# Patient Record
Sex: FEMALE | Race: WHITE | Employment: OTHER | URBAN - METROPOLITAN AREA
[De-identification: names, ages, dates, MRNs, and addresses within clinical notes are randomized per-mention and may not be internally consistent; named-entity substitution may affect disease eponyms.]

---

## 2017-02-13 ENCOUNTER — HOSPITAL ENCOUNTER (OUTPATIENT)
Dept: LAB | Age: 72
Discharge: HOME | End: 2017-02-13
Payer: MEDICARE

## 2017-02-13 DIAGNOSIS — Z12.11 SCREENING FOR COLON CANCER: ICD-10-CM

## 2017-02-13 PROCEDURE — 82274 ASSAY TEST FOR BLOOD FECAL: CPT | Performed by: FAMILY MEDICINE

## 2017-02-23 LAB — HEMOCCULT STL QL IA: POSITIVE

## 2017-02-24 DIAGNOSIS — R19.5 POSITIVE FIT (FECAL IMMUNOCHEMICAL TEST): Primary | ICD-10-CM

## 2017-02-24 NOTE — PROGRESS NOTES
Spoke with patient (2 verifiers name/) regarding FIT test is positive and for now Dr Trace Finley is sending her to GI for further evaluation. Patient stated she will contact the office with the name of the GI specialist she will like to see. Patient voiced understanding.

## 2017-03-13 ENCOUNTER — HOSPITAL ENCOUNTER (OUTPATIENT)
Dept: LAB | Age: 72
Discharge: HOME | End: 2017-03-13
Payer: MEDICARE

## 2017-03-13 DIAGNOSIS — R73.03 BORDERLINE DIABETES: ICD-10-CM

## 2017-03-13 DIAGNOSIS — R73.01 IMPAIRED FASTING BLOOD SUGAR: Primary | ICD-10-CM

## 2017-03-13 DIAGNOSIS — R73.01 IMPAIRED FASTING BLOOD SUGAR: ICD-10-CM

## 2017-03-13 DIAGNOSIS — E78.5 HYPERLIPIDEMIA, UNSPECIFIED HYPERLIPIDEMIA TYPE: ICD-10-CM

## 2017-03-13 DIAGNOSIS — Z11.59 NEED FOR HEPATITIS C SCREENING TEST: ICD-10-CM

## 2017-03-13 DIAGNOSIS — I10 ESSENTIAL HYPERTENSION: ICD-10-CM

## 2017-03-13 LAB
ALBUMIN SERPL BCP-MCNC: 3.6 G/DL (ref 3.4–5)
ALBUMIN/GLOB SERPL: 1.2 {RATIO} (ref 0.8–1.7)
ALP SERPL-CCNC: 43 U/L (ref 45–117)
ALT SERPL-CCNC: 28 U/L (ref 13–56)
ANION GAP BLD CALC-SCNC: 5 MMOL/L (ref 3–18)
AST SERPL W P-5'-P-CCNC: 17 U/L (ref 15–37)
BILIRUB SERPL-MCNC: 0.7 MG/DL (ref 0.2–1)
BUN SERPL-MCNC: 21 MG/DL (ref 7–18)
BUN/CREAT SERPL: 20 (ref 12–20)
CALCIUM SERPL-MCNC: 9.4 MG/DL (ref 8.5–10.1)
CHLORIDE SERPL-SCNC: 104 MMOL/L (ref 100–108)
CHOLEST SERPL-MCNC: 154 MG/DL
CO2 SERPL-SCNC: 33 MMOL/L (ref 21–32)
CREAT SERPL-MCNC: 1.05 MG/DL (ref 0.6–1.3)
EST. AVERAGE GLUCOSE BLD GHB EST-MCNC: 114 MG/DL
GLOBULIN SER CALC-MCNC: 3 G/DL (ref 2–4)
GLUCOSE SERPL-MCNC: 103 MG/DL (ref 74–99)
HBA1C MFR BLD: 5.6 % (ref 4.2–5.6)
HCV AB SER IA-ACNC: 0.05 INDEX
HCV AB SERPL QL IA: NEGATIVE
HCV COMMENT,HCGAC: NORMAL
HDLC SERPL-MCNC: 66 MG/DL (ref 40–60)
HDLC SERPL: 2.3 {RATIO} (ref 0–5)
LDLC SERPL CALC-MCNC: 71.2 MG/DL (ref 0–100)
LIPID PROFILE,FLP: ABNORMAL
POTASSIUM SERPL-SCNC: 4.8 MMOL/L (ref 3.5–5.5)
PROT SERPL-MCNC: 6.6 G/DL (ref 6.4–8.2)
SODIUM SERPL-SCNC: 142 MMOL/L (ref 136–145)
TRIGL SERPL-MCNC: 84 MG/DL (ref ?–150)
VLDLC SERPL CALC-MCNC: 16.8 MG/DL

## 2017-03-13 PROCEDURE — 86803 HEPATITIS C AB TEST: CPT | Performed by: FAMILY MEDICINE

## 2017-03-13 PROCEDURE — 83036 HEMOGLOBIN GLYCOSYLATED A1C: CPT | Performed by: FAMILY MEDICINE

## 2017-03-13 PROCEDURE — 80061 LIPID PANEL: CPT | Performed by: FAMILY MEDICINE

## 2017-03-13 PROCEDURE — 80053 COMPREHEN METABOLIC PANEL: CPT | Performed by: FAMILY MEDICINE

## 2017-03-13 PROCEDURE — 36415 COLL VENOUS BLD VENIPUNCTURE: CPT | Performed by: FAMILY MEDICINE

## 2017-03-13 NOTE — PROGRESS NOTES
Let pt know that improvement in lipid panel and diabetes test. Continue current plan and further discussion on follow up visit. Thanks.

## 2017-03-16 NOTE — PROGRESS NOTES
Spoke with patient (2 verifiers name/) regarding improvement in lipid panel and diabetes test.  Patient informed to continue current plan and further discussion on follow up visit. Patient voiced understanding.

## 2017-03-20 DIAGNOSIS — I10 ESSENTIAL HYPERTENSION: ICD-10-CM

## 2017-03-20 RX ORDER — LISINOPRIL 20 MG/1
TABLET ORAL
Qty: 30 TAB | Refills: 0 | Status: SHIPPED | OUTPATIENT
Start: 2017-03-20 | End: 2017-03-22 | Stop reason: SDUPTHER

## 2017-03-21 DIAGNOSIS — I10 ESSENTIAL HYPERTENSION: ICD-10-CM

## 2017-03-22 RX ORDER — LISINOPRIL 20 MG/1
TABLET ORAL
Qty: 30 TAB | Refills: 0 | Status: SHIPPED | OUTPATIENT
Start: 2017-03-22 | End: 2017-05-26 | Stop reason: SDUPTHER

## 2017-04-13 ENCOUNTER — TELEPHONE (OUTPATIENT)
Dept: FAMILY MEDICINE CLINIC | Age: 72
End: 2017-04-13

## 2017-04-13 NOTE — TELEPHONE ENCOUNTER
Left a voice message asking patient to return call to [facility] [place] [facility], [phone] option 0. Ask for [address]. Patient was supposed to contact [facility] to advise name of GI provider she wanted to see. A [facility] message has been routed to the patient, as well.

## 2017-05-16 ENCOUNTER — TELEPHONE (OUTPATIENT)
Dept: FAMILY MEDICINE CLINIC | Age: 72
End: 2017-05-16

## 2017-05-16 DIAGNOSIS — H40.9 UNSPECIFIED GLAUCOMA(365.9): Primary | ICD-10-CM

## 2017-05-16 NOTE — TELEPHONE ENCOUNTER
Patient is requesting (New  Updated) referral to the following office:    Speciality: ophthalmology  Specialist Name: Dr Diaz Madison  Office Location: [address] [place] [address] [address]  Phone (if available): [phone]  Fax (if available): [phone]  Diagnosis: Glaucoma /continue care   Date of appointment (if scheduled): 5/172017 [facility] [place]

## 2017-05-16 NOTE — TELEPHONE ENCOUNTER
A [employer] approval has been obtained for consult with Dr. Symone Del Castillo; Auth: [id number] beginning 5/16/17 and expires 5/16/18 for unlimited visits. The approval has been faxed to Dr. Symone Del Castillo. A fax confirmation has been received.

## 2017-05-24 DIAGNOSIS — I10 ESSENTIAL HYPERTENSION: ICD-10-CM

## 2017-05-24 DIAGNOSIS — E78.5 HYPERLIPIDEMIA, UNSPECIFIED HYPERLIPIDEMIA TYPE: ICD-10-CM

## 2017-05-24 NOTE — TELEPHONE ENCOUNTER
Patient is requesting a 90 day refill on Lisinopril. She took her last Lisinopril pill on 5/23/17. Also, needs 90 day refill on Simvastatin. LOV: 12/14/16  Last refill: lisinopril 3/22/17 and Simvastatin 12/14/16.   Last labs: 3/13/17

## 2017-05-25 DIAGNOSIS — I10 ESSENTIAL HYPERTENSION: ICD-10-CM

## 2017-05-26 RX ORDER — LISINOPRIL 20 MG/1
TABLET ORAL
Qty: 30 TAB | Refills: 0 | Status: SHIPPED | COMMUNITY
Start: 2017-05-26 | End: 2017-05-30 | Stop reason: SDUPTHER

## 2017-05-26 RX ORDER — LISINOPRIL 20 MG/1
20 TABLET ORAL DAILY
Qty: 30 TAB | Refills: 0 | Status: SHIPPED | COMMUNITY
Start: 2017-05-26 | End: 2017-05-30 | Stop reason: SDUPTHER

## 2017-05-26 RX ORDER — SIMVASTATIN 10 MG/1
10 TABLET, FILM COATED ORAL
Qty: 30 TAB | Refills: 0 | Status: SHIPPED | COMMUNITY
Start: 2017-05-26 | End: 2017-05-30 | Stop reason: SDUPTHER

## 2017-05-30 DIAGNOSIS — E78.5 HYPERLIPIDEMIA, UNSPECIFIED HYPERLIPIDEMIA TYPE: ICD-10-CM

## 2017-05-30 DIAGNOSIS — I10 ESSENTIAL HYPERTENSION: ICD-10-CM

## 2017-05-30 DIAGNOSIS — R19.5 POSITIVE FIT (FECAL IMMUNOCHEMICAL TEST): Primary | ICD-10-CM

## 2017-05-30 RX ORDER — LISINOPRIL 20 MG/1
20 TABLET ORAL DAILY
Qty: 90 TAB | Refills: 0 | Status: SHIPPED | OUTPATIENT
Start: 2017-05-30 | End: 2017-08-28

## 2017-05-30 RX ORDER — SIMVASTATIN 10 MG/1
10 TABLET, FILM COATED ORAL
Qty: 90 TAB | Refills: 0 | Status: SHIPPED | OUTPATIENT
Start: 2017-05-30 | End: 2017-06-29

## 2018-09-19 ENCOUNTER — DOCUMENTATION ONLY (OUTPATIENT)
Dept: INTERNAL MEDICINE CLINIC | Age: 73
End: 2018-09-19

## 2021-07-05 ENCOUNTER — HOSPITAL ENCOUNTER (INPATIENT)
Age: 76
LOS: 1 days | Discharge: HOME | DRG: 125 | End: 2021-07-07
Attending: EMERGENCY MEDICINE | Admitting: HOSPITALIST
Payer: MEDICARE

## 2021-07-05 ENCOUNTER — APPOINTMENT (OUTPATIENT)
Dept: CT IMAGING | Age: 76
DRG: 125 | End: 2021-07-05
Attending: PHYSICIAN ASSISTANT
Payer: MEDICARE

## 2021-07-05 ENCOUNTER — APPOINTMENT (OUTPATIENT)
Dept: GENERAL RADIOLOGY | Age: 76
DRG: 125 | End: 2021-07-05
Attending: PHYSICIAN ASSISTANT
Payer: MEDICARE

## 2021-07-05 DIAGNOSIS — N30.00 ACUTE CYSTITIS WITHOUT HEMATURIA: ICD-10-CM

## 2021-07-05 DIAGNOSIS — G93.41 ACUTE METABOLIC ENCEPHALOPATHY: ICD-10-CM

## 2021-07-05 DIAGNOSIS — R41.0 CONFUSION: Primary | ICD-10-CM

## 2021-07-05 LAB
ANION GAP SERPL CALC-SCNC: 5 MMOL/L (ref 3–18)
BASOPHILS # BLD: 0 K/UL (ref 0–0.1)
BASOPHILS NFR BLD: 0 % (ref 0–2)
BUN SERPL-MCNC: 14 MG/DL (ref 7–18)
BUN/CREAT SERPL: 15 (ref 12–20)
CALCIUM SERPL-MCNC: 9.3 MG/DL (ref 8.5–10.1)
CHLORIDE SERPL-SCNC: 105 MMOL/L (ref 100–111)
CO2 SERPL-SCNC: 28 MMOL/L (ref 21–32)
CREAT SERPL-MCNC: 0.96 MG/DL (ref 0.6–1.3)
DIFFERENTIAL METHOD BLD: ABNORMAL
EOSINOPHIL # BLD: 0 K/UL (ref 0–0.4)
EOSINOPHIL NFR BLD: 0 % (ref 0–5)
ERYTHROCYTE [DISTWIDTH] IN BLOOD BY AUTOMATED COUNT: 13.5 % (ref 11.6–14.5)
ETHANOL SERPL-MCNC: <3 MG/DL (ref 0–3)
GLUCOSE SERPL-MCNC: 99 MG/DL (ref 74–99)
HCT VFR BLD AUTO: 38.3 % (ref 35–45)
HGB BLD-MCNC: 12 G/DL (ref 12–16)
LYMPHOCYTES # BLD: 1.8 K/UL (ref 0.9–3.6)
LYMPHOCYTES NFR BLD: 20 % (ref 21–52)
MAGNESIUM SERPL-MCNC: 1.9 MG/DL (ref 1.6–2.6)
MCH RBC QN AUTO: 28.9 PG (ref 24–34)
MCHC RBC AUTO-ENTMCNC: 31.3 G/DL (ref 31–37)
MCV RBC AUTO: 92.3 FL (ref 74–97)
MONOCYTES # BLD: 0.6 K/UL (ref 0.05–1.2)
MONOCYTES NFR BLD: 7 % (ref 3–10)
NEUTS SEG # BLD: 6.7 K/UL (ref 1.8–8)
NEUTS SEG NFR BLD: 73 % (ref 40–73)
PLATELET # BLD AUTO: 220 K/UL (ref 135–420)
PMV BLD AUTO: 10.3 FL (ref 9.2–11.8)
POTASSIUM SERPL-SCNC: 3.3 MMOL/L (ref 3.5–5.5)
RBC # BLD AUTO: 4.15 M/UL (ref 4.2–5.3)
SODIUM SERPL-SCNC: 138 MMOL/L (ref 136–145)
TSH SERPL DL<=0.05 MIU/L-ACNC: 0.98 UIU/ML (ref 0.36–3.74)
WBC # BLD AUTO: 9.2 K/UL (ref 4.6–13.2)

## 2021-07-05 PROCEDURE — 84443 ASSAY THYROID STIM HORMONE: CPT

## 2021-07-05 PROCEDURE — 86592 SYPHILIS TEST NON-TREP QUAL: CPT

## 2021-07-05 PROCEDURE — 85025 COMPLETE CBC W/AUTO DIFF WBC: CPT

## 2021-07-05 PROCEDURE — 83735 ASSAY OF MAGNESIUM: CPT

## 2021-07-05 PROCEDURE — 82607 VITAMIN B-12: CPT

## 2021-07-05 PROCEDURE — 71046 X-RAY EXAM CHEST 2 VIEWS: CPT

## 2021-07-05 PROCEDURE — 82746 ASSAY OF FOLIC ACID SERUM: CPT

## 2021-07-05 PROCEDURE — 93005 ELECTROCARDIOGRAM TRACING: CPT

## 2021-07-05 PROCEDURE — 70450 CT HEAD/BRAIN W/O DYE: CPT

## 2021-07-05 PROCEDURE — 82077 ASSAY SPEC XCP UR&BREATH IA: CPT

## 2021-07-05 PROCEDURE — 99284 EMERGENCY DEPT VISIT MOD MDM: CPT

## 2021-07-05 PROCEDURE — 80048 BASIC METABOLIC PNL TOTAL CA: CPT

## 2021-07-05 NOTE — ED NOTES
Visual and auditory hallucinations x 3-4 months. Decreased appetite, decreased sleep. No hx of similar. I performed a brief evaluation, including history and physical, of the patient here in triage and I have determined that pt will need further treatment and evaluation from the main side ER physician. I have placed initial orders to help in expediting patients care.      July 05, 2021 at 3:06 PM - JAZIEL Alvarado

## 2021-07-05 NOTE — Clinical Note
Status[de-identified] INPATIENT [101]   Type of Bed: Telemetry [19]   Cardiac Monitoring Required?: Yes   Inpatient Hospitalization Certified Necessary for the Following Reasons: 3.  Patient receiving treatment that can only be provided in an inpatient setting (further clarification in H&P documentation)   Admitting Diagnosis: UTI (urinary tract infection) [566221]   Admitting Physician: [place], [address]   Attending Physician: Irineo Tristan   Estimated Length of Stay: 2 Midnights   Discharge Plan[de-identified] Extended Care Facility (e.g. Adult Home, Nursing Home, etc.)

## 2021-07-05 NOTE — ED NOTES
ED Course as of Jul 05 1607 Mon Jul 05, 2021   1527 Called for patient in the waiting area no answer    [CB]   1555 Called again for the patient including the main lobby no answer    [CB]      ED Course User Index  [CB] Vita Bosworth, MD     Did not have contact with the patient. Discussed with triage nurse in case she locates the patient.

## 2021-07-05 NOTE — ED TRIAGE NOTES
Pt arrived for mental health with visual hallucinations seeing random adults shooting children, auditory hallucinations telling pt to hurt children by shooting them starting 3-4 years ago. Friend reports pt is not eating, not sleeping, forgetful.   Pt has not slept in two days

## 2021-07-05 NOTE — ED PROVIDER NOTES
EMERGENCY DEPARTMENT HISTORY AND PHYSICAL EXAM    6:24 PM difficulty finding the patient. Patient interviewed in the [facility] track room at this time      Date: 7/5/2021  Patient Name: Len Serna    History of Presenting Illness     Chief Complaint   Patient presents with   [address] Problem         History Provided By: patient    Additional History (Context): Len Serna is a 78 y.o. female presents with complains of memory and not being good, states some executive confusion. After lengthy discussion seems she has some confabulations and some inappropriate word usage. No organic complaint denies drug use. She tries to tell me that she works part-time and had to close the business and seemed to motion with her hands a key turning in a lock but said it was a computer. I asked her the name of the company and she was unable to tell me. Further making me think that this is confabulation. There is no contributory history in the electronic medical record no frequent visits just some primary care stuff from previous and no mention of dementia or other. Denies any psychiatric diagnosis. Demographics lists 1 friend but no phone number, and a son, and there was no answer at his number. .   Also reporting visual hallucinations of violence committed by people she knows, she understands these are not reality. PCP: Kathy Márquez MD    Chief Complaint:   Duration:    Timing:    Location:   Quality:   Severity:   Modifying Factors:   Associated Symptoms:       Current Outpatient Medications   Medication Sig Dispense Refill    ASCORBIC ACID/MULTIVIT-MIN (EMERGEN-C PO) Take  by mouth daily.  cycloSPORINE (RESTASIS) 0.05 % ophthalmic emulsion Administer 1 Drop to both eyes two (2) times a day.  Aspirin, Buffered 81 mg tab 81 mg daily.  MULTIVITAMIN PO Take  by mouth.  brimonidine-timolol (COMBIGAN) 0.2-0.5 % Drop ophthalmic solution 1 Drop every twelve (12) hours.       fish oil-dha-epa 1,200-144-216 mg cap Take  by mouth.  cinnamon bark (CINNAMON) 500 mg cap Take 500 mg by mouth two (2) times a day.  ascorbic acid (VITAMIN C) 500 mg tablet Take  by mouth.  garlic 9,817 mg cap Take 1,000 mg by mouth four (4) times daily.  BETA-CAROTENE,A, W-C & E/MIN (VISION FORMULA PO) Take  by mouth.  lutein 20 mg Tab Take 20 mg by mouth daily.  melatonin 10 mg Tab Take 10 mg by mouth daily.  ACETAMINOPHEN (TYLENOL PO) Take  by mouth.  NAPROXEN PO Take  by mouth. Past History     Past Medical History:  Past Medical History:   Diagnosis Date    Anxiety     Arrhythmia 2011    Negative Stress test    Arthritis     Carotid artery stenosis 2009    <50%    Gestational diabetes     Glaucoma     Hypercholesterolemia     Hypertension        Past Surgical History:  Past Surgical History:   Procedure Laterality Date    HX CHOLECYSTECTOMY  1990    gallstones    HX HYSTERECTOMY  1990    total hysterectomy       Family History:  Family History   Problem Relation Age of Onset    Arthritis-osteo Mother     Dementia Mother     Hypertension Mother     Coronary Artery Disease Mother        Social History:  Social History     Tobacco Use    Smoking status: Never Smoker    Smokeless tobacco: Never Used   Substance Use Topics    Alcohol use: Yes     Comment: Socially    Drug use: Not on file       Allergies: Allergies   Allergen Reactions    Codeine Nausea Only         Review of Systems     Review of Systems   Constitutional: Negative for diaphoresis and fever. HENT: Negative for congestion and sore throat. Eyes: Negative for pain and itching. Respiratory: Negative for cough and shortness of breath. Cardiovascular: Negative for chest pain and palpitations. Gastrointestinal: Negative for abdominal pain and diarrhea. Endocrine: Negative for polydipsia and polyuria. Genitourinary: Negative for dysuria and hematuria.    Musculoskeletal: Negative for arthralgias and myalgias. Skin: Negative for rash and wound. Neurological: Negative for seizures and syncope. Hematological: Does not bruise/bleed easily. Psychiatric/Behavioral: Positive for confusion and hallucinations. Negative for agitation. Physical Exam       Patient Vitals for the past 12 hrs:   Temp Pulse Resp BP SpO2   07/05/21 1510 98.9 °F (37.2 °C) (!) 103 18 (!) 144/97 96 %       Physical Exam  Vitals and nursing note reviewed. Constitutional:       General: She is not in acute distress. Appearance: Normal appearance. She is well-developed and normal weight. She is not ill-appearing. HENT:      Head: Normocephalic and atraumatic. Eyes:      General: No scleral icterus. Conjunctiva/sclera: Conjunctivae normal.   Neck:      Vascular: No JVD. Cardiovascular:      Rate and Rhythm: Normal rate and regular rhythm. Pulmonary:      Effort: Pulmonary effort is normal. No respiratory distress. Musculoskeletal:         General: Normal range of motion. Cervical back: Normal range of motion and neck supple. Skin:     General: Skin is warm and dry. Neurological:      General: No focal deficit present. Mental Status: She is alert. Cranial Nerves: No cranial nerve deficit. Psychiatric:         Mood and Affect: Mood normal.         Behavior: Behavior normal.         Judgment: Judgment normal.      Comments: Thought process is tangential, suspect some confabulation, does have visual hallucinations, there is no overt psychosis.   No suicidal or homicidal ideation           Diagnostic Study Results   Labs -  Recent Results (from the past 12 hour(s))   EKG, 12 LEAD, INITIAL    Collection Time: 07/05/21  3:22 PM   Result Value Ref Range    Ventricular Rate 88 BPM    Atrial Rate 88 BPM    P-R Interval 128 ms    QRS Duration 106 ms    Q-T Interval 382 ms    QTC Calculation (Bezet) 462 ms    Calculated P Axis 53 degrees    Calculated R Axis -12 degrees    Calculated T Axis 123 degrees    Diagnosis       Sinus rhythm with premature atrial complexes  Possible Left atrial enlargement  Incomplete right bundle branch block  Septal infarct , age undetermined  Abnormal ECG  No previous ECGs available     ETHYL ALCOHOL    Collection Time: 07/05/21  3:28 PM   Result Value Ref Range    ALCOHOL(ETHYL),SERUM <3 0 - 3 MG/DL   CBC WITH AUTOMATED DIFF    Collection Time: 07/05/21  3:28 PM   Result Value Ref Range    WBC 9.2 4.6 - 13.2 K/uL    RBC 4.15 (L) 4.20 - 5.30 M/uL    HGB 12.0 12.0 - 16.0 g/dL    HCT 38.3 35.0 - 45.0 %    MCV 92.3 74.0 - 97.0 FL    MCH 28.9 24.0 - 34.0 PG    MCHC 31.3 31.0 - 37.0 g/dL    RDW 13.5 11.6 - 14.5 %    PLATELET 911 035 - 545 K/uL    MPV 10.3 9.2 - 11.8 FL    NEUTROPHILS 73 40 - 73 %    LYMPHOCYTES 20 (L) 21 - 52 %    MONOCYTES 7 3 - 10 %    EOSINOPHILS 0 0 - 5 %    BASOPHILS 0 0 - 2 %    ABS. NEUTROPHILS 6.7 1.8 - 8.0 K/UL    ABS. LYMPHOCYTES 1.8 0.9 - 3.6 K/UL    ABS. MONOCYTES 0.6 0.05 - 1.2 K/UL    ABS. EOSINOPHILS 0.0 0.0 - 0.4 K/UL    ABS. BASOPHILS 0.0 0.0 - 0.1 K/UL    DF AUTOMATED     METABOLIC PANEL, BASIC    Collection Time: 07/05/21  3:28 PM   Result Value Ref Range    Sodium 138 136 - 145 mmol/L    Potassium 3.3 (L) 3.5 - 5.5 mmol/L    Chloride 105 100 - 111 mmol/L    CO2 28 21 - 32 mmol/L    Anion gap 5 3.0 - 18 mmol/L    Glucose 99 74 - 99 mg/dL    BUN 14 7.0 - 18 MG/DL    Creatinine 0.96 0.6 - 1.3 MG/DL    BUN/Creatinine ratio 15 12 - 20      GFR est AA >60 >60 ml/min/1.73m2    GFR est non-AA 57 (L) >60 ml/min/1.73m2    Calcium 9.3 8.5 - 10.1 MG/DL   TSH 3RD GENERATION    Collection Time: 07/05/21  3:28 PM   Result Value Ref Range    TSH 0.98 0.36 - 3.74 uIU/mL   MAGNESIUM    Collection Time: 07/05/21  3:28 PM   Result Value Ref Range    Magnesium 1.9 1.6 - 2.6 mg/dL       Radiologic Studies -   CT HEAD WO CONT   Final Result      No acute intracranial abnormality.    Note: If clinical concern of acute stroke, MRI with diffusion weighted images is recommended for better evaluation. Mild microvascular disease of white matter. Thank you for your referral.      XR CHEST PA LAT   Final Result   1. No acute infiltrate or effusion. MRI BRAIN WO CONT    (Results Pending)     XR CHEST PA LAT    Result Date: 7/5/2021  EXAM: Chest Radiographs INDICATION:  AMS TECHNIQUE: PA and lateral views of the chest COMPARISON: None. FINDINGS: No pneumothorax identified. The lungs are clear. No infiltrates identified. No effusions appreciated. The cardiomediastinal silhouette is unremarkable. The pulmonary vascularity is unremarkable. Degenerative changes shoulders and spine are noted. 1.  No acute infiltrate or effusion. CT HEAD WO CONT    Result Date: 7/5/2021  CT of the head without contrast HISTORY: hallucinations, altered mental status COMPARISON: None TECHNIQUE: Helical axial scan to the head was performed from the skull base to the vertex without IV contrast administration. All CT scans at this facility performed using dose optimization techniques as appreciated to a performed exam, to include automated exposure control, adjustment of the mA and or KU according to patient size (including appropriate matching for site specific examination), or use of iterative reconstruction technique. FINDINGS: Brain volume appears unremarkable for age. No ventricular dilatation. The gray-white matter differentiation is preserved. Mild periventricular white matter hypodensities present. There is no evidence of acute intracranial hemorrhage,  mass effect or midline shift identified. No skull fracture or extra axial fluid collections identified. Visualized sinuses and mastoid air cells appear unremarkable. No acute intracranial abnormality. Note: If clinical concern of acute stroke, MRI with diffusion weighted images is recommended for better evaluation. Mild microvascular disease of white matter.  Thank you for your referral.      Medications ordered: Medications - No data to display      Medical Decision Making   Initial Medical Decision Making and DDx:  Valuate for acute cause of delirium and confusion. Were still waiting on drug screen and urinalysis. Head CT no acute lesion or bleed there is no focal neurologic deficit on her exam.  No metabolic cause, uremia hypoglycemia anemia to account for her mental state thyroid is not deranged. ED Course: Progress Notes, Reevaluation, and Consults:  ED Course as of Jul 05 2106 Mon Jul 05, 2021   1527 Called for patient in the waiting area no answer    [CB]   1555 Called again for the patient including the main lobby no answer    [CB]   1844 D/w Dr Kai Mcgraw teleneurology recommends MRI to exclude a mass lesion formal evaluation by inpatient neurology, possibly EEG. Aborted the MRI.    [CB]      ED Course User Index  [CB] Brent Lee MD     9:06 PM long delay in getting urinalysis. MRI is ordered. Discussed with Dr. Ayesha Tejeda hospitalist for admission, points out timeline is unknown with this if this is chronic confusion or dementia does not require admission. Will reconsider admission after the urinalysis. Spoke to her emergency contact and friend Corrina Obregon who emphasizes presence of hallucinations. May be a geriatric psych case. Signed out to Dr. Zoie Ojeda in the emergency department pending ultimate disposition    I am the first provider for this patient. I reviewed the vital signs, available nursing notes, past medical history, past surgical history, family history and social history. Patient Vitals for the past 12 hrs:   Temp Pulse Resp BP SpO2   07/05/21 1510 98.9 °F (37.2 °C) (!) 103 18 (!) 144/97 96 %       Vital Signs-Reviewed the patient's vital signs. Pulse Oximetry Analysis, Cardiac Monitor, 12 lead ekg: No hypoxia on room air  Interpreted by the EP.       Records Reviewed: Nursing notes reviewed (Time of Review: 6:24 PM)    Procedures:   Critical Care Time:   Aspirin: (was aspirin given for stroke?)    Diagnosis     Clinical Impression:   1. Confusion        Disposition:       Follow-up Information    None          Patient's Medications   Start Taking    No medications on file   Continue Taking    ACETAMINOPHEN (TYLENOL PO)    Take  by mouth. ASCORBIC ACID (VITAMIN C) 500 MG TABLET    Take  by mouth. ASCORBIC ACID/MULTIVIT-MIN (EMERGEN-C PO)    Take  by mouth daily. ASPIRIN, BUFFERED 81 MG TAB    81 mg daily. BETA-CAROTENE,A, W-C & E/MIN (VISION FORMULA PO)    Take  by mouth. BRIMONIDINE-TIMOLOL (COMBIGAN) 0.2-0.5 % DROP OPHTHALMIC SOLUTION    1 Drop every twelve (12) hours. CINNAMON BARK (CINNAMON) 500 MG CAP    Take 500 mg by mouth two (2) times a day. CYCLOSPORINE (RESTASIS) 0.05 % OPHTHALMIC EMULSION    Administer 1 Drop to both eyes two (2) times a day. FISH OIL-DHA-EPA 1,200-144-216 MG CAP    Take  by mouth. GARLIC 9,565 MG CAP    Take 1,000 mg by mouth four (4) times daily. LUTEIN 20 MG TAB    Take 20 mg by mouth daily. MELATONIN 10 MG TAB    Take 10 mg by mouth daily. MULTIVITAMIN PO    Take  by mouth. NAPROXEN PO    Take  by mouth.    These Medications have changed    No medications on file   Stop Taking    No medications on file     _______________________________    Notes:    Patrick Mccall MD using Dragon dictation      _______________________________

## 2021-07-06 ENCOUNTER — APPOINTMENT (OUTPATIENT)
Dept: MRI IMAGING | Age: 76
DRG: 125 | End: 2021-07-06
Attending: EMERGENCY MEDICINE
Payer: MEDICARE

## 2021-07-06 VITALS
DIASTOLIC BLOOD PRESSURE: 79 MMHG | HEIGHT: 68 IN | SYSTOLIC BLOOD PRESSURE: 136 MMHG | OXYGEN SATURATION: 98 % | RESPIRATION RATE: 16 BRPM | BODY MASS INDEX: 23.34 KG/M2 | WEIGHT: 154 LBS | HEART RATE: 88 BPM | TEMPERATURE: 97.8 F

## 2021-07-06 PROBLEM — N39.0 UTI (URINARY TRACT INFECTION): Status: ACTIVE | Noted: 2021-07-06

## 2021-07-06 LAB
AMPHET UR QL SCN: NEGATIVE
APPEARANCE UR: ABNORMAL
ATRIAL RATE: 88 BPM
BACTERIA URNS QL MICRO: ABNORMAL /HPF
BARBITURATES UR QL SCN: NEGATIVE
BENZODIAZ UR QL: NEGATIVE
BILIRUB UR QL: NEGATIVE
CALCULATED P AXIS, ECG09: 53 DEGREES
CALCULATED R AXIS, ECG10: -12 DEGREES
CALCULATED T AXIS, ECG11: 123 DEGREES
CANNABINOIDS UR QL SCN: NEGATIVE
COCAINE UR QL SCN: NEGATIVE
COLOR UR: YELLOW
COVID-19 RAPID TEST, COVR: NOT DETECTED
DIAGNOSIS, 93000: NORMAL
EPITH CASTS URNS QL MICRO: ABNORMAL /LPF (ref 0–5)
FOLATE SERPL-MCNC: 8.2 NG/ML (ref 3.1–17.5)
GLUCOSE UR STRIP.AUTO-MCNC: NEGATIVE MG/DL
HDSCOM,HDSCOM: NORMAL
HGB UR QL STRIP: NEGATIVE
KETONES UR QL STRIP.AUTO: NEGATIVE MG/DL
LEUKOCYTE ESTERASE UR QL STRIP.AUTO: ABNORMAL
METHADONE UR QL: NEGATIVE
NITRITE UR QL STRIP.AUTO: POSITIVE
OPIATES UR QL: NEGATIVE
P-R INTERVAL, ECG05: 128 MS
PCP UR QL: NEGATIVE
PH UR STRIP: 5.5 [PH] (ref 5–8)
PROT UR STRIP-MCNC: NEGATIVE MG/DL
Q-T INTERVAL, ECG07: 382 MS
QRS DURATION, ECG06: 106 MS
QTC CALCULATION (BEZET), ECG08: 462 MS
SOURCE, COVRS: NORMAL
SP GR UR REFRACTOMETRY: 1.01 (ref 1–1.03)
UROBILINOGEN UR QL STRIP.AUTO: 0.2 EU/DL (ref 0.2–1)
VENTRICULAR RATE, ECG03: 88 BPM
VIT B12 SERPL-MCNC: 327 PG/ML (ref 211–911)
WBC URNS QL MICRO: ABNORMAL /HPF (ref 0–5)

## 2021-07-06 PROCEDURE — 99222 1ST HOSP IP/OBS MODERATE 55: CPT | Performed by: STUDENT IN AN ORGANIZED HEALTH CARE EDUCATION/TRAINING PROGRAM

## 2021-07-06 PROCEDURE — 80307 DRUG TEST PRSMV CHEM ANLYZR: CPT

## 2021-07-06 PROCEDURE — 74011250636 HC RX REV CODE- 250/636: Performed by: HOSPITALIST

## 2021-07-06 PROCEDURE — 74011250637 HC RX REV CODE- 250/637: Performed by: HOSPITALIST

## 2021-07-06 PROCEDURE — 99221 1ST HOSP IP/OBS SF/LOW 40: CPT | Performed by: PSYCHIATRY & NEUROLOGY

## 2021-07-06 PROCEDURE — 74011250636 HC RX REV CODE- 250/636: Performed by: EMERGENCY MEDICINE

## 2021-07-06 PROCEDURE — 74011250637 HC RX REV CODE- 250/637: Performed by: INTERNAL MEDICINE

## 2021-07-06 PROCEDURE — 99222 1ST HOSP IP/OBS MODERATE 55: CPT | Performed by: HOSPITALIST

## 2021-07-06 PROCEDURE — 74011000250 HC RX REV CODE- 250: Performed by: EMERGENCY MEDICINE

## 2021-07-06 PROCEDURE — 81001 URINALYSIS AUTO W/SCOPE: CPT

## 2021-07-06 PROCEDURE — 87635 SARS-COV-2 COVID-19 AMP PRB: CPT

## 2021-07-06 PROCEDURE — 65660000000 HC RM CCU STEPDOWN

## 2021-07-06 PROCEDURE — 99232 SBSQ HOSP IP/OBS MODERATE 35: CPT | Performed by: INTERNAL MEDICINE

## 2021-07-06 RX ORDER — GUAIFENESIN 100 MG/5ML
81 LIQUID (ML) ORAL DAILY
Status: DISCONTINUED | OUTPATIENT
Start: 2021-07-06 | End: 2021-07-07 | Stop reason: HOSPADM

## 2021-07-06 RX ORDER — POTASSIUM CHLORIDE 20 MEQ/1
40 TABLET, EXTENDED RELEASE ORAL
Status: COMPLETED | OUTPATIENT
Start: 2021-07-06 | End: 2021-07-06

## 2021-07-06 RX ORDER — LANOLIN ALCOHOL/MO/W.PET/CERES
100 CREAM (GRAM) TOPICAL DAILY
Status: DISCONTINUED | OUTPATIENT
Start: 2021-07-06 | End: 2021-07-07 | Stop reason: HOSPADM

## 2021-07-06 RX ORDER — ACETAMINOPHEN 650 MG/1
650 SUPPOSITORY RECTAL
Status: DISCONTINUED | OUTPATIENT
Start: 2021-07-06 | End: 2021-07-07 | Stop reason: HOSPADM

## 2021-07-06 RX ORDER — ONDANSETRON 2 MG/ML
4 INJECTION INTRAMUSCULAR; INTRAVENOUS
Status: DISCONTINUED | OUTPATIENT
Start: 2021-07-06 | End: 2021-07-07 | Stop reason: HOSPADM

## 2021-07-06 RX ORDER — LEVOFLOXACIN 750 MG/1
750 TABLET ORAL EVERY 24 HOURS
Status: DISCONTINUED | OUTPATIENT
Start: 2021-07-06 | End: 2021-07-07 | Stop reason: HOSPADM

## 2021-07-06 RX ORDER — ACETAMINOPHEN 325 MG/1
650 TABLET ORAL
Status: DISCONTINUED | OUTPATIENT
Start: 2021-07-06 | End: 2021-07-07 | Stop reason: HOSPADM

## 2021-07-06 RX ORDER — FOLIC ACID 1 MG/1
1 TABLET ORAL DAILY
Status: DISCONTINUED | OUTPATIENT
Start: 2021-07-06 | End: 2021-07-07 | Stop reason: HOSPADM

## 2021-07-06 RX ORDER — PROMETHAZINE HYDROCHLORIDE 25 MG/1
12.5 TABLET ORAL
Status: DISCONTINUED | OUTPATIENT
Start: 2021-07-06 | End: 2021-07-07 | Stop reason: HOSPADM

## 2021-07-06 RX ORDER — LORAZEPAM 2 MG/ML
1 INJECTION INTRAMUSCULAR
Status: DISCONTINUED | OUTPATIENT
Start: 2021-07-06 | End: 2021-07-07 | Stop reason: HOSPADM

## 2021-07-06 RX ORDER — SODIUM CHLORIDE 0.9 % (FLUSH) 0.9 %
5-40 SYRINGE (ML) INJECTION EVERY 8 HOURS
Status: DISCONTINUED | OUTPATIENT
Start: 2021-07-06 | End: 2021-07-07 | Stop reason: HOSPADM

## 2021-07-06 RX ORDER — SODIUM CHLORIDE 0.9 % (FLUSH) 0.9 %
5-40 SYRINGE (ML) INJECTION AS NEEDED
Status: DISCONTINUED | OUTPATIENT
Start: 2021-07-06 | End: 2021-07-07 | Stop reason: HOSPADM

## 2021-07-06 RX ORDER — AMLODIPINE BESYLATE 10 MG/1
10 TABLET ORAL DAILY
Status: DISCONTINUED | OUTPATIENT
Start: 2021-07-06 | End: 2021-07-07 | Stop reason: HOSPADM

## 2021-07-06 RX ORDER — ENOXAPARIN SODIUM 100 MG/ML
40 INJECTION SUBCUTANEOUS DAILY
Status: DISCONTINUED | OUTPATIENT
Start: 2021-07-06 | End: 2021-07-07 | Stop reason: HOSPADM

## 2021-07-06 RX ORDER — HYDRALAZINE HYDROCHLORIDE 20 MG/ML
10 INJECTION INTRAMUSCULAR; INTRAVENOUS
Status: DISCONTINUED | OUTPATIENT
Start: 2021-07-06 | End: 2021-07-07 | Stop reason: HOSPADM

## 2021-07-06 RX ORDER — CEPHALEXIN 500 MG/1
500 CAPSULE ORAL 4 TIMES DAILY
Qty: 28 CAPSULE | Refills: 0 | Status: SHIPPED | OUTPATIENT
Start: 2021-07-06 | End: 2021-07-13

## 2021-07-06 RX ORDER — POLYETHYLENE GLYCOL 3350 17 G/17G
17 POWDER, FOR SOLUTION ORAL DAILY PRN
Status: DISCONTINUED | OUTPATIENT
Start: 2021-07-06 | End: 2021-07-07 | Stop reason: HOSPADM

## 2021-07-06 RX ADMIN — Medication 100 MG: at 08:44

## 2021-07-06 RX ADMIN — LEVOFLOXACIN 750 MG: 750 TABLET, FILM COATED ORAL at 08:44

## 2021-07-06 RX ADMIN — ASPIRIN 81 MG: 81 TABLET, CHEWABLE ORAL at 08:44

## 2021-07-06 RX ADMIN — WATER 1 G: 1 INJECTION INTRAMUSCULAR; INTRAVENOUS; SUBCUTANEOUS at 05:27

## 2021-07-06 RX ADMIN — FOLIC ACID 1 MG: 1 TABLET ORAL at 08:44

## 2021-07-06 RX ADMIN — AMLODIPINE BESYLATE 10 MG: 10 TABLET ORAL at 08:44

## 2021-07-06 RX ADMIN — Medication 10 ML: at 08:52

## 2021-07-06 RX ADMIN — POTASSIUM CHLORIDE 40 MEQ: 1500 TABLET, EXTENDED RELEASE ORAL at 08:44

## 2021-07-06 RX ADMIN — ENOXAPARIN SODIUM 40 MG: 40 INJECTION SUBCUTANEOUS at 08:45

## 2021-07-06 NOTE — PROGRESS NOTES
MRI Screening form needs to be filled out and faxed to [phone] MRI can be scheduled. If unable to obtain information from pt, [facility] needs to be contacted.  If pt is claustro or will need pain meds, please have ordered in advance in order to facilitate exam.

## 2021-07-06 NOTE — CONSULTS
[address] [facility] [address]  Consultation Note    Date of Service:  07/06/21    Historian(s): Patient, friend, medical records  Referral Source: Hospitalist team    Chief Complaint   Auditory and visual hallucinations    History of Present Illness     Myrna Donovan is a 78 y.o. WHITE/NON- female  with a history of hypertension and hyperlipidemia who was brought to the emergency department by her friend for auditory and visual hallucinations and confusion. Collateral information was obtained from the friend Curly Nuñez.  Ms. Yisel Huber reports that the patient has been experiencing visual hallucinations for at least 3 to 4 months and her friends have been encouraging her to consult with a physician but she has been hesitant. She has an appointment with a physician next month. Yesterday, the patient was very anxious about the hallucinations and told her friends that the people were telling her to shoot children. This was the first time she had informed her friends that she was actually hearing voices. Her friend reports that they are very concerned because the patient has been confused, acting irrationally as a result of the hallucinations. Some weeks ago she barricaded the entrance to her house for fear of the hallucinations getting inside and harming her. She has also since left her house and has been living with a friend for the past week and a half because she is afraid of the hallucinations. She says she is seeing people in her house and they are disturbing her, misplacing her things and making her feel uncomfortable such that she no longer wants to live there. She has also been wandering and taking very long walks for no apparent reason. She would find herself somewhere she had no intention of being and will panic and contact someone to pick her up. The patient was oriented to self and place.   She knew she was at [facility] and the month was July but she did not know the exact day of the week. She thought it was Wednesday. She said she came to the hospital because she has been forgetful and experiencing visual hallucinations so she wanted to find out what was going on but that she is ready to leave the hospital now. She admits to having difficulty finding words and forgetting how to cook certain recipes. She denies feeling sad or depressed but admits to being more irritable. She denies problems with sleep, appetite or energy. She denies suicidal or homicidal ideation. Friend reported that she had not eaten in 2 days but the patient says she has been eating 2 meals a day. The friend also said that she had not slept in 2 days but the patient denies this. She denies lack of interest in her hobbies such as cutting grass, reading and painting. She also denied auditory hallucinations. She was specifically asked if the visual hallucinations were communicating with her or  speaking to her and she denied this. She tried to minimize visual hallucinations and said she thinks she just distorts regular images in her mind. The patient endorsed use of alcohol occasionally. She says once a month or every other months she may have about 4 ounces of wine. She denies use of tobacco products or other recreational drugs. Psychiatric Treatment History     The patient denies any history of inpatient or outpatient psychiatric treatment. She denies any diagnosis of depression or psychiatric diagnosis. Allergies      Allergies   Allergen Reactions    Codeine Nausea Only       Medical History     Past Medical History:   Diagnosis Date    Anxiety     Arrhythmia 2011    Negative Stress test    Arthritis     Carotid artery stenosis 2009    <50%    Gestational diabetes     Glaucoma     Hypercholesterolemia     Hypertension        Medication(s)     No current facility-administered medications on file prior to encounter.      Current Outpatient Medications on File Prior to Encounter   Medication Sig Dispense Refill    cycloSPORINE (RESTASIS) 0.05 % ophthalmic emulsion Administer 1 Drop to both eyes two (2) times a day.  Aspirin, Buffered 81 mg tab 81 mg daily.  MULTIVITAMIN PO Take  by mouth.  brimonidine-timolol (COMBIGAN) 0.2-0.5 % Drop ophthalmic solution 1 Drop every twelve (12) hours.  fish oil-dha-epa 1,200-144-216 mg cap Take  by mouth.  cinnamon bark (CINNAMON) 500 mg cap Take 500 mg by mouth two (2) times a day.  ascorbic acid (VITAMIN C) 500 mg tablet Take  by mouth.  garlic 5,711 mg cap Take 1,000 mg by mouth four (4) times daily.  BETA-CAROTENE,A, W-C & E/MIN (VISION FORMULA PO) Take  by mouth.  lutein 20 mg Tab Take 20 mg by mouth daily.  melatonin 10 mg Tab Take 10 mg by mouth daily.  ACETAMINOPHEN (TYLENOL PO) Take  by mouth.  NAPROXEN PO Take  by mouth. Family History     Family History   Problem Relation Age of Onset   [employer] Arthritis-osteo Mother     Dementia Mother     Hypertension Mother     Coronary Artery Disease Mother        Psychiatric Family History  Mother with dementia. Patient says she took care of her mother for the last 9 years of her life but that her mother  at the age of 80. Social History     The patient was born and raised in [place]. She has 1 brother who lives in [place]. She has 3 living sons, 2 of whom live in [place]. She is . 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 She has her own residence in [place] where she lives alone but has been living with a friend for the past week.     Vitals/Labs     Visit Vitals  BP (!) 141/84 (BP 1 Location: Left upper arm)   Pulse 99   Temp 97.8 °F (36.6 °C)   Resp 16   Ht 5' 8\" (1.727 m)   Wt 69.9 kg (154 lb)   SpO2 97%   BMI 23.42 kg/m²         Mental Status Examination     Appearance/Hygiene  fair   Attitude/Behavior/Social Relatedness Appropriate   Musculoskeletal Gait/Station: Not tested  Tone (flaccid, cogwheeling, spastic): not assessed  Psychomotor (hyperkinetic, hypokinetic): calm  Involuntary movements (tics, dyskinesias, akathisa, stereotypies): none   Speech   Rate, rhythm, volume, fluency and articulation are appropriate   Mood   euthymic   Affect    congruent   Thought Process Linear and goal directed   Thought Content and Perceptual Disturbances Denies self-injurious behavior (SIB), suicidal ideation (SI), aggressive behavior or homicidal ideation (HI). Endorses auditory auditory and visual hallucinations   Sensorium and Cognition  alert and oriented to person, place and situation. Memory appears to be impaired. Attention is intact. Insight  Limited   Judgment  Limited     Assessment and Plan     Patient is a 73-year old  female with visual hallucinations for at least 4 months which appeared to be worsening. Patient also reports memory impairment and confusion. Medical work-up so far is only significant for urinary tract infection and hypokalemia. Neurologist has been consulted and additional work-up is in progress. It appears that patient also refused MRI that was ordered by neurologist.  The patient is also demanding to leave the hospital.  I believe it is in the patient's best interest to stay in the hospital for medical work-up to be completed. It is my opinion that she does not have the capacity to leave the hospital [address] at this time so I am requesting for a medical TDO. Differential diagnosis includes acute metabolic encephalopathy, major neurocognitive disorder, psychosis NOS, other neurological disorder. Recommend continue treatment of UTI and follow Neurology recommendations.   We will continue to follow patient while she is in the hospital.    Cresencio Dodson MD  Psychiatrist  [facility] \[facility] [place] [facility]\""

## 2021-07-06 NOTE — BSMART NOTE
Crisis Note: Dr. Tangela Prado, on-call psychiatrist, made aware that patient is requesting to left the hospital against medical advice. Psychiatrist would like patient evaluated for possible TDO d/t patient reportedly has auditory hallucinations of random people shooting children and auditory hallucinations telling her (patient) to hurt children by shooting them; spoke with Paige Núñze [address], [phone]; clinicals submitted for review; awaiting response.

## 2021-07-06 NOTE — H&P
History & Physical    Patient: Corrinne Mc MRN: [id number]  CSN: [id number]    YOB: 1945  Age: 78 y.o. Sex: female      DOA: 7/5/2021    Chief Complaint:   Chief Complaint   Patient presents with     Confusion and hallucination          HPI:     Corrinne Mc is a 78 y.o.  female with past medical's of hypertension, dyslipidemia who was brought in by family friend for evaluation of hallucinations. Patient alert awake and oriented x2, confused and very poor historian. Patient not able to give any history. Patient states she does not know why she is in the hospital.  Per ED physician, patient confused and all of her friend who dropped said patient had some hallucinations. In the ED patient had routine labs which were within normal limits. UA showed signs of UTI. Patient had CT head negative, telemetry neurology recommended further work-up including MRI. Patient been started on IV ceftriaxone and I been called patient for admission. On asking patient, patient denies any visual hallucinations or auditory hallucinations. She denies any headache, no blurred vision, no weakness or numbness involve the body. Called patient's son Lebron Landis no response, no voicemail set up.    7:40 AM  Spoke to patient's friend Federica Navarro, per her patient has been having visual and auditory hallucinations at home. Patient has been having strange behavior for last few months. Patient has been not staying in her house for last week or so. Patient goes to friends also some stay there sometimes she goes to her mom's friends houses and stay. She frequently gets upset and angry. Per our friend this is been going on for last 3 to 4 months. Patient at times very confused and agitated.     Past Medical History:   Diagnosis Date    Anxiety     Arrhythmia 2011    Negative Stress test    Arthritis     Carotid artery stenosis 2009    <50%    Gestational diabetes     Glaucoma     Hypercholesterolemia     Hypertension        Past Surgical History:   Procedure Laterality Date    HX CHOLECYSTECTOMY  1990    gallstones    HX HYSTERECTOMY  1990    total hysterectomy       Family History   Problem Relation Age of Onset    Arthritis-osteo Mother     Dementia Mother     Hypertension Mother     Coronary Artery Disease Mother        Social History     Socioeconomic History    Marital status:      Spouse name: Not on file    Number of children: Not on file    Years of education: Not on file    Highest education level: Not on file   Tobacco Use    Smoking status: Never Smoker    Smokeless tobacco: Never Used   Substance and Sexual Activity    Alcohol use: Yes     Comment: Socially     Social Determinants of Health     Financial Resource Strain:     Difficulty of Paying Living Expenses:    Food Insecurity:     Worried About Running Out of Food in the Last Year:     Ran Out of Food in the Last Year:    Transportation Needs:     Lack of Transportation (Medical):  Lack of Transportation (Non-Medical):    Physical Activity:     Days of Exercise per Week:     Minutes of Exercise per Session:    Stress:     Feeling of Stress :    Social Connections:     Frequency of Communication with Friends and Family:     Frequency of Social Gatherings with Friends and Family:     Attends Sabianist Services:     Active Member of Clubs or Organizations:     Attends Club or Organization Meetings:     Marital Status:        Prior to Admission medications    Medication Sig Start Date End Date Taking? Authorizing Provider   ASCORBIC ACID/MULTIVIT-MIN (EMERGEN-C PO) Take  by mouth daily. Provider, Historical   cycloSPORINE (RESTASIS) 0.05 % ophthalmic emulsion Administer 1 Drop to both eyes two (2) times a day. Provider, Historical   Aspirin, Buffered 81 mg tab 81 mg daily. 6/10/09   Provider, Historical   MULTIVITAMIN PO Take  by mouth.     Provider, Historical   brimonidine-timolol (COMBIGAN) 0.2-0.5 % Drop ophthalmic solution 1 Drop every twelve (12) hours. Provider, Historical   fish oil-dha-epa 1,200-144-216 mg cap Take  by mouth. Provider, Historical   cinnamon bark (CINNAMON) 500 mg cap Take 500 mg by mouth two (2) times a day. Provider, Historical   ascorbic acid (VITAMIN C) 500 mg tablet Take  by mouth. Provider, Historical   garlic 7,403 mg cap Take 1,000 mg by mouth four (4) times daily. Provider, Historical   BETA-CAROTENE,A, W-C & E/MIN (VISION FORMULA PO) Take  by mouth. Provider, Historical   lutein 20 mg Tab Take 20 mg by mouth daily. Provider, Historical   melatonin 10 mg Tab Take 10 mg by mouth daily. Provider, Historical   ACETAMINOPHEN (TYLENOL PO) Take  by mouth. Provider, Historical   NAPROXEN PO Take  by mouth. Provider, Historical       Allergies   Allergen Reactions    Codeine Nausea Only         Review of Systems  GENERAL: Patient alert, awake and oriented times 2, able to communicate full sentences and not in distress. confused, limited history. On asking leading question patient stated no to everything. HEENT: No change in vision, no earache, no tinnitus, no sore throat or sinus congestion. NECK: No pain or stiffness. PULMONARY: No shortness of breath, no cough or wheeze. Cardiovascular: no pnd or orthopnea, no CP  GASTROINTESTINAL: No abdominal pain, no nausea, no vomiting or diarrhea, no melena or bright red blood per rectum. GENITOURINARY: No urinary frequency, no urgency, no hesitancy or dysuria. MUSCULOSKELETAL: No joint or muscle pain, no back pain, no recent trauma. DERMATOLOGIC: No rash, no itching, no lesions. ENDOCRINE: No polyuria, no polydipsia, no heat or cold intolerance. No recent change in weight. HEMATOLOGICAL: No anemia or easy bruising or bleeding. NEUROLOGIC: No headache, no seizures, no numbness, no tingling or weakness.        Physical Exam:     Physical Exam:  Visit Vitals  BP (!) 144/97   Pulse (!) 103   Temp 98.9 °F (37.2 °C)   Resp 18   Ht 5' 8\" (1.727 m)   Wt 69.9 kg (154 lb)   SpO2 96%   BMI 23.42 kg/m²      O2 Device: None (Room air)    Temp (24hrs), Av.9 °F (37.2 °C), Min:98.9 °F (37.2 °C), Max:98.9 °F (37.2 °C)    No intake/output data recorded. No intake/output data recorded. General:  Alert, cooperative, no distress, appears stated age. Head: Normocephalic, without obvious abnormality, atraumatic. Eyes:  Conjunctivae/corneas clear. PERRL, EOMs intact. Nose: Nares normal. No drainage or sinus tenderness. Neck: Supple, symmetrical, trachea midline, no adenopathy, thyroid: no enlargement, no carotid bruit and no JVD. Lungs:   Clear to auscultation bilaterally. Heart:  Regular rate and rhythm, S1, S2 normal.     Abdomen: Soft, non-tender. Bowel sounds normal.    Extremities: no cyanosis or edema. Pulses: 2+ and symmetric all extremities. Skin:  No rashes or lesions   Neurologic: AAOx2, moves all extremities.        Labs Reviewed:    BMP:   Lab Results   Component Value Date/Time     2021 03:28 PM    K 3.3 (L) 2021 03:28 PM     2021 03:28 PM    CO2 28 2021 03:28 PM    AGAP 5 2021 03:28 PM    GLU 99 2021 03:28 PM    BUN 14 2021 03:28 PM    CREA 0.96 2021 03:28 PM    GFRAA >60 2021 03:28 PM    GFRNA 57 (L) 2021 03:28 PM     CMP:   Lab Results   Component Value Date/Time     2021 03:28 PM    K 3.3 (L) 2021 03:28 PM     2021 03:28 PM    CO2 28 2021 03:28 PM    AGAP 5 2021 03:28 PM    GLU 99 2021 03:28 PM    BUN 14 2021 03:28 PM    CREA 0.96 2021 03:28 PM    GFRAA >60 2021 03:28 PM    GFRNA 57 (L) 2021 03:28 PM    CA 9.3 2021 03:28 PM    MG 1.9 2021 03:28 PM     CBC:   Lab Results   Component Value Date/Time    WBC 9.2 2021 03:28 PM    HGB 12.0 2021 03:28 PM    HCT 38.3 2021 03:28 PM     2021 03:28 PM     All Cardiac Markers in the last 24 hours: No results found for: CPK, CK, CKMMB, CKMB, RCK3, CKMBT, CKNDX, CKND1, BEBETO, TROPT, TROIQ, GENNY, TROPT, TNIPOC, BNP, BNPP  Recent Glucose Results:   Lab Results   Component Value Date/Time    GLU 99 07/05/2021 03:28 PM     CT Results (most recent):  Results from [facility] encounter on 07/05/21    CT HEAD WO CONT    Narrative  CT of the head without contrast    HISTORY: hallucinations, altered mental status    COMPARISON: None    TECHNIQUE: Helical axial scan to the head was performed from the skull base to  the vertex without IV contrast administration. All CT scans at this facility performed using dose optimization techniques as  appreciated to a performed exam, to include automated exposure control,  adjustment of the mA and or KU according to patient size (including appropriate  matching for site specific examination), or use of iterative reconstruction  technique. FINDINGS:    Brain volume appears unremarkable for age. No ventricular dilatation. The  gray-white matter differentiation is preserved. Mild periventricular white  matter hypodensities present. There is no evidence of acute intracranial  hemorrhage,  mass effect or midline shift identified. No skull fracture or extra  axial fluid collections identified. Visualized sinuses and mastoid air cells  appear unremarkable. Impression  No acute intracranial abnormality. Note: If clinical concern of acute stroke, MRI with diffusion weighted images is  recommended for better evaluation. Mild microvascular disease of white matter. Thank you for your referral.      Procedures/imaging: see electronic medical records for all procedures/Xrays and details which were not copied into this note but were reviewed prior to creation of Plan      Assessment/Plan     1. UTI  2. Acute metabolic encephalopathy  3. Visual and auditory hallucination  4. Behavioral issues  5. Hypertension  6. Hypokalemia    Plan  1.  We will continue IV antibiotics, follow-up urine culture. 2. MRI pending, based on MRI consider neurological consultation. 3. I suspect patient most likely has underlying psych issues, please consult psychiatry. Called crisis team multiple times but no response. 4. We will start amlodipine and monitor blood pressure. 5. We will replace potassium  6. DVT/GI Prophylaxis: Lovenox    Discussed with patient and her friend over the phone about hospital admission and my plan care, both understood and agree with my plan care. Олег Mariee MD  7/6/2021 5:02 AM    Disclaimer: Sections of this note are dictated using utilizing voice recognition software. Minor typographical errors may be present. If questions arise, please do not hesitate to contact me or call our department.

## 2021-07-06 NOTE — PROGRESS NOTES
patient refused scan per [facility].  Notifying Dr Alonso Alvarez

## 2021-07-06 NOTE — ED NOTES
ED Course as of Jul 14 1500 Mon Jul 05, 2021   1527 Called for patient in the waiting area no answer    [CB]   1555 Called again for the patient including the main lobby no answer    [CB]   3404 D/w Dr Nehal Morales teleneurology recommends MRI to exclude a mass lesion formal evaluation by inpatient neurology, possibly EEG.   ordered the MRI.    [CB]   Tue Jul 06, 2021   [address] [place] [address]    [CB]      ED Course User Index  [CB] Maria Elena Palencia MD

## 2021-07-06 NOTE — ED NOTES
Spoke with  regarding pts confusion and MRI screening form completion, asked for orders for xray KUB and orbits to rule out any errors in form. Dr. Schul[facility] will place orders.

## 2021-07-06 NOTE — DISCHARGE SUMMARY
Pt refusing to take medical management in hospital/refuses MRI. Wants to go home. The patient had some behavioral issue , currently patient is alert awake and oriented. Clearly understands management plan. She wishes to go home and if needed she will come back. Psych admission offered psych evaluation offered helping with her AMS which is going on for more than to 3 months. Jose Armando Mood is alert and oriented, and able to clearly report the risks of leaving the hospital w/o further intervention/ treatment . This examiner had a long discussion with the patient at his bedside regarding the risks of leaving the hospital AMA and benefits of further treatment. The patient's decisional capacity is intact and clearly understands what this examiner / Nurse in care of patient is recommending and why. The patient is fully aware of the risks including, but not limited to the following: worsening symptoms, decline in functional status, and even the possiblity of death. The patient declines the recommendations at this time. The patient clearly understands that he may return to the ED at any time for further evaluation and treatment. The patient is adamant of leaving the hospital AMA. recommended to Follow up Pcp.       07/07/21                       Discharge Summary     Patient ID:  Sneha Toure  [id number]  89 y.o.  1945  Body mass index is 23.42 kg/m².   PCP on record: Dilip Riley MD    Admit date: 7/5/2021  Discharge date and time: 07/07/21 9:29 AM     Discharge Diagnoses:                                           1 Subacute onset of visual hallucination   2 HTN   3 Vit B12 deficiency   4 UTI - POA       Consults: Psychiatry          Hospital Course by problems:  1 Subacute onset of visual hallucination   - No Organic cause   - CT head neg   - Refused MRI   - Psych consulted - patient refused admission , TDO tried - not TDO candidate   - Being discharged home with MVI and Vit B12 po replacement - need Vit B12 level follow up in 2 -3 months     2 UTI - POA   - heavy growth   - Treated with Levaquin - Identity unknown as No culture from ED sent   - Asymptomatic     3 HTN   - On Norvasc   - Further changes per PCP           Patient seen and examined by me on discharge day. Pertinent Findings:  Stable   Visit Vitals  /79 (BP 1 Location: Right arm, BP Patient Position: At rest;Semi fowlers)   Pulse 88   Temp 97.8 °F (36.6 °C)   Resp 16   Ht 5' 8\" (1.727 m)   Wt 69.9 kg (154 lb)   SpO2 98%   BMI 23.42 kg/m²          Significant Diagnostic Studies:  Results  CT HEAD WO CONT (Accession [id number]) (Order [id number])  Allergies     Not Specified: Codeine   Exam Information    Status Exam Begun  Exam Ended    Final [99] 7/05/2021 15:45 7/05/2021  4:02 PM [id number]  4:02 PM   Result Information    Status: Final result (Exam End: 7/5/2021 16:02) Provider Status: Open   Study Result    Narrative & Impression   CT of the head without contrast     HISTORY: hallucinations, altered mental status     COMPARISON: None     TECHNIQUE: Helical axial scan to the head was performed from the skull base to  the vertex without IV contrast administration.     All CT scans at this facility performed using dose optimization techniques as  appreciated to a performed exam, to include automated exposure control,  adjustment of the mA and or KU according to patient size (including appropriate  matching for site specific examination), or use of iterative reconstruction  technique.     FINDINGS:     Brain volume appears unremarkable for age. No ventricular dilatation. The  gray-white matter differentiation is preserved. Mild periventricular white  matter hypodensities present. There is no evidence of acute intracranial  hemorrhage,  mass effect or midline shift identified. No skull fracture or extra  axial fluid collections identified.  Visualized sinuses and mastoid air cells  appear unremarkable.      IMPRESSION     No acute intracranial abnormality. Note: If clinical concern of acute stroke, MRI with diffusion weighted images is  recommended for better evaluation.     Mild microvascular disease of white matter.     Thank you for your referral.         Pertinent Lab Data:  Recent Labs     07/05/21  1528   WBC 9.2   HGB 12.0   HCT 38.3        Recent Labs     07/05/21  1528      K 3.3*      CO2 28   GLU 99   BUN 14   CREA 0.96   CA 9.3   MG 1.9       DISCHARGE MEDICATIONS:   @  Current Discharge Medication List      START taking these medications    Details   amLODIPine (NORVASC) 10 mg tablet Take 1 Tablet by mouth daily. Qty: 20 Tablet, Refills: 0  Start date: 7/7/2021      potassium chloride SR (K-TAB) 20 mEq tablet Take 1 Tablet by mouth daily. Qty: 7 Tablet, Refills: 0  Start date: 7/7/2021      cyanocobalamin 1,000 mcg tablet Take 1 Tablet by mouth daily. Qty: 30 Tablet, Refills: 2  Start date: 7/7/2021      aspirin 81 mg chewable tablet Take 1 Tablet by mouth daily. Qty: 30 Tablet, Refills: 0  Start date: 7/7/2021      polyethylene glycol (MIRALAX) 17 gram packet Take 1 Packet by mouth daily. Qty: 30 Packet, Refills: 0  Start date: 7/7/2021      cephALEXin (Keflex) 500 mg capsule Take 1 Capsule by mouth four (4) times daily for 7 days. Qty: 28 Capsule, Refills: 0  Start date: 7/6/2021, End date: 7/13/2021         CONTINUE these medications which have NOT CHANGED    Details   cycloSPORINE (RESTASIS) 0.05 % ophthalmic emulsion Administer 1 Drop to both eyes two (2) times a day. Aspirin, Buffered 81 mg tab 81 mg daily. MULTIVITAMIN PO Take  by mouth. brimonidine-timolol (COMBIGAN) 0.2-0.5 % Drop ophthalmic solution 1 Drop every twelve (12) hours. fish oil-dha-epa 1,200-144-216 mg cap Take  by mouth.      cinnamon bark (CINNAMON) 500 mg cap Take 500 mg by mouth two (2) times a day.       ascorbic acid (VITAMIN C) 500 mg tablet Take  by mouth.      garlic 4,573 mg cap Take 1,000 mg by mouth four (4) times daily.      BETA-CAROTENE,A, W-C & E/MIN (VISION FORMULA PO) Take  by mouth.      lutein 20 mg Tab Take 20 mg by mouth daily. melatonin 10 mg Tab Take 10 mg by mouth daily. ACETAMINOPHEN (TYLENOL PO) Take  by mouth. NAPROXEN PO Take  by mouth. STOP taking these medications       ASCORBIC ACID/MULTIVIT-MIN (EMERGEN-C PO) Comments:   Reason for Stopping:                 My Recommended Diet, Activity, Wound Care, and follow-up labs are listed in the patient's Discharge Insturctions which I have personally completed and reviewed. Disposition:     [x] Home with family     [] [facility] [place] PT/RN   [] SNF/NH   [] Inpatient Rehab/ERIC  Condition at Discharge:  Stable    Follow up with:   PCP : Tacos Jimenez MD      Please follow-up tests/labs that are still pendin. None  2.    >30 minutes spent coordinating this discharge (review instructions/follow-up, prescriptions, preparing report for sign off)    Disclaimer: Sections of this note are dictated utilizing voice recognition software, which may have resulted in some phonetic based errors in grammar and contents. Even though attempts were made to correct all the mistakes, some may have been missed, and remained in the body of the document. If questions arise, please contact our department.     Signed:  Helen Whitt MD

## 2021-07-06 NOTE — PROGRESS NOTES
[facility] Hospitalist Group  Progress Note    Patient: Juan Ocampo Age: 78 y.o. : 1945 MR#: [id number] SSN: [id number]  Date/Time: 2021     C/C: Subacute onset of mental status change  UTI      Subjective:   HPI : HPI per admitting MD\" Juan Ocampo is a 78 y.o.  female with past medical's of hypertension, dyslipidemia who was brought in by family friend for evaluation of hallucinations. Patient alert awake and oriented x2, confused and very poor historian. Patient not able to give any history. Patient states she does not know why she is in the hospital.  Per ED physician, patient confused and all of her friend who dropped said patient had some hallucinations. In the ED patient had routine labs which were within normal limits. UA showed signs of UTI. Patient had CT head negative, telemetry neurology recommended further work-up including MRI. Patient been started on IV ceftriaxone and I been called patient for admission. On asking patient, patient denies any visual hallucinations or auditory hallucinations. She denies any headache, no blurred vision, no weakness or numbness involve the body. Called patient's son Darell Marquez no response, no voicemail set up.     7:40 AM  Spoke to patient's friend Keary Libman, per her patient has been having visual and auditory hallucinations at home. Patient has been having strange behavior for last few months. Patient has been not staying in her house for last week or so. Patient goes to friends also some stay there sometimes she goes to her mom's friends houses and stay. She frequently gets upset and angry. Per our friend this is been going on for last 3 to 4 months. Patient at times very confused and agitated. \"      Also patient in ER bed-she is sitting up in bed alert awake oriented concerned that she would rather go home, she does not need to be in the hospital.  Is feeling fine.   I do not see any hallucination visual or any other kind, very appropriated answering questions             Review of Systems:  positive responses in bold type   Constitutional: Negative for fever, chills, diaphoresis and unexpected weight change. HENT: Negative for ear pain, congestion, sore throat, rhinorrhea, drooling, trouble swallowing, neck pain and tinnitus. Eyes: Negative for photophobia, pain, redness and visual disturbance. Respiratory: negative for shortness of breath, cough, choking, chest tightness, wheezing or stridor. Cardiovascular: Negative for chest pain, palpitations and leg swelling. Gastrointestinal: Negative for nausea, vomiting, abdominal pain, diarrhea, constipation, blood in stool, abdominal distention and anal bleeding. Genitourinary: Negative for dysuria, urgency, frequency, hematuria, flank pain and difficulty urinating. Musculoskeletal: Negative for back pain and arthralgias. Skin: Negative for color change, rash and wound. Neurological: Negative for dizziness, seizures, syncope, speech difficulty, light-headedness or headaches. Hematological: Does not bruise/bleed easily. Psychiatric/Behavioral: Negative for suicidal ideas, hallucinations, behavioral problems, self-injury or agitation     Assessment/Plan:     1. Visual hallucinations  2 hypertension  3 UTI-present on admission    Plan    -I discussed with patient regarding continued inpatient treatment I have also consulted psych this was informed to the patient    -Switch Rocephin to p.o. Levaquin if she needs to go to psych unit    -Monitor electrolytes and continue to correct    -During her entire my interview patient insisted on going home, and repeatedly I have explained her that since she is in the hospital it be good idea to continue in-hospital care, though patient is not clearly suicidal does not indicate harm to self or others.        Time spent on direct patient care >30 mints     Complexity : High complex - due to multiple medical issues outlined above. CODE Status : Full    Case discussed with:  [x]Patient  [] Family  [x]Nursing  []Case Management   DVT Prophylaxis:  []Lovenox  []Hep SQ  []SCDs  []Coumadin   []On Heparin gtt      Objective:   VS:   Visit Vitals  BP (!) 141/84 (BP 1 Location: Left upper arm)   Pulse 99   Temp 97.8 °F (36.6 °C)   Resp 16   Ht 5' 8\" (1.727 m)   Wt 69.9 kg (154 lb)   SpO2 97%   BMI 23.42 kg/m²      Tmax/24hrs: Temp (24hrs), Av °F (36.7 °C), Min:97.4 °F (36.3 °C), Max:98.9 °F (37.2 °C)  IOBRIEFNo intake or output data in the 24 hours ending 21 1325    General:  Alert, cooperative, no acute distress   HEENT: No facial asymmetry, LOUISE Isidro, External ears - WNL    Cardiovascular: S1S2 - regular , No Murmur   Pulmonary: Equal expansion , No Use of accessory muscles , No Rales No Rhonchi    GI:  +BS in all four quadrants, soft, non-tender  Extremities:  No edema; 2+ dorsalis pedis pulses bilaterally  Neuro: Alert and oriented X 2.        Medications:   Current Facility-Administered Medications   Medication Dose Route Frequency    sodium chloride (NS) flush 5-40 mL  5-40 mL IntraVENous Q8H    sodium chloride (NS) flush 5-40 mL  5-40 mL IntraVENous PRN    acetaminophen (TYLENOL) tablet 650 mg  650 mg Oral Q6H PRN    Or    acetaminophen (TYLENOL) suppository 650 mg  650 mg Rectal Q6H PRN    polyethylene glycol (MIRALAX) packet 17 g  17 g Oral DAILY PRN    promethazine (PHENERGAN) tablet 12.5 mg  12.5 mg Oral Q6H PRN    Or    ondansetron (ZOFRAN) injection 4 mg  4 mg IntraVENous Q6H PRN    enoxaparin (LOVENOX) injection 40 mg  40 mg SubCUTAneous DAILY    amLODIPine (NORVASC) tablet 10 mg  10 mg Oral DAILY    hydrALAZINE (APRESOLINE) 20 mg/mL injection 10 mg  10 mg IntraVENous Q6H PRN    aspirin chewable tablet 81 mg  81 mg Oral DAILY    thiamine HCL (B-1) tablet 100 mg  100 mg Oral DAILY    folic acid (FOLVITE) tablet 1 mg  1 mg Oral DAILY    levoFLOXacin (LEVAQUIN) tablet 750 mg  750 mg Oral Q24H    LORazepam (ATIVAN) injection 1 mg  1 mg IntraVENous Q6H PRN     Current Outpatient Medications   Medication Sig    cycloSPORINE (RESTASIS) 0.05 % ophthalmic emulsion Administer 1 Drop to both eyes two (2) times a day.  Aspirin, Buffered 81 mg tab 81 mg daily.  MULTIVITAMIN PO Take  by mouth.  brimonidine-timolol (COMBIGAN) 0.2-0.5 % Drop ophthalmic solution 1 Drop every twelve (12) hours.  fish oil-dha-epa 1,200-144-216 mg cap Take  by mouth.  cinnamon bark (CINNAMON) 500 mg cap Take 500 mg by mouth two (2) times a day.  ascorbic acid (VITAMIN C) 500 mg tablet Take  by mouth.  garlic 1,626 mg cap Take 1,000 mg by mouth four (4) times daily.  BETA-CAROTENE,A, W-C & E/MIN (VISION FORMULA PO) Take  by mouth.  lutein 20 mg Tab Take 20 mg by mouth daily.  melatonin 10 mg Tab Take 10 mg by mouth daily.  ACETAMINOPHEN (TYLENOL PO) Take  by mouth.  NAPROXEN PO Take  by mouth. Labs:    Recent Labs     07/05/21  1528   WBC 9.2   HGB 12.0   HCT 38.3        Recent Labs     07/05/21  1528      K 3.3*      CO2 28   GLU 99   BUN 14   CREA 0.96   CA 9.3   MG 1.9         Disclaimer: Sections of this note are dictated utilizing voice recognition software, which may have resulted in some phonetic based errors in grammar and contents. Even though attempts were made to correct all the mistakes, some may have been missed, and remained in the body of the document. If questions arise, please contact our department.     Signed By: Bee Palomino MD     July 6, 2021

## 2021-07-06 NOTE — ED NOTES
I received the patient in turnover from Dr. Koffi Bob. Briefly she is a 68-year-old woman who presents to the ED today with increasing confusion and hallucinations for the past several months. On Dr. Huseyin Chiang exam, she seemed to be confabulating. Right now we are awaiting her urinalysis and a brain MRI. Once those are back, I will consult the hospitalist for admission. Recent Results (from the past 12 hour(s))   EKG, 12 LEAD, INITIAL    Collection Time: 07/05/21  3:22 PM   Result Value Ref Range    Ventricular Rate 88 BPM    Atrial Rate 88 BPM    P-R Interval 128 ms    QRS Duration 106 ms    Q-T Interval 382 ms    QTC Calculation (Bezet) 462 ms    Calculated P Axis 53 degrees    Calculated R Axis -12 degrees    Calculated T Axis 123 degrees    Diagnosis       Sinus rhythm with premature atrial complexes  Possible Left atrial enlargement  Incomplete right bundle branch block  Septal infarct , age undetermined  Abnormal ECG  No previous ECGs available     ETHYL ALCOHOL    Collection Time: 07/05/21  3:28 PM   Result Value Ref Range    ALCOHOL(ETHYL),SERUM <3 0 - 3 MG/DL   CBC WITH AUTOMATED DIFF    Collection Time: 07/05/21  3:28 PM   Result Value Ref Range    WBC 9.2 4.6 - 13.2 K/uL    RBC 4.15 (L) 4.20 - 5.30 M/uL    HGB 12.0 12.0 - 16.0 g/dL    HCT 38.3 35.0 - 45.0 %    MCV 92.3 74.0 - 97.0 FL    MCH 28.9 24.0 - 34.0 PG    MCHC 31.3 31.0 - 37.0 g/dL    RDW 13.5 11.6 - 14.5 %    PLATELET 210 616 - 409 K/uL    MPV 10.3 9.2 - 11.8 FL    NEUTROPHILS 73 40 - 73 %    LYMPHOCYTES 20 (L) 21 - 52 %    MONOCYTES 7 3 - 10 %    EOSINOPHILS 0 0 - 5 %    BASOPHILS 0 0 - 2 %    ABS. NEUTROPHILS 6.7 1.8 - 8.0 K/UL    ABS. LYMPHOCYTES 1.8 0.9 - 3.6 K/UL    ABS. MONOCYTES 0.6 0.05 - 1.2 K/UL    ABS. EOSINOPHILS 0.0 0.0 - 0.4 K/UL    ABS.  BASOPHILS 0.0 0.0 - 0.1 K/UL    DF AUTOMATED     METABOLIC PANEL, BASIC    Collection Time: 07/05/21  3:28 PM   Result Value Ref Range    Sodium 138 136 - 145 mmol/L    Potassium 3.3 (L) 3.5 - 5.5 mmol/L    Chloride 105 100 - 111 mmol/L    CO2 28 21 - 32 mmol/L    Anion gap 5 3.0 - 18 mmol/L    Glucose 99 74 - 99 mg/dL    BUN 14 7.0 - 18 MG/DL    Creatinine 0.96 0.6 - 1.3 MG/DL    BUN/Creatinine ratio 15 12 - 20      GFR est AA >60 >60 ml/min/1.73m2    GFR est non-AA 57 (L) >60 ml/min/1.73m2    Calcium 9.3 8.5 - 10.1 MG/DL   TSH 3RD GENERATION    Collection Time: 07/05/21  3:28 PM   Result Value Ref Range    TSH 0.98 0.36 - 3.74 uIU/mL   MAGNESIUM    Collection Time: 07/05/21  3:28 PM   Result Value Ref Range    Magnesium 1.9 1.6 - 2.6 mg/dL   DRUG SCREEN, URINE    Collection Time: 07/06/21 12:46 AM   Result Value Ref Range    BENZODIAZEPINES Negative NEG      BARBITURATES Negative NEG      THC (TH-CANNABINOL) Negative NEG      OPIATES Negative NEG      PCP(PHENCYCLIDINE) Negative NEG      COCAINE Negative NEG      AMPHETAMINES Negative NEG      METHADONE Negative NEG      HDSCOM (NOTE)    COVID-19 RAPID TEST    Collection Time: 07/06/21 12:46 AM   Result Value Ref Range    Specimen source Nasopharyngeal      COVID-19 rapid test Not detected NOTD     URINALYSIS W/ RFLX MICROSCOPIC    Collection Time: 07/06/21 12:46 AM   Result Value Ref Range    Color YELLOW      Appearance CLOUDY      Specific gravity 1.006 1.005 - 1.030      pH (UA) 5.5 5.0 - 8.0      Protein Negative NEG mg/dL    Glucose Negative NEG mg/dL    Ketone Negative NEG mg/dL    Bilirubin Negative NEG      Blood Negative NEG      Urobilinogen 0.2 0.2 - 1.0 EU/dL    Nitrites Positive (A) NEG      Leukocyte Esterase LARGE (A) NEG     URINE MICROSCOPIC ONLY    Collection Time: 07/06/21 12:46 AM   Result Value Ref Range    WBC 70 to 80 0 - 5 /hpf    Epithelial cells 1+ 0 - 5 /lpf    Bacteria 3+ (A) NEG /hpf     CT HEAD WO CONT   Final Result      No acute intracranial abnormality. Note: If clinical concern of acute stroke, MRI with diffusion weighted images is   recommended for better evaluation.       Mild microvascular disease of white matter. Thank you for your referral.      XR CHEST PA LAT   Final Result   1. No acute infiltrate or effusion. MRI BRAIN WO CONT    (Results Pending)     A/P: The patient presented today with altered mental status. Her urine is infected with nitrates and leuk esterase. She has 70-80 WBCs and 3+ bacteria. I am ordering Rocephin and consulting the hospitalist for admission for further evaluation and management. The patient has been accepted on their service.

## 2021-07-06 NOTE — CONSULTS
A 78years old female patient with medical history of hypertension and hyperlipidemia was brought to the emergency room by her friends for episodes of confusion and hallucinations. Patient is poor historian. She states that she has been having some forgetfulness for few months. She endorses forgetting the days. Over the past couple of months, she has been having hallucinations mostly visual.  She sees a group of people in her house chatting with each other. Says, appears that she is watching a movie on television. They do not try to talk to her. She was also talking about helping with a movie for some people around July 4. Not sure about auditory hallucinations. From the medical records, it was mentioned that patient has been having some behavioral problems over the past 4 months sometimes getting easily upset and angry. Does not have any tremor. No difficulty walking. No weakness of her arms or legs. Denied having any recent illness: No fever, chills, neck pain or stiffness. No headache, nausea, or vomiting. No previous history of stroke. Denied use of alcohol or drugs. Had a CT scan of the brain in the emergency room which did not show any acute changes.     Social History     Socioeconomic History    Marital status:      Spouse name: Not on file    Number of children: Not on file    Years of education: Not on file    Highest education level: Not on file   Occupational History    Not on file   Tobacco Use    Smoking status: Never Smoker    Smokeless tobacco: Never Used   Substance and Sexual Activity    Alcohol use: Yes     Comment: Socially    Drug use: Not on file    Sexual activity: Not on file   Other Topics Concern    Not on file   Social History Narrative    Not on file     Social Determinants of Health     Financial Resource Strain:     Difficulty of Paying Living Expenses:    Food Insecurity:     Worried About Running Out of Food in the Last Year:     [address] in the Last Year:    Transportation Needs:     Lack of Transportation (Medical):      Lack of Transportation (Non-Medical):    Physical Activity:     Days of Exercise per Week:     Minutes of Exercise per Session:    Stress:     Feeling of Stress :    Social Connections:     Frequency of Communication with Friends and Family:     Frequency of Social Gatherings with Friends and Family:     Attends Orthodoxy Services:     Active Member of Clubs or Organizations:     Attends Club or Organization Meetings:     Marital Status:    Intimate Partner Violence:     Fear of Current or Ex-Partner:     Emotionally Abused:     Physically Abused:     Sexually Abused:        Family History   Problem Relation Age of Onset    Arthritis-osteo Mother     Dementia Mother     Hypertension Mother     Coronary Artery Disease Mother         Current Facility-Administered Medications   Medication Dose Route Frequency Provider Last Rate Last Admin    sodium chloride (NS) flush 5-40 mL  5-40 mL IntraVENous Q8H Aure Rosenbaum MD   10 mL at 07/06/21 0852    sodium chloride (NS) flush 5-40 mL  5-40 mL IntraVENous PRN Carmen Barrios MD        acetaminophen (TYLENOL) tablet 650 mg  650 mg Oral Q6H PRN Carmen Barrios MD        Or    acetaminophen (TYLENOL) suppository 650 mg  650 mg Rectal Q6H PRN Carmen Barrios MD        polyethylene glycol (MIRALAX) packet 17 g  17 g Oral DAILY PRN Carmen Barrios MD        promethazine (PHENERGAN) tablet 12.5 mg  12.5 mg Oral Q6H PRN Carmen Barrios MD        Or    ondansetron (ZOFRAN) injection 4 mg  4 mg IntraVENous Q6H PRN Aure Rosenbaum MD        enoxaparin (LOVENOX) injection 40 mg  40 mg SubCUTAneous DAILY Aure Rosenbaum MD   40 mg at 07/06/21 0845    amLODIPine (NORVASC) tablet 10 mg  10 mg Oral DAILY Carmen Barrios MD   10 mg at 07/06/21 0844    hydrALAZINE (APRESOLINE) 20 mg/mL injection 10 mg  10 mg IntraVENous Q6H PRN Omaira Rosenbaum MD        aspirin chewable tablet 81 mg  81 mg Oral DAILY Natalia Rhoades MD   81 mg at 07/06/21 0844    thiamine HCL (B-1) tablet 100 mg  100 mg Oral DAILY Natalia Rhoades MD   100 mg at 25/56/06 5644    folic acid (FOLVITE) tablet 1 mg  1 mg Oral DAILY Omaira Rosenbaum MD   1 mg at 07/06/21 0844    levoFLOXacin (LEVAQUIN) tablet 750 mg  750 mg Oral Q24H Luzmaria Lo MD   750 mg at 07/06/21 0844    LORazepam (ATIVAN) injection 1 mg  1 mg IntraVENous Q6H PRN Luzmaria Lo MD         Current Outpatient Medications   Medication Sig Dispense Refill    cycloSPORINE (RESTASIS) 0.05 % ophthalmic emulsion Administer 1 Drop to both eyes two (2) times a day.  Aspirin, Buffered 81 mg tab 81 mg daily.  MULTIVITAMIN PO Take  by mouth.  brimonidine-timolol (COMBIGAN) 0.2-0.5 % Drop ophthalmic solution 1 Drop every twelve (12) hours.  fish oil-dha-epa 1,200-144-216 mg cap Take  by mouth.  cinnamon bark (CINNAMON) 500 mg cap Take 500 mg by mouth two (2) times a day.  ascorbic acid (VITAMIN C) 500 mg tablet Take  by mouth.  garlic 1,202 mg cap Take 1,000 mg by mouth four (4) times daily.  BETA-CAROTENE,A, W-C & E/MIN (VISION FORMULA PO) Take  by mouth.  lutein 20 mg Tab Take 20 mg by mouth daily.  melatonin 10 mg Tab Take 10 mg by mouth daily.  ACETAMINOPHEN (TYLENOL PO) Take  by mouth.  NAPROXEN PO Take  by mouth.          Past Medical History:   Diagnosis Date    Anxiety     Arrhythmia 2011    Negative Stress test    Arthritis     Carotid artery stenosis 2009    <50%    Gestational diabetes     Glaucoma     Hypercholesterolemia     Hypertension        Past Surgical History:   Procedure Laterality Date    HX CHOLECYSTECTOMY  1990    gallstones    HX HYSTERECTOMY  1990    total hysterectomy       Allergies Allergen Reactions    Codeine Nausea Only       Patient Active Problem List   Diagnosis Code    Glaucoma H40.9    Anxiety F41.9    Impaired fasting blood sugar/ borderline diabetes R73.01    UTI (urinary tract infection) N39.0         Review of Systems:    Constitutional no fever or chills  Skin denies rash or itching  HENT  Denies hearing lose  Eyes denies diplopia vision lose  Respiratory denies shortness of breath  Cardiovascular denies chest pain  Gastrointestinal denies nausea, vomiting  Genitourinary denies incontinence  Musculoskeletal denies joint pain  Hematology denies easy bruising  Neurological as above in HPI      PHYSICAL EXAMINATION:      VITAL SIGNS:    Visit Vitals  BP (!) 192/90   Pulse 72   Temp 97.4 °F (36.3 °C)   Resp 15   Ht 5' 8\" (1.727 m)   Wt 69.9 kg (154 lb)   SpO2 97%   BMI 23.42 kg/m²       GENERAL: In no apparent distress. EXTREMITIES: Muscle tone is normal.  HEAD:   The patient is normocephalic. NEUROLOGIC EXAMINATION  Mental status: Awake, alert, oriented self but not to her age; not oriented to place, month/date/day; knows the year. Follows simple and complex commands. Speech and languge: fluent and comprehension appears intact but with some confabulation.   CN: VFF, EOMI, PERRLA, face sensation intact , no facial asymmetry noted, palate elevation symmetric bilat, SS+SCM 5/5 bilat, tongue midline  Motor: no pronator drift, tone normal throughout, strength 5/5 throughout  Sensory: intact to light touch throughout  Coordination: FNF, HS accurate w/o dysmetria  DTR: 2+ throughout, toes downgoing BL  Gait: not tested       Study Result    Narrative & Impression   CT of the head without contrast     HISTORY: hallucinations, altered mental status     COMPARISON: None     TECHNIQUE: Helical axial scan to the head was performed from the skull base to  the vertex without IV contrast administration.     All CT scans at this facility performed using dose optimization techniques as  appreciated to a performed exam, to include automated exposure control,  adjustment of the mA and or KU according to patient size (including appropriate  matching for site specific examination), or use of iterative reconstruction  technique.     FINDINGS:     Brain volume appears unremarkable for age. No ventricular dilatation. The  gray-white matter differentiation is preserved. Mild periventricular white  matter hypodensities present. There is no evidence of acute intracranial  hemorrhage,  mass effect or midline shift identified. No skull fracture or extra  axial fluid collections identified. Visualized sinuses and mastoid air cells  appear unremarkable.      IMPRESSION     No acute intracranial abnormality. Note: If clinical concern of acute stroke, MRI with diffusion weighted images is  recommended for better evaluation.     Mild microvascular disease of white matter. CBC:   Lab Results   Component Value Date/Time    WBC 9.2 07/05/2021 03:28 PM    RBC 4.15 (L) 07/05/2021 03:28 PM    HGB 12.0 07/05/2021 03:28 PM    HCT 38.3 07/05/2021 03:28 PM    PLATELET 361 87/02/5419 03:28 PM     BMP:   Lab Results   Component Value Date/Time    Glucose 99 07/05/2021 03:28 PM    Sodium 138 07/05/2021 03:28 PM    Potassium 3.3 (L) 07/05/2021 03:28 PM    Chloride 105 07/05/2021 03:28 PM    CO2 28 07/05/2021 03:28 PM    BUN 14 07/05/2021 03:28 PM    Creatinine 0.96 07/05/2021 03:28 PM    Calcium 9.3 07/05/2021 03:28 PM     CMP:   Lab Results   Component Value Date/Time    Glucose 99 07/05/2021 03:28 PM    Sodium 138 07/05/2021 03:28 PM    Potassium 3.3 (L) 07/05/2021 03:28 PM    Chloride 105 07/05/2021 03:28 PM    CO2 28 07/05/2021 03:28 PM    BUN 14 07/05/2021 03:28 PM    Creatinine 0.96 07/05/2021 03:28 PM    Calcium 9.3 07/05/2021 03:28 PM    Anion gap 5 07/05/2021 03:28 PM    BUN/Creatinine ratio 15 07/05/2021 03:28 PM    Alk.  phosphatase 43 (L) 03/13/2017 09:38 AM    Protein, total 6.6 03/13/2017 09:38 AM Albumin 3.6 03/13/2017 09:38 AM    Globulin 3.0 03/13/2017 09:38 AM    A-G Ratio 1.2 03/13/2017 09:38 AM       Impression:   A 78years old female patient was brought to the emergency room for hallucinations and changes in her behavior. Was reported by her friends that she might have some with hallucinations. Has been having complex visual hallucinations over the past few months. Was noticed to have some additional changes in her behavior. Confusing the days. No focal neuro deficits. On exam, she is disoriented to time and place. CT scan of the brain did not show any acute changes. MRI of the brain pending. Need to rule out organic possibilities. Metabolic panel and UDS unremarkable. Urinalysis showed some signs of infection. She is on ceftriaxone. Plan:   -Follow MRI results  -Thiamine, vitamin B12, and folate levels  -RPR  -Work-up and treatment of infections per the primary team  -Reevaluation by psychiatry  -We will follow patient  -Call for questions. PLEASE NOTE:   This document has been produced using voice recognition software. Unrecognized errors in transcription may be present.

## 2021-07-06 NOTE — ED NOTES
Pt tolerated swallowing oral medication, pt given breakfast tray at this time, MRI screen form completed and faxed

## 2021-07-07 LAB — RPR SER QL: NONREACTIVE

## 2021-07-07 RX ORDER — LANOLIN ALCOHOL/MO/W.PET/CERES
1000 CREAM (GRAM) TOPICAL DAILY
Qty: 30 TABLET | Refills: 2 | Status: SHIPPED | OUTPATIENT
Start: 2021-07-07 | End: 2021-10-06 | Stop reason: ALTCHOICE

## 2021-07-07 RX ORDER — AMLODIPINE BESYLATE 10 MG/1
10 TABLET ORAL DAILY
Qty: 20 TABLET | Refills: 0 | Status: SHIPPED | OUTPATIENT
Start: 2021-07-07 | End: 2021-10-06 | Stop reason: SDUPTHER

## 2021-07-07 RX ORDER — GUAIFENESIN 100 MG/5ML
81 LIQUID (ML) ORAL DAILY
Qty: 30 TABLET | Refills: 0 | Status: SHIPPED | OUTPATIENT
Start: 2021-07-07 | End: 2021-10-06 | Stop reason: ALTCHOICE

## 2021-07-07 RX ORDER — POTASSIUM CHLORIDE 1500 MG/1
20 TABLET, FILM COATED, EXTENDED RELEASE ORAL DAILY
Qty: 7 TABLET | Refills: 0 | Status: SHIPPED | OUTPATIENT
Start: 2021-07-07 | End: 2021-10-06 | Stop reason: ALTCHOICE

## 2021-07-07 RX ORDER — POLYETHYLENE GLYCOL 3350 17 G/17G
17 POWDER, FOR SOLUTION ORAL DAILY
Qty: 30 PACKET | Refills: 0 | Status: SHIPPED | OUTPATIENT
Start: 2021-07-07 | End: 2021-10-06 | Stop reason: ALTCHOICE

## 2021-07-07 NOTE — PROGRESS NOTES
New OT orders received and chart reviewed. Unable to see pt for OT evaluation at this time due to:    Pt is discharged from the hospital prior to OT evaluation.   Thank you for this referral.    Rosy Velázquez, MS, OTR/L

## 2021-07-07 NOTE — ED NOTES
The patient was turned over to me by Dr. Stoney Campos at ED shift change. The patient is a 22-year-old woman who was brought to the ED yesterday with concern for visual hallucinations. She was noted to have a UTI. She was evaluated by the hospitalist service and was scheduled to be admitted but then refused admission. At that point, psychiatry evaluated her and she is not amenable to admission. Dr. Sally Mckeon is requesting a medical TDO. We are awaiting feedback from crisis. Crisis evaluated the patient and they state that the patient does not meet criteria for medical TDO. She will be discharged home with a prescription for Keflex and advised to follow-up with her primary care physician in 2 to 3 days. She will also be given the name of an outpatient psychiatrist to follow-up with. Return precautions have been given. When the nursing staff called the patient's friend and advised her that the patient will be discharged, the patient's friend stated that she is unable to drive at night. It will be tomorrow before the patient can get a ride home. The nursing staff also attempted to contact the patient's son but the son lives in [place] and is not available to retrieve the patient in the hospital either.

## 2021-07-07 NOTE — PROGRESS NOTES
PT eval order received and chart reviewed. Spoke with patient's RN, pt is at functional baseline for mobility. She has been indep amb the hallways without any functional mobility deficits. Pt's family is coming to pick her up soon. No further need for assist or AD/DME. Will sign off per protocol and educated patient that if their functional mobility needs should change that they may have MD re-order PT at any time.  Thank you for this referral.     Amber Escamilla, PT, DPT

## 2021-07-07 NOTE — ED NOTES
Patient is discharged but her friend Memo Mayer will not be able to pick her up until tomorrow. This nurse spoke with the patient's friend Hussein Rowe that she lives with, friend is hard of hearing and states she can not come to get her. Friend (Magui) states son is in [place].

## 2021-07-07 NOTE — BSMART NOTE
Crisis NoteGordjulisa Ramirez, [phone], evaluated patient for possible TDO, and concluded that patient does not meet the criteria to be detained by TDO. Dr. Jim Mcconnell, ED-MD, patient and ED-charge nurse made aware. Also, spoke with Rickie Sheryl, friend/emergency contact, [phone], to inform that patient did meet the criteria to be detained. Ms Hammad Shaver verbalized that she can't pick-up patient tonight, and she or someone else with pick-up patient in the morning.

## 2021-07-07 NOTE — PROGRESS NOTES
conducted a Follow up consultation and Spiritual Assessment for Demetria Lott, who is a 78 y.o.,female. The  provided the following Interventions:  Continued the relationship of care and support. Listened empathically. Offered prayer and assurance of continued prayer on patients behalf. Chart reviewed. The following outcomes were achieved:  Patient expressed gratitude for pastoral care visit. Assessment:  There are no further spiritual or Episcopal issues which require Spiritual Care Services interventions at this time. Plan:  Chaplains will continue to follow and will provide pastoral care on an as needed/requested basis.  recommends bedside caregivers page  on duty if patient shows signs of acute spiritual or emotional distress.

## 2021-07-08 NOTE — ED NOTES
Patient family member called the ER requesting the contact information for Dr. Flakito Desai this was her PCP but I guess she no longer follows with her is advised to follow-up with another PCP they asked for a recommendation so I advised follow-up with [facility] family medicine become a new patient

## 2021-07-15 NOTE — PROGRESS NOTES
Physician Progress Note      PATIENT:               Francesco Kumari  CSN #:                  [id number]  :                       1945  ADMIT DATE:       2021 3:14 PM  [address] DATE:        2021 12:23 PM  RESPONDING  PROVIDER #:        Rodriguez Peng MD          QUERY TEXT:    Dear  Dr. Naima Shannon ,  Patient admitted with visual hallucinations  and  UTI . Documentation reflects  acute metabolic encephalopathy in  H&P . If possible, please document in the progress notes and discharge summary if :    The medical record reflects the following:  Risk Factors: visual and auditory hallucinations   present  x 3-4 months prior to admit  Clinical Indicators: Spoke to patient's friend Cassius Clemens, per her patient has been having visual and auditory hallucinations at home. She frequently gets upset and angry. Per our friend this is been going on for last 3 to 4 months. Patient at times very confused and agitated. \"  UA  +   for  UTI  on admit  K+  3.3;    cxr   negative ; no leukocytosis  per  Psych-   Patient  with visual hallucinations for at least 4 months which appeared to be worsening. Patient also reports memory impairment and confusion. Medical work-up so far is only significant for urinary tract infection and hypokalemia  Differential diagnosis includes acute metabolic encephalopathy, major neurocognitive disorder, psychosis NOS, other neurological disorder.   Treatment: - rocephin IV  x 1 dose  levaquin    750mg  po x  3 doses  Psych and neuro consults    Thank you,   Chelly Mclean RN   CCDS  x [phone]  Options provided:  -- Acute metabolic encephalopathy confirmed after study  -- Acute metabolic encephalopathy treated and resolved  -- Acute metabolic encephalopathy ruled out after study  -- Other - I will add my own diagnosis  -- Disagree - Not applicable / Not valid  -- Disagree - Clinically unable to determine / Unknown  -- Refer to Clinical Documentation Reviewer    PROVIDER RESPONSE TEXT:    Acute metabolic encephalopathy confirmed after study.     Query created by: Chris Monte on 7/7/2021 11:02 AM      Electronically signed by:  Arcenio Coronado MD 7/14/2021 10:56 PM

## 2021-07-18 NOTE — PROGRESS NOTES
Physician Progress Note      PATIENT:               Vinod Olvera  CSN #:                  [id number]  :                       1945  ADMIT DATE:       2021 3:14 PM  [address] DATE:        2021 12:23 PM  RESPONDING  PROVIDER #:        Deborah MCLEOD MD          QUERY TEXT:    Pt admitted with  confusion, hallucinations . Pt noted to have acute metabolic encephalopathy. If possible, please document in progress notes and discharge summary the relationship, if any, between UTI  and metabolic encephalopathy    The medical record reflects the following    Risk Factors: pt is poor historian    Clinical Indicators  :pt found to have  UTI; Has been having complex visual hallucinations over the past few months. Was noticed to have some additional changes in her behavior , per neuro consult    Treatment: IV  abx  Psych consult    Thank you,   Eulalia Casas  RN   CCDS   x [phone]  Options provided:  -- acute metabolic encephalopathy  due to UTI  -- acute metabolic encephalopathy   unrelated to UTI  -- Other - I will add my own diagnosis  -- Disagree - Not applicable / Not valid  -- Disagree - Clinically unable to determine / Unknown  -- Refer to Clinical Documentation Reviewer    PROVIDER RESPONSE TEXT:    This patient has acute metabolic encephalopathy due to UTI .     Query created by: Leigh Sidhu on 2021 8:47 AM      Electronically signed by:  Lili Fernando MD 2021 10:44 AM

## 2021-07-21 NOTE — PROGRESS NOTES
Physician Progress Note      PATIENT:               Rikki Mohan  CSN #:                  [id number]  :                       1945  ADMIT DATE:       2021 3:14 PM  [address] DATE:        2021 12:23 PM  RESPONDING  PROVIDER #:        Karmen MCLEOD MD          QUERY TEXT:    Patient admitted with encephalopathy  due to UTI. ( per query response )      In order to support the diagnosis of UTI , please include additional clinical indicators in your documentation. Or please document if the diagnosis of UTI  has been ruled out after further study. The medical record reflects the following:    Risk Factors: encephalopathy  Clinical Indicators: UA  showed   heavy growth + nitrites; large leukocytes with 3+ bacteria  per discharge summary, pt asymptomatic ,  no leukocytosis, fever;  no urine cx sent    Treatment: - treated with levaquin    Thank you Dr. Deanne Singer  RN   CCDS    x [phone]  Options provided:  -- UTI  present as evidenced by, Please document evidence. -- UTI  was ruled out  -- Other - I will add my own diagnosis  -- Disagree - Not applicable / Not valid  -- Disagree - Clinically unable to determine / Unknown  -- Refer to Clinical Documentation Reviewer    PROVIDER RESPONSE TEXT:    UTI was ruled out after study.     Query created by: Tommy Saha on 2021 9:39 AM      Electronically signed by:  Karmen Shepard MD 2021 12:53 PM

## 2021-07-23 NOTE — PROGRESS NOTES
Physician Progress Note      PATIENT:               Marva Rojas  CSN #:                  [id number]  :                       1945  ADMIT DATE:       2021 3:14 PM  [address] DATE:        2021 12:23 PM  RESPONDING  PROVIDER #:        Kimmy MCLEOD MD          QUERY TEXT:      Dear Dr Krystyna Meyers,  I promise this is the last query for this patient , thank you for your patience    Patient admitted with  Hallucinations Noted documentation of acute metabolic encephalopathy  in H&P . In order to support the diagnosis of   acute metabolic encephalopathy please include additional clinical indicators in your documentation. Or please document if the diagnosis of acute metabolic encephalopathy has been ruled out after further study. The medical record reflects the following:    Risk Factors: hallucinations PTA    Clinical Indicators: UTI   was ruled out , no other infectious process    Treatment: supportive care  ua  was treated   but  UTI ruled out . Thank you,   Cristino Parnell RN  CCDS  x [phone]  Options provided:  -- Acute metabolic encephalopathy present as evidenced by, Please document evidence. -- Acute metabolic encephalopathy  was ruled out  -- Other - I will add my own diagnosis  -- Disagree - Not applicable / Not valid  -- Disagree - Clinically unable to determine / Unknown  -- Refer to Clinical Documentation Reviewer    PROVIDER RESPONSE TEXT:    Acute metabolic encephalopathy was ruled out after study.     Query created by: Marlin Cardenas on 2021 2:02 PM      Electronically signed by:  Levell Seip MD 2021 10:44 AM

## 2021-09-14 ENCOUNTER — HOSPITAL ENCOUNTER (EMERGENCY)
Age: 76
Discharge: HOME | End: 2021-09-15
Attending: EMERGENCY MEDICINE
Payer: MEDICARE

## 2021-09-14 DIAGNOSIS — F43.21 ADJUSTMENT DISORDER WITH DEPRESSED MOOD: ICD-10-CM

## 2021-09-14 DIAGNOSIS — N30.00 ACUTE CYSTITIS WITHOUT HEMATURIA: Primary | ICD-10-CM

## 2021-09-14 PROCEDURE — 99283 EMERGENCY DEPT VISIT LOW MDM: CPT

## 2021-09-15 ENCOUNTER — APPOINTMENT (OUTPATIENT)
Dept: GENERAL RADIOLOGY | Age: 76
End: 2021-09-15
Attending: EMERGENCY MEDICINE
Payer: MEDICARE

## 2021-09-15 VITALS
OXYGEN SATURATION: 97 % | DIASTOLIC BLOOD PRESSURE: 95 MMHG | TEMPERATURE: 99 F | HEART RATE: 92 BPM | RESPIRATION RATE: 18 BRPM | SYSTOLIC BLOOD PRESSURE: 182 MMHG

## 2021-09-15 LAB
ALBUMIN SERPL-MCNC: 3.3 G/DL (ref 3.4–5)
ALBUMIN/GLOB SERPL: 0.9 {RATIO} (ref 0.8–1.7)
ALP SERPL-CCNC: 65 U/L (ref 45–117)
ALT SERPL-CCNC: 18 U/L (ref 13–56)
AMPHET UR QL SCN: NEGATIVE
ANION GAP SERPL CALC-SCNC: 5 MMOL/L (ref 3–18)
APPEARANCE UR: ABNORMAL
AST SERPL-CCNC: 11 U/L (ref 10–38)
ATRIAL RATE: 68 BPM
BACTERIA URNS QL MICRO: ABNORMAL /HPF
BARBITURATES UR QL SCN: NEGATIVE
BASOPHILS # BLD: 0 K/UL (ref 0–0.1)
BASOPHILS NFR BLD: 0 % (ref 0–2)
BENZODIAZ UR QL: NEGATIVE
BILIRUB SERPL-MCNC: 0.4 MG/DL (ref 0.2–1)
BILIRUB UR QL: NEGATIVE
BNP SERPL-MCNC: 160 PG/ML (ref 0–900)
BUN SERPL-MCNC: 14 MG/DL (ref 7–18)
BUN/CREAT SERPL: 15 (ref 12–20)
CALCIUM SERPL-MCNC: 9.5 MG/DL (ref 8.5–10.1)
CALCULATED P AXIS, ECG09: 63 DEGREES
CALCULATED R AXIS, ECG10: 5 DEGREES
CALCULATED T AXIS, ECG11: 1 DEGREES
CANNABINOIDS UR QL SCN: NEGATIVE
CHLORIDE SERPL-SCNC: 105 MMOL/L (ref 100–111)
CO2 SERPL-SCNC: 31 MMOL/L (ref 21–32)
COCAINE UR QL SCN: NEGATIVE
COLOR UR: YELLOW
CREAT SERPL-MCNC: 0.94 MG/DL (ref 0.6–1.3)
DIAGNOSIS, 93000: NORMAL
DIFFERENTIAL METHOD BLD: ABNORMAL
EOSINOPHIL # BLD: 0.1 K/UL (ref 0–0.4)
EOSINOPHIL NFR BLD: 1 % (ref 0–5)
EPITH CASTS URNS QL MICRO: ABNORMAL /LPF (ref 0–5)
ERYTHROCYTE [DISTWIDTH] IN BLOOD BY AUTOMATED COUNT: 13.4 % (ref 11.6–14.5)
ETHANOL SERPL-MCNC: 4 MG/DL (ref 0–3)
GLOBULIN SER CALC-MCNC: 3.5 G/DL (ref 2–4)
GLUCOSE SERPL-MCNC: 94 MG/DL (ref 74–99)
GLUCOSE UR STRIP.AUTO-MCNC: NEGATIVE MG/DL
HCT VFR BLD AUTO: 37 % (ref 35–45)
HDSCOM,HDSCOM: NORMAL
HGB BLD-MCNC: 12.2 G/DL (ref 12–16)
HGB UR QL STRIP: NEGATIVE
KETONES UR QL STRIP.AUTO: NEGATIVE MG/DL
LEUKOCYTE ESTERASE UR QL STRIP.AUTO: ABNORMAL
LYMPHOCYTES # BLD: 2.9 K/UL (ref 0.9–3.6)
LYMPHOCYTES NFR BLD: 29 % (ref 21–52)
MCH RBC QN AUTO: 30.6 PG (ref 24–34)
MCHC RBC AUTO-ENTMCNC: 33 G/DL (ref 31–37)
MCV RBC AUTO: 92.7 FL (ref 78–100)
METHADONE UR QL: NEGATIVE
MONOCYTES # BLD: 0.7 K/UL (ref 0.05–1.2)
MONOCYTES NFR BLD: 7 % (ref 3–10)
NEUTS SEG # BLD: 6.4 K/UL (ref 1.8–8)
NEUTS SEG NFR BLD: 63 % (ref 40–73)
NITRITE UR QL STRIP.AUTO: NEGATIVE
OPIATES UR QL: NEGATIVE
P-R INTERVAL, ECG05: 136 MS
PCP UR QL: NEGATIVE
PH UR STRIP: 5.5 [PH] (ref 5–8)
PLATELET # BLD AUTO: 261 K/UL (ref 135–420)
PMV BLD AUTO: 10 FL (ref 9.2–11.8)
POTASSIUM SERPL-SCNC: 3.7 MMOL/L (ref 3.5–5.5)
PROT SERPL-MCNC: 6.8 G/DL (ref 6.4–8.2)
PROT UR STRIP-MCNC: NEGATIVE MG/DL
Q-T INTERVAL, ECG07: 386 MS
QRS DURATION, ECG06: 106 MS
QTC CALCULATION (BEZET), ECG08: 410 MS
RBC # BLD AUTO: 3.99 M/UL (ref 4.2–5.3)
RBC #/AREA URNS HPF: NEGATIVE /HPF (ref 0–5)
SODIUM SERPL-SCNC: 141 MMOL/L (ref 136–145)
SP GR UR REFRACTOMETRY: 1 (ref 1–1.03)
TROPONIN I SERPL-MCNC: <0.02 NG/ML (ref 0–0.04)
UROBILINOGEN UR QL STRIP.AUTO: 0.2 EU/DL (ref 0.2–1)
VENTRICULAR RATE, ECG03: 68 BPM
WBC # BLD AUTO: 10.2 K/UL (ref 4.6–13.2)
WBC URNS QL MICRO: ABNORMAL /HPF (ref 0–4)

## 2021-09-15 PROCEDURE — 85025 COMPLETE CBC W/AUTO DIFF WBC: CPT

## 2021-09-15 PROCEDURE — 83880 ASSAY OF NATRIURETIC PEPTIDE: CPT

## 2021-09-15 PROCEDURE — 84484 ASSAY OF TROPONIN QUANT: CPT

## 2021-09-15 PROCEDURE — 74011000250 HC RX REV CODE- 250: Performed by: EMERGENCY MEDICINE

## 2021-09-15 PROCEDURE — 71045 X-RAY EXAM CHEST 1 VIEW: CPT

## 2021-09-15 PROCEDURE — 80307 DRUG TEST PRSMV CHEM ANLYZR: CPT

## 2021-09-15 PROCEDURE — 82077 ASSAY SPEC XCP UR&BREATH IA: CPT

## 2021-09-15 PROCEDURE — 81001 URINALYSIS AUTO W/SCOPE: CPT

## 2021-09-15 PROCEDURE — 80053 COMPREHEN METABOLIC PANEL: CPT

## 2021-09-15 PROCEDURE — 74011250636 HC RX REV CODE- 250/636: Performed by: EMERGENCY MEDICINE

## 2021-09-15 PROCEDURE — 93005 ELECTROCARDIOGRAM TRACING: CPT

## 2021-09-15 PROCEDURE — 96374 THER/PROPH/DIAG INJ IV PUSH: CPT

## 2021-09-15 RX ORDER — CEPHALEXIN 500 MG/1
500 CAPSULE ORAL 4 TIMES DAILY
Qty: 28 CAPSULE | Refills: 0 | Status: SHIPPED | OUTPATIENT
Start: 2021-09-15 | End: 2021-09-22

## 2021-09-15 RX ADMIN — WATER 1 G: 1 INJECTION INTRAMUSCULAR; INTRAVENOUS; SUBCUTANEOUS at 09:16

## 2021-09-15 NOTE — ED PROVIDER NOTES
The patient is a 70-year-old woman who was brought in by EMS today after a welfare check. She states that somebody, possibly one of her roommates, called EMS because she was outside wandering around. She states that she was seen several weeks ago for similar concerns. I actually did see the patient then. She does not feel like she does have anything wrong. She states that she just has multiple life stressors and would like to go to counseling. Past Medical History:   Diagnosis Date    Anxiety     Arrhythmia 2011    Negative Stress test    Arthritis     Carotid artery stenosis 2009    <50%    Gestational diabetes     Glaucoma     Hypercholesterolemia     Hypertension        Past Surgical History:   Procedure Laterality Date    HX CHOLECYSTECTOMY  1990    gallstones    HX HYSTERECTOMY  1990    total hysterectomy         Family History:   Problem Relation Age of Onset    Arthritis-osteo Mother     Dementia Mother     Hypertension Mother     Coronary Artery Disease Mother        Social History     Socioeconomic History    Marital status:      Spouse name: Not on file    Number of children: Not on file    Years of education: Not on file    Highest education level: Not on file   Occupational History    Not on file   Tobacco Use    Smoking status: Never Smoker    Smokeless tobacco: Never Used   Substance and Sexual Activity    Alcohol use: Yes     Comment: Socially    Drug use: Not on file    Sexual activity: Not on file   Other Topics Concern    Not on file   Social History Narrative    Not on file     Social Determinants of Health     Financial Resource Strain:     Difficulty of Paying Living Expenses:    Food Insecurity:     Worried About Running Out of Food in the Last Year:     [address] in the Last Year:    Transportation Needs:     Lack of Transportation (Medical):      Lack of Transportation (Non-Medical):    Physical Activity:     Days of Exercise per Week:     Minutes of Exercise per Session:    Stress:     Feeling of Stress :    Social Connections:     Frequency of Communication with Friends and Family:     Frequency of Social Gatherings with Friends and Family:     Attends Mu-ism Services:     Active Member of Clubs or Organizations:     Attends Club or Organization Meetings:     Marital Status:    Intimate Partner Violence:     Fear of Current or Ex-Partner:     Emotionally Abused:     Physically Abused:     Sexually Abused: ALLERGIES: Codeine    Review of Systems   All other systems reviewed and are negative. Vitals:    09/15/21 0041   BP: (!) 182/95   Pulse: 92   Resp: 18   Temp: 99 °F (37.2 °C)   SpO2: 97%            Physical Exam  Vitals and nursing note reviewed. Constitutional:       Appearance: Normal appearance. HENT:      Head: Normocephalic. Right Ear: External ear normal.      Left Ear: External ear normal.      Nose: Nose normal.      Mouth/Throat:      Mouth: Mucous membranes are moist.      Pharynx: Oropharynx is clear. Eyes:      Extraocular Movements: Extraocular movements intact. Conjunctiva/sclera: Conjunctivae normal.      Pupils: Pupils are equal, round, and reactive to light. Cardiovascular:      Rate and Rhythm: Normal rate and regular rhythm. Pulses: Normal pulses. Heart sounds: Normal heart sounds. Pulmonary:      Effort: Pulmonary effort is normal.      Breath sounds: Normal breath sounds. Abdominal:      General: Abdomen is flat. Bowel sounds are normal.      Palpations: Abdomen is soft. Musculoskeletal:         General: Normal range of motion. Cervical back: Normal range of motion and neck supple. Skin:     General: Skin is warm and dry. Capillary Refill: Capillary refill takes less than 2 seconds. Neurological:      General: No focal deficit present. Mental Status: She is alert and oriented to person, place, and time.    Psychiatric:         Mood and Affect: Mood normal.         Behavior: Behavior normal.         Thought Content: Thought content normal.         Judgment: Judgment normal.      Comments: Has a little bit of short-term memory loss. She is able to recall that it is now Wednesday, it is September, and she knows that it is 2021. She did not know the numerical date though. She also had difficulty recalling that I did ask her for the numerical day 2.         Recent Results (from the past 12 hour(s))   URINALYSIS W/ RFLX MICROSCOPIC    Collection Time: 09/15/21  3:04 AM   Result Value Ref Range    Color YELLOW      Appearance CLOUDY      Specific gravity 1.005 1.005 - 1.030      pH (UA) 5.5 5.0 - 8.0      Protein Negative NEG mg/dL    Glucose Negative NEG mg/dL    Ketone Negative NEG mg/dL    Bilirubin Negative NEG      Blood Negative NEG      Urobilinogen 0.2 0.2 - 1.0 EU/dL    Nitrites Negative NEG      Leukocyte Esterase LARGE (A) NEG     DRUG SCREEN, URINE    Collection Time: 09/15/21  3:04 AM   Result Value Ref Range    BENZODIAZEPINES Negative NEG      BARBITURATES Negative NEG      THC (TH-CANNABINOL) Negative NEG      OPIATES Negative NEG      PCP(PHENCYCLIDINE) Negative NEG      COCAINE Negative NEG      AMPHETAMINES Negative NEG      METHADONE Negative NEG      HDSCOM (NOTE)    URINE MICROSCOPIC ONLY    Collection Time: 09/15/21  3:04 AM   Result Value Ref Range    WBC 15 to 20 0 - 4 /hpf    RBC Negative 0 - 5 /hpf    Epithelial cells 2+ 0 - 5 /lpf    Bacteria 2+ (A) NEG /hpf   CBC WITH AUTOMATED DIFF    Collection Time: 09/15/21  5:03 AM   Result Value Ref Range    WBC 10.2 4.6 - 13.2 K/uL    RBC 3.99 (L) 4.20 - 5.30 M/uL    HGB 12.2 12.0 - 16.0 g/dL    HCT 37.0 35.0 - 45.0 %    MCV 92.7 78.0 - 100.0 FL    MCH 30.6 24.0 - 34.0 PG    MCHC 33.0 31.0 - 37.0 g/dL    RDW 13.4 11.6 - 14.5 %    PLATELET 148 145 - 782 K/uL    MPV 10.0 9.2 - 11.8 FL    NEUTROPHILS 63 40 - 73 %    LYMPHOCYTES 29 21 - 52 %    MONOCYTES 7 3 - 10 %    EOSINOPHILS 1 0 - 5 %    BASOPHILS 0 0 - 2 %    ABS. NEUTROPHILS 6.4 1.8 - 8.0 K/UL    ABS. LYMPHOCYTES 2.9 0.9 - 3.6 K/UL    ABS. MONOCYTES 0.7 0.05 - 1.2 K/UL    ABS. EOSINOPHILS 0.1 0.0 - 0.4 K/UL    ABS. BASOPHILS 0.0 0.0 - 0.1 K/UL    DF AUTOMATED     METABOLIC PANEL, COMPREHENSIVE    Collection Time: 09/15/21  5:03 AM   Result Value Ref Range    Sodium 141 136 - 145 mmol/L    Potassium 3.7 3.5 - 5.5 mmol/L    Chloride 105 100 - 111 mmol/L    CO2 31 21 - 32 mmol/L    Anion gap 5 3.0 - 18 mmol/L    Glucose 94 74 - 99 mg/dL    BUN 14 7.0 - 18 MG/DL    Creatinine 0.94 0.6 - 1.3 MG/DL    BUN/Creatinine ratio 15 12 - 20      GFR est AA >60 >60 ml/min/1.73m2    GFR est non-AA 58 (L) >60 ml/min/1.73m2    Calcium 9.5 8.5 - 10.1 MG/DL    Bilirubin, total 0.4 0.2 - 1.0 MG/DL    ALT (SGPT) 18 13 - 56 U/L    AST (SGOT) 11 10 - 38 U/L    Alk.  phosphatase 65 45 - 117 U/L    Protein, total 6.8 6.4 - 8.2 g/dL    Albumin 3.3 (L) 3.4 - 5.0 g/dL    Globulin 3.5 2.0 - 4.0 g/dL    A-G Ratio 0.9 0.8 - 1.7     NT-PRO BNP    Collection Time: 09/15/21  5:03 AM   Result Value Ref Range    NT pro- 0 - 900 PG/ML   TROPONIN I    Collection Time: 09/15/21  5:03 AM   Result Value Ref Range    Troponin-I, QT <0.02 0.0 - 0.045 NG/ML   ETHYL ALCOHOL    Collection Time: 09/15/21  5:03 AM   Result Value Ref Range    ALCOHOL(ETHYL),SERUM 4 (H) 0 - 3 MG/DL   EKG, 12 LEAD, INITIAL    Collection Time: 09/15/21  5:43 AM   Result Value Ref Range    Ventricular Rate 68 BPM    Atrial Rate 68 BPM    P-R Interval 136 ms    QRS Duration 106 ms    Q-T Interval 386 ms    QTC Calculation (Bezet) 410 ms    Calculated P Axis 63 degrees    Calculated R Axis 5 degrees    Calculated T Axis 1 degrees    Diagnosis       Normal sinus rhythm  Septal infarct (cited on or before 05-JUL-2021)  Abnormal ECG  When compared with ECG of 05-JUL-2021 15:22,  premature atrial complexes are no longer present  Questionable change in initial forces of Septal leads       XR CHEST PORT (Results Pending)       MDM  Number of Diagnoses or Management Options  Diagnosis management comments: The patient is a 46-year-old woman who was brought to the ED today by EMS after a welfare check. Her roommate was concerned that she was wandering around outside. I have seen this patient before under similar circumstances, and at that time she did have a urinary tract infection. The patient does have a urinary tract infection again today. I have prescribed Rocephin 1 g IV. I will discharge the patient home with a prescription for Keflex. She will be advised to follow-up with her primary care physician and 2 to 3 days. Return precautions have been given. The patient will be discharged home with counseling resources as well.          Procedures

## 2021-09-15 NOTE — ED TRIAGE NOTES
Patient brought in by ems. EMS states police was sent to patients home on a well person check. Pt was standing outside when police arrived. Medics were called. States pt alert and orientated, answered most questions appropriately. Some confusion. Per ems and pt. Pt wanted to come to hospital to be checked out.

## 2021-10-05 PROBLEM — I10 ESSENTIAL HYPERTENSION: Status: ACTIVE | Noted: 2021-10-05

## 2021-10-05 PROBLEM — N39.0 UTI (URINARY TRACT INFECTION): Status: RESOLVED | Noted: 2021-07-06 | Resolved: 2021-10-05

## 2021-10-05 NOTE — PROGRESS NOTES
Rui Banres is a 78 y.o. female is seen on 10/6/2021 for Establish Care, Hypertension (high readings ), Hospital Follow Up (Memory Loss/ Mental health ), and UTI (medication from [facility] )    Assessment & Plan:     1. Transient alteration of awareness  Comments:  Persists, treat for UTI, but will proceed with neuropsych consult given that this has occurred previously  Orders:  -     METABOLIC PANEL, COMPREHENSIVE; Future  -     CBC WITH AUTOMATED DIFF; Future  -     REFERRAL TO NEUROPSYCHOLOGY  -     REFERRAL TO HOME HEALTH  2. Essential hypertension  Assessment & Plan:  BP goal <130/80  Orders:  -     METABOLIC PANEL, COMPREHENSIVE; Future  -     amLODIPine (NORVASC) 10 mg tablet; Take 1 Tablet by mouth daily. , Normal, Disp-20 Tablet, R-0  3. Acute cystitis without hematuria  Comments:  Repeat UA positive for nitrities, will cover with Macrobid for now   Send UA for cx, will call with results  Orders:  -     AMB POC URINALYSIS DIP STICK AUTO W/ MICRO  -     CULTURE, URINE; Future  -     nitrofurantoin, macrocrystal-monohydrate, (MACROBID) 100 mg capsule; Take 1 Capsule by mouth two (2) times a day for 7 days. , Normal, Disp-14 Capsule, R-0  4. Glaucoma, unspecified glaucoma type, unspecified laterality  -     REFERRAL TO OPHTHALMOLOGY  5. Encounter for screening mammogram for malignant neoplasm of breast  -     [facility] MAMMO BI SCREENING INCL CAD; Future  6. Screen for colon cancer  Comments:  Discussed colonoscopy vs FIT vs Cologuard  Wants to think about options, will discuss again at next appt  7. Screening for lipid disorders  -     LIPID PANEL; Future  8. Encounter to establish care    Follow-up and Dispositions    · Return in about 4 weeks (around 11/3/2021) for medicare wellness visit, blood pressure, lab results. Subjective:     HPI    Patient presents today to [facility], accompanied by [facility], who is the main contact for the patient at [phone].     Previous PCP:  Dr. Barrie Jarrett with [facility]? Reason for switching: had not been seen in a while, needed a new PCP    Preventive:  COVID-19 vac - recommended  Flu vaccine- recommended  Shingles vaccine- recommended  Last mammogram - ordered today  Last Colonoscopy- never had one, would like to think about which method she prefers  [facility] - will schedule  MyChart activation -already activated    Health Concerns: Altered Mental Status -  Patient was seen in the ER on 09/15/2021, brought by EMS after wellness check by the police. Patient was found wandering around with some confusion. She was diagnosed with UTI and depression. Her blood pressure was also elevated at the time. She reportedly is not taking any medications. She states that she overreacts, which is not something she used to do. She finds herself questioning if she is handling everything as well as she should. She admits that her memory is not as good as it used to be. Has a family history of dementia in mother. Patient has two sons who live in [place]. Her friend is concerned that she does not have any help at home with her medications and is requesting home health. Hypertension-  Symptoms:  None  BP readings at home are not being taken, has a monitor at home  Has not been on amlodipine for about a year and half  She states that she ran out of her medication and never followed up  Key CAD CHF Meds             amLODIPine (NORVASC) 10 mg tablet (Taking) Take 1 Tablet by mouth daily. Review of Systems   Constitutional: Negative for chills, fever and malaise/fatigue. Respiratory: Negative for shortness of breath. Cardiovascular: Negative for chest pain. Gastrointestinal: Negative for abdominal pain, nausea and vomiting. Genitourinary: Negative for dysuria, frequency and urgency. Musculoskeletal: Negative for back pain. Neurological: Negative for dizziness and headaches. Reports transient confusion   Psychiatric/Behavioral: Positive for memory loss. Objective:   BP (!) 172/98 (BP 1 Location: Left upper arm, BP Patient Position: Sitting, BP Cuff Size: Adult)   Pulse 99   Temp 98.6 °F (37 °C) (Oral)   Resp 17   Ht 5' 8\" (1.727 m)   Wt 148 lb (67.1 kg)   SpO2 98%   BMI 22.50 kg/m²     Physical Exam  Vitals and nursing note reviewed. Constitutional:       General: She is not in acute distress. Appearance: She is not ill-appearing. HENT:      Head: Normocephalic and atraumatic. Cardiovascular:      Rate and Rhythm: Normal rate and regular rhythm. Heart sounds: No murmur heard. No friction rub. No gallop. Pulmonary:      Effort: Pulmonary effort is normal. No respiratory distress. Breath sounds: No wheezing, rhonchi or rales. Skin:     General: Skin is warm and dry. Neurological:      General: No focal deficit present. Mental Status: She is alert and oriented to person, place, and time. Psychiatric:         Mood and Affect: Mood normal.         Thought Content:  Thought content normal.         Judgment: Judgment normal.        Anna Mane NP

## 2021-10-06 ENCOUNTER — HOSPITAL ENCOUNTER (OUTPATIENT)
Dept: LAB | Age: 76
Discharge: HOME | End: 2021-10-06
Payer: MEDICARE

## 2021-10-06 ENCOUNTER — OFFICE VISIT (OUTPATIENT)
Dept: FAMILY MEDICINE CLINIC | Age: 76
End: 2021-10-06
Payer: MEDICARE

## 2021-10-06 VITALS
OXYGEN SATURATION: 98 % | HEIGHT: 68 IN | HEART RATE: 99 BPM | BODY MASS INDEX: 22.43 KG/M2 | RESPIRATION RATE: 17 BRPM | WEIGHT: 148 LBS | SYSTOLIC BLOOD PRESSURE: 172 MMHG | DIASTOLIC BLOOD PRESSURE: 98 MMHG | TEMPERATURE: 98.6 F

## 2021-10-06 DIAGNOSIS — Z76.89 ENCOUNTER TO ESTABLISH CARE: ICD-10-CM

## 2021-10-06 DIAGNOSIS — Z12.11 SCREEN FOR COLON CANCER: ICD-10-CM

## 2021-10-06 DIAGNOSIS — Z13.220 SCREENING FOR LIPID DISORDERS: ICD-10-CM

## 2021-10-06 DIAGNOSIS — I10 ESSENTIAL HYPERTENSION: ICD-10-CM

## 2021-10-06 DIAGNOSIS — N30.00 ACUTE CYSTITIS WITHOUT HEMATURIA: ICD-10-CM

## 2021-10-06 DIAGNOSIS — H40.9 GLAUCOMA, UNSPECIFIED GLAUCOMA TYPE, UNSPECIFIED LATERALITY: ICD-10-CM

## 2021-10-06 DIAGNOSIS — R40.4 TRANSIENT ALTERATION OF AWARENESS: Primary | ICD-10-CM

## 2021-10-06 DIAGNOSIS — Z12.31 ENCOUNTER FOR SCREENING MAMMOGRAM FOR MALIGNANT NEOPLASM OF BREAST: ICD-10-CM

## 2021-10-06 LAB
BILIRUB UR QL STRIP: NEGATIVE
GLUCOSE UR-MCNC: NEGATIVE MG/DL
KETONES P FAST UR STRIP-MCNC: NEGATIVE MG/DL
PH UR STRIP: 5.5 [PH] (ref 4.6–8)
PROT UR QL STRIP: NEGATIVE
SP GR UR STRIP: 1.03 (ref 1–1.03)
UA UROBILINOGEN AMB POC: NORMAL (ref 0.2–1)
URINALYSIS CLARITY POC: CLEAR
URINALYSIS COLOR POC: YELLOW
URINE BLOOD POC: NEGATIVE
URINE LEUKOCYTES POC: NORMAL
URINE NITRITES POC: POSITIVE

## 2021-10-06 PROCEDURE — 99204 OFFICE O/P NEW MOD 45 MIN: CPT | Performed by: NURSE PRACTITIONER

## 2021-10-06 PROCEDURE — 1101F PT FALLS ASSESS-DOCD LE1/YR: CPT | Performed by: NURSE PRACTITIONER

## 2021-10-06 PROCEDURE — G0463 HOSPITAL OUTPT CLINIC VISIT: HCPCS | Performed by: NURSE PRACTITIONER

## 2021-10-06 PROCEDURE — G9899 SCRN MAM PERF RSLTS DOC: HCPCS | Performed by: NURSE PRACTITIONER

## 2021-10-06 PROCEDURE — 87186 SC STD MICRODIL/AGAR DIL: CPT

## 2021-10-06 PROCEDURE — 87086 URINE CULTURE/COLONY COUNT: CPT

## 2021-10-06 PROCEDURE — G8536 NO DOC ELDER MAL SCRN: HCPCS | Performed by: NURSE PRACTITIONER

## 2021-10-06 PROCEDURE — 81001 URINALYSIS AUTO W/SCOPE: CPT | Performed by: NURSE PRACTITIONER

## 2021-10-06 PROCEDURE — 87077 CULTURE AEROBIC IDENTIFY: CPT

## 2021-10-06 PROCEDURE — G8399 PT W/DXA RESULTS DOCUMENT: HCPCS | Performed by: NURSE PRACTITIONER

## 2021-10-06 PROCEDURE — 1090F PRES/ABSN URINE INCON ASSESS: CPT | Performed by: NURSE PRACTITIONER

## 2021-10-06 PROCEDURE — 3017F COLORECTAL CA SCREEN DOC REV: CPT | Performed by: NURSE PRACTITIONER

## 2021-10-06 PROCEDURE — G8427 DOCREV CUR MEDS BY ELIG CLIN: HCPCS | Performed by: NURSE PRACTITIONER

## 2021-10-06 PROCEDURE — G8432 DEP SCR NOT DOC, RNG: HCPCS | Performed by: NURSE PRACTITIONER

## 2021-10-06 PROCEDURE — G8420 CALC BMI NORM PARAMETERS: HCPCS | Performed by: NURSE PRACTITIONER

## 2021-10-06 RX ORDER — NITROFURANTOIN 25; 75 MG/1; MG/1
100 CAPSULE ORAL 2 TIMES DAILY
Qty: 14 CAPSULE | Refills: 0 | Status: SHIPPED | OUTPATIENT
Start: 2021-10-06 | End: 2021-10-13

## 2021-10-06 RX ORDER — AMLODIPINE BESYLATE 10 MG/1
10 TABLET ORAL DAILY
Qty: 20 TABLET | Refills: 0 | Status: SHIPPED | OUTPATIENT
Start: 2021-10-06 | End: 2021-10-27 | Stop reason: SDUPTHER

## 2021-10-06 NOTE — PROGRESS NOTES
Darren España presents today for   Chief Complaint   Patient presents with   [address]    Hypertension     high readings    [facility] Follow Up     Memory Loss/ Mental health     UTI     medication from [facility]        Is someone accompanying this pt? Yes, Partner Sandra Dakin     Is the patient using any DME equipment during OV? No     Depression Screening:  3 most recent PHQ Screens 10/6/2021   Little interest or pleasure in doing things Several days   Feeling down, depressed, irritable, or hopeless Not at all   Total Score PHQ 2 1       Learning Assessment:  Learning Assessment 10/6/2021   PRIMARY LEARNER Patient   HIGHEST LEVEL OF EDUCATION - PRIMARY LEARNER  SOME COLLEGE   BARRIERS PRIMARY LEARNER NONE   CO-LEARNER CAREGIVER No   PRIMARY LANGUAGE ENGLISH    NEED -   LEARNER PREFERENCE PRIMARY DEMONSTRATION     -   LEARNING SPECIAL TOPICS -   ANSWERED BY patient    RELATIONSHIP SELF       Fall Risk  Fall Risk Assessment, last 12 mths 10/6/2021   Able to walk? Yes   Fall in past 12 months? 0   Do you feel unsteady? 0   Are you worried about falling 0       ADL  No flowsheet data found. Travel Screening:    Travel Screening     Question   Response    In the last month, have you been in contact with someone who was confirmed or suspected to have Coronavirus / COVID-19? No / Unsure    Have you had a COVID-19 viral test in the last 14 days? No    Do you have any of the following new or worsening symptoms? Have you traveled internationally or domestically in the last month? No      Travel History   Travel since 09/06/21     No documented travel since 09/06/21          Health Maintenance reviewed and discussed and ordered per Provider.     Health Maintenance Due   Topic Date Due    COVID-19 Vaccine (1) Never done    Shingrix Vaccine Age 50> (1 of 2) Never done    Colorectal Cancer Screening Combo  02/13/2018    A1C test (Diabetic or Prediabetic)  03/13/2018    Breast Cancer Screen Mammogram  04/11/2018    Medicare Yearly Exam  06/07/2021    Flu Vaccine (1) 09/01/2021   . Coordination of Care:  1. Have you been to the ER, urgent care clinic since your last visit? Hospitalized since your last visit? [facility] September     2. Have you seen or consulted any other health care providers outside of the [address] [place] [address] since your last visit? Include any pap smears or colon screening.  no

## 2021-10-06 NOTE — PATIENT INSTRUCTIONS
DASH Diet: Care Instructions  Your Care Instructions     The DASH diet is an eating plan that can help lower your blood pressure. DASH stands for Dietary Approaches to Stop Hypertension. Hypertension is high blood pressure. The DASH diet focuses on eating foods that are high in calcium, potassium, and magnesium. These nutrients can lower blood pressure. The foods that are highest in these nutrients are fruits, vegetables, low-fat dairy products, nuts, seeds, and legumes. But taking calcium, potassium, and magnesium supplements instead of eating foods that are high in those nutrients does not have the same effect. The DASH diet also includes whole grains, fish, and poultry. The DASH diet is one of several lifestyle changes your doctor may recommend to lower your high blood pressure. Your doctor may also want you to decrease the amount of sodium in your diet. Lowering sodium while following the DASH diet can lower blood pressure even further than just the DASH diet alone. Follow-up care is a key part of your treatment and safety. Be sure to make and go to all appointments, and call your doctor if you are having problems. It's also a good idea to know your test results and keep a list of the medicines you take. How can you care for yourself at home? Following the DASH diet  · Eat 4 to 5 servings of fruit each day. A serving is 1 medium-sized piece of fruit, ½ cup chopped or canned fruit, 1/4 cup dried fruit, or 4 ounces (½ cup) of fruit juice. Choose fruit more often than fruit juice. · Eat 4 to 5 servings of vegetables each day. A serving is 1 cup of lettuce or raw leafy vegetables, ½ cup of chopped or cooked vegetables, or 4 ounces (½ cup) of vegetable juice. Choose vegetables more often than vegetable juice. · Get 2 to 3 servings of low-fat and fat-free dairy each day. A serving is 8 ounces of milk, 1 cup of yogurt, or 1 ½ ounces of cheese. · Eat 6 to 8 servings of grains each day.  A serving is 1 slice of bread, 1 ounce of dry cereal, or ½ cup of cooked rice, pasta, or cooked cereal. Try to choose whole-grain products as much as possible. · Limit lean meat, poultry, and fish to 2 servings each day. A serving is 3 ounces, about the size of a deck of cards. · Eat 4 to 5 servings of nuts, seeds, and legumes (cooked dried beans, lentils, and split peas) each week. A serving is 1/3 cup of nuts, 2 tablespoons of seeds, or ½ cup of cooked beans or peas. · Limit fats and oils to 2 to 3 servings each day. A serving is 1 teaspoon of vegetable oil or 2 tablespoons of salad dressing. · Limit sweets and added sugars to 5 servings or less a week. A serving is 1 tablespoon jelly or jam, ½ cup sorbet, or 1 cup of lemonade. · Eat less than 2,300 milligrams (mg) of sodium a day. If you limit your sodium to 1,500 mg a day, you can lower your blood pressure even more. · Be aware that all of these are the suggested number of servings for people who eat 1,800 to 2,000 calories a day. Your recommended number of servings may be different if you need more or fewer calories. Tips for success  · Start small. Do not try to make dramatic changes to your diet all at once. You might feel that you are missing out on your favorite foods and then be more likely to not follow the plan. Make small changes, and stick with them. Once those changes become habit, add a few more changes. · Try some of the following:  ? Make it a goal to eat a fruit or vegetable at every meal and at snacks. This will make it easy to get the recommended amount of fruits and vegetables each day. ? Try yogurt topped with fruit and nuts for a snack or healthy dessert. ? Add lettuce, tomato, cucumber, and onion to sandwiches. ? Combine a ready-made pizza crust with low-fat mozzarella cheese and lots of vegetable toppings. Try using tomatoes, squash, spinach, broccoli, carrots, cauliflower, and onions. ?  Have a variety of cut-up vegetables with a low-fat dip as an appetizer instead of chips and dip. ? Sprinkle sunflower seeds or chopped almonds over salads. Or try adding chopped walnuts or almonds to cooked vegetables. ? Try some vegetarian meals using beans and peas. Add garbanzo or kidney beans to salads. Make burritos and tacos with mashed san beans or black beans. Where can you learn more? Go to http://www.lujan.com/  Enter H967 in the search box to learn more about \"DASH Diet: Care Instructions. \"  Current as of: April 29, 2021               Content Version: 13.0  © 4544-6959 [employer]. Care instructions adapted under license by [employer] (which disclaims liability or warranty for this information). If you have questions about a medical condition or this instruction, always ask your healthcare professional. [address] any warranty or liability for your use of this information.

## 2021-10-08 ENCOUNTER — HOME HEALTH ADMISSION (OUTPATIENT)
Dept: HOME HEALTH SERVICES | Facility: HOME HEALTH | Age: 76
End: 2021-10-08
Payer: MEDICARE

## 2021-10-09 LAB
BACTERIA SPEC CULT: ABNORMAL
CC UR VC: ABNORMAL
SERVICE CMNT-IMP: ABNORMAL

## 2021-10-10 ENCOUNTER — HOME CARE VISIT (OUTPATIENT)
Dept: SCHEDULING | Facility: HOME HEALTH | Age: 76
End: 2021-10-10
Payer: MEDICARE

## 2021-10-10 PROCEDURE — 3331090002 HH PPS REVENUE DEBIT

## 2021-10-10 PROCEDURE — 400018 HH-NO PAY CLAIM PROCEDURE

## 2021-10-10 PROCEDURE — G0299 HHS/HOSPICE OF RN EA 15 MIN: HCPCS

## 2021-10-10 PROCEDURE — 3331090001 HH PPS REVENUE CREDIT

## 2021-10-10 PROCEDURE — 400013 HH SOC

## 2021-10-11 ENCOUNTER — TELEPHONE (OUTPATIENT)
Dept: FAMILY MEDICINE CLINIC | Age: 76
End: 2021-10-11

## 2021-10-11 ENCOUNTER — HOME CARE VISIT (OUTPATIENT)
Dept: HOME HEALTH SERVICES | Facility: HOME HEALTH | Age: 76
End: 2021-10-11
Payer: MEDICARE

## 2021-10-11 PROCEDURE — 3331090001 HH PPS REVENUE CREDIT

## 2021-10-11 PROCEDURE — 3331090002 HH PPS REVENUE DEBIT

## 2021-10-11 NOTE — PROGRESS NOTES
Please notify patient that her urine culture confirmed bacteria and the medication she was given is effective against the strain. However, if she continues to have UTI symptoms, we can send another round of antibiotics to her pharmacy; if not, follow-up as scheduled. Thank you.

## 2021-10-11 NOTE — TELEPHONE ENCOUNTER
Patient was called and explained about results and antibiotics. After carefully explaining patient understood verbal information.

## 2021-10-12 ENCOUNTER — HOME CARE VISIT (OUTPATIENT)
Dept: SCHEDULING | Facility: HOME HEALTH | Age: 76
End: 2021-10-12
Payer: MEDICARE

## 2021-10-12 VITALS
SYSTOLIC BLOOD PRESSURE: 130 MMHG | TEMPERATURE: 97.3 F | RESPIRATION RATE: 17 BRPM | DIASTOLIC BLOOD PRESSURE: 76 MMHG | OXYGEN SATURATION: 97 % | HEART RATE: 88 BPM

## 2021-10-12 PROCEDURE — G0153 HHCP-SVS OF S/L PATH,EA 15MN: HCPCS

## 2021-10-12 PROCEDURE — 3331090001 HH PPS REVENUE CREDIT

## 2021-10-12 PROCEDURE — 3331090002 HH PPS REVENUE DEBIT

## 2021-10-12 NOTE — Clinical Note
Pt with overall mild cognitive communication impairment. [facility] Mental Status Exam administered. Pt scored 19 out of possible 30 indicating reduced cognitive and memory function. Score of 19 suggests dementia. Pt demonstrated reduced immediate auditory memory for paragraphs, semantic generative listing and short term memory recall. Pt does not currently have a medical dx of a neurocognitive impairment. Pt will benefit from further neuropsychological evaluation to assist with diagnosis. Pt is noted with awareness in decreased memory and in difficulty with conversational skills. Pt c/o word finding difficulty. Pts cognitive and memory impairment are of concern to impact safety and function in IADLs such as medication management,  maintaining nutrition/hydration, living alone, community interactions. Pt appears to be able to complete basic ADLs such as dressing and bathing. Pt is recommended to have intermittent assistance of a caregiver. Bill Proctor is medically necessary to maximize cognitive, memory communication skills and to establish memory aids/strategies in home to improve Pts safety and function for home living.   Thank you for this referral.

## 2021-10-13 VITALS
DIASTOLIC BLOOD PRESSURE: 83 MMHG | OXYGEN SATURATION: 97 % | RESPIRATION RATE: 16 BRPM | TEMPERATURE: 98.5 F | HEART RATE: 99 BPM | SYSTOLIC BLOOD PRESSURE: 152 MMHG

## 2021-10-13 PROCEDURE — 3331090002 HH PPS REVENUE DEBIT

## 2021-10-13 PROCEDURE — 3331090001 HH PPS REVENUE CREDIT

## 2021-10-13 NOTE — HOME HEALTH
RECENT HX OF CURRENT ILLNESS/REASON FOR REFERRAL:  The patient was referred to [address] [place] [address] following Face to Face encounter on 10/6/21 by Estuardo Boateng,   [address] Dx: Acute cystitis without hematuria with Transient alteration of awareness      Disciplines requested: SN SLP      Services to  provide: Please evaluate patient for skilled nursing, speech therapy      Physician to follow patient's care (the person listed here will be responsible for signing ongoing orders): Estuardo Boateng, NP       Per office visit notes:  Lavonne De La O is a 78 y.o. female is seen on 10/6/2021 for Establish Care, Hypertension (high readings ), Hospital Follow Up (Memory Loss/ Mental health ), and UTI (medication from [facility] )         Assessment & Plan:         1. Transient alteration of awareness    Comments:    Persists, treat for UTI, but will proceed with neuropsych consult given that this has occurred previously    Orders:    -     METABOLIC PANEL, COMPREHENSIVE; Futur e    -     CBC WITH AUTOMATED DIFF; Future    -     REFERRAL TO NEUROPSYCHOLOGY    -     REFERRAL TO HOME HEALTH    2. Essential hypertension    Assessment & Plan:    BP goal <130/80    Orders:    -     METABOLIC PANEL, COMPREHENSIVE; Future    -     amLODIPine (NORVASC) 10 mg tablet; Take 1 Tablet by mouth daily. , Normal, Disp-20 Tablet, R-0    3. Acute cystitis without hematuria    Comments:    Repeat UA positive for nitrities, will cover with Macr obid for now    Send UA for cx, will call with results    Orders:    -     AMB POC URINALYSIS DIP STICK AUTO W/ MICRO    -     CULTURE, URINE; Future    -     nitrofurantoin, macrocrystal-monohydrate, (MACROBID) 100 mg capsule; Take 1 Capsule by mouth two (2) times a day for 7 days. , Normal, Disp-14 Capsule, R-0    4.  Glaucoma, unspecified glaucoma type, unspecified laterality    -     REFERRAL TO OPHTHALMOLOGY    Altered Mental Status -    Patient was seen in the ER on 09/15/2021, brought by EMS after wellness check by the police. Patient was found wandering around with some confusion. She was diagnosed with UTI and depression. Her blood pressure was also e levated at the time. She reportedly is not taking any medications. She states that she overreacts, which is not something she used to do. She finds herself questioning if she is handling everything as well as she should. She admits that her memory is not as good as it used to be. Has a family history of dementia in mother. Patient has two sons who live in [place]. Her friend is concerned that she does not have any help at  home with her medications and is requesting home health. \"       PLOF/DIET/COMMUNICATION: I with ADLs/IADLs    PAST MEDICAL HISTORY:  Circulatory   Essential hypertension 10/5/2021   Endocrine   Impaired fasting blood sugar/ borderline diabetes 3/13/2017   Other   Glaucoma Unknown   Anxiety Unknown     CURRENT RESIDENCE/LIVING SITUATION: Lives in home, reports having roommates that stay when in town    EDUCATIONAL LEVEL:  Bachelors    SUBJECTIVE (PT/FAMILY COMMENTS, THERAPIST OBSERVATIONS): Pt alert, noted with difficulty in conversational speech. Demonstrates unorganized speech, reports that she is anxious whenever she has to answer questions. Pt further c/o \"I didn't know the next word I want to say\" \"It usually comes to me\", SLP noted posted written note on microwave with steps on how to use microwave, notetaking    MEDICATIONS REVIEWED: NO PROBLEMS     CAREGIVER INVOLVEMENT: Millaci/friend assists with transportation    [address] [facility] / PATIENT PROGRESS / Mack Adamso / PLAN:  Pt with overall mild cognitive communication impairment. [facility] Mental Status Exam administered. Pt scored 19 out of possible 30 indicating reduced cognitive and memory function. Score of 19 suggests dementia. Pt demonstrated reduced immediate auditory memory for paragraphs, semantic generative listing and short term memory recall.   Pt does not currently have a medical dx of a neurocognitive impairment. Pt will benefit from further neuropsychological evaluation to assist with diagnosis. Pt is noted with awareness in decreased memory and in difficulty with conversational skills. Pt c/o word finding difficulty. Pts cognitive and memory impairment are of concern to impact safety and function in IADLs such as medication management,  maintaining nutrition/hydration, living alone, community interactions. Pt appears to be able to complete basic ADLs such as dressing and bathing. Pt is recommended to have intermittent assistance of a caregiver.   Diandra Fleming is medically necessary to maximize cognitive, memory communication skills and to establish memory aids/strategies in home to improve Pts safety and function for home living    MD NOTIFIED OF POC AND FREQUENCY    PT GOALS: improve cognitive communication skills    INSTRUCTION GIVEN/EDUCATION/RESPONSE TO TEACHING: discussed POC, Pt in agreement    HOME EXERCISE PROGRAM: defer to next encounter    [address] - (Vitaly Kari Garcia [address]): ST to d/c in 8 more visits/ wks or when goals met/max potential achieved, Pt/caregiver verbalized understanding

## 2021-10-13 NOTE — HOME HEALTH
Skilled services/Home bound verification:     Skilled Reason for admission/summary of clinical condition: Acute cystitis without hematuria with Transient alteration of awareness requiring disease process teaching and medication management. This patient is homebound for the following reasons Requires considerable and taxing effort to leave the home  and Requires the assistance of 1 or more persons to leave the home . Caregiver: friend. Caregiver assists with iadls, adls, meal prep, med management, taking to md appointments. Medications reconciled and all medications are not available in the home this visit. pt reporting not taking combigan or lutein. pt needing to get bottles for norvasc and macrobid, pt has a friend helping her with medications who keeps her bottles. The following education was provided regarding medications, medication interactions, and look alike medications (specify): reviewed side effects, purposes, dosage, frequencies. Medications  are too early to assess effectiveness. at this time. High risk medication teaching regarding anticoagulants, hyperglycemic agents or opiod narcotics performed (specify) na    MD notified of any discrepancies/medication interactions- na, made aware pt does not have meds in home. Home health supplies by type and quantity ordered/delivered this visit include: na    Patient education provided this visit to include: patient/cg instructed to monitor for edema/increase in edema, to elevate extremity when edema occurs and to notify md if edema exceeds normal limits for patient, none noted at this time. encouraged patient to get three nutritional meals daily and to stay hydrated, drink plenty of fluids. reviewed low sodium diet- patient aware to limit sodium, no added sodium to diet. reviewed foods to avoid, how to order foods when eating out, how to read nutrition labels and measure sodium intake.  discussed importance of monitoring blood pressure daily and recording for review, discussed hypertension, causes/long term effects of uncontrolled hypertension. patient made aware to monitor for s/s of infection [increased swelling, increased redness around site, increased pain, foul smelling drainage, fever] aware who to report to/when. no s/s of infection noted. SN discussed s/s of UTI, who to report to/when. SN notified patient/cg on good hygiene, complete bladder emptying, and avoiding use of powders or lotions. Instruct on how to recognize symptoms of urinary tract infection including elevated temperature, changes in mental status or confusion, frequency of urination, pain, hesitancy and urgency, malodorous urine, urine that has changed in color or clarity. Educated pt/cg that anxiety is a condition that causes you to feel extremely worried or nervous. The feelings are so strong that they can affect your daily activities or sleep. Anxiety may be triggered by fear, or it may happen without a cause. Family or work stress, smoking, caffeine, and alcohol can increase your risk for anxiety. Certain medicines or health conditions can also increase risk. Educated pt/cg on s/s of anxiety: verbalized nervousness, uneasiness, shakiness, pacing, apprehension, fear of failure, withdrawn behavior, irritability, isolation, trouble sleeping. Educated pt/cg on s/s of severe anxiety: feeling jumpy, easily startled, dizzy, rapid heartbeat, shortness of breath, chest tightness, stomachaches, muscle tension. Educated pt/cg to notify staff/ their PCP office if they have one if symptoms worsen or do not get better with treatments, anxiety keeps them from doing regular daily activities, or if they have any thoughts of hurting themselves or others. SN notified pt/cg of s/s to monitor for associated with memory loss (increased confusion in evening hours, inability to recognize people, repetitive communication, wandering, lack of short term memory, referring to past events).  discussed fall precautions in detail- having lighted hallways, removing throw rugs, monitoring medication that may alter mental status. patient made aware to turn every 2 hours and to keep pressure off of bony prominences, to monitor for any pressure ulcer development/worsening. pt denies any questions or concerns at this time. Patient/caregiver degree of understanding:good    Home exercise program/Homework provided: patient instructed to perform sob hep 4-5 x daily and prn for sob, to promote lung expansion. pt also encouraged to use ICS q 2 hours and to perform sob hep during therapy. patient instructed to perform incontinence hep 3-4 x daily to strengthen muscles and to perform timed voiding q 2 hours to prevent incontinence from occurring. Pt/Caregiver instructed on plan of care and are agreeable to plan of care at this time. Physician Aaron Dominguez NP notified of patient admission to home health and plan of care including anticipated frequency of 2w2, 1w6, 2 prn and treatments/interventions/modalities of disease process teaching and medication management. Discharge planning discussed with patient and caregiver. Discharge planning as follows: Patient will be discharged once education has completed, patient is medically stable and pt/cg are able to independently manage medications and disease process. Pt/Caregiver did verbalize understanding of discharge planning. Next MD appointment 11/8 (date) with Aaron Dominguez NP. Patient/caregiver encouraged/instructed to keep appointment as lack of follow through with physician appointment could result in discontinuation of home care services for non-compliance.

## 2021-10-14 ENCOUNTER — HOME CARE VISIT (OUTPATIENT)
Dept: SCHEDULING | Facility: HOME HEALTH | Age: 76
End: 2021-10-14
Payer: MEDICARE

## 2021-10-14 VITALS
DIASTOLIC BLOOD PRESSURE: 78 MMHG | RESPIRATION RATE: 18 BRPM | HEART RATE: 76 BPM | SYSTOLIC BLOOD PRESSURE: 128 MMHG | OXYGEN SATURATION: 98 % | TEMPERATURE: 97.3 F

## 2021-10-14 PROCEDURE — 3331090001 HH PPS REVENUE CREDIT

## 2021-10-14 PROCEDURE — 3331090002 HH PPS REVENUE DEBIT

## 2021-10-14 PROCEDURE — G0300 HHS/HOSPICE OF LPN EA 15 MIN: HCPCS

## 2021-10-14 NOTE — HOME HEALTH
Skilled reason for visit: Skilled nursing assessment, medication review and education    Caregiver involvement:n/a    Medications reviewed and all medications are available in the home this visit. Medication for blood pressure Amlodipine, taking care when changing positions and standing from a sitting position to avoid dizziness  The following education was provided regarding medications, medication   . Medications  are effective at this time.       Home health supplies by type and quantity ordered/delivered this visit include: n/a    Patient education provided this visit: Patient educated on nutrition to support good urinary tract health,  Increase fluids, patient verbalized understanding     Skilled Care Performed this visit: Skilled nursing assessment, medication review and education    Patient's Progress towards personal goals: Patient continues to comply with physicians orders    Home exercise program: sn instructed patient to perform deep breathing exercises, 5-6 breaths 5 x daily to promote air exchange and prevent pneumonia     Continued need for the following skills: Nursing    Plan for next visit: Skilled nursing assessment, medication review and education    Patient and/or caregiver notified and agrees to changes in the Plan of Care N/A      The following discharge planning was discussed with pt/caregiver: Patient to discharge when all goals are met

## 2021-10-15 PROCEDURE — 3331090001 HH PPS REVENUE CREDIT

## 2021-10-15 PROCEDURE — 3331090002 HH PPS REVENUE DEBIT

## 2021-10-16 PROCEDURE — 3331090001 HH PPS REVENUE CREDIT

## 2021-10-16 PROCEDURE — 3331090002 HH PPS REVENUE DEBIT

## 2021-10-17 PROCEDURE — 3331090001 HH PPS REVENUE CREDIT

## 2021-10-17 PROCEDURE — 3331090002 HH PPS REVENUE DEBIT

## 2021-10-18 PROCEDURE — 3331090001 HH PPS REVENUE CREDIT

## 2021-10-18 PROCEDURE — 3331090002 HH PPS REVENUE DEBIT

## 2021-10-19 ENCOUNTER — HOME CARE VISIT (OUTPATIENT)
Dept: SCHEDULING | Facility: HOME HEALTH | Age: 76
End: 2021-10-19
Payer: MEDICARE

## 2021-10-19 PROCEDURE — G0153 HHCP-SVS OF S/L PATH,EA 15MN: HCPCS

## 2021-10-19 PROCEDURE — 3331090002 HH PPS REVENUE DEBIT

## 2021-10-19 PROCEDURE — G0299 HHS/HOSPICE OF RN EA 15 MIN: HCPCS

## 2021-10-19 PROCEDURE — 3331090001 HH PPS REVENUE CREDIT

## 2021-10-20 VITALS
SYSTOLIC BLOOD PRESSURE: 164 MMHG | DIASTOLIC BLOOD PRESSURE: 75 MMHG | TEMPERATURE: 97.5 F | RESPIRATION RATE: 16 BRPM | OXYGEN SATURATION: 95 % | HEART RATE: 100 BPM

## 2021-10-20 PROCEDURE — 3331090002 HH PPS REVENUE DEBIT

## 2021-10-20 PROCEDURE — 3331090001 HH PPS REVENUE CREDIT

## 2021-10-20 NOTE — HOME HEALTH
SUBJECTIVE (PT/FAMILY COMMENTS, THERAPIST OBSERVATIONS): Pt alert, demonstrated anxiety with listening memory exercise. Pt is noted to be on track with taking med from pill box    MEDICATIONS REVIEWED: NO PROBLEMS          CAREGIVER INVOLVEMENT: Millaci/friend assists with transportation         [address] / [address] [employer] / PLAN:  Pt demonstrated full attention with all tasks although Pt exhibited increased anxiety with listening memory exercise. Pt with overall mild cognitive communication impairment.   ST to continue to maximize cognitive, memory communication skills and to establish memory aids/strategies in home to improve Pts safety and function for home living         MD NOTIFIED OF POC AND FREQUENCY         PT GOALS: improve cognitive communication skills         INSTRUCTION GIVEN/EDUCATION/RESPONSE TO TEACHING:          HOME EXERCISE PROGRAM: defer to next encounter         DISCHARGE PLANNING - (Vitaly Kari Garcia [address]): ST to d/c in 7 more visits/ wks or when goals met/max potential achieved, Pt/caregiver verbalized understanding

## 2021-10-21 ENCOUNTER — HOME CARE VISIT (OUTPATIENT)
Dept: SCHEDULING | Facility: HOME HEALTH | Age: 76
End: 2021-10-21
Payer: MEDICARE

## 2021-10-21 VITALS
SYSTOLIC BLOOD PRESSURE: 132 MMHG | DIASTOLIC BLOOD PRESSURE: 84 MMHG | RESPIRATION RATE: 18 BRPM | HEART RATE: 86 BPM | TEMPERATURE: 97.7 F | OXYGEN SATURATION: 97 %

## 2021-10-21 PROCEDURE — G0300 HHS/HOSPICE OF LPN EA 15 MIN: HCPCS

## 2021-10-21 PROCEDURE — 3331090002 HH PPS REVENUE DEBIT

## 2021-10-21 PROCEDURE — 3331090001 HH PPS REVENUE CREDIT

## 2021-10-21 NOTE — HOME HEALTH
Skilled reason for visit: Skilled nursing assessment, medication review and education    Caregiver involvement:Son assists     Medications reviewed and all medications are available in the home this visit. The following education was provided regarding medications, medication interactions, and look alike medications (specify): Education regarding Norvasc, avoid grapefruit as it can increase concentration of medication. Tke the medication at the same time each day. Do not skip a dose and if you miss a dose do not double dose. Medications  are effective at this time.       Home health supplies by type and quantity ordered/delivered this visit include: n/a    Patient education provided this visit: Patient educated on s/s of UTI including burning or pain when urinating, fever and foul smelling urine    Skilled Care Performed this visit: Skilled nursing assessment, medication review and education    Patient's Progress towards personal goals: Patient remains teachable about the disease process and comply's with physicians orders to work toward discharge    Home exercise program: sn instructed patient to perform deep breathing exercises, 5-6 breaths 5 x daily to promote air exchange and prevent pneumonia     Continued need for the following skills: Nursing    Plan for next visit: Skilled nursing assessment, medication review and education    Patient and/or caregiver notified and agrees to changes in the Plan of Care N/A      The following discharge planning was discussed with the pt/caregiver: Patient to discharge when all goals are met

## 2021-10-22 PROCEDURE — 3331090001 HH PPS REVENUE CREDIT

## 2021-10-22 PROCEDURE — 3331090002 HH PPS REVENUE DEBIT

## 2021-10-23 PROCEDURE — 3331090002 HH PPS REVENUE DEBIT

## 2021-10-23 PROCEDURE — 3331090001 HH PPS REVENUE CREDIT

## 2021-10-24 VITALS
TEMPERATURE: 97.3 F | DIASTOLIC BLOOD PRESSURE: 75 MMHG | HEART RATE: 100 BPM | RESPIRATION RATE: 16 BRPM | OXYGEN SATURATION: 95 % | SYSTOLIC BLOOD PRESSURE: 164 MMHG

## 2021-10-24 PROCEDURE — 3331090001 HH PPS REVENUE CREDIT

## 2021-10-24 PROCEDURE — 3331090002 HH PPS REVENUE DEBIT

## 2021-10-25 PROCEDURE — 3331090002 HH PPS REVENUE DEBIT

## 2021-10-25 PROCEDURE — 3331090001 HH PPS REVENUE CREDIT

## 2021-10-25 NOTE — HOME HEALTH
Caregiver involvement: COOKS, CLEANS AND TAKES PATIENT TO MD APPT. Medications reconciled and all medications are available in the home this visit. The following education was provided regarding medications, medication interactions, and look a like medications: REVIEWED MEDICATIONS WITH PATIENT. Medications  are effective at this time. Home health supplies by type and quantity ordered/delivered this visit include: NA    Patient education provided this visit: REINSTRUCTED PATIENT ON S/S  OF CYSTITUS , INSTRUCTED PATIENT ON IMPORTANCE OF MEDICATION AND DIETARY COMPLIANCE. PATIENT STATES SHE HAS SOME MEMORY PROBLEMS AND SEEMS TO BE SEARCHING FOR WORDS AT TIMES. SPEECH THERAPY IN HOME. NO PAIN AT THIS TIME, CYCTITIS HAS CLEARED UP. INSTRUCTED PATIENT ON WHEN TO NOTIFY MD OF MEDICAL CHANGES R/T CYSTITIS AND AWARENESS. Progress toward goals:IMPROVING WITH SPEECH THERAPY. Home exercise program: BREATHING EXERCISES TO HELP PREVENT RESPIRATORY PROBLEMS. Continued need for the following skills: Nursing    The following discharge planning was discussed with the pt/caregiver: PATIENT WILL BE DISCHARGED ONCE ALL GOALS HAVE BEEN MET AND MEDICALLY STABLE.

## 2021-10-26 ENCOUNTER — HOME CARE VISIT (OUTPATIENT)
Dept: HOME HEALTH SERVICES | Facility: HOME HEALTH | Age: 76
End: 2021-10-26
Payer: MEDICARE

## 2021-10-26 ENCOUNTER — HOME CARE VISIT (OUTPATIENT)
Dept: SCHEDULING | Facility: HOME HEALTH | Age: 76
End: 2021-10-26
Payer: MEDICARE

## 2021-10-26 VITALS
TEMPERATURE: 98.5 F | DIASTOLIC BLOOD PRESSURE: 88 MMHG | SYSTOLIC BLOOD PRESSURE: 145 MMHG | HEART RATE: 93 BPM | OXYGEN SATURATION: 98 %

## 2021-10-26 PROCEDURE — 3331090001 HH PPS REVENUE CREDIT

## 2021-10-26 PROCEDURE — G0153 HHCP-SVS OF S/L PATH,EA 15MN: HCPCS

## 2021-10-26 PROCEDURE — 3331090002 HH PPS REVENUE DEBIT

## 2021-10-26 NOTE — HOME HEALTH
SUBJECTIVE (PT/FAMILY COMMENTS, THERAPIST OBSERVATIONS): Pt alert and pleasant. Pt reports no more refills of amlodipine. SLP instructed Pt to call NP and request refill       ASSESSMENT OF INTERVENTION / PATIENT PROGRESS / [address] / PLAN:  Pt with steady progress with short term memory and word finding skills. Pt with overall mild cognitive communication impairment.   ST to continue to maximize cognitive, memory communication skills and to establish memory aids/strategies in home to improve Pts safety and function for home living       MD NOTIFIED OF POC AND FREQUENCY       PT GOALS: improve cognitive communication skills     INSTRUCTION GIVEN/EDUCATION/RESPONSE TO TEACHING: explained and handouts provided on visual memory exercise, generative listing       HOME EXERCISE PROGRAM: visual memory exercise, generative listing         DISCHARGE PLANNING - (Vitaly Kari Garcia [address]): ST to d/c in 5 more visits/ wks or when goals met/max potential achieved, Pt/caregiver verbalized understanding

## 2021-10-27 DIAGNOSIS — I10 ESSENTIAL HYPERTENSION: ICD-10-CM

## 2021-10-27 PROCEDURE — 3331090001 HH PPS REVENUE CREDIT

## 2021-10-27 PROCEDURE — 3331090002 HH PPS REVENUE DEBIT

## 2021-10-27 RX ORDER — AMLODIPINE BESYLATE 10 MG/1
10 TABLET ORAL DAILY
Qty: 90 TABLET | Refills: 0 | Status: SHIPPED | OUTPATIENT
Start: 2021-10-27 | End: 2022-03-01 | Stop reason: SDUPTHER

## 2021-10-27 NOTE — TELEPHONE ENCOUNTER
Pt requesting med refill     Requested Prescriptions     Pending Prescriptions Disp Refills    amLODIPine (NORVASC) 10 mg tablet 20 Tablet 0     Sig: Take 1 Tablet by mouth daily.

## 2021-10-28 ENCOUNTER — HOME CARE VISIT (OUTPATIENT)
Dept: SCHEDULING | Facility: HOME HEALTH | Age: 76
End: 2021-10-28
Payer: MEDICARE

## 2021-10-28 PROCEDURE — 3331090002 HH PPS REVENUE DEBIT

## 2021-10-28 PROCEDURE — 3331090001 HH PPS REVENUE CREDIT

## 2021-10-28 PROCEDURE — G0153 HHCP-SVS OF S/L PATH,EA 15MN: HCPCS

## 2021-10-29 PROCEDURE — 3331090002 HH PPS REVENUE DEBIT

## 2021-10-29 PROCEDURE — 3331090001 HH PPS REVENUE CREDIT

## 2021-10-30 PROCEDURE — 3331090002 HH PPS REVENUE DEBIT

## 2021-10-30 PROCEDURE — 3331090001 HH PPS REVENUE CREDIT

## 2021-10-31 VITALS
OXYGEN SATURATION: 99 % | HEART RATE: 97 BPM | DIASTOLIC BLOOD PRESSURE: 88 MMHG | TEMPERATURE: 99.2 F | SYSTOLIC BLOOD PRESSURE: 114 MMHG | RESPIRATION RATE: 16 BRPM

## 2021-10-31 PROCEDURE — 3331090002 HH PPS REVENUE DEBIT

## 2021-10-31 PROCEDURE — 3331090001 HH PPS REVENUE CREDIT

## 2021-10-31 NOTE — HOME HEALTH
SUBJECTIVE (PT/FAMILY COMMENTS, THERAPIST OBSERVATIONS): Pt alert and pleasant. Pt reports she called Dr office and requested refill of amlodipine, Pt states it has been filled at pharmacy and her friend, Judy King will  today. Pt reports she has been completing home exercises and has been enjoying them    ASSESSMENT OF INTERVENTION / PATIENT PROGRESS / Rasta Berrios / PLAN:  Pt with steady progress with short term memory, listening memory, and immediate visual memory recall . Pt with overall mild cognitive communication impairment.   ST to continue to maximize cognitive, memory communication skills and to establish memory aids/strategies in home to improve Pts safety and function for home living       INSTRUCTION GIVEN/EDUCATION/RESPONSE TO TEACHING:  HOME EXERCISE PROGRAM: visual memory exercise, generative listing       DISCHARGE PLANNING - (Vitaly Kari Garcia [address]): ST to d/c in 4 more visits/ wks or when goals met/max potential achieved, Pt/caregiver verbalized understanding

## 2021-11-01 PROCEDURE — 3331090001 HH PPS REVENUE CREDIT

## 2021-11-01 PROCEDURE — 3331090002 HH PPS REVENUE DEBIT

## 2021-11-02 ENCOUNTER — HOME CARE VISIT (OUTPATIENT)
Dept: SCHEDULING | Facility: HOME HEALTH | Age: 76
End: 2021-11-02
Payer: MEDICARE

## 2021-11-02 VITALS
TEMPERATURE: 98.4 F | HEART RATE: 102 BPM | RESPIRATION RATE: 16 BRPM | SYSTOLIC BLOOD PRESSURE: 151 MMHG | OXYGEN SATURATION: 97 % | DIASTOLIC BLOOD PRESSURE: 86 MMHG

## 2021-11-02 PROCEDURE — 3331090001 HH PPS REVENUE CREDIT

## 2021-11-02 PROCEDURE — G0153 HHCP-SVS OF S/L PATH,EA 15MN: HCPCS

## 2021-11-02 PROCEDURE — 3331090002 HH PPS REVENUE DEBIT

## 2021-11-02 NOTE — Clinical Note
During Colleen Gilliam [facility] visit today, Pt was noted with confusion, paranoia and delusion. Paranoia has not been witnessed until today's visit. Pt met SLP outside at car when SLP arrived. Pt stated she was \"paranoid\" and did not want to go inside home stating \"they were inside\" and \"listening\". Pt unable to verbalize who the people were from, stated from a \"company\" or \"entity\". Pts friend, Leticia Krysta arrived at Pts home to assist due to noting Pt with confusion for last several days.

## 2021-11-02 NOTE — HOME HEALTH
SUBJECTIVE (PT/FAMILY COMMENTS, THERAPIST OBSERVATIONS): Pt met SLP outside at car when SLP arrived. Pt stated she was \"paranoid\" and did not want to go inside home stating \"they were inside\" and \"listening\". Pt unable to verbalize who the people were from, stated from a \"company\" or \"entity\". Pts friend, Abraham Rios showed up at home, he was in touch with Pt right before SLP's arrival and came to check in on Pt. Saul Harris reports noting Pt with confusion in last several days. Pt called him in middle of night over weekend thinking it was time for her appt to get lab work. David Barnes stated he reassurred her that appt was on Monday morning. David Barnes states he thinks Pt has not eaten in couple days to fast for lab work although he insutrcted her that she only needed to fast Monday morning right before appt. Pts BP was noted to be elevated this visit. SLP and Saul Harris noted Pts pill box was full and he had filled last Thursday. Pt claims she has taken pills and refilled pill box. ASSESSMENT OF INTERVENTION / PATIENT PROGRESS / Se[place] Le / PLAN:  Pt is noted with confusion, paranoia and delusions this visit. Pts friend, Saul Harris reports that Pt has upcoming appt with NP and with neuropsychology on 11/22. With Pts permission, SLP discussed with Saul Harris findings of ST initial evaluation, recommendations for neurosychology, need for Pt to have contiued assistance in managing meds,finances, nutrtition/hydration. SLP advised to continue with use of written visual aids such as calendar or posted notes. Pt with steady progress with short term memory, listening memory, and immediate visual memory recall . Pt with overall mild cognitive communication impairment.   ST to continue to maximize cognitive, memory communication skills and to establish memory aids/strategies in home to improve Pts safety and function for home living     INSTRUCTION GIVEN/EDUCATION/RESPONSE TO TEACHING:    HOME EXERCISE PROGRAM: visual memory exercise, generative listing  DISCHARGE PLANNING - (ANTICIPATED DC DATE, DC PLAN): ST to d/c in 3 more visits/ wks or when goals met/max potential achieved, informed Pt and Raghavendra of STd/c next week

## 2021-11-03 ENCOUNTER — HOME CARE VISIT (OUTPATIENT)
Dept: SCHEDULING | Facility: HOME HEALTH | Age: 76
End: 2021-11-03
Payer: MEDICARE

## 2021-11-03 ENCOUNTER — TELEPHONE (OUTPATIENT)
Dept: FAMILY MEDICINE CLINIC | Age: 76
End: 2021-11-03

## 2021-11-03 ENCOUNTER — HOSPITAL ENCOUNTER (OUTPATIENT)
Dept: LAB | Age: 76
Discharge: HOME | End: 2021-11-03
Payer: MEDICARE

## 2021-11-03 VITALS
TEMPERATURE: 98.7 F | DIASTOLIC BLOOD PRESSURE: 84 MMHG | OXYGEN SATURATION: 98 % | SYSTOLIC BLOOD PRESSURE: 132 MMHG | RESPIRATION RATE: 18 BRPM | HEART RATE: 71 BPM

## 2021-11-03 DIAGNOSIS — N30.01 ACUTE CYSTITIS WITH HEMATURIA: Primary | ICD-10-CM

## 2021-11-03 LAB
APPEARANCE UR: ABNORMAL
BACTERIA URNS QL MICRO: ABNORMAL /HPF
BILIRUB UR QL: NEGATIVE
COLOR UR: YELLOW
EPITH CASTS URNS QL MICRO: ABNORMAL /LPF (ref 0–5)
GLUCOSE UR STRIP.AUTO-MCNC: NEGATIVE MG/DL
HGB UR QL STRIP: ABNORMAL
KETONES UR QL STRIP.AUTO: 15 MG/DL
LEUKOCYTE ESTERASE UR QL STRIP.AUTO: ABNORMAL
NITRITE UR QL STRIP.AUTO: NEGATIVE
PH UR STRIP: 5.5 [PH] (ref 5–8)
PROT UR STRIP-MCNC: ABNORMAL MG/DL
RBC #/AREA URNS HPF: ABNORMAL /HPF (ref 0–5)
SP GR UR REFRACTOMETRY: 1.02 (ref 1–1.03)
UROBILINOGEN UR QL STRIP.AUTO: 1 EU/DL (ref 0.2–1)
WBC URNS QL MICRO: ABNORMAL /HPF (ref 0–4)

## 2021-11-03 PROCEDURE — 87086 URINE CULTURE/COLONY COUNT: CPT

## 2021-11-03 PROCEDURE — 3331090002 HH PPS REVENUE DEBIT

## 2021-11-03 PROCEDURE — 3331090001 HH PPS REVENUE CREDIT

## 2021-11-03 PROCEDURE — G0300 HHS/HOSPICE OF LPN EA 15 MIN: HCPCS

## 2021-11-03 PROCEDURE — 87077 CULTURE AEROBIC IDENTIFY: CPT

## 2021-11-03 PROCEDURE — 87186 SC STD MICRODIL/AGAR DIL: CPT

## 2021-11-03 PROCEDURE — 81001 URINALYSIS AUTO W/SCOPE: CPT

## 2021-11-03 RX ORDER — SULFAMETHOXAZOLE AND TRIMETHOPRIM 800; 160 MG/1; MG/1
1 TABLET ORAL 2 TIMES DAILY
Qty: 14 TABLET | Refills: 0 | OUTPATIENT
Start: 2021-11-03 | End: 2021-11-08

## 2021-11-03 NOTE — TELEPHONE ENCOUNTER
Please call patient's caregiver, Tyronne Snellen at [phone] and let him know that patient's repeat urine sample still had bacteria in it. I have sent antibiotics for her to take twice daily for one week. Report any side effects of rash or sore throat while on antibiotics. Follow-up as scheduled. Thank you.

## 2021-11-03 NOTE — TELEPHONE ENCOUNTER
Spoke to patient's caregiver and gave him instructions for antibiotics sent to the pharmacy earlier. Urine cx pending. Advised caregiver to bring patient in once abx completed if her mentation does not improve; otherwise, keep scheduled appointment.

## 2021-11-03 NOTE — TELEPHONE ENCOUNTER
Disregard message, urine was repeated, positive for bacteria, abx sent. See additional tel encounter.

## 2021-11-03 NOTE — TELEPHONE ENCOUNTER
Patient had been reportedly confused at home per [address]. Please call and schedule patient today, have her ready to leave a urine sample (had previous UTI that may not have cleared up yet). You may have to call Sandra Dakin at [phone] to get patient scheduled. Thank you!

## 2021-11-04 ENCOUNTER — HOME CARE VISIT (OUTPATIENT)
Dept: SCHEDULING | Facility: HOME HEALTH | Age: 76
End: 2021-11-04
Payer: MEDICARE

## 2021-11-04 VITALS
TEMPERATURE: 98.3 F | OXYGEN SATURATION: 98 % | SYSTOLIC BLOOD PRESSURE: 151 MMHG | DIASTOLIC BLOOD PRESSURE: 67 MMHG | RESPIRATION RATE: 12 BRPM | HEART RATE: 94 BPM

## 2021-11-04 PROCEDURE — 3331090001 HH PPS REVENUE CREDIT

## 2021-11-04 PROCEDURE — 3331090002 HH PPS REVENUE DEBIT

## 2021-11-04 PROCEDURE — G0153 HHCP-SVS OF S/L PATH,EA 15MN: HCPCS

## 2021-11-04 NOTE — HOME HEALTH
Skilled reason for visit: Skilled nursing assessment, medication review and education, collection of urine sample due to AMS. Caregiver involvement:n/a    Medications reviewed and all medications are available in the home this visit. The following education was provided regarding medications, medication interactions, and look alike medications (specify): n/a. Medications  are effective at this time. Home health supplies by type and quantity ordered/delivered this visit include: n/a    Patient education provided this visit: Patient educated on s/s of UTI including fever, foul smelling urine, decreased urination, pain and or burning when urinating. Advised to alert physician if any symptoms develop. Urine sample obtained this visit and submitted to lab. Skilled Care Performed this visit: Skilled nursing assessment, medication review and education.   Urine sample obtained and submitted to lab    Patient's Progress towards personal goals: Patient continues to comply with physicians orders and keep medical appointments to work toward accomplishing goals set and discharge    Home exercise program: sn instructed patient to perform deep breathing exercises, 5-6 breaths 5 x daily to promote air exchange and prevent pneumonia     Continued need for the following skills: Nursing and Physical Therapy    Plan for next visit: Skilled nursing assessment, medication review and education    Patient and/or caregiver notified and agrees to changes in the Plan of Care N/A      The following discharge planning was discussed with the pt/caregiver: Patient to discharge when goals are met and patient is ablw to independently manage medications

## 2021-11-04 NOTE — HOME HEALTH
SUBJECTIVE (PT/FAMILY COMMENTS, THERAPIST OBSERVATIONS): Pt alert and pleasant. Pt states she got Covid vaccination a couple days ago with Butch Hazel. Pt states she foud out that UTI was not cleared up and NP will call in new ABX. Pt recalled and asked about pharmacologic treatment available to stabilize cognitive impairment/dementia that SLP mentioned last visit . Pt became tearful when SLP explained that in some individuals a fast progression in ADLs could occur. Pt states that anxiousness is new. Pt was noted to have difficulty communicating on phone about setting up ride to volunteer at [facility] / [facility] [employer] / RECESS. / PLAN:  Pt was stimulable for use of checklist cue chart and notetaking in a notebook given instruction this visit. Received signed orders in Epic for MSW consult from Corenlius Cazares NP. Pt with overall mild cognitive communication impairment.   ST to continue to maximize cognitive, memory communication skills and to establish memory aids/strategies in home to improve Pts safety and function for home living          INSTRUCTION GIVEN/EDUCATION/RESPONSE TO TEACHING:         HOME EXERCISE PROGRAM: visual memory exercise, generative listing    DISCHARGE PLANNING - (Vitaly Kari Garcia De [place]ima 772): ST to d/c in 2 more visits/ wks or when goals met/max potential achieved

## 2021-11-05 ENCOUNTER — HOSPITAL ENCOUNTER (EMERGENCY)
Age: 76
Discharge: HOME | End: 2021-11-08
Attending: STUDENT IN AN ORGANIZED HEALTH CARE EDUCATION/TRAINING PROGRAM
Payer: MEDICARE

## 2021-11-05 DIAGNOSIS — N39.0 ACUTE UTI: ICD-10-CM

## 2021-11-05 DIAGNOSIS — F48.9 MENTAL HEALTH PROBLEM: Primary | ICD-10-CM

## 2021-11-05 LAB
AMPHET UR QL SCN: NEGATIVE
APPEARANCE UR: ABNORMAL
BACTERIA URNS QL MICRO: ABNORMAL /HPF
BARBITURATES UR QL SCN: NEGATIVE
BENZODIAZ UR QL: NEGATIVE
BILIRUB UR QL: NEGATIVE
CANNABINOIDS UR QL SCN: NEGATIVE
COCAINE UR QL SCN: NEGATIVE
COLOR UR: YELLOW
EPITH CASTS URNS QL MICRO: ABNORMAL /LPF (ref 0–5)
GLUCOSE UR STRIP.AUTO-MCNC: NEGATIVE MG/DL
HDSCOM,HDSCOM: NORMAL
HGB UR QL STRIP: ABNORMAL
KETONES UR QL STRIP.AUTO: NEGATIVE MG/DL
LEUKOCYTE ESTERASE UR QL STRIP.AUTO: ABNORMAL
METHADONE UR QL: NEGATIVE
NITRITE UR QL STRIP.AUTO: NEGATIVE
OPIATES UR QL: NEGATIVE
PCP UR QL: NEGATIVE
PH UR STRIP: 6 [PH] (ref 5–8)
PROT UR STRIP-MCNC: NEGATIVE MG/DL
RBC #/AREA URNS HPF: ABNORMAL /HPF (ref 0–5)
SP GR UR REFRACTOMETRY: 1.01 (ref 1–1.03)
UROBILINOGEN UR QL STRIP.AUTO: 0.2 EU/DL (ref 0.2–1)
WBC URNS QL MICRO: ABNORMAL /HPF (ref 0–5)

## 2021-11-05 PROCEDURE — 80307 DRUG TEST PRSMV CHEM ANLYZR: CPT

## 2021-11-05 PROCEDURE — 3331090001 HH PPS REVENUE CREDIT

## 2021-11-05 PROCEDURE — 99284 EMERGENCY DEPT VISIT MOD MDM: CPT

## 2021-11-05 PROCEDURE — 87086 URINE CULTURE/COLONY COUNT: CPT

## 2021-11-05 PROCEDURE — 3331090002 HH PPS REVENUE DEBIT

## 2021-11-05 PROCEDURE — 96372 THER/PROPH/DIAG INJ SC/IM: CPT

## 2021-11-05 PROCEDURE — 81001 URINALYSIS AUTO W/SCOPE: CPT

## 2021-11-05 NOTE — ED PROVIDER NOTES
EMERGENCY DEPARTMENT HISTORY AND PHYSICAL EXAM    7:28 PM    Date: 11/5/2021  Patient Name: Alfredito Ching    History of Presenting Illness     No chief complaint on file. History Provided By: Patient  Location/Duration/Severity/Modifying factors   HPI  Alfredito Ching is a 78 y.o. female with past medical history of anxiety, hypertension, hypercholesterol presenting under [facility] for medical health evaluation. Patient states that earlier today she was \"[place]\" and got an argument with her neighbor. Her neighbors contacted the police, who brought her in due to feelings from her neighbors that she is not behaving like herself. She says that she has been up ambulating in the yard at night, and has been getting in arguments with people. She denies feeling any suicidal ideation, homicidal ideation, hallucinations. She has no medical complaints today. Denies tobacco, alcohol or illicit drug use. PCP: Cynthia Kumar NP    Current Facility-Administered Medications   Medication Dose Route Frequency Provider Last Rate Last Admin    melatonin tablet 10 mg  10 mg Oral ONCE Edwin Espinoza MD         Current Outpatient Medications   Medication Sig Dispense Refill    trimethoprim-sulfamethoxazole (BACTRIM DS, SEPTRA DS) 160-800 mg per tablet Take 1 Tablet by mouth two (2) times a day for 7 days. 14 Tablet 0    amLODIPine (NORVASC) 10 mg tablet Take 1 Tablet by mouth daily. 90 Tablet 0    melatonin 10 mg subl Take 20 mg by mouth nightly.          Past History     Past Medical History:  Past Medical History:   Diagnosis Date    Anxiety     Arrhythmia 2011    Negative Stress test    Arthritis     Carotid artery stenosis 2009    <50%    Gestational diabetes     Glaucoma     Hypercholesterolemia     Hypertension        Past Surgical History:  Past Surgical History:   Procedure Laterality Date    HX CHOLECYSTECTOMY  1990    gallstones    HX HYSTERECTOMY  1990    total hysterectomy       Family History:  Family History   Problem Relation Age of Onset   [place] Arthritis-osteo Mother     Dementia Mother     Hypertension Mother     Coronary Art Dis Mother        Social History:  Social History     Tobacco Use    Smoking status: Never Smoker    Smokeless tobacco: Never Used   Vaping Use    Vaping Use: Never used   Substance Use Topics    Alcohol use: Yes     Comment: Socially    Drug use: Not on file       Allergies: Allergies   Allergen Reactions    Codeine Nausea Only       I reviewed and confirmed the above information with patient and updated as necessary. Review of Systems     Review of Systems   Constitutional: Negative for diaphoresis and fever. HENT: Negative for ear pain and sore throat. Eyes:        No acute change in vision   Respiratory: Negative for cough and shortness of breath. Cardiovascular: Negative for chest pain and leg swelling. Gastrointestinal: Negative for abdominal pain and vomiting. Genitourinary: Negative for dysuria. Musculoskeletal: Negative for neck pain. Skin: Negative for wound. Neurological: Negative for weakness and headaches. Psychiatric/Behavioral: Positive for behavioral problems. Negative for hallucinations, self-injury and suicidal ideas. Physical Exam     Visit Vitals  BP (!) 145/63 (BP Patient Position: Semi fowlers)   Pulse (!) 105   Temp 98.5 °F (36.9 °C)   Resp 18   SpO2 97%       Physical Exam  Vitals and nursing note reviewed. Constitutional:       Comments: Adult female resting comfortably in the hospital stretcher. Calm, pleasant   HENT:      Mouth/Throat:      Mouth: Mucous membranes are moist.   Eyes:      Pupils: Pupils are equal, round, and reactive to light. Cardiovascular:      Rate and Rhythm: Normal rate and regular rhythm. Pulses: Normal pulses. Pulmonary:      Effort: Pulmonary effort is normal.      Breath sounds: Normal breath sounds. Abdominal:      Palpations: Abdomen is soft. Tenderness:  There is no abdominal tenderness. Musculoskeletal:         General: No signs of injury. Normal range of motion. Cervical back: Normal range of motion. Skin:     General: Skin is warm. Neurological:      General: No focal deficit present. Mental Status: She is alert and oriented to person, place, and time. Mental status is at baseline. Diagnostic Study Results     Labs -  Recent Results (from the past 24 hour(s))   DRUG SCREEN, URINE    Collection Time: 11/05/21  9:45 PM   Result Value Ref Range    BENZODIAZEPINES Negative NEG      BARBITURATES Negative NEG      THC (TH-CANNABINOL) Negative NEG      OPIATES Negative NEG      PCP(PHENCYCLIDINE) Negative NEG      COCAINE Negative NEG      AMPHETAMINES Negative NEG      METHADONE Negative NEG      HDSCOM (NOTE)    URINALYSIS W/ RFLX MICROSCOPIC    Collection Time: 11/05/21  9:45 PM   Result Value Ref Range    Color YELLOW      Appearance CLOUDY      Specific gravity 1.012 1.005 - 1.030      pH (UA) 6.0 5.0 - 8.0      Protein Negative NEG mg/dL    Glucose Negative NEG mg/dL    Ketone Negative NEG mg/dL    Bilirubin Negative NEG      Blood TRACE (A) NEG      Urobilinogen 0.2 0.2 - 1.0 EU/dL    Nitrites Negative NEG      Leukocyte Esterase LARGE (A) NEG     URINE MICROSCOPIC ONLY    Collection Time: 11/05/21  9:45 PM   Result Value Ref Range    WBC 25 to 30 0 - 5 /hpf    RBC 2 to 5 0 - 5 /hpf    Epithelial cells FEW 0 - 5 /lpf    Bacteria 2+ (A) NEG /hpf   CBC WITH AUTOMATED DIFF    Collection Time: 11/06/21 12:55 AM   Result Value Ref Range    WBC 8.3 4.6 - 13.2 K/uL    RBC 4.10 (L) 4.20 - 5.30 M/uL    HGB 12.3 12.0 - 16.0 g/dL    HCT 37.4 35.0 - 45.0 %    MCV 91.2 78.0 - 100.0 FL    MCH 30.0 24.0 - 34.0 PG    MCHC 32.9 31.0 - 37.0 g/dL    RDW 13.4 11.6 - 14.5 %    PLATELET 959 466 - 665 K/uL    MPV 10.2 9.2 - 11.8 FL    NEUTROPHILS 66 40 - 73 %    LYMPHOCYTES 25 21 - 52 %    MONOCYTES 8 3 - 10 %    EOSINOPHILS 1 0 - 5 %    BASOPHILS 1 0 - 2 %    ABS. NEUTROPHILS 5.5 1.8 - 8.0 K/UL    ABS. LYMPHOCYTES 2.0 0.9 - 3.6 K/UL    ABS. MONOCYTES 0.7 0.05 - 1.2 K/UL    ABS. EOSINOPHILS 0.0 0.0 - 0.4 K/UL    ABS. BASOPHILS 0.0 0.0 - 0.1 K/UL    DF AUTOMATED     METABOLIC PANEL, COMPREHENSIVE    Collection Time: 11/06/21 12:55 AM   Result Value Ref Range    Sodium 139 136 - 145 mmol/L    Potassium 3.5 3.5 - 5.5 mmol/L    Chloride 107 100 - 111 mmol/L    CO2 27 21 - 32 mmol/L    Anion gap 5 3.0 - 18 mmol/L    Glucose 106 (H) 74 - 99 mg/dL    BUN 17 7.0 - 18 MG/DL    Creatinine 1.17 0.6 - 1.3 MG/DL    BUN/Creatinine ratio 15 12 - 20      GFR est AA 55 (L) >60 ml/min/1.73m2    GFR est non-AA 45 (L) >60 ml/min/1.73m2    Calcium 9.2 8.5 - 10.1 MG/DL    Bilirubin, total 0.6 0.2 - 1.0 MG/DL    ALT (SGPT) 26 13 - 56 U/L    AST (SGOT) 14 10 - 38 U/L    Alk. phosphatase 53 45 - 117 U/L    Protein, total 7.2 6.4 - 8.2 g/dL    Albumin 3.6 3.4 - 5.0 g/dL    Globulin 3.6 2.0 - 4.0 g/dL    A-G Ratio 1.0 0.8 - 1.7     ETHYL ALCOHOL    Collection Time: 11/06/21 12:55 AM   Result Value Ref Range    ALCOHOL(ETHYL),SERUM <3 0 - 3 MG/DL   COVID-19 RAPID TEST    Collection Time: 11/06/21 12:55 AM   Result Value Ref Range    Specimen source Nasopharyngeal      COVID-19 rapid test Not detected NOTD           Radiologic Studies -   No orders to display           Medical Decision Making   I am the first provider for this patient. I reviewed the vital signs, available nursing notes, past medical history, past surgical history, family history and social history. Vital Signs-Reviewed the patient's vital signs. Records Reviewed: Nursing Notes, Old Medical Records and Previous electrocardiograms (Time of Review: 7:28 PM)    Provider Notes (Medical Decision Making):   MDM  24-year-old female here under E [facility] for mental health evaluation. Likely due to anxiety and social stressors as well as some underlying behavioral issues. No SI/HI. No acute medical problems today.     ED Course: Progress Notes, Reevaluation, and Consults:  Patient is afebrile and hemodynamically normal    We will screen labs, urine for medical screening exam.    Screening labs okay, no leukocytosis, CMP is unremarkable  UA has large leuk esterase and 2+ bacteria, patient has a history of being colonized. She is not having any symptoms, do not feel that treatment is indicated at this time. Will send for culture. Patient is medically cleared at this time for psychiatric evaluation. Patient signed out to Dr. Cheko Perkins, please see his notes for further details on patient care. I will be the provider of record for this patient. Procedures    Diagnosis     Clinical Impression:   1. Mental health problem        Disposition: TBD    Follow-up Information    None          Patient's Medications   Start Taking    No medications on file   Continue Taking    AMLODIPINE (NORVASC) 10 MG TABLET    Take 1 Tablet by mouth daily. MELATONIN 10 MG SUBL    Take 20 mg by mouth nightly. TRIMETHOPRIM-SULFAMETHOXAZOLE (BACTRIM DS, SEPTRA DS) 160-800 MG PER TABLET    Take 1 Tablet by mouth two (2) times a day for 7 days. These Medications have changed    No medications on file   Stop Taking    No medications on file       Simon Zelaya MD   Emergency Medicine   November 6, 2021, 7:28 PM     This note is dictated utilizing Dragon voice recognition software. Unfortunately this leads to occasional typographical errors using the voice recognition. I apologize in advance if the situation occurs. If questions occur please do not hesitate to contact me directly.     Aditya Nance MD

## 2021-11-06 LAB
ALBUMIN SERPL-MCNC: 3.6 G/DL (ref 3.4–5)
ALBUMIN/GLOB SERPL: 1 {RATIO} (ref 0.8–1.7)
ALP SERPL-CCNC: 53 U/L (ref 45–117)
ALT SERPL-CCNC: 26 U/L (ref 13–56)
ANION GAP SERPL CALC-SCNC: 5 MMOL/L (ref 3–18)
AST SERPL-CCNC: 14 U/L (ref 10–38)
BACTERIA SPEC CULT: ABNORMAL
BASOPHILS # BLD: 0 K/UL (ref 0–0.1)
BASOPHILS NFR BLD: 1 % (ref 0–2)
BILIRUB SERPL-MCNC: 0.6 MG/DL (ref 0.2–1)
BUN SERPL-MCNC: 17 MG/DL (ref 7–18)
BUN/CREAT SERPL: 15 (ref 12–20)
CALCIUM SERPL-MCNC: 9.2 MG/DL (ref 8.5–10.1)
CC UR VC: ABNORMAL
CHLORIDE SERPL-SCNC: 107 MMOL/L (ref 100–111)
CO2 SERPL-SCNC: 27 MMOL/L (ref 21–32)
COVID-19 RAPID TEST, COVR: NOT DETECTED
CREAT SERPL-MCNC: 1.17 MG/DL (ref 0.6–1.3)
DIFFERENTIAL METHOD BLD: ABNORMAL
EOSINOPHIL # BLD: 0 K/UL (ref 0–0.4)
EOSINOPHIL NFR BLD: 1 % (ref 0–5)
ERYTHROCYTE [DISTWIDTH] IN BLOOD BY AUTOMATED COUNT: 13.4 % (ref 11.6–14.5)
ETHANOL SERPL-MCNC: <3 MG/DL (ref 0–3)
GLOBULIN SER CALC-MCNC: 3.6 G/DL (ref 2–4)
GLUCOSE SERPL-MCNC: 106 MG/DL (ref 74–99)
HCT VFR BLD AUTO: 37.4 % (ref 35–45)
HGB BLD-MCNC: 12.3 G/DL (ref 12–16)
LYMPHOCYTES # BLD: 2 K/UL (ref 0.9–3.6)
LYMPHOCYTES NFR BLD: 25 % (ref 21–52)
MCH RBC QN AUTO: 30 PG (ref 24–34)
MCHC RBC AUTO-ENTMCNC: 32.9 G/DL (ref 31–37)
MCV RBC AUTO: 91.2 FL (ref 78–100)
MONOCYTES # BLD: 0.7 K/UL (ref 0.05–1.2)
MONOCYTES NFR BLD: 8 % (ref 3–10)
NEUTS SEG # BLD: 5.5 K/UL (ref 1.8–8)
NEUTS SEG NFR BLD: 66 % (ref 40–73)
PLATELET # BLD AUTO: 224 K/UL (ref 135–420)
PMV BLD AUTO: 10.2 FL (ref 9.2–11.8)
POTASSIUM SERPL-SCNC: 3.5 MMOL/L (ref 3.5–5.5)
PROT SERPL-MCNC: 7.2 G/DL (ref 6.4–8.2)
RBC # BLD AUTO: 4.1 M/UL (ref 4.2–5.3)
SERVICE CMNT-IMP: ABNORMAL
SODIUM SERPL-SCNC: 139 MMOL/L (ref 136–145)
SOURCE, COVRS: NORMAL
WBC # BLD AUTO: 8.3 K/UL (ref 4.6–13.2)

## 2021-11-06 PROCEDURE — 80053 COMPREHEN METABOLIC PANEL: CPT

## 2021-11-06 PROCEDURE — 87635 SARS-COV-2 COVID-19 AMP PRB: CPT

## 2021-11-06 PROCEDURE — 82077 ASSAY SPEC XCP UR&BREATH IA: CPT

## 2021-11-06 PROCEDURE — 74011250637 HC RX REV CODE- 250/637: Performed by: STUDENT IN AN ORGANIZED HEALTH CARE EDUCATION/TRAINING PROGRAM

## 2021-11-06 PROCEDURE — 3331090001 HH PPS REVENUE CREDIT

## 2021-11-06 PROCEDURE — 85025 COMPLETE CBC W/AUTO DIFF WBC: CPT

## 2021-11-06 PROCEDURE — 3331090002 HH PPS REVENUE DEBIT

## 2021-11-06 RX ORDER — CHOLECALCIFEROL (VITAMIN D3) 125 MCG
10 CAPSULE ORAL ONCE
Status: COMPLETED | OUTPATIENT
Start: 2021-11-06 | End: 2021-11-06

## 2021-11-06 RX ADMIN — Medication 10 MG: at 17:57

## 2021-11-06 NOTE — ED NOTES
Patient handcuffed to the stretcher. She climbs out of bed and needs to have the handcuffs to be removed to get her in bed. Patient has been awake the entire day. Asked Dr. Shelby Goodson if I could give her Melatonin that is ordered, per his verbal response it was ok to give the patient the melatonin 10 mg.

## 2021-11-06 NOTE — ED NOTES
Accepted patient from Dr. Prema Elizabeth, in brief this is a 78year old female under Eco pending bed placement by [facility] for combative behaviors. No acute interventions.      Russ Lawler MD

## 2021-11-06 NOTE — ED NOTES
Office Note      Subjective  Adia Brooks is a 75 year old female.    Chief Complaint   Patient presents with   • Office Visit   • Follow-up     neck & shoulder pain     HPI     Notes swelling in the right side of the neck, no pain, no discomfort    Has a small lump right side of the neck    Right shoulder discomfort, better since seen by orthopedics and injection    Past Medical History:   Diagnosis Date   • Essential (primary) hypertension    • Hyperlipoproteinemia    • No known problems        Past Surgical History:   Procedure Laterality Date   • Hysterectomy     • Tubal ligation     • Uterine fibroid surgery         Current Outpatient Medications   Medication Sig Dispense Refill   • amLODIPine (NORVASC) 10 MG tablet TAKE 1 TABLET EVERY DAY 90 tablet 0   • glimepiride (AMARYL) 4 MG tablet TAKE 1 TABLET TWICE DAILY 180 tablet 3   • allopurinol (ZYLOPRIM) 300 MG tablet TAKE 1 TABLET EVERY DAY 90 tablet 3   • atorvastatin (LIPITOR) 20 MG tablet TAKE 1 TABLET AT BEDTIME (FOR HIGH CHOLESTEROL) 90 tablet 3   • hydrochlorothiazide (HYDRODIURIL) 25 MG tablet TAKE 1 TABLET EVERY MORNING 90 tablet 3   • losartan (COZAAR) 50 MG tablet TAKE 1 TABLET EVERY DAY 90 tablet 3   • insulin glargine (LANTUS SOLOSTAR) 100 UNIT/ML pen-injector INJECT 35-40 UNITS UNDER THE SKIN DAILY BEFORE BEDTIME FOR DIABETES. 15 mL 3   • B-D U/F PEN NEEDLE 5/16\" 31G X 8 MM Misc nightly. 35 - 40 units nightly under skin 30 each 3   • ACCU-CHEK MICHAEL PLUS test strip TEST TWO TIMES DAILY 200 strip 3   • triamcinolone (ARISTOCORT) 0.1 % ointment Apply topically 2 times daily. For 1-2 weeks and then as needed 30 g 0   • clindamycin (CLEOCIN-T) 1 % lotion Apply topically 2 times daily. 60 mL 0   • ASPIRIN PO        No current facility-administered medications for this visit.       ALLERGIES:  No Known Allergies         Family History   Problem Relation Age of Onset   • Hypertension Mother    • Diabetes Mother         Social History     Socioeconomic  Patient care signed out to me by Dr. Fuad Navarrete. Patient under [facility] for aggressive behavior. She has been medically cleared. Currently awaiting placement.   Care signed out to Dr. Olmstead Early History   • Marital status: /Civil Union     Spouse name: Not on file   • Number of children: Not on file   • Years of education: Not on file   • Highest education level: Not on file   Occupational History   • Not on file   Tobacco Use   • Smoking status: Never Smoker   • Smokeless tobacco: Never Used   Substance and Sexual Activity   • Alcohol use: Yes     Comment: Social drinker   • Drug use: No   • Sexual activity: Yes     Partners: Male     Birth control/protection: Surgical   Other Topics Concern   • Not on file   Social History Narrative   • Not on file     Social Determinants of Health     Financial Resource Strain:    • Social Determinants: Financial Resource Strain:    Food Insecurity:    • Social Determinants: Food Insecurity:    Transportation Needs:    • Lack of Transportation (Medical):    • Lack of Transportation (Non-Medical):    Physical Activity:    • Days of Exercise per Week:    • Minutes of Exercise per Session:    Stress:    • Social Determinants: Stress:    Social Connections:    • Social Determinants: Social Connections:    Intimate Partner Violence:    • Social Determinants: Intimate Partner Violence Past Fear:    • Social Determinants: Intimate Partner Violence Current Fear:        Review of Systems   Constitutional: Negative for activity change and appetite change.   Eyes: Negative for visual disturbance.   Respiratory: Negative for cough and shortness of breath.    Cardiovascular: Positive for leg swelling. Negative for chest pain and palpitations.   Gastrointestinal: Negative for abdominal pain.   Genitourinary: Negative for difficulty urinating.   Musculoskeletal: Positive for arthralgias. Negative for gait problem.   Skin: Negative for color change and rash.   Neurological: Negative for dizziness and headaches.   Psychiatric/Behavioral: Negative for dysphoric mood.       Objective  Visit Vitals  /57   Pulse 85   Resp 18   Ht 5' 6\" (1.676 m)   Wt 115.3 kg (254 lb 3.1 oz)    SpO2 99%   BMI 41.03 kg/m²       Physical Exam  Vitals and nursing note reviewed.   Constitutional:       General: She is not in acute distress.     Appearance: Normal appearance. She is well-developed. She is obese. She is not ill-appearing.   HENT:      Head: Normocephalic and atraumatic.   Neck:      Comments: Right side of neck with prominent fat accumulation right upper trapezius area  Cardiovascular:      Rate and Rhythm: Normal rate and regular rhythm.      Heart sounds: Normal heart sounds.   Pulmonary:      Effort: Pulmonary effort is normal.      Breath sounds: Normal breath sounds.   Musculoskeletal:      Cervical back: Neck supple.      Right lower leg: Edema present.      Left lower leg: Edema present.   Skin:     General: Skin is warm and dry.      Comments: Small cystic nodule right lower neck area, nontender, mobile   Neurological:      Mental Status: She is alert and oriented to person, place, and time.      Gait: Gait normal.   Psychiatric:         Mood and Affect: Mood normal.       Assessment and Plan  1. Lipoma of neck    2. Skin nodule    3. Leg edema, right    4. Morbid obesity with BMI of 40.0-44.9, adult (CMS/HCC)          Plan:    Lipoma-reassurance, no symptoms    Skin nodule-likely inclusion cyst, reassurance    Shoulder tendinitis-encouraged exercise    Leg edema-multifactorial in origin, likely due to inactivity, medication, venous stasis  Keep legs elevated  Support stocking  Low-sodium diet     Morbid obesity- patient encouraged to try to lose weight     Screen-mammogram 10/2020    colonoscopy 1/15/2019, complete no further required/recommended    OV 3 months or as needed    No orders of the defined types were placed in this encounter.      5/27/2021  Missy Villa MD

## 2021-11-07 PROCEDURE — 3331090001 HH PPS REVENUE CREDIT

## 2021-11-07 PROCEDURE — 3331090002 HH PPS REVENUE DEBIT

## 2021-11-07 PROCEDURE — 74011250637 HC RX REV CODE- 250/637: Performed by: STUDENT IN AN ORGANIZED HEALTH CARE EDUCATION/TRAINING PROGRAM

## 2021-11-07 RX ORDER — AMLODIPINE BESYLATE 10 MG/1
10 TABLET ORAL
Status: ACTIVE | OUTPATIENT
Start: 2021-11-07 | End: 2021-11-07

## 2021-11-07 RX ORDER — LORAZEPAM 1 MG/1
1 TABLET ORAL ONCE
Status: COMPLETED | OUTPATIENT
Start: 2021-11-07 | End: 2021-11-07

## 2021-11-07 RX ADMIN — LORAZEPAM 1 MG: 1 TABLET ORAL at 07:46

## 2021-11-07 NOTE — ED NOTES
Patient linen changed, warm blanket provided. Patient is currently resting with eyes closed, even rise and fall of chest noted. Will continue to monitor. Staff message created to make sure Bill schedules INR appointment.

## 2021-11-07 NOTE — ED NOTES
Patient laying in bed alert but confused. She is pleasant at this time, bilateral hand cuffs are in place. She does have kerlix in place due to rubbing of cuffs on her wrist. Patient keeps attempting to get out of handcuffs. When she has only one on she crawls over the handrails of the bed, which is a safety hazard for patient.

## 2021-11-08 ENCOUNTER — HOME CARE VISIT (OUTPATIENT)
Dept: HOME HEALTH SERVICES | Facility: HOME HEALTH | Age: 76
End: 2021-11-08
Payer: MEDICARE

## 2021-11-08 ENCOUNTER — HOME CARE VISIT (OUTPATIENT)
Dept: SCHEDULING | Facility: HOME HEALTH | Age: 76
End: 2021-11-08
Payer: MEDICARE

## 2021-11-08 VITALS
TEMPERATURE: 98.4 F | SYSTOLIC BLOOD PRESSURE: 165 MMHG | RESPIRATION RATE: 16 BRPM | OXYGEN SATURATION: 99 % | HEART RATE: 92 BPM | DIASTOLIC BLOOD PRESSURE: 85 MMHG

## 2021-11-08 LAB
BACTERIA SPEC CULT: NORMAL
CC UR VC: NORMAL
SERVICE CMNT-IMP: NORMAL

## 2021-11-08 PROCEDURE — 3331090002 HH PPS REVENUE DEBIT

## 2021-11-08 PROCEDURE — 96372 THER/PROPH/DIAG INJ SC/IM: CPT

## 2021-11-08 PROCEDURE — 74011250636 HC RX REV CODE- 250/636: Performed by: EMERGENCY MEDICINE

## 2021-11-08 PROCEDURE — G0155 HHCP-SVS OF CSW,EA 15 MIN: HCPCS

## 2021-11-08 PROCEDURE — 3331090001 HH PPS REVENUE CREDIT

## 2021-11-08 PROCEDURE — 74011000250 HC RX REV CODE- 250: Performed by: EMERGENCY MEDICINE

## 2021-11-08 RX ORDER — CEFDINIR 300 MG/1
300 CAPSULE ORAL 2 TIMES DAILY
Qty: 14 CAPSULE | Refills: 0 | Status: SHIPPED | OUTPATIENT
Start: 2021-11-08 | End: 2021-11-15

## 2021-11-08 RX ADMIN — LIDOCAINE HYDROCHLORIDE 1 G: 10 INJECTION, SOLUTION EPIDURAL; INFILTRATION; INTRACAUDAL; PERINEURAL at 11:40

## 2021-11-08 NOTE — ED NOTES
The patient is alert and oriented and was seen for her hearing for TDO and has been released. The patient is in no distress and appears to have a urinary tract infection. The patient has gram-negative rods in her urine and last urine culture showed Klebsiella that was sensitive to most antibiotics except for nitrofurantoin. The patient has antibiotic at home and thinks it may be Bactrim but I told her that the urinary tract infection may have something to do with her behavioral symptoms. I told her I had like to change her to a stronger antibiotic until we have a better understanding of what her sensitivities are. The patient wants to go home and she has family is coming to pick her up and she feels comfortable going home. The patient has no suicidal homicidal ideations and has been cleared from a behavioral standpoint and looks that she can take antibiotics at home for urinary tract infection so will be discharged home. Carole Chowdhury DO 11:46 AM      Workup and recommendations were reviewed with the patient and all questions were answered. The patient understands the plan and will proceed with close outpatient care. I have encouraged the patient to return if at all worsened or concerned.    Carole Chowdhury, DO 11:46 AM    MEDICATIONS:    Medications   amLODIPine (NORVASC) tablet 10 mg (has no administration in time range)   melatonin tablet 10 mg (10 mg Oral Given 11/6/21 1757)   LORazepam (ATIVAN) tablet 1 mg (1 mg Oral Given 11/7/21 0746)   cefTRIAXone (ROCEPHIN) 1 g in lidocaine (PF) (XYLOCAINE) 10 mg/mL (1 %) IM injection (1 g IntraMUSCular Given 11/8/21 1140)            RESULTS:        No orders to display           Abnormal Results this visit:  Labs Reviewed   CULTURE, URINE - Abnormal; Notable for the following components:       Result Value    Culture result: GRAM NEGATIVE RODS (*)     All other components within normal limits   CBC WITH AUTOMATED DIFF - Abnormal; Notable for the following components:    RBC 4.10 (*)     All other components within normal limits   METABOLIC PANEL, COMPREHENSIVE - Abnormal; Notable for the following components:    Glucose 106 (*)     GFR est AA 55 (*)     GFR est non-AA 45 (*)     All other components within normal limits   URINALYSIS W/ RFLX MICROSCOPIC - Abnormal; Notable for the following components:    Blood TRACE (*)     Leukocyte Esterase LARGE (*)     All other components within normal limits   URINE MICROSCOPIC ONLY - Abnormal; Notable for the following components:    Bacteria 2+ (*)     All other components within normal limits   COVID-19 RAPID TEST   CULTURE, URINE   ETHYL ALCOHOL   DRUG SCREEN, URINE       All Results past 24 hours:  No results found for this or any previous visit (from the past 24 hour(s)). CLINICAL IMPRESSION    1. Mental health problem    2.  Acute UTI

## 2021-11-08 NOTE — BSMART NOTE
Crisis Note: Court paperwork has been prepared and patient is ready for court in the am; charge nurse made aware.

## 2021-11-08 NOTE — PROGRESS NOTES
CM consulted to assist with discharge planning regarding pt's safety at home. Chart reviewed and pt came to ED on [facility]. Pt was seen by court this morning. Called Crisis and was informed that pt was discharged by court. 1015 Met with pt. She states that she lives alone but her son comes when she needs him. He's independent with ADLs. 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 Pt is alert and oriented x 4. She states that her friend Dior Suarez will come and pick her up. She states that she's safe going back home    1030 Dr Yasmin West made aware of above and also informed him with pt's positive urine culture.

## 2021-11-09 ENCOUNTER — HOME CARE VISIT (OUTPATIENT)
Dept: SCHEDULING | Facility: HOME HEALTH | Age: 76
End: 2021-11-09
Payer: MEDICARE

## 2021-11-09 ENCOUNTER — TELEPHONE (OUTPATIENT)
Dept: FAMILY MEDICINE CLINIC | Age: 76
End: 2021-11-09

## 2021-11-09 PROCEDURE — 400013 HH SOC

## 2021-11-09 PROCEDURE — G0153 HHCP-SVS OF S/L PATH,EA 15MN: HCPCS

## 2021-11-09 PROCEDURE — 3331090001 HH PPS REVENUE CREDIT

## 2021-11-09 PROCEDURE — 400018 HH-NO PAY CLAIM PROCEDURE

## 2021-11-09 PROCEDURE — 3331090002 HH PPS REVENUE DEBIT

## 2021-11-10 VITALS
OXYGEN SATURATION: 98 % | HEART RATE: 92 BPM | TEMPERATURE: 97.8 F | SYSTOLIC BLOOD PRESSURE: 153 MMHG | RESPIRATION RATE: 12 BRPM | DIASTOLIC BLOOD PRESSURE: 81 MMHG

## 2021-11-10 PROCEDURE — 3331090001 HH PPS REVENUE CREDIT

## 2021-11-10 PROCEDURE — 3331090002 HH PPS REVENUE DEBIT

## 2021-11-10 NOTE — HOME HEALTH
SUBJECTIVE (PT/FAMILY COMMENTS, THERAPIST OBSERVATIONS): Pt alert, tried to explain what led her to going to ER over weekend. SLP had difficulty following Pt, Pt demonstrated empty, disorganized speech. Pts hair was noted not to be styled as her usual.  Pts green pill box is missing. SLP called Pts friend, Woodrow Chirinos. Liya Reed states he is out of town and is aware that pill box is missing. Pt has not obtained cefdinir (OMNICEF) yet from pharmacy. Pt is also noted to misplace blue notebook for notetaking given last visit       [address] [facility] / [facility] / [facility] More / PLAN:  Pt is to be discharged from [facility] next visit. Pt would benefit from daily assistance for medication management due to recent confusion.            INSTRUCTION GIVEN/EDUCATION/RESPONSE TO TEACHING:    HOME EXERCISE PROGRAM: visual memory exercise, generative listing         DISCHARGE PLANNING - (Vitaly Kari Garcia De Lima 782): ST to d/c next visit

## 2021-11-11 ENCOUNTER — HOME CARE VISIT (OUTPATIENT)
Dept: SCHEDULING | Facility: HOME HEALTH | Age: 76
End: 2021-11-11
Payer: MEDICARE

## 2021-11-11 VITALS
HEART RATE: 73 BPM | SYSTOLIC BLOOD PRESSURE: 130 MMHG | TEMPERATURE: 98.3 F | OXYGEN SATURATION: 99 % | DIASTOLIC BLOOD PRESSURE: 84 MMHG | RESPIRATION RATE: 18 BRPM

## 2021-11-11 PROCEDURE — G0300 HHS/HOSPICE OF LPN EA 15 MIN: HCPCS

## 2021-11-11 PROCEDURE — 3331090002 HH PPS REVENUE DEBIT

## 2021-11-11 PROCEDURE — 3331090001 HH PPS REVENUE CREDIT

## 2021-11-11 PROCEDURE — G0153 HHCP-SVS OF S/L PATH,EA 15MN: HCPCS

## 2021-11-11 NOTE — HOME HEALTH
Pt gave verbal permission for the MSW to speak with her friends Scottie Dahl and Mr. and Mrs. Nurys (present during the assessment), Prem Talavera ([phone]) and brother Jamison Hankins [facility]) [place] [address] ([phone]) in [facility]. MSW noted pt would benefit from [facility] and/or 1-2 individuals assisting with business management to streamline communication and support LTC services.

## 2021-11-12 VITALS
DIASTOLIC BLOOD PRESSURE: 75 MMHG | SYSTOLIC BLOOD PRESSURE: 140 MMHG | HEART RATE: 94 BPM | OXYGEN SATURATION: 99 % | RESPIRATION RATE: 12 BRPM | TEMPERATURE: 98 F

## 2021-11-12 PROCEDURE — 3331090001 HH PPS REVENUE CREDIT

## 2021-11-12 PROCEDURE — 3331090002 HH PPS REVENUE DEBIT

## 2021-11-13 PROCEDURE — 3331090001 HH PPS REVENUE CREDIT

## 2021-11-13 PROCEDURE — 3331090002 HH PPS REVENUE DEBIT

## 2021-11-14 PROCEDURE — 3331090002 HH PPS REVENUE DEBIT

## 2021-11-14 PROCEDURE — 3331090001 HH PPS REVENUE CREDIT

## 2021-11-15 PROCEDURE — 3331090002 HH PPS REVENUE DEBIT

## 2021-11-15 PROCEDURE — 3331090001 HH PPS REVENUE CREDIT

## 2021-11-16 ENCOUNTER — HOME CARE VISIT (OUTPATIENT)
Dept: SCHEDULING | Facility: HOME HEALTH | Age: 76
End: 2021-11-16
Payer: MEDICARE

## 2021-11-16 VITALS
DIASTOLIC BLOOD PRESSURE: 72 MMHG | HEART RATE: 85 BPM | OXYGEN SATURATION: 99 % | RESPIRATION RATE: 17 BRPM | SYSTOLIC BLOOD PRESSURE: 122 MMHG | TEMPERATURE: 97.2 F

## 2021-11-16 PROCEDURE — G0300 HHS/HOSPICE OF LPN EA 15 MIN: HCPCS

## 2021-11-16 PROCEDURE — G0153 HHCP-SVS OF S/L PATH,EA 15MN: HCPCS

## 2021-11-16 PROCEDURE — 3331090001 HH PPS REVENUE CREDIT

## 2021-11-16 PROCEDURE — 3331090002 HH PPS REVENUE DEBIT

## 2021-11-16 NOTE — HOME HEALTH
Skilled reason for visit: Education on Anxiety, UTI prevention, Fall Prevention, and Medications    Caregiver involvement: Patient is independent at this time. Family/friends are available should a need arise. Medications reviewed and all medications ARE available in the home this visit. The following education was provided regarding medications, medication interactions, and look alike medications (specify): Norvasc-Patient/Caregiver was educated on this medication and the purpose of it. Patient/Caregiver verbalized understanding. Medications are EFFECTIVE at this time. Home health supplies by type and quantity ordered/delivered this visit include: N/A    Patient education provided this visit: Falls: Reviewed fall precautions and safety:dangle, uses assistive device at all times, non skid foot wear,and night light. Patient was also educated on numerous ways to decrease anxiety and patient was able to verbalize understanding. Progress toward goals: Patient is making progress towards goals as she has completed treatment for UTI, denies any falls or any skin breakdown. Continued need for the following skills: Nursing will continue to follow patient until education is understood and able to be verbalized by patient/caregiver. Patient and/or caregiver notified and agrees to changes in the Plan of Care NA    The following discharge planning was discussed with the pt/caregiver: when patient reaches goals and medication is managed, and disease processes are understood patient agrees and understand that discharge will take place.

## 2021-11-17 VITALS
DIASTOLIC BLOOD PRESSURE: 82 MMHG | TEMPERATURE: 98.3 F | OXYGEN SATURATION: 98 % | HEART RATE: 86 BPM | SYSTOLIC BLOOD PRESSURE: 148 MMHG | RESPIRATION RATE: 16 BRPM

## 2021-11-17 PROCEDURE — 3331090002 HH PPS REVENUE DEBIT

## 2021-11-17 PROCEDURE — 3331090001 HH PPS REVENUE CREDIT

## 2021-11-17 NOTE — HOME HEALTH
SUBJECTIVE (PT/FAMILY COMMENTS, THERAPIST OBSERVATIONS): Pt alert and pleasant. Pt states during tasks this visit,  \"I'm doing better than I have done before\"       ASSESSMENT OF INTERVENTION / PATIENT PROGRESS / Ren Cowart / PLAN: Pt with steady progress toward goals. Pt is noted to have varying medication adherence. [place] ST to continue maximize cognitive, memory communication skills and to establish memory aids/strategies in home to improve Pts safety and function for home living.          INSTRUCTION GIVEN/EDUCATION/RESPONSE TO TEACHING:        HOME EXERCISE PROGRAM: visual memory exercise, generative listing         DISCHARGE PLANNING - ([address], [place] PLAN): ST to continue x 3 more visits

## 2021-11-18 ENCOUNTER — HOME CARE VISIT (OUTPATIENT)
Dept: SCHEDULING | Facility: HOME HEALTH | Age: 76
End: 2021-11-18
Payer: MEDICARE

## 2021-11-18 ENCOUNTER — OFFICE VISIT (OUTPATIENT)
Dept: FAMILY MEDICINE CLINIC | Age: 76
End: 2021-11-18
Payer: MEDICARE

## 2021-11-18 VITALS
OXYGEN SATURATION: 97 % | TEMPERATURE: 97.9 F | RESPIRATION RATE: 18 BRPM | HEIGHT: 68 IN | DIASTOLIC BLOOD PRESSURE: 77 MMHG | BODY MASS INDEX: 21.67 KG/M2 | SYSTOLIC BLOOD PRESSURE: 133 MMHG | WEIGHT: 143 LBS | HEART RATE: 101 BPM

## 2021-11-18 DIAGNOSIS — Z12.11 SCREEN FOR COLON CANCER: ICD-10-CM

## 2021-11-18 DIAGNOSIS — Z00.00 MEDICARE ANNUAL WELLNESS VISIT, SUBSEQUENT: Primary | ICD-10-CM

## 2021-11-18 DIAGNOSIS — N30.01 ACUTE CYSTITIS WITH HEMATURIA: ICD-10-CM

## 2021-11-18 DIAGNOSIS — Z23 NEEDS FLU SHOT: ICD-10-CM

## 2021-11-18 DIAGNOSIS — Z71.89 ACP (ADVANCE CARE PLANNING): ICD-10-CM

## 2021-11-18 DIAGNOSIS — R41.3 MEMORY LOSS: ICD-10-CM

## 2021-11-18 PROCEDURE — G0153 HHCP-SVS OF S/L PATH,EA 15MN: HCPCS

## 2021-11-18 PROCEDURE — G0439 PPPS, SUBSEQ VISIT: HCPCS | Performed by: NURSE PRACTITIONER

## 2021-11-18 PROCEDURE — G8427 DOCREV CUR MEDS BY ELIG CLIN: HCPCS | Performed by: NURSE PRACTITIONER

## 2021-11-18 PROCEDURE — G8432 DEP SCR NOT DOC, RNG: HCPCS | Performed by: NURSE PRACTITIONER

## 2021-11-18 PROCEDURE — 3331090002 HH PPS REVENUE DEBIT

## 2021-11-18 PROCEDURE — 3331090001 HH PPS REVENUE CREDIT

## 2021-11-18 PROCEDURE — 99497 ADVNCD CARE PLAN 30 MIN: CPT | Performed by: NURSE PRACTITIONER

## 2021-11-18 PROCEDURE — 99214 OFFICE O/P EST MOD 30 MIN: CPT | Performed by: NURSE PRACTITIONER

## 2021-11-18 PROCEDURE — G8536 NO DOC ELDER MAL SCRN: HCPCS | Performed by: NURSE PRACTITIONER

## 2021-11-18 PROCEDURE — G0463 HOSPITAL OUTPT CLINIC VISIT: HCPCS | Performed by: NURSE PRACTITIONER

## 2021-11-18 PROCEDURE — G9899 SCRN MAM PERF RSLTS DOC: HCPCS | Performed by: NURSE PRACTITIONER

## 2021-11-18 PROCEDURE — G8420 CALC BMI NORM PARAMETERS: HCPCS | Performed by: NURSE PRACTITIONER

## 2021-11-18 PROCEDURE — 1101F PT FALLS ASSESS-DOCD LE1/YR: CPT | Performed by: NURSE PRACTITIONER

## 2021-11-18 PROCEDURE — 1090F PRES/ABSN URINE INCON ASSESS: CPT | Performed by: NURSE PRACTITIONER

## 2021-11-18 PROCEDURE — G8399 PT W/DXA RESULTS DOCUMENT: HCPCS | Performed by: NURSE PRACTITIONER

## 2021-11-18 PROCEDURE — 3017F COLORECTAL CA SCREEN DOC REV: CPT | Performed by: NURSE PRACTITIONER

## 2021-11-18 NOTE — PROGRESS NOTES
Advance Care Planning     Advance Care Planning (ACP) Physician/NP/PA Conversation      Date of Conversation: 11/18/2021  Conducted with: Patient with Decision Making Capacity    Healthcare Decision Maker:     Primary Decision Maker: Millie Gomes - [phone]  Click here to complete [address] including selection of the Healthcare Decision Maker Relationship (ie \"Primary\")  Today we documented Decision Maker(s). The patient will provide ACP documents. Care Preferences:    Hospitalization: \"If your health worsens and it becomes clear that your chance of recovery is unlikely, what would be your preference regarding hospitalization? \"  The patient would prefer comfort-focused treatment without hospitalization. Ventilation: \"If you were unable to breathe on your own and your chance of recovery was unlikely, what would be your preference about the use of a ventilator (breathing machine) if it was available to you? \"   The patient is unsure. Resuscitation: \"In the event your heart stopped as a result of an underlying serious health condition, would you want attempts to be made to restart your heart, or would you prefer a natural death? \"   The patient is unsure.       Conversation Outcomes / Follow-Up Plan:   ACP in process - information provided, considering goals and options     Length of Voluntary ACP Conversation in minutes:  16 minutes    Mack Sales NP

## 2021-11-18 NOTE — PATIENT INSTRUCTIONS
Medicare Wellness Visit, Female     The best way to live healthy is to have a lifestyle where you eat a well-balanced diet, exercise regularly, limit alcohol use, and quit all forms of tobacco/nicotine, if applicable. Regular preventive services are another way to keep healthy. Preventive services (vaccines, screening tests, monitoring & exams) can help personalize your care plan, which helps you manage your own care. Screening tests can find health problems at the earliest stages, when they are easiest to treat. [facility] follows the current, evidence-based guidelines published by the [facility] ([facility]) when recommending preventive services for our patients. Because we follow these guidelines, sometimes recommendations change over time as research supports it. (For example, mammograms used to be recommended annually. Even though Medicare will still pay for an annual mammogram, the newer guidelines recommend a mammogram every two years for women of average risk). Of course, you and your doctor may decide to screen more often for some diseases, based on your risk and your co-morbidities (chronic disease you are already diagnosed with). Preventive services for you include:  - Medicare offers their members a free annual wellness visit, which is time for you and your primary care provider to discuss and plan for your preventive service needs. Take advantage of this benefit every year!  -All adults over the age of 72 should receive the recommended pneumonia vaccines. Current USPSTF guidelines recommend a series of two vaccines for the best pneumonia protection.   -All adults should have a flu vaccine yearly and a tetanus vaccine every 10 years.   -All adults age 48 and older should receive the shingles vaccines (series of two vaccines).       -All adults age 38-68 who are overweight should have a diabetes screening test once every three years.   -All adults born between [birth year] and 1965 should be screened once for Hepatitis C.  -Other screening tests and preventive services for persons with diabetes include: an eye exam to screen for diabetic retinopathy, a kidney function test, a foot exam, and stricter control over your cholesterol.   -Cardiovascular screening for adults with routine risk involves an electrocardiogram (ECG) at intervals determined by your doctor.   -Colorectal cancer screenings should be done for adults age 54-65 with no increased risk factors for colorectal cancer. There are a number of acceptable methods of screening for this type of cancer. Each test has its own benefits and drawbacks. Discuss with your doctor what is most appropriate for you during your annual wellness visit. The different tests include: colonoscopy (considered the best screening method), a fecal occult blood test, a fecal DNA test, and sigmoidoscopy.    -A bone mass density test is recommended when a woman turns 65 to screen for osteoporosis. This test is only recommended one time, as a screening. Some providers will use this same test as a disease monitoring tool if you already have osteoporosis. -Breast cancer screenings are recommended every other year for women of normal risk, age 54-69.  -Cervical cancer screenings for women over age 72 are only recommended with certain risk factors.      Here is a list of your current Health Maintenance items (your personalized list of preventive services) with a due date:  Health Maintenance Due   Topic Date Due    COVID-19 Vaccine (1) Never done    Shingles Vaccine (1 of 2) Never done    Colorectal Screening  02/13/2018    Hemoglobin A1C    03/13/2018    Mammogram  04/11/2018    Annual Well Visit  06/07/2021    Yearly Flu Vaccine (1) 09/01/2021

## 2021-11-18 NOTE — PROGRESS NOTES
Chief Complaint   Patient presents with    Annual Wellness Visit    Labs     not completed      Assessment & Plan:     1. Acute cystitis with hematuria  Comments:  Resolved  2. Memory loss  Comments: Follow-up as scheduled with neuropsych  3. Needs flu shot  Comments:  Declines for now  4. Screen for colon cancer  -     OCCULT BLOOD IMMUNOASSAY,DIAGNOSTIC; Future    Follow-up and Dispositions    · Return in about 4 weeks (around 12/16/2021) for lab results. Subjective:     HPI    Patient presents today, accompanied by accompanied by Emile Kruger, who is the main contact for the patient at [phone]. Patient was seen and evaluated in the ER on 11/05/2021 for AMS and UTI. She was on TDO due to agitation but was released and discharged home. Patient was positive for UTI and was initially treated with Macrobid, but continued to have AMS and her UA was repeated with cx, which came back positive again for Klebsiella on 11/03/2021. At that time, she was given Bactrim and Emile Kruger was notified of the abnormal result and antibiotics on 11/03/2021. Per her friend, she did not complete the antibiotics and that they have \"disappeared. \"  Per her friend, she was given a new prescription for UTI and completed the 7-day course, does not recall the name. Patient has been having some visual hallucinations, stating there have been people visiting her, which no one else has seen. Patient right now lives alone and there is apparently a complicated family dynamic that her friend is helping her navigate through. She has friends who talk to her daily and visit her every few days. She has in-home care coming to the house once a week. They have spoken with [address] [place] [address]  and have been given some resources.       Health Maintenance:  COVID-19 vac - received first dose  Flu vaccine- will give today  Shingles vaccine- recommended  Last mammogram - previously ordered, advised to complete  Last Colonoscopy- never had one, would like to think about which method she prefers    Review of Systems   Constitutional: Negative for chills, fever and malaise/fatigue. Respiratory: Negative for shortness of breath. Cardiovascular: Negative for chest pain. Gastrointestinal: Negative for abdominal pain, nausea and vomiting. Genitourinary: Negative for dysuria, frequency and urgency. Neurological: Negative for dizziness and headaches. Psychiatric/Behavioral: Positive for hallucinations and memory loss. Objective:   /77 (BP 1 Location: Right arm, BP Patient Position: Sitting, BP Cuff Size: Adult)   Pulse (!) 101   Temp 97.9 °F (36.6 °C) (Oral)   Resp 18   Ht 5' 8\" (1.727 m)   Wt 143 lb (64.9 kg)   SpO2 97%   BMI 21.74 kg/m²      Physical Exam  Vitals and nursing note reviewed. Constitutional:       General: She is not in acute distress. Appearance: She is not ill-appearing. HENT:      Head: Normocephalic and atraumatic. Cardiovascular:      Rate and Rhythm: Regular rhythm. Tachycardia present. Heart sounds: No murmur heard. No friction rub. No gallop. Pulmonary:      Effort: Pulmonary effort is normal. No respiratory distress. Breath sounds: No wheezing, rhonchi or rales. Skin:     General: Skin is warm and dry. Neurological:      General: No focal deficit present. Mental Status: She is alert and oriented to person, place, and time. Psychiatric:         Mood and Affect: Mood normal.         Thought Content:  Thought content normal.         Judgment: Judgment normal.            YUAN Bah

## 2021-11-18 NOTE — PROGRESS NOTES
This is an Initial Medicare Annual Wellness Exam (AWV) (Performed 12 months after IPPE or effective date of Medicare Part B enrollment, Once in a lifetime)    I have reviewed the patient's medical history in detail and updated the computerized patient record. Assessment/Plan   Education and counseling provided:  Are appropriate based on today's review and evaluation  End-of-Life planning (with patient's consent)    1. Medicare annual wellness visit, subsequent  2. ACP (advance care planning)  3. Memory loss  Comments: Follow-up as scheduled with neuropsych  4. Needs flu shot  Comments:  Declines for now  5. Screen for colon cancer  -     OCCULT BLOOD IMMUNOASSAY,DIAGNOSTIC; Future     Follow-up and Dispositions    · Return in about 4 weeks (around 12/16/2021) for lab results. Depression Risk Factor Screening     3 most recent PHQ Screens 11/18/2021   Little interest or pleasure in doing things Not at all   Feeling down, depressed, irritable, or hopeless Not at all   Total Score PHQ 2 0       Alcohol Risk Screen    Do you average more than 1 drink per night or more than 7 drinks a week:  No    On any one occasion in the past three months have you have had more than 3 drinks containing alcohol:  No         Functional Ability and Level of Safety    Hearing: Hearing is good. Activities of Daily Living: The home contains: handrails  Patient does total self care     Ambulation: with no difficulty      Fall Risk:  Fall Risk Assessment, last 12 mths 10/6/2021   Able to walk? Yes   Fall in past 12 months? 0   Do you feel unsteady?  0   Are you worried about falling 0      Abuse Screen:  Patient is not abused       Cognitive Screening    Has your family/caregiver stated any concerns about your memory: yes - has appointment with Dr. Rufus Sapp on 11/22/2021     Health Maintenance Due     Health Maintenance Due   Topic Date Due    COVID-19 Vaccine (1) Never done    Shingrix Vaccine Age 50> (1 of 2) Never done   [facility] Colorectal Cancer Screening Combo  02/13/2018    A1C test (Diabetic or Prediabetic)  03/13/2018    Breast Cancer Screen Mammogram  04/11/2018    Medicare Yearly Exam  06/07/2021    Flu Vaccine (1) 09/01/2021       Patient Care Team   Patient Care Team:  Aaron Dominguez NP as PCP - General (Nurse Practitioner)  Aaron Dominguez NP as PCP - [facility] Empaneled Provider    History     Patient Active Problem List   Diagnosis Code    Glaucoma H40.9    Anxiety F41.9    Impaired fasting blood sugar/ borderline diabetes R73.01    Essential hypertension I10     Past Medical History:   Diagnosis Date    Anxiety     Arrhythmia 2011    Negative Stress test    Arthritis     Carotid artery stenosis 2009    <50%    Gestational diabetes     Glaucoma     Hypercholesterolemia     Hypertension       Past Surgical History:   Procedure Laterality Date    HX CHOLECYSTECTOMY  1990    gallstones    HX HYSTERECTOMY  1990    total hysterectomy     Current Outpatient Medications   Medication Sig Dispense Refill    amLODIPine (NORVASC) 10 mg tablet Take 1 Tablet by mouth daily. 90 Tablet 0    melatonin 10 mg subl Take 20 mg by mouth nightly.        Allergies   Allergen Reactions    Codeine Nausea Only       Family History   Problem Relation Age of Onset   Rodolfo Olmedo Arthritis-osteo Mother     Dementia Mother     Hypertension Mother     Coronary Art Dis Mother      Social History     Tobacco Use    Smoking status: Never Smoker    Smokeless tobacco: Never Used   Substance Use Topics    Alcohol use: Yes     Comment: YUAN Vargas

## 2021-11-18 NOTE — PROGRESS NOTES
Ethan May presents today for   Chief Complaint   Patient presents with    Annual Wellness Visit    Labs       Is someone accompanying this pt? no    Is the patient using any DME equipment during OV? no    Depression Screening:  3 most recent PHQ Screens 11/18/2021   Little interest or pleasure in doing things Not at all   Feeling down, depressed, irritable, or hopeless Not at all   Total Score PHQ 2 0       Learning Assessment:  Learning Assessment 10/6/2021   PRIMARY LEARNER Patient   HIGHEST LEVEL OF EDUCATION - PRIMARY LEARNER  SOME COLLEGE   BARRIERS PRIMARY LEARNER NONE   CO-LEARNER CAREGIVER No   PRIMARY LANGUAGE ENGLISH    NEED -   LEARNER PREFERENCE PRIMARY DEMONSTRATION     -   LEARNING SPECIAL TOPICS -   ANSWERED BY patient    RELATIONSHIP SELF       Fall Risk  Fall Risk Assessment, last 12 mths 10/6/2021   Able to walk? Yes   Fall in past 12 months? 0   Do you feel unsteady? 0   Are you worried about falling 0       ADL  No flowsheet data found. Travel Screening:    Travel Screening     Question   Response    In the last month, have you been in contact with someone who was confirmed or suspected to have Coronavirus / COVID-19? No / Unsure    Have you had a COVID-19 viral test in the last 14 days? No    Do you have any of the following new or worsening symptoms? Have you traveled internationally or domestically in the last month? No      Travel History   Travel since 10/18/21    No documented travel since 10/18/21         Health Maintenance reviewed and discussed and ordered per Provider. Health Maintenance Due   Topic Date Due    COVID-19 Vaccine (1) Never done    Shingrix Vaccine Age 50> (1 of 2) Never done    Colorectal Cancer Screening Combo  02/13/2018    A1C test (Diabetic or Prediabetic)  03/13/2018    Breast Cancer Screen Mammogram  04/11/2018    Medicare Yearly Exam  06/07/2021    Flu Vaccine (1) 09/01/2021   . Coordination of Care:  1.  Have you been to the ER, urgent care clinic since your last visit? Hospitalized since your last visit? No     2. Have you seen or consulted any other health care providers outside of the [address] [place] [address] since your last visit? Include any pap smears or colon screening.  No

## 2021-11-19 PROCEDURE — 3331090001 HH PPS REVENUE CREDIT

## 2021-11-19 PROCEDURE — 3331090002 HH PPS REVENUE DEBIT

## 2021-11-20 PROCEDURE — 3331090001 HH PPS REVENUE CREDIT

## 2021-11-20 PROCEDURE — 3331090002 HH PPS REVENUE DEBIT

## 2021-11-21 VITALS
HEART RATE: 91 BPM | SYSTOLIC BLOOD PRESSURE: 134 MMHG | OXYGEN SATURATION: 98 % | DIASTOLIC BLOOD PRESSURE: 77 MMHG | RESPIRATION RATE: 12 BRPM | TEMPERATURE: 97.3 F

## 2021-11-21 PROCEDURE — 3331090001 HH PPS REVENUE CREDIT

## 2021-11-21 PROCEDURE — 3331090002 HH PPS REVENUE DEBIT

## 2021-11-21 NOTE — HOME HEALTH
SUBJECTIVE (PT/FAMILY COMMENTS, THERAPIST OBSERVATIONS): Pt looked drowsy. Pt stated she remembered not taking melatonin last night. SLP noted Pt is on track with meds according to pillx box jessy Pt has not taken meds yet when SLP arrived. \"Today, I'm alejandro out of it\"            ASSESSMENT OF INTERVENTION / PATIENT PROGRESS / Meg Come / PLAN: Pt with min progress this visit most likely due to Pts somnolence. [facility] ST to continue maximize cognitive, memory communication skills and to establish memory aids/strategies in home to improve Pts safety and function for home living.                 INSTRUCTION GIVEN/EDUCATION/RESPONSE TO TEACHING:               HOME EXERCISE PROGRAM: visual memory exercise, generative listing                   DISCHARGE PLANNING - ([address]): ST to continue x 2 more visits, [facility] signed

## 2021-11-22 ENCOUNTER — OFFICE VISIT (OUTPATIENT)
Dept: NEUROLOGY | Age: 76
End: 2021-11-22
Payer: MEDICARE

## 2021-11-22 DIAGNOSIS — R41.89 COGNITIVE DECLINE: Primary | ICD-10-CM

## 2021-11-22 DIAGNOSIS — F41.9 ANXIETY: ICD-10-CM

## 2021-11-22 DIAGNOSIS — R44.1 VISUAL HALLUCINATIONS: ICD-10-CM

## 2021-11-22 PROCEDURE — 90791 PSYCH DIAGNOSTIC EVALUATION: CPT | Performed by: PSYCHOLOGIST

## 2021-11-22 PROCEDURE — 3331090001 HH PPS REVENUE CREDIT

## 2021-11-22 PROCEDURE — 3331090002 HH PPS REVENUE DEBIT

## 2021-11-22 NOTE — PROGRESS NOTES
[address] [facility]  Neuroscience   [address] [facility] [place], [address]  Office:  [phone]  Fax: [phone]                  Initial Office Exam  Patient Name: Manuel Cuevas  Age: 78 y.o. Gender: female   Handedness: right handed   Presenting Concern: cognitive decline  Primary Care Physician: Josef Rubio NP  Referring Provider: Josef Rubio NP      REASON FOR REFERRAL:  This comprehensive and medically necessary neuropsychological assessment was requested to assist a differential diagnosis of cognitive complaints. The use and purpose of this examination, as well as the extent and limitations of confidentiality, were explained prior to obtaining permission to participate. Instructions were provided regarding the necessity to put forth optimal effort and answer questions truthfully in order to obtain reliable and accurate test results. REVIEW OF RECORDS:  Ms. Henrietta Mccurdy was referred by her PCP for work-up of memory loss. Records indicate that she was seen in the ER on 11/5/2021 for AMS and UTI. She was on TDO for agitation but was released and discharged home. Records suggest that following discharge, Ms. Henrietta Mccurdy did not comply with the antibiotic regimen and was ultimately given a new prescription for UTI which she completed. Visual hallucinations are noted and include people who \"visit\". Ms. Henrietta Mccurdy lives alone and there is a complicated family dynamic. Her friend, Olga Don, assists and visits every few days. And home care comes to the house once a week. A [employer] is involved. Hospital records also indicate an admission in July 2021. During this visit, Ms. Henrietta Mccurdy refused medication management and an MRI. She left AMA although she was offered a psych admission. It was determined that she had decisional capacity. A CT of the head on 7/5/21:  No acute intracranial abnormality.   Mild microvascular disease of white matter. In a letter provided by Ms. Rodriguez Ramey brother's, he comments on the hallucinations and delusions that are causing significant distress for Ms. Rodriguez Ramey. He also states that in the past few months, he has become aware of memory problems and confusion. In particular, he indicates that Ms. Rodriguez Ramey has thoughts of hurting children and often sees people. She believes that she has turned her house and car keys over to the people that she sees. According to him, she is convinced that she no longer owns her own home, despite being continually convinced and reassured. Ms. Rodriguez Ramey has had difficulties keeping track of her bank accounts and stopped paying her bills around May of this year. Her family is now managing bill payment online using her [employer] card. According to Ms. Spencer's brother, she no longer has an ID and has difficulty operating her television and cell phone. She continues to [employer] and demonstrates confusion over what day of the week it is and what time of day it is. He provides an example whereby Ms. Rodriguez Ramey had blood work scheduled and instead of fasting after midnight, she went 24 hours without eating. This has raised concerns about her ability to manage medications without supervision. In his letter, it is also indicated that neighbors have raised concerns as they have observed Ms. Rodriguez Ramey looking thin and disheveled, raking leaves every day for hours. Police have reportedly been called due to the concerns of friends and neighbors. She has been taken to [facility] multiple times but refuses to stay and checks herself out. Current Outpatient Medications   Medication Sig    amLODIPine (NORVASC) 10 mg tablet Take 1 Tablet by mouth daily.  melatonin 10 mg subl Take 20 mg by mouth nightly. No current facility-administered medications for this visit.        Past Medical History:   Diagnosis Date    Anxiety     Arrhythmia 2011    Negative Stress test    Arthritis  Carotid artery stenosis 2009    <50%    Gestational diabetes     Glaucoma     Hypercholesterolemia     Hypertension        Past Surgical History:   Procedure Laterality Date    HX CHOLECYSTECTOMY  1990    gallstones    HX HYSTERECTOMY  1990    total hysterectomy       CLINICAL INTERVIEW:  Ms. Natalia Jennings was accompanied by her friend, Jermaine Valenzuela for the initial interview. Consistent with records, they both reported difficulties with memory and word finding which first emerged approximately 2 years ago. Ms. Natalia Jennings recently finished speech therapy which she found beneficial for her cognitive decline. Neurologic history is negative for seizures, stroke, syncope, and significant head trauma. Sleep is benefited with melatonin. Snoring and EDS were denied. Pain complaints were denied. There is no alcohol, tobacco, or illicit substance use. Family history of neurologic illness is significant for dementia in the mother and seizures and ADHD in the son. With regard to emotional functioning, Ms. Natalia Jennings elaborated on the hallucinations that are noted in records. She stated that she sees \"entities\" in her house. She sees adults, children, and animals and feels that they are going to come after her with a knife. She also feels that they are making bombs to harm her. Although these entities do not speak directly to her, they have sent a message to her that she does not own her own home. Ms. Natalia Jennings feels very frightened especially when she is at home. Therefore, she spends an inordinate amount of time trying to avoid being at home. History is negative for significant psychiatric illness, psychiatric hospitalization, suicidal ideation, and self-harm behaviors. Psychosocial stressors include the advancing dementia of Ms. Cindi Carvalho ex- who lives in [place] and is cared for by one of her adult sons.   Another 1 of Ms. Cindi Carvalho sons has significant substance abuse problems which is another source of stress for her. Socially, Ms. Soraya Simpson has been twice . She lives alone in a house that she inherited from her mother. She cared for her mother, who  from dementia in 2018. Ms. Soraya Simpson has 2 adult sons. She lost 1 son at the age of 12 due to leukemia. Academically, Ms. Soraya Simpson completed 16 years of education and subsequently participated in some masters level coursework. 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 Family support is limited but Ms. Soraya Simpson has a brother living in [place]. Functionally, Ms. Soraya Simpson is independent for medication management. MENTAL STATUS:    Sensorium  Awake, Aware, Alert   Orientation person, place, time/date, situation, day of week, month of year and year   Relations cooperative   Eye Contact appropriate   Appearance:  thin & gaunt looking   Motor Behavior:  restless   Speech:  normal pitch and normal volume   Vocabulary average   Thought Process: tangential   Thought Content delusions and hallucinations   Suicidal ideations none   Homicidal ideations none   Mood:  anxious   Affect:  mood-congruent   Memory recent  impaired   Memory remote:  impaired   Concentration:  impaired   Abstraction:  concrete   Insight:  limited   Reliability poor   Judgment:  limited         DIAGNOSTIC IMPRESSIONS:  1. Cognitive Decline: R/O Major Neurocognitive Disorder  2. Visual hallucinations  3. Anxiety       PLAN:  1. Complete a comprehensive neuropsychological assessment to provide a differential diagnosis of presenting concerns as well as to assist with disposition and treatment planning as appropriate. 2. Consider compensatory and remedial cognitive training. 3. Consider nonpharmacological interventions for mood disorder. 4. Consider an adaptive driving evaluation. 5. Consider referral for elder health nurse to provide an in-home functional assessment.   6. Consider placement issues to provide greater structure and supervision to ensure safety, health and well-being. [id number] x 1 Review of records. Face to face interview w/ patient. Determine test protocol: 60 minutes. Total 1 unit      Joby Moon, PHD  Licensed Clinical Psychologist    This note was created using voice recognition software. Despite editing, there may be syntax errors.

## 2021-11-23 ENCOUNTER — HOME CARE VISIT (OUTPATIENT)
Dept: SCHEDULING | Facility: HOME HEALTH | Age: 76
End: 2021-11-23
Payer: MEDICARE

## 2021-11-23 VITALS
RESPIRATION RATE: 16 BRPM | OXYGEN SATURATION: 98 % | DIASTOLIC BLOOD PRESSURE: 72 MMHG | SYSTOLIC BLOOD PRESSURE: 127 MMHG | TEMPERATURE: 97.1 F | HEART RATE: 95 BPM

## 2021-11-23 PROCEDURE — G0299 HHS/HOSPICE OF RN EA 15 MIN: HCPCS

## 2021-11-23 PROCEDURE — 3331090001 HH PPS REVENUE CREDIT

## 2021-11-23 PROCEDURE — G0153 HHCP-SVS OF S/L PATH,EA 15MN: HCPCS

## 2021-11-23 PROCEDURE — 3331090002 HH PPS REVENUE DEBIT

## 2021-11-24 ENCOUNTER — HOME CARE VISIT (OUTPATIENT)
Dept: HOME HEALTH SERVICES | Facility: HOME HEALTH | Age: 76
End: 2021-11-24
Payer: MEDICARE

## 2021-11-24 PROCEDURE — 3331090002 HH PPS REVENUE DEBIT

## 2021-11-24 PROCEDURE — 3331090001 HH PPS REVENUE CREDIT

## 2021-11-24 NOTE — HOME HEALTH
SUBJECTIVE (PT/FAMILY COMMENTS, THERAPIST OBSERVATIONS): Pt alert and pleasant.   Able to recall appt with neurospych yesterday and verbalized details in conversation with SLP       ASSESSMENT OF INTERVENTION / PATIENT PROGRESS / Jazz Presume / PLAN: ST to d/c next visit       INSTRUCTION GIVEN/EDUCATION/RESPONSE TO TEACHING:         HOME EXERCISE PROGRAM: visual memory exercise, generative listing          DISCHARGE PLANNING - (Vitaly Kari Garcia [address]): ST to continue x 1 more visit

## 2021-11-25 PROCEDURE — 3331090001 HH PPS REVENUE CREDIT

## 2021-11-25 PROCEDURE — 3331090002 HH PPS REVENUE DEBIT

## 2021-11-26 PROCEDURE — 3331090002 HH PPS REVENUE DEBIT

## 2021-11-26 PROCEDURE — 3331090001 HH PPS REVENUE CREDIT

## 2021-11-27 PROCEDURE — 3331090001 HH PPS REVENUE CREDIT

## 2021-11-27 PROCEDURE — 3331090002 HH PPS REVENUE DEBIT

## 2021-11-28 VITALS
DIASTOLIC BLOOD PRESSURE: 80 MMHG | TEMPERATURE: 97.2 F | OXYGEN SATURATION: 97 % | RESPIRATION RATE: 16 BRPM | SYSTOLIC BLOOD PRESSURE: 140 MMHG | HEART RATE: 80 BPM

## 2021-11-28 PROCEDURE — 3331090001 HH PPS REVENUE CREDIT

## 2021-11-28 PROCEDURE — 3331090002 HH PPS REVENUE DEBIT

## 2021-11-28 NOTE — HOME HEALTH
Caregiver involvement: COOKS, CLEANS AND TAKES PATIENT TO MD APPT. Medications reconciled and all medications are available in the home this visit. The following education was provided regarding medications, medication interactions, and look a like medications: REVIEWED MEDICATIONS WITH PATIENT. Medications  are effective at this time. Home health supplies by type and quantity ordered/delivered this visit include: NA    Patient education provided this visit:INSTRUCTED PATIENT ON IMPORTANCE OF MEDICATION AND DIETARY COMPLIANCY. INSTRUCTED ON S/S OF CYSTITIS. PAINFUL URINATION, LOW BACK PAIN. PATIENT STATED SHE WAS FORGETTFUL AT TIMES AND HER NEIGHBOR WATCHES OUT FOR HER. [facility]. MEDS REVIEWED. NEXT MD APPT IN JANUARY 2022. INSTRUCTED PATIENT ON WHEN TO NOTIFY MD OF MEDICAL CHANGES R/T ACUTE CYSTITIS AND FORGETTFULNESS. SN TALKED WITH PATIENT ABOUT DISCHARGE NEXT WEEK. Progress toward goals: NO S/S OF CYSTITIS AT THIS TIME. Home exercise program: BREATHING EXERCISES TO HELP PREVENT RESPIRATORY PROBLEMS. Continued need for the following skills: Nursing    The following discharge planning was discussed with the pt/caregiver: PATIENT WILL BE DISCHARGED ONCE ALL GOALS HAVE BEEN MET AND MEDICALLY STABLE.

## 2021-11-29 PROCEDURE — 3331090001 HH PPS REVENUE CREDIT

## 2021-11-29 PROCEDURE — 3331090002 HH PPS REVENUE DEBIT

## 2021-11-30 ENCOUNTER — HOME CARE VISIT (OUTPATIENT)
Dept: SCHEDULING | Facility: HOME HEALTH | Age: 76
End: 2021-11-30
Payer: MEDICARE

## 2021-11-30 ENCOUNTER — HOME CARE VISIT (OUTPATIENT)
Dept: HOME HEALTH SERVICES | Facility: HOME HEALTH | Age: 76
End: 2021-11-30
Payer: MEDICARE

## 2021-11-30 PROCEDURE — 3331090001 HH PPS REVENUE CREDIT

## 2021-11-30 PROCEDURE — 3331090002 HH PPS REVENUE DEBIT

## 2021-11-30 PROCEDURE — G0299 HHS/HOSPICE OF RN EA 15 MIN: HCPCS

## 2021-12-01 PROCEDURE — 3331090002 HH PPS REVENUE DEBIT

## 2021-12-01 PROCEDURE — 3331090001 HH PPS REVENUE CREDIT

## 2021-12-02 PROCEDURE — 3331090001 HH PPS REVENUE CREDIT

## 2021-12-02 PROCEDURE — 3331090002 HH PPS REVENUE DEBIT

## 2021-12-03 PROCEDURE — 3331090001 HH PPS REVENUE CREDIT

## 2021-12-03 PROCEDURE — 3331090002 HH PPS REVENUE DEBIT

## 2021-12-04 PROCEDURE — 3331090001 HH PPS REVENUE CREDIT

## 2021-12-04 PROCEDURE — 3331090002 HH PPS REVENUE DEBIT

## 2021-12-05 PROCEDURE — 3331090002 HH PPS REVENUE DEBIT

## 2021-12-05 PROCEDURE — 3331090001 HH PPS REVENUE CREDIT

## 2021-12-06 ENCOUNTER — TELEPHONE (OUTPATIENT)
Dept: NEUROLOGY | Age: 76
End: 2021-12-06

## 2021-12-06 VITALS
DIASTOLIC BLOOD PRESSURE: 80 MMHG | RESPIRATION RATE: 16 BRPM | OXYGEN SATURATION: 97 % | SYSTOLIC BLOOD PRESSURE: 130 MMHG | TEMPERATURE: 97 F | HEART RATE: 82 BPM

## 2021-12-06 PROCEDURE — 3331090002 HH PPS REVENUE DEBIT

## 2021-12-06 PROCEDURE — 3331090001 HH PPS REVENUE CREDIT

## 2021-12-06 NOTE — HOME HEALTH
Caregiver involvement: COOKS, CLEANS AND TAKES PATIENT TO MD PEDRO. Medications reconciled and all medications are available in the home this visit. The following education was provided regarding medications, medication interactions, and look a like medications: REVIEED MEDICATIONS WITH PATIENT. Medications  are effective at this time. Home health supplies by type and quantity ordered/delivered this visit include: NA    Patient education provided this visit:REINSTRUCTED PATIENT ON S/S OF CYSTITIS, INSTRUCTED PATIENT ON IMPORTANCE OF MEDICATION AND DIETARY COMPLIANCY. Progress toward goals:  FORGETTFULNESS HAS DECREASED SINCE URINARY PROBLEM HAS CLEARED UP. Home exercise program: BREATHING EXERCISES TO HELP PREVENT RESPIRATORY PROBLEMS. The following discharge planning was discussed with the pt/caregiver: SN DISCHARGING THIS VISIT. PATIENT  STATES READY FOR DISCHARGE AND MD Pam Salinas.

## 2021-12-06 NOTE — TELEPHONE ENCOUNTER
Patient friend called the office and stated she is seeing things and will not go into her house because of the people that she is seeing that are not really there. Patient friend wants to know if there is anything that can be done to help the patient. Patient friend is listed on her [place] to release.

## 2021-12-10 ENCOUNTER — HOSPITAL ENCOUNTER (OUTPATIENT)
Dept: LAB | Age: 76
Discharge: HOME | End: 2021-12-10
Payer: MEDICARE

## 2021-12-10 DIAGNOSIS — Z13.220 SCREENING FOR LIPID DISORDERS: ICD-10-CM

## 2021-12-10 DIAGNOSIS — R40.4 TRANSIENT ALTERATION OF AWARENESS: ICD-10-CM

## 2021-12-10 DIAGNOSIS — I10 ESSENTIAL HYPERTENSION: ICD-10-CM

## 2021-12-10 LAB
ALBUMIN SERPL-MCNC: 3.5 G/DL (ref 3.4–5)
ALBUMIN/GLOB SERPL: 1.1 {RATIO} (ref 0.8–1.7)
ALP SERPL-CCNC: 63 U/L (ref 45–117)
ALT SERPL-CCNC: 24 U/L (ref 13–56)
ANION GAP SERPL CALC-SCNC: 7 MMOL/L (ref 3–18)
AST SERPL-CCNC: 22 U/L (ref 10–38)
BASOPHILS # BLD: 0.1 K/UL (ref 0–0.1)
BASOPHILS NFR BLD: 1 % (ref 0–2)
BILIRUB SERPL-MCNC: 0.6 MG/DL (ref 0.2–1)
BUN SERPL-MCNC: 17 MG/DL (ref 7–18)
BUN/CREAT SERPL: 18 (ref 12–20)
CALCIUM SERPL-MCNC: 9.5 MG/DL (ref 8.5–10.1)
CHLORIDE SERPL-SCNC: 106 MMOL/L (ref 100–111)
CHOLEST SERPL-MCNC: 224 MG/DL
CO2 SERPL-SCNC: 27 MMOL/L (ref 21–32)
CREAT SERPL-MCNC: 0.95 MG/DL (ref 0.6–1.3)
DIFFERENTIAL METHOD BLD: ABNORMAL
EOSINOPHIL # BLD: 0.1 K/UL (ref 0–0.4)
EOSINOPHIL NFR BLD: 1 % (ref 0–5)
ERYTHROCYTE [DISTWIDTH] IN BLOOD BY AUTOMATED COUNT: 14.2 % (ref 11.6–14.5)
GLOBULIN SER CALC-MCNC: 3.3 G/DL (ref 2–4)
GLUCOSE SERPL-MCNC: 111 MG/DL (ref 74–99)
HCT VFR BLD AUTO: 37.4 % (ref 35–45)
HDLC SERPL-MCNC: 71 MG/DL (ref 40–60)
HDLC SERPL: 3.2 {RATIO} (ref 0–5)
HGB BLD-MCNC: 11.6 G/DL (ref 12–16)
IMM GRANULOCYTES # BLD AUTO: 0 K/UL (ref 0–0.04)
IMM GRANULOCYTES NFR BLD AUTO: 0 % (ref 0–0.5)
LDLC SERPL CALC-MCNC: 133.4 MG/DL (ref 0–100)
LIPID PROFILE,FLP: ABNORMAL
LYMPHOCYTES # BLD: 2.2 K/UL (ref 0.9–3.6)
LYMPHOCYTES NFR BLD: 26 % (ref 21–52)
MCH RBC QN AUTO: 29.3 PG (ref 24–34)
MCHC RBC AUTO-ENTMCNC: 31 G/DL (ref 31–37)
MCV RBC AUTO: 94.4 FL (ref 78–100)
MONOCYTES # BLD: 0.7 K/UL (ref 0.05–1.2)
MONOCYTES NFR BLD: 9 % (ref 3–10)
NEUTS SEG # BLD: 5.2 K/UL (ref 1.8–8)
NEUTS SEG NFR BLD: 62 % (ref 40–73)
NRBC # BLD: 0 K/UL (ref 0–0.01)
NRBC BLD-RTO: 0 PER 100 WBC
PLATELET # BLD AUTO: 266 K/UL (ref 135–420)
PMV BLD AUTO: 9.9 FL (ref 9.2–11.8)
POTASSIUM SERPL-SCNC: 4 MMOL/L (ref 3.5–5.5)
PROT SERPL-MCNC: 6.8 G/DL (ref 6.4–8.2)
RBC # BLD AUTO: 3.96 M/UL (ref 4.2–5.3)
SODIUM SERPL-SCNC: 140 MMOL/L (ref 136–145)
TRIGL SERPL-MCNC: 98 MG/DL (ref ?–150)
VLDLC SERPL CALC-MCNC: 19.6 MG/DL
WBC # BLD AUTO: 8.3 K/UL (ref 4.6–13.2)

## 2021-12-10 PROCEDURE — 85025 COMPLETE CBC W/AUTO DIFF WBC: CPT

## 2021-12-10 PROCEDURE — 80053 COMPREHEN METABOLIC PANEL: CPT

## 2021-12-10 PROCEDURE — 36415 COLL VENOUS BLD VENIPUNCTURE: CPT

## 2021-12-10 PROCEDURE — 80061 LIPID PANEL: CPT

## 2021-12-16 ENCOUNTER — OFFICE VISIT (OUTPATIENT)
Dept: FAMILY MEDICINE CLINIC | Age: 76
End: 2021-12-16
Payer: MEDICARE

## 2021-12-16 VITALS
SYSTOLIC BLOOD PRESSURE: 162 MMHG | TEMPERATURE: 97.6 F | HEART RATE: 91 BPM | WEIGHT: 144 LBS | BODY MASS INDEX: 21.82 KG/M2 | DIASTOLIC BLOOD PRESSURE: 79 MMHG | RESPIRATION RATE: 17 BRPM | HEIGHT: 68 IN | OXYGEN SATURATION: 98 %

## 2021-12-16 DIAGNOSIS — N18.31 STAGE 3A CHRONIC KIDNEY DISEASE (HCC): ICD-10-CM

## 2021-12-16 DIAGNOSIS — I12.9 HYPERTENSIVE KIDNEY DISEASE WITH STAGE 3A CHRONIC KIDNEY DISEASE (HCC): ICD-10-CM

## 2021-12-16 DIAGNOSIS — R73.01 IFG (IMPAIRED FASTING GLUCOSE): Primary | ICD-10-CM

## 2021-12-16 DIAGNOSIS — D64.9 NORMOCYTIC NORMOCHROMIC ANEMIA: ICD-10-CM

## 2021-12-16 DIAGNOSIS — N18.31 HYPERTENSIVE KIDNEY DISEASE WITH STAGE 3A CHRONIC KIDNEY DISEASE (HCC): ICD-10-CM

## 2021-12-16 DIAGNOSIS — R44.1 VISUAL HALLUCINATIONS: ICD-10-CM

## 2021-12-16 DIAGNOSIS — Z71.2 ENCOUNTER TO DISCUSS TEST RESULTS: ICD-10-CM

## 2021-12-16 DIAGNOSIS — E78.5 HYPERLIPIDEMIA LDL GOAL <100: ICD-10-CM

## 2021-12-16 DIAGNOSIS — Z23 NEEDS FLU SHOT: ICD-10-CM

## 2021-12-16 PROCEDURE — G9899 SCRN MAM PERF RSLTS DOC: HCPCS | Performed by: NURSE PRACTITIONER

## 2021-12-16 PROCEDURE — G8420 CALC BMI NORM PARAMETERS: HCPCS | Performed by: NURSE PRACTITIONER

## 2021-12-16 PROCEDURE — G0463 HOSPITAL OUTPT CLINIC VISIT: HCPCS | Performed by: NURSE PRACTITIONER

## 2021-12-16 PROCEDURE — G8536 NO DOC ELDER MAL SCRN: HCPCS | Performed by: NURSE PRACTITIONER

## 2021-12-16 PROCEDURE — 1101F PT FALLS ASSESS-DOCD LE1/YR: CPT | Performed by: NURSE PRACTITIONER

## 2021-12-16 PROCEDURE — 90694 VACC AIIV4 NO PRSRV 0.5ML IM: CPT | Performed by: NURSE PRACTITIONER

## 2021-12-16 PROCEDURE — 99214 OFFICE O/P EST MOD 30 MIN: CPT | Performed by: NURSE PRACTITIONER

## 2021-12-16 PROCEDURE — 3017F COLORECTAL CA SCREEN DOC REV: CPT | Performed by: NURSE PRACTITIONER

## 2021-12-16 PROCEDURE — G8427 DOCREV CUR MEDS BY ELIG CLIN: HCPCS | Performed by: NURSE PRACTITIONER

## 2021-12-16 PROCEDURE — 1090F PRES/ABSN URINE INCON ASSESS: CPT | Performed by: NURSE PRACTITIONER

## 2021-12-16 PROCEDURE — G8432 DEP SCR NOT DOC, RNG: HCPCS | Performed by: NURSE PRACTITIONER

## 2021-12-16 PROCEDURE — G8399 PT W/DXA RESULTS DOCUMENT: HCPCS | Performed by: NURSE PRACTITIONER

## 2021-12-16 RX ORDER — LOSARTAN POTASSIUM 25 MG/1
25 TABLET ORAL DAILY
Qty: 90 TABLET | Refills: 0 | Status: SHIPPED | OUTPATIENT
Start: 2021-12-16 | End: 2021-12-21 | Stop reason: SDUPTHER

## 2021-12-16 NOTE — PATIENT INSTRUCTIONS
Heart-Healthy Diet: Care Instructions  Your Care Instructions     A heart-healthy diet has lots of vegetables, fruits, nuts, beans, and whole grains, and is low in salt. It limits foods that are high in saturated fat, such as meats, cheeses, and fried foods. It may be hard to change your diet, but even small changes can lower your risk of heart attack and heart disease. Follow-up care is a key part of your treatment and safety. Be sure to make and go to all appointments, and call your doctor if you are having problems. It's also a good idea to know your test results and keep a list of the medicines you take. How can you care for yourself at home? Watch your portions  · Use food labels to learn what the recommended servings are for the foods you eat. · Eat only the number of calories you need to stay at a healthy weight. If you need to lose weight, eat fewer calories than your body burns (through exercise and other physical activity). Eat more fruits and vegetables  · Eat a variety of fruit and vegetables every day. Dark green, deep orange, red, or yellow fruits and vegetables are especially good for you. Examples include spinach, carrots, peaches, and berries. · Keep carrots, celery, and other veggies handy for snacks. Buy fruit that is in season and store it where you can see it so that you will be tempted to eat it. · Cook dishes that have a lot of veggies in them, such as stir-fries and soups. Limit saturated fat  · Read food labels, and try to avoid saturated fats. They increase your risk of heart disease. · Use olive or canola oil when you cook. · Bake, broil, grill, or steam foods instead of frying them. · Choose lean meats instead of high-fat meats such as hot dogs and sausages. Cut off all visible fat when you prepare meat. · Eat fish, skinless poultry, and meat alternatives such as soy products instead of high-fat meats.  Soy products, such as tofu, may be especially good for your heart.  · Choose low-fat or fat-free milk and dairy products. Eat foods high in fiber  · Eat a variety of grain products every day. Include whole-grain foods that have lots of fiber and nutrients. Examples of whole-grain foods include oats, whole wheat bread, and brown rice. · Buy whole-grain breads and cereals, instead of white bread or pastries. Limit salt and sodium  · Limit how much salt and sodium you eat to help lower your blood pressure. · Taste food before you salt it. Add only a little salt when you think you need it. With time, your taste buds will adjust to less salt. · Eat fewer snack items, fast foods, and other high-salt, processed foods. Check food labels for the amount of sodium in packaged foods. · Choose low-sodium versions of canned goods (such as soups, vegetables, and beans). Limit sugar  · Limit drinks and foods with added sugar. These include candy, desserts, and soda pop. Limit alcohol  · Limit alcohol to no more than 2 drinks a day for men and 1 drink a day for women. Too much alcohol can cause health problems. When should you call for help? Watch closely for changes in your health, and be sure to contact your doctor if:    · You would like help planning heart-healthy meals. Where can you learn more? Go to http://www.lujan.com/  Enter V137 in the search box to learn more about \"Heart-Healthy Diet: Care Instructions. \"  Current as of: December 17, 2020               Content Version: 13.0  © 2006-2021 Healthwise[employer] Inc[employer]. Care instructions adapted under license by [employer] (which disclaims liability or warranty for this information). If you have questions about a medical condition or this instruction, always ask your healthcare professional. [place] [address] any warranty or liability for your use of this information.         DASH Diet: Care Instructions  Your Care Instructions     The DASH diet is an eating plan that can help lower your blood pressure. DASH stands for Dietary Approaches to Stop Hypertension. Hypertension is high blood pressure. The DASH diet focuses on eating foods that are high in calcium, potassium, and magnesium. These nutrients can lower blood pressure. The foods that are highest in these nutrients are fruits, vegetables, low-fat dairy products, nuts, seeds, and legumes. But taking calcium, potassium, and magnesium supplements instead of eating foods that are high in those nutrients does not have the same effect. The DASH diet also includes whole grains, fish, and poultry. The DASH diet is one of several lifestyle changes your doctor may recommend to lower your high blood pressure. Your doctor may also want you to decrease the amount of sodium in your diet. Lowering sodium while following the DASH diet can lower blood pressure even further than just the DASH diet alone. Follow-up care is a key part of your treatment and safety. Be sure to make and go to all appointments, and call your doctor if you are having problems. It's also a good idea to know your test results and keep a list of the medicines you take. How can you care for yourself at home? Following the DASH diet  · Eat 4 to 5 servings of fruit each day. A serving is 1 medium-sized piece of fruit, ½ cup chopped or canned fruit, 1/4 cup dried fruit, or 4 ounces (½ cup) of fruit juice. Choose fruit more often than fruit juice. · Eat 4 to 5 servings of vegetables each day. A serving is 1 cup of lettuce or raw leafy vegetables, ½ cup of chopped or cooked vegetables, or 4 ounces (½ cup) of vegetable juice. Choose vegetables more often than vegetable juice. · Get 2 to 3 servings of low-fat and fat-free dairy each day. A serving is 8 ounces of milk, 1 cup of yogurt, or 1 ½ ounces of cheese. · Eat 6 to 8 servings of grains each day.  A serving is 1 slice of bread, 1 ounce of dry cereal, or ½ cup of cooked rice, pasta, or cooked cereal. Try to choose whole-grain products as much as possible. · Limit lean meat, poultry, and fish to 2 servings each day. A serving is 3 ounces, about the size of a deck of cards. · Eat 4 to 5 servings of nuts, seeds, and legumes (cooked dried beans, lentils, and split peas) each week. A serving is 1/3 cup of nuts, 2 tablespoons of seeds, or ½ cup of cooked beans or peas. · Limit fats and oils to 2 to 3 servings each day. A serving is 1 teaspoon of vegetable oil or 2 tablespoons of salad dressing. · Limit sweets and added sugars to 5 servings or less a week. A serving is 1 tablespoon jelly or jam, ½ cup sorbet, or 1 cup of lemonade. · Eat less than 2,300 milligrams (mg) of sodium a day. If you limit your sodium to 1,500 mg a day, you can lower your blood pressure even more. · Be aware that all of these are the suggested number of servings for people who eat 1,800 to 2,000 calories a day. Your recommended number of servings may be different if you need more or fewer calories. Tips for success  · Start small. Do not try to make dramatic changes to your diet all at once. You might feel that you are missing out on your favorite foods and then be more likely to not follow the plan. Make small changes, and stick with them. Once those changes become habit, add a few more changes. · Try some of the following:  ? Make it a goal to eat a fruit or vegetable at every meal and at snacks. This will make it easy to get the recommended amount of fruits and vegetables each day. ? Try yogurt topped with fruit and nuts for a snack or healthy dessert. ? Add lettuce, tomato, cucumber, and onion to sandwiches. ? Combine a ready-made pizza crust with low-fat mozzarella cheese and lots of vegetable toppings. Try using tomatoes, squash, spinach, broccoli, carrots, cauliflower, and onions. ? Have a variety of cut-up vegetables with a low-fat dip as an appetizer instead of chips and dip. ? Sprinkle sunflower seeds or chopped almonds over salads.  Or try adding chopped walnuts or almonds to cooked vegetables. ? Try some vegetarian meals using beans and peas. Add garbanzo or kidney beans to salads. Make burritos and tacos with mashed san beans or black beans. Where can you learn more? Go to http://www.lujan.com/  Enter H967 in the search box to learn more about \"DASH Diet: Care Instructions. \"  Current as of: April 29, 2021               Content Version: 13.0  © 0841-6157 [employer]. Care instructions adapted under license by [employer] (which disclaims liability or warranty for this information). If you have questions about a medical condition or this instruction, always ask your healthcare professional. [address] any warranty or liability for your use of this information.

## 2021-12-16 NOTE — PROGRESS NOTES
Manuel Cuevas presents today for   Chief Complaint   Patient presents with    Labs       Is someone accompanying this pt? Yes, friend is accompanying patient     Is the patient using any DME equipment during [address]? No     Depression Screening:  3 most recent PHQ Screens 12/16/2021   Little interest or pleasure in doing things Not at all   Feeling down, depressed, irritable, or hopeless Not at all   Total Score PHQ 2 0       Learning Assessment:  Learning Assessment 10/6/2021   PRIMARY LEARNER Patient   HIGHEST LEVEL OF EDUCATION - PRIMARY LEARNER  SOME COLLEGE   BARRIERS PRIMARY LEARNER NONE   CO-LEARNER CAREGIVER No   PRIMARY LANGUAGE ENGLISH    NEED -   LEARNER PREFERENCE PRIMARY DEMONSTRATION     -   LEARNING SPECIAL TOPICS -   ANSWERED BY patient    RELATIONSHIP SELF       Fall Risk  Fall Risk Assessment, last 12 mths 10/6/2021   Able to walk? Yes   Fall in past 12 months? 0   Do you feel unsteady? 0   Are you worried about falling 0       ADL  No flowsheet data found. Travel Screening:    Travel Screening     Question   Response    In the last month, have you been in contact with someone who was confirmed or suspected to have Coronavirus / COVID-19? No / Unsure    Have you had a COVID-19 viral test in the last 14 days? No    Do you have any of the following new or worsening symptoms? Have you traveled internationally or domestically in the last month? No      Travel History   Travel since 11/16/21    No documented travel since 11/16/21         Health Maintenance reviewed and discussed and ordered per Provider. Health Maintenance Due   Topic Date Due    COVID-19 Vaccine (1) Never done    Shingrix Vaccine Age 50> (1 of 2) Never done    Colorectal Cancer Screening Combo  02/13/2018    A1C test (Diabetic or Prediabetic)  03/13/2018    Breast Cancer Screen Mammogram  04/11/2018    Flu Vaccine (1) 09/01/2021   . Coordination of Care:  1.  Have you been to the ER, urgent care clinic since your last visit? Hospitalized since your last visit? no    2. Have you seen or consulted any other health care providers outside of the [address] [place] [address] since your last visit? Include any pap smears or colon screening.  No

## 2021-12-16 NOTE — PROGRESS NOTES
Chief Complaint   Patient presents with    Labs     Assessment & Plan:     1. IFG (impaired fasting glucose)  Assessment & Plan:  Diet and exercise advised  Add A1c to next set of labs  Orders:  -     HEMOGLOBIN A1C WITH EAG; Future  -     METABOLIC PANEL, COMPREHENSIVE; Future  2. Hyperlipidemia LDL goal <100  Assessment & Plan:  Diet and exercise advised  Recheck lipid panel in 6 mos  Orders:  -     LIPID PANEL; Future  -     METABOLIC PANEL, COMPREHENSIVE; Future  3. Stage 3a chronic kidney disease (HCC)  Assessment & Plan:  Avoid NSAIDs, hydration advised  Recheck in 6 mos  Orders:  -     METABOLIC PANEL, COMPREHENSIVE; Future  4. Normocytic normochromic anemia  Assessment & Plan:  Borderline, trend for now  Advised to complete FIT  Orders:  -     CBC WITH AUTOMATED DIFF; Future  5. Hypertensive kidney disease with stage 3a chronic kidney disease ([facility] [address]  Assessment & Plan:  BP goal <130/80  Orders:  -     METABOLIC PANEL, COMPREHENSIVE; Future  -     losartan (COZAAR) 25 mg tablet; Take 1 Tablet by mouth daily for 90 days. , Normal, Disp-90 Tablet, R-0  6. Visual hallucinations  Assessment & Plan:  Continue workup per neuropsych    Orders:  -     METABOLIC PANEL, COMPREHENSIVE; Future  7. Needs flu shot  -     FLU (FLUAD QUAD INFLUENZA VACCINE,QUAD,ADJUVANTED)  8. Encounter to discuss test results    Follow-up and Dispositions    · Return in about 4 weeks (around 1/13/2022) for blood pressure and  · Return in 6 mos for elevated fasting blood sugar, cholesterol, blood pressure, anemia, lab results       Subjective:     HPI    Patient presents today for follow-up, accompanied by accompanied by Harry Crum, who is the main contact for the patient at [phone].      IFG-  Diet: deli meats, rice, does not like fried foods, frozen foods  Exercise: None  Risk factors: HLD, HTN  Treatment: None    Hyperlipidemia  Treatment:  None  Comorbid:  HTN    CKD-  Stage of Chronic Kidney Disease III   Denies any BLE swelling, SOB, chest pain  NSAID use:  None    Hypertension-  Symptoms:  None  BP readings at home are 192-679 systolic  Comorbid:  HLD  Key CAD CHF Meds             losartan (COZAAR) 25 mg tablet (Taking) Take 1 Tablet by mouth daily for 90 days. amLODIPine (NORVASC) 10 mg tablet Take 1 Tablet by mouth daily. Health Maintenance:  Flu vac - will give today  Shingles vac - recommended  COVID-19 vac - will bring card     Review of Systems   Constitutional: Negative for chills, fever and malaise/fatigue. Respiratory: Negative for shortness of breath. Cardiovascular: Negative for chest pain. Gastrointestinal: Negative for nausea and vomiting. Neurological: Negative for dizziness, tingling and headaches. Psychiatric/Behavioral: Positive for memory loss. Objective:   BP (!) 162/79 (BP 1 Location: Left upper arm, BP Patient Position: Sitting, BP Cuff Size: Adult)   Pulse 91   Temp 97.6 °F (36.4 °C) (Oral)   Resp 17   Ht 5' 8\" (1.727 m)   Wt 144 lb (65.3 kg)   SpO2 98%   BMI 21.90 kg/m²      Physical Exam  Vitals and nursing note reviewed. Constitutional:       General: She is not in acute distress. Appearance: She is not ill-appearing. HENT:      Head: Normocephalic and atraumatic. Cardiovascular:      Rate and Rhythm: Normal rate. Rhythm irregular. Heart sounds: No murmur heard. No friction rub. No gallop. Pulmonary:      Effort: Pulmonary effort is normal. No respiratory distress. Breath sounds: No wheezing, rhonchi or rales. Skin:     General: Skin is warm and dry. Neurological:      General: No focal deficit present. Mental Status: She is alert and oriented to person, place, and time. Psychiatric:         Mood and Affect: Mood normal.         Thought Content:  Thought content normal.         Judgment: Judgment normal.        YUAN Conn

## 2021-12-17 PROBLEM — D64.9 NORMOCYTIC NORMOCHROMIC ANEMIA: Status: ACTIVE | Noted: 2021-12-17

## 2021-12-17 NOTE — PROGRESS NOTES
Obtained consent from patient. Per verbal order from NP Kris  Injection of Flu vaccine 65 and up administered. Verified by me  that this is the correct immunization/injection. Patient observed for 15 minutes with no adverse reaction.

## 2021-12-21 DIAGNOSIS — N18.31 HYPERTENSIVE KIDNEY DISEASE WITH STAGE 3A CHRONIC KIDNEY DISEASE (HCC): ICD-10-CM

## 2021-12-21 DIAGNOSIS — I12.9 HYPERTENSIVE KIDNEY DISEASE WITH STAGE 3A CHRONIC KIDNEY DISEASE (HCC): ICD-10-CM

## 2021-12-21 RX ORDER — LOSARTAN POTASSIUM 25 MG/1
25 TABLET ORAL DAILY
Qty: 30 TABLET | Refills: 0 | Status: SHIPPED | OUTPATIENT
Start: 2021-12-21 | End: 2022-03-01 | Stop reason: SDUPTHER

## 2021-12-27 DIAGNOSIS — I10 ESSENTIAL HYPERTENSION: ICD-10-CM

## 2021-12-27 NOTE — TELEPHONE ENCOUNTER
Np-Rachel patient. .Patient need a medication refill. Please advise     Requested Prescriptions     Pending Prescriptions Disp Refills    amLODIPine (NORVASC) 10 mg tablet 90 Tablet 0     Sig: Take 1 Tablet by mouth daily.

## 2021-12-28 RX ORDER — AMLODIPINE BESYLATE 10 MG/1
10 TABLET ORAL DAILY
Qty: 90 TABLET | Refills: 0 | OUTPATIENT
Start: 2021-12-28

## 2022-02-28 NOTE — PROGRESS NOTES
Chief Complaint   Patient presents with    Hypertension    Medication Refill     Assessment & Plan:     1. Hypertensive kidney disease with stage 3a chronic kidney disease ([facility]  Assessment & Plan:  BP elevated, goal <130/80  Restart Amlodipine 10 mg for the next three days then add Losartan 25 mg  Continue home BP monitoring and follow up in 4 weeks  Titrate Losartan until BP goal achieved  S/s of stroke reviewed  Orders:  -     amLODIPine (NORVASC) 10 mg tablet; Take 1 Tablet by mouth daily. , Normal, Disp-90 Tablet, R-0  -     losartan (COZAAR) 25 mg tablet; Take 1 Tablet by mouth daily. , Normal, Disp-90 Tablet, R-0  2. Stage 3a chronic kidney disease ([facility]  Assessment & Plan:  Stable, continue hydration, ARBs    Follow-up and Dispositions    · Return in about 4 weeks (around 3/29/2022) for blood pressure and   · Return in 3 mos for blood pressure, cholesterol, chronic kidney disease       Subjective:     HPI    Patient presents today for follow-up, accompanied by accompanied by Karol Maxwell, who is the main contact for the patient at [phone].     Hypertension-  Symptoms:  None  BP readings at home are   Comorbid:  HLD  Current treatment:  Losartan 25 mg, amlodipine 10 mg  Patient has ran out of medications for about a week  She had been out of town in [facility] and came back about a week and a half ago     CKD-  Stage of Chronic Kidney Disease III   Denies any BLE swelling, SOB, chest pain  NSAID use:  None  Risk factors:  Uncontrolled HTN     Review of Systems   Constitutional: Negative for chills, fever and malaise/fatigue. Eyes: Negative for blurred vision and double vision. Respiratory: Negative for shortness of breath. Cardiovascular: Negative for chest pain and palpitations. Gastrointestinal: Negative for nausea. Neurological: Negative for dizziness, tingling, focal weakness, weakness and headaches.        Objective:   BP (!) 188/83 (BP 1 Location: Left upper arm, BP Patient Position: Sitting, BP Cuff Size: Adult)   Pulse (!) 109   Temp 98.2 °F (36.8 °C) (Oral)   Resp 18   Ht 5' 8\" (1.727 m)   Wt 155 lb (70.3 kg)   SpO2 97%   BMI 23.57 kg/m²      Physical Exam  Vitals and nursing note reviewed. Constitutional:       General: She is not in acute distress. Appearance: She is not ill-appearing. HENT:      Head: Normocephalic and atraumatic. Cardiovascular:      Rate and Rhythm: Regular rhythm. Tachycardia present. Heart sounds: No murmur heard. No friction rub. No gallop. Pulmonary:      Effort: Pulmonary effort is normal. No respiratory distress. Breath sounds: No wheezing, rhonchi or rales. Skin:     General: Skin is warm and dry. Neurological:      General: No focal deficit present. Mental Status: She is alert and oriented to person, place, and time. Psychiatric:         Mood and Affect: Mood normal.         Thought Content:  Thought content normal.         Judgment: Judgment normal.            YUAN Casiano

## 2022-03-01 ENCOUNTER — OFFICE VISIT (OUTPATIENT)
Dept: FAMILY MEDICINE CLINIC | Age: 77
End: 2022-03-01
Payer: MEDICARE

## 2022-03-01 VITALS
OXYGEN SATURATION: 97 % | DIASTOLIC BLOOD PRESSURE: 83 MMHG | BODY MASS INDEX: 23.49 KG/M2 | HEIGHT: 68 IN | WEIGHT: 155 LBS | RESPIRATION RATE: 18 BRPM | HEART RATE: 109 BPM | TEMPERATURE: 98.2 F | SYSTOLIC BLOOD PRESSURE: 188 MMHG

## 2022-03-01 DIAGNOSIS — N18.31 HYPERTENSIVE KIDNEY DISEASE WITH STAGE 3A CHRONIC KIDNEY DISEASE (HCC): Primary | ICD-10-CM

## 2022-03-01 DIAGNOSIS — N18.31 STAGE 3A CHRONIC KIDNEY DISEASE (HCC): ICD-10-CM

## 2022-03-01 DIAGNOSIS — I12.9 HYPERTENSIVE KIDNEY DISEASE WITH STAGE 3A CHRONIC KIDNEY DISEASE (HCC): Primary | ICD-10-CM

## 2022-03-01 PROCEDURE — G8420 CALC BMI NORM PARAMETERS: HCPCS | Performed by: NURSE PRACTITIONER

## 2022-03-01 PROCEDURE — 3017F COLORECTAL CA SCREEN DOC REV: CPT | Performed by: NURSE PRACTITIONER

## 2022-03-01 PROCEDURE — 99214 OFFICE O/P EST MOD 30 MIN: CPT | Performed by: NURSE PRACTITIONER

## 2022-03-01 PROCEDURE — G8427 DOCREV CUR MEDS BY ELIG CLIN: HCPCS | Performed by: NURSE PRACTITIONER

## 2022-03-01 PROCEDURE — 1101F PT FALLS ASSESS-DOCD LE1/YR: CPT | Performed by: NURSE PRACTITIONER

## 2022-03-01 PROCEDURE — 1090F PRES/ABSN URINE INCON ASSESS: CPT | Performed by: NURSE PRACTITIONER

## 2022-03-01 PROCEDURE — G9899 SCRN MAM PERF RSLTS DOC: HCPCS | Performed by: NURSE PRACTITIONER

## 2022-03-01 PROCEDURE — G8536 NO DOC ELDER MAL SCRN: HCPCS | Performed by: NURSE PRACTITIONER

## 2022-03-01 PROCEDURE — G8399 PT W/DXA RESULTS DOCUMENT: HCPCS | Performed by: NURSE PRACTITIONER

## 2022-03-01 PROCEDURE — G8432 DEP SCR NOT DOC, RNG: HCPCS | Performed by: NURSE PRACTITIONER

## 2022-03-01 RX ORDER — AMLODIPINE BESYLATE 10 MG/1
10 TABLET ORAL DAILY
Qty: 90 TABLET | Refills: 0 | Status: SHIPPED | OUTPATIENT
Start: 2022-03-01 | End: 2022-06-06 | Stop reason: SDUPTHER

## 2022-03-01 RX ORDER — LOSARTAN POTASSIUM 25 MG/1
25 TABLET ORAL DAILY
Qty: 90 TABLET | Refills: 0 | Status: SHIPPED | OUTPATIENT
Start: 2022-03-01 | End: 2022-06-06 | Stop reason: SDUPTHER

## 2022-03-01 NOTE — PATIENT INSTRUCTIONS
SUMMARY:    1. Please start taking Amlodipine 10 mg today for the next three days then start adding Losartan 25 mg. You will then be taking both medications until your next appointment. 2.  Take blood pressure readings at home. Best time is in the morning before breakfast and in the evening before dinner. Sit in a chair, feet flat on the floor, back supported. Place the blood pressure cuff on your arm and sit for about 2-3 minutes then hit the start button. 3.  Please bring a log of your blood pressure readings to your next appointment or bring the machine with you so we can compare with ours.

## 2022-03-01 NOTE — ASSESSMENT & PLAN NOTE
BP elevated, goal <130/80  Restart Amlodipine 10 mg for the next three days then add Losartan 25 mg  Continue home BP monitoring and follow up in 4 weeks  Titrate Losartan until BP goal achieved  S/s of stroke reviewed

## 2022-03-01 NOTE — PROGRESS NOTES
Curly Course presents today for   Chief Complaint   Patient presents with    Hypertension    Medication Refill       Is someone accompanying this pt? no    Is the patient using any DME equipment during OV? no    Depression Screening:  3 most recent PHQ Screens 12/16/2021   Little interest or pleasure in doing things Not at all   Feeling down, depressed, irritable, or hopeless Not at all   Total Score PHQ 2 0       Learning Assessment:  Learning Assessment 10/6/2021   PRIMARY LEARNER Patient   HIGHEST LEVEL OF EDUCATION - PRIMARY LEARNER  SOME COLLEGE   BARRIERS PRIMARY LEARNER NONE   CO-LEARNER CAREGIVER No   PRIMARY LANGUAGE ENGLISH    NEED -   LEARNER PREFERENCE PRIMARY DEMONSTRATION     -   LEARNING SPECIAL TOPICS -   ANSWERED BY patient    RELATIONSHIP SELF       Fall Risk  Fall Risk Assessment, last 12 mths 10/6/2021   Able to walk? Yes   Fall in past 12 months? 0   Do you feel unsteady? 0   Are you worried about falling 0       ADL  No flowsheet data found. Travel Screening:    Travel Screening     No screening recorded since 02/28/22 0000     Travel History   Travel since 02/01/22    No documented travel since 02/01/22         Health Maintenance reviewed and discussed and ordered per Provider. Health Maintenance Due   Topic Date Due    COVID-19 Vaccine (1) Never done    Shingrix Vaccine Age 50> (1 of 2) Never done    Colorectal Cancer Screening Combo  02/13/2018    A1C test (Diabetic or Prediabetic)  03/13/2018    Breast Cancer Screen Mammogram  04/11/2018   . Coordination of Care:  1. Have you been to the ER, urgent care clinic since your last visit? Hospitalized since your last visit? no    2. Have you seen or consulted any other health care providers outside of the [address] [place] [address] since your last visit? Include any pap smears or colon screening.  no

## 2022-03-18 PROBLEM — E78.5 HYPERLIPIDEMIA LDL GOAL <100: Status: ACTIVE | Noted: 2021-12-16

## 2022-03-18 PROBLEM — N18.31 STAGE 3A CHRONIC KIDNEY DISEASE (HCC): Status: ACTIVE | Noted: 2021-12-16

## 2022-03-19 PROBLEM — R44.1 VISUAL HALLUCINATIONS: Status: ACTIVE | Noted: 2021-12-16

## 2022-03-19 PROBLEM — R73.01 IFG (IMPAIRED FASTING GLUCOSE): Status: ACTIVE | Noted: 2017-03-13

## 2022-03-19 PROBLEM — D64.9 NORMOCYTIC NORMOCHROMIC ANEMIA: Status: ACTIVE | Noted: 2021-12-17

## 2022-03-19 PROBLEM — N18.31 HYPERTENSIVE KIDNEY DISEASE WITH STAGE 3A CHRONIC KIDNEY DISEASE (HCC): Status: ACTIVE | Noted: 2021-10-05

## 2022-03-19 PROBLEM — I12.9 HYPERTENSIVE KIDNEY DISEASE WITH STAGE 3A CHRONIC KIDNEY DISEASE (HCC): Status: ACTIVE | Noted: 2021-10-05

## 2022-03-29 ENCOUNTER — OFFICE VISIT (OUTPATIENT)
Dept: FAMILY MEDICINE CLINIC | Age: 77
End: 2022-03-29
Payer: MEDICARE

## 2022-03-29 VITALS
HEART RATE: 92 BPM | HEIGHT: 68 IN | SYSTOLIC BLOOD PRESSURE: 137 MMHG | OXYGEN SATURATION: 97 % | DIASTOLIC BLOOD PRESSURE: 76 MMHG | RESPIRATION RATE: 17 BRPM | BODY MASS INDEX: 23.64 KG/M2 | WEIGHT: 156 LBS | TEMPERATURE: 98.4 F

## 2022-03-29 DIAGNOSIS — N18.31 HYPERTENSIVE KIDNEY DISEASE WITH STAGE 3A CHRONIC KIDNEY DISEASE (HCC): Primary | ICD-10-CM

## 2022-03-29 DIAGNOSIS — I12.9 HYPERTENSIVE KIDNEY DISEASE WITH STAGE 3A CHRONIC KIDNEY DISEASE (HCC): Primary | ICD-10-CM

## 2022-03-29 DIAGNOSIS — N18.31 STAGE 3A CHRONIC KIDNEY DISEASE (HCC): ICD-10-CM

## 2022-03-29 DIAGNOSIS — Z12.11 SCREEN FOR COLON CANCER: ICD-10-CM

## 2022-03-29 PROCEDURE — 1101F PT FALLS ASSESS-DOCD LE1/YR: CPT | Performed by: NURSE PRACTITIONER

## 2022-03-29 PROCEDURE — 3017F COLORECTAL CA SCREEN DOC REV: CPT | Performed by: NURSE PRACTITIONER

## 2022-03-29 PROCEDURE — G8432 DEP SCR NOT DOC, RNG: HCPCS | Performed by: NURSE PRACTITIONER

## 2022-03-29 PROCEDURE — 1090F PRES/ABSN URINE INCON ASSESS: CPT | Performed by: NURSE PRACTITIONER

## 2022-03-29 PROCEDURE — G8536 NO DOC ELDER MAL SCRN: HCPCS | Performed by: NURSE PRACTITIONER

## 2022-03-29 PROCEDURE — G8427 DOCREV CUR MEDS BY ELIG CLIN: HCPCS | Performed by: NURSE PRACTITIONER

## 2022-03-29 PROCEDURE — 99213 OFFICE O/P EST LOW 20 MIN: CPT | Performed by: NURSE PRACTITIONER

## 2022-03-29 PROCEDURE — G8399 PT W/DXA RESULTS DOCUMENT: HCPCS | Performed by: NURSE PRACTITIONER

## 2022-03-29 PROCEDURE — G8420 CALC BMI NORM PARAMETERS: HCPCS | Performed by: NURSE PRACTITIONER

## 2022-03-29 NOTE — ASSESSMENT & PLAN NOTE
Normotensive readings at home noted  Recheck today improved, continue regimen  Continue home BP monitoring, bring monitor again to next appt

## 2022-03-29 NOTE — PROGRESS NOTES
Sherif Chawla presents today for   Chief Complaint   Patient presents with    Hypertension    Chronic Kidney Disease       Is someone accompanying this pt? Yes by her male friend     Is the patient using any DME equipment during [address]? no    Depression Screening:  3 most recent PHQ Screens 12/16/2021   Little interest or pleasure in doing things Not at all   Feeling down, depressed, irritable, or hopeless Not at all   Total Score PHQ 2 0       Learning Assessment:  Learning Assessment 10/6/2021   PRIMARY LEARNER Patient   HIGHEST LEVEL OF EDUCATION - PRIMARY LEARNER  SOME COLLEGE   BARRIERS PRIMARY LEARNER NONE   CO-LEARNER CAREGIVER No   PRIMARY LANGUAGE ENGLISH    NEED -   LEARNER PREFERENCE PRIMARY DEMONSTRATION     -   LEARNING SPECIAL TOPICS -   ANSWERED BY patient    RELATIONSHIP SELF       Fall Risk  Fall Risk Assessment, last 12 mths 10/6/2021   Able to walk? Yes   Fall in past 12 months? 0   Do you feel unsteady? 0   Are you worried about falling 0       ADL  No flowsheet data found. Travel Screening:    Travel Screening     Question   Response    In the last 10 days, have you been in contact with someone who was confirmed or suspected to have Coronavirus/COVID-19? No / Unsure    Have you had a COVID-19 viral test in the last 10 days? No    Do you have any of the following new or worsening symptoms? Have you traveled internationally or domestically in the last month? No      Travel History   Travel since 02/28/22    No documented travel since 02/28/22         Health Maintenance reviewed and discussed and ordered per Provider. Health Maintenance Due   Topic Date Due    COVID-19 Vaccine (1) Never done    Shingrix Vaccine Age 50> (1 of 2) Never done    Colorectal Cancer Screening Combo  02/13/2018    A1C test (Diabetic or Prediabetic)  03/13/2018   . Coordination of Care:  1. Have you been to the ER, urgent care clinic since your last visit?  Hospitalized since your last visit? no    2. Have you seen or consulted any other health care providers outside of the [address] [place] [address] since your last visit? Include any pap smears or colon screening.  No

## 2022-04-01 ENCOUNTER — TRANSCRIBE ORDER (OUTPATIENT)
Dept: SCHEDULING | Age: 77
End: 2022-04-01

## 2022-04-01 DIAGNOSIS — Z12.31 VISIT FOR SCREENING MAMMOGRAM: Primary | ICD-10-CM

## 2022-04-14 ENCOUNTER — OFFICE VISIT (OUTPATIENT)
Dept: NEUROLOGY | Age: 77
End: 2022-04-14
Payer: MEDICARE

## 2022-04-14 DIAGNOSIS — F03.91 DEMENTIA WITH BEHAVIORAL DISTURBANCE, UNSPECIFIED DEMENTIA TYPE: Primary | ICD-10-CM

## 2022-04-14 DIAGNOSIS — F69 BEHAVIOR CONCERN IN ADULT: ICD-10-CM

## 2022-04-14 PROCEDURE — G8432 DEP SCR NOT DOC, RNG: HCPCS | Performed by: PSYCHOLOGIST

## 2022-04-14 PROCEDURE — 96138 PSYCL/NRPSYC TECH 1ST: CPT | Performed by: PSYCHOLOGIST

## 2022-04-14 PROCEDURE — 96137 PSYCL/NRPSYC TST PHY/QHP EA: CPT | Performed by: PSYCHOLOGIST

## 2022-04-14 PROCEDURE — 96136 PSYCL/NRPSYC TST PHY/QHP 1ST: CPT | Performed by: PSYCHOLOGIST

## 2022-04-14 PROCEDURE — 96132 NRPSYC TST EVAL PHYS/QHP 1ST: CPT | Performed by: PSYCHOLOGIST

## 2022-04-14 PROCEDURE — 96139 PSYCL/NRPSYC TST TECH EA: CPT | Performed by: PSYCHOLOGIST

## 2022-04-14 PROCEDURE — 96133 NRPSYC TST EVAL PHYS/QHP EA: CPT | Performed by: PSYCHOLOGIST

## 2022-04-19 NOTE — PROGRESS NOTES
[facility]  Neuroscience   [address] [place] [address]. [facility], [facility] [address]  Office:  [phone]  Fax: [phone]                Neuropsychological Evaluation Report    Patient Name: Jason Nascimento  Age: 78 y.o. Gender: female   Handedness: right handed   Presenting Concern: cognitive decline  Primary Care Physician: Yovanny Perea NP  Referring Provider: Yovanny Perea NP    PATIENT HISTORY (OBTAINED DURING INITIAL CLINICAL EVALUATION):    REASON FOR REFERRAL:  This comprehensive and medically necessary neuropsychological assessment was requested to assist a differential diagnosis of cognitive complaints. The use and purpose of this examination, as well as the extent and limitations of confidentiality, were explained prior to obtaining permission to participate. Instructions were provided regarding the necessity to put forth optimal effort and answer questions truthfully in order to obtain reliable and accurate test results. REVIEW OF RECORDS:  Ms. Jorge A Mireles was referred by her PCP for work-up of memory loss. Records indicate that she was seen in the ER on 11/5/2021 for AMS and UTI. She was on TDO for agitation but was released and discharged home. Records suggest that following discharge, Ms. Jorge A Mireles did not comply with the antibiotic regimen and was ultimately given a new prescription for UTI which she completed. Visual hallucinations are noted and include people who \"visit\". Ms. Jorge A Mireles lives alone and there is a complicated family dynamic. Her friend, Umberto Brooks, assists and visits every few days. A home health care aide comes to the house once a week. A [employer] is involved. Hospital records also indicate an admission in July 2021. During this visit, Ms. Jorge A Mireles refused medication management and an MRI. She left AMA although she was offered a psych admission. It was determined that she had decisional capacity.     A CT of the head on 7/5/21:  No acute intracranial abnormality. Mild microvascular disease of white matter. In a letter provided by Ms. Fred Estrada brother's, he comments on the hallucinations and delusions that are causing significant distress for Ms. Fred Estrada. He also states that in the past few months, he has become aware of memory problems and confusion. In particular, he indicates that Ms. Fred Estrada has thoughts of hurting children and often sees people. She believes that she has turned her house and car keys over to the people that she sees. According to him, she is convinced that she no longer owns her own home, despite being continually convinced and reassured. Ms. Fred Estrada has had difficulties keeping track of her bank accounts and stopped paying her bills around May of this year. Her family is now managing bill payment online using her [employer] card. According to Ms. Spencer's brother, she no longer has an ID and has difficulty operating her television and cell phone. She continues to [employer] and demonstrates confusion over what day of the week it is and what time of day it is. He provides an example whereby Ms. Fred Estrada had blood work scheduled and instead of fasting after midnight, she went 24 hours without eating. This has raised concerns about her ability to manage medications without supervision. In his letter, it is also indicated that neighbors have raised concerns as they have observed Ms. Fred Estrada looking thin and disheveled, raking leaves every day for hours. Police have reportedly been called due to the concerns of friends and neighbors. She has been taken to [facility] multiple times but refuses to stay and checks herself out. Current Outpatient Medications   Medication Sig    amLODIPine (NORVASC) 10 mg tablet Take 1 Tablet by mouth daily.  losartan (COZAAR) 25 mg tablet Take 1 Tablet by mouth daily.  melatonin 10 mg subl Take 20 mg by mouth nightly.      No current facility-administered medications for this visit. Past Medical History:   Diagnosis Date    Anxiety     Arrhythmia 2011    Negative Stress test    Arthritis     Carotid artery stenosis 2009    <50%    Gestational diabetes     Glaucoma     Hypercholesterolemia     Hypertension        Past Surgical History:   Procedure Laterality Date    HX CHOLECYSTECTOMY  1990    gallstones    HX HYSTERECTOMY  1990    total hysterectomy       CLINICAL INTERVIEW:  Ms. Etelvina Lund was accompanied by her friend, Yuniel Arita for the initial interview. Consistent with records, they both reported difficulties with memory and word finding which first emerged approximately two years ago. Ms. Etelvina Lund recently finished speech therapy which she found beneficial for her cognitive decline. Neurologic history is negative for seizures, stroke, syncope, and significant head trauma. Sleep is benefited with melatonin. Snoring and EDS were denied. Pain complaints were denied. There is no alcohol, tobacco, or illicit substance use. Family history of neurologic illness is significant for dementia in the mother and seizures and ADHD in the son. With regard to emotional functioning, Ms. Etelvina Lund elaborated on the hallucinations that are noted in records. She stated that she sees \"entities\" in her house. She sees adults, children, and animals and feels that they are going to come after her with a knife. She also feels that they are making bombs to harm her. Although these entities do not speak directly to her, they have sent a message to her that she does not own her own home. Ms. Etelvina Lund feels very frightened especially when she is at home. Therefore, she spends an inordinate amount of time trying to avoid being at home. History is negative for significant psychiatric illness, psychiatric hospitalization, suicidal ideation, and self-harm behaviors.   Psychosocial stressors include the advancing dementia of Ms. Evan Casarez ex- who lives in [place] and is cared for by one of her adult sons. Another one of Ms. Danilo Donato sons has significant substance abuse problems which is another source of stress for her. Socially, Ms. Recinos Parent has been twice . She lives alone in a house that she inherited from her mother. She cared for her mother, who  from dementia in 2018. Ms. Jorje Solis has two adult sons. She lost one son at the age of 12 due to leukemia. Academically, Ms. Jorje Solis completed 16 years of education and subsequently participated in some masters level coursework. 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 Family support is limited but Ms. Jorje Solis has a brother living in [place]. Functionally, Ms. Jorje Solis is independent for medication management. MENTAL STATUS:    Sensorium  Awake, Aware, Alert   Orientation person, place, time/date, situation, day of week, month of year and year   Relations cooperative   Eye Contact appropriate   Appearance:  thin & gaunt looking   Motor Behavior:  restless   Speech:  normal pitch and normal volume   Vocabulary average   Thought Process: tangential   Thought Content delusions and hallucinations   Suicidal ideations none   Homicidal ideations none   Mood:  anxious   Affect:  mood-congruent   Memory recent  impaired   Memory remote:  impaired   Concentration:  impaired   Abstraction:  concrete   Insight:  limited   Reliability poor   Judgment:  limited       METHODS OF ASSESSMENT (Current Evaluation):  Clinician Administered:   Adaptive Behavior Assessment System (ABAS-3)  Clinical Assessment of Geriatric Emotional Status (CASE)  Geriatric Anxiety Scale (GAS)  Geriatric Depression Scale: Short Form (GDS)  Ziyad Cognitive Assessment (MOCA)  Personality Assessment Inventory (АННА-2)    Technician Administered:  Category Fluency for Animals  Esvin's Continuous Performance Test  Controlled Oral Word Association Test  Neuropsychological Assessment Battery-Select Subtests  RBANS-A-select subtests  Reliable Digit Span  [place] Functional Living Scale  Trailmaking Test  Wechsler Adult Intelligence Scale-IV  [place] Card Sorting Test    TEST OBSERVATIONS:  Ms. Maria Isabel Mejia arrived promptly for the testing session. Dress and grooming were appropriate; physical presentation was unchanged from that observed during the clinical interview. Speech was fluent; response style was delayed. Comprehension difficulties for complex instructions were noted. No unusual behaviors or psychomotor abnormalities were observed. Thought process was logical. Thought content showed no apparent delusional ideation. Auditory and visual hallucinations were denied and there was no obvious response to internal stimuli. Affect was congruent with the anxious and frustrated mood conveyed. Ms. Maria Isabel Mejia was adequately cooperative but easily overwhelmed. She required constant prompting. Rapport with the examiner was adequately established and maintained. Performance motivation was objectively measured with one instrument (Reliable Digit Span); Ms. Maria Isabel Mejia produced a normal score on this measure. Accordingly, test findings below do not appear to be the product of disingenuous effort. Given the above observations, plus comments contained in the Mental Status section, the results of this examination are regarded as reasonably reliable and valid. TEST RESULTS:  Quantitative test results are derived from comparisons to age and education corrected cohort normative data, where applicable. Percentiles are included in these instances. Qualitative test results are determined using clinical observations. General Orientation and Awareness:       Orientation to person yes   Time yes  Place yes  Circumstance yes                     Sensory-Perceptual and Motor Functioning:    Visual and auditory acuity:  Glasses       Gait and balance:    WNL Classification:    Cognitive Screening: On the [facility] Cognitive Assessment [facility], Ms. Valentin Rushing showed difficulties in the following areas: Cognitive flexibility, mental reversal, repetition, abstract reasoning, and delayed spontaneous recall.     Attention/Concentration:       Simple visuomotor tracking (7 percentile)                   Mildly Impaired     On a continuous performance test, the number of omission errors made was highly unusual.  This can indicate clinical impairment but can also be the result of fatigue, misunderstanding of the test instructions, lack of motivation, etc.    Visuospatial and Constructional Praxis:     Visual discrimination (4 percentile)                               Moderately Impaired   Figure Copy (<1 percentile)          Severely Impaired     Language:            Phonemic verbal fluency (27 percentile)                                  Average    Categorical verbal fluency (<1 percentile)                   Severely Impaired   Auditory comprehension (<1 percentile)                     Severely Impaired    Naming (38 percentile)             Average    Memory and Learning:       Immediate word list learning (<1 percentile)                    Severely Impaired   Delayed word list recall (<1 percentile)                     Severely Impaired   Delayed word list recognition (42 percentile)                    Average   Immediate story memory (<1 percentile)                       Severely Impaired   Delayed story recall (<1 percentile)                     Severely Impaired   Delayed figure recall (1 percentile)                     Severely Impaired   Shape learning immediate recognition (34 percentile)         Average   Shape learning delayed recognition (62 percentile)                   Average  Forced Choice Recognition (90 percentile)          Above Average     Cognitive Tests of Executive Functioning:     Ability to think flexibly, Trailmaking B (N/A percentile) Discontinued due to time and errors    Intellectual and Basic Cognitive Functioning (WAIS-IV)  Verbal Comprehension Index: 87  Percentile: 19       Low Average   Similarities: 8    Percentile: 25      Vocabulary: 10    Percentile: 50           Information: 5    Percentile: 5   Perceptual Reasoning:   Matrix Reasonin   Percentile: 63  Working Memory:   Digit Span: 10    Percentile 50      Processing Speed: 62   Percentile: 1       Extremely Low   Symbol Search: 3   Percentile: 1         Coding: 3    Percentile: 1       Adaptive Behavior Measure for Independent Living [facility] Functional Living Scale):  Time                 >75 percentile       High Average  Money and calculation   3-9 percentile       Borderline  Communication     <2 percentile        Severely Impaired  Memory      <2 percentile        Severely Impaired  Total                 2 percentile        Moderately Impaired    Adaptive Behavior (Adaptive Behavior Assessment System)  General Adaptive Composite:  88   Percentile: 21  Low Average   Conceptual:  84    Percentile: 14  Low Average   Social:  93    Percentile: 32  Average   Practical:  90    Percentile: 25  Average    Emotional Functioning:  Screening of Emotional/Psychiatric Status:  Level of self-reported depression   (2/15)       Normal              Level of self-report anxiety                      Total:    87 percentile        Moderate   Somatic:   81 percentile        Mild   Cognitive:   93 percentile        Moderate   Affective:   81 percentile        Mild    On the clinical assessment scales for the elderly completed by Ms. Spencer's friend, he reported observing the following: Anxiety, cognitive deficits, repetitive thoughts and behavior, paranoia, and psychotic symptoms. On a measure of general emotional functioning, the profile was considered invalid.   This can occur for a number of reasons including reading difficulties, careless or random responding, marked confusion, or failure to follow the test instructions. IMPRESSIONS/RECOMMENDATIONS:  Test performance showed difficulties in the following areas: mental reversal, repetition, abstract reasoning, visual motor tracking, visual attention, visual discrimination, categorical fluency, auditory comprehension, figure drawing and recall, list learning and recall, story learning and recall, cognitive flexibility, and processing speed. Difficulties were also noted on a measure of adaptive behaviors necessary for independent living. Taken together, this evaluation suggests that Ms. Rudolph Nobles requires an immediate increase in supervision. In fact, this evaluation suggests that she does not have the capacity for medical or financial decision-making. Guardianship should be assigned. Treatment is challenged by Ms. Spencer's history of refusing treatment, medication noncompliance, and leaving the hospital AMA. In fact, it is difficult for me to opine about the etiology of her dementia as she has refused neuroimaging. That said, the pattern of scores does suggest a dysexecutive type of dementia. However, her history of altered mental status and UTIs makes it difficult to be confident about even that. Regardless of etiology, this appears to be an advancing dementia associated with behavioral disturbance. The psychotic symptoms that Ms. Rudolph Nobles is experiencing are clearly creating an inordinate degree of psychological distress for her and they seem to be further exacerbating her limited judgment and decision-making abilities. This again highlights the need for guardianship and does indicate that Ms. Rudolph Nobles needs to be in an environment where she can receive 24/7 supervision. With regard to medical treatment, I would recommend pharmacotherapy for cognitive efficiency and psychosis. Nonpharmacological intervention should also be used to manage the behavioral disturbance associated with Ms. Dennys Correa dementia.   General recommendations for doing so can be found below. Serial testing in 12 months is encouraged to compare with baseline and to inform treatment accordingly. In the interim, Ms. Britta Dill family is again encouraged to pursue guardianship and to transition Ms. Nicholaus Najjar into an environment where she can receive supervision, be adequately stimulated with activities that are enjoyable, and engage in a physician approved level of exercise. DIAGNOSTIC IMPRESSIONS:  1. Dementia, with behavioral disturbance (psychosis)  2. Behavior concern in adult    GENERAL RECOMMENDATIONS:  · When planning daily activities, consider the following:  · Focus on abilities rather than on limitations as much as possible. Caregiver should give honest praise and encouragement. · A predictable routine is important. It decreases anxiety and reduce his need to adapt to change  · Break down tasks into simple steps. Provide one step at a time instructions or cues and use short words and simple sentences  · Except that each daily task will gradually take more time to do as the disease progresses  · Encourage activities that have little risk of failure. Give credit for trying rather than for completing a task  · Reduce distractions such as background noise to improve the ability to concentrate on task  · Allow the person to make choices to improve self-esteem. Confusion, anxiety, or agitation in response to a choice or clues that making that particular decision may be too difficult  · People with dementia are highly sensitive to the moods of people around him. Caregiver should try to remain calm and patient and avoid criticism. · Early fatigue and short attention span are common in individuals with dementing diseases. When possible, plan activities at the best time of day and allow time for rest.     · Individuals with dementia symptoms often have heightened sensitivities and can therefore be more vulnerable to distress and agitation.   Creating a calm home will be important; the following are recommended:  · If reflections seem to call cause visual disturbance, cover reflective surfaces such as mirrors, windows, or glass tables or shiny appliances. · A flickering television can be disturbing, or could even induce hallucinations. · Reduce background noise such as radio or television. This may lessen confusion or anxiety and also increase the ability to concentrate. · Limit the number of activities or visitors if they are causing fatigue or agitation  · Allow plenty of time to engage in everyday tasks without rushing. This may mean that to get to the doctor's appointment on time, you may need to begin to get ready to hours beforehand  · Simplify the living areas of the home if too much clutter is confusing    · Plan ahead for caregiver emergencies  · Make arrangements for others to assume care at a moments notice: Family, friend, neighbor, home health care agency offering 24-hour care, group home, or nursing home  · Post emergency information and a prominent place such as the refrigerator door or next to the phone. Include: Family names, addresses, work and home phone numbers, whom to contact in an emergency, whom you have selected to make healthcare decisions for you if you are incapacitated, doctor's name and phone number (including weekend and evenings), name and address of preferred hospital, insurance names and policy numbers, the list of medications, schedules, and where they are kept for both the person with dementia and the caregiver     Currently, there is a deficit in optimal levels of behavior and social activation. Therefore,  programming is recommended (e.g. [employer] [facility] program)    · Fall prevention  · Use nonskid wax or vinyl floors  · Remove any throw rugs are fast and then to the floor  · Use nonskid surfaces such as textured strips or decals in the bathtub or shower  · Install grab bars near the toilet and in the bath.   Consider a raised toilet seat, shower stool, and a hand-held shower  · Make sure hallways and stairways are well lit and free of clutter  · Use a night light in the back bedroom and/or bathroom  · Install secure railings on all stairs and marked the edges of stairs with brightly colored tape. Install Du on the top of stairs if needed  · Remove any tripping hazards such as extension cords  · Encourage the use of sturdy chairs that have high seats and strong arms  · Make sure the person with dementia wear shoes that are comfortable and safe  · Correct or treat other health problems that may increase the risk of falling such as hearing and vision loss or arthritis  · Closely monitor medication use, especially those that could cause dizziness or confusion. Check with the doctor immediately if you suspect a medication reaction  · Consider the use of an emergency alarm to alert others if there is a fall    · Fire hazards  · Make sure there is a smoke alarm that works. If possible, connected to the apartment office or fire station so that someone in addition to the person with dementia will be alerted if it goes off  · Explore the possibility of a microwave. If the person with Alzheimer's disease already knows how to operate a microwave or can learn to do so, remove the knobs of the regular stove or disconnected  · Limit the amount of cooking by bringing and prepared meals, hiring a homemaker, or arranging for home delivered meals  · Call the fire department to see whether there is a program for registering persons with disabilities so that in case of a fire they would be rescued first  · Allow smoking only in one area and only with supervision.   Lock up all smoking materials when not in use    · Creating a safe home  · Remove or lock up sharp or dangerous kitchen utensils such as knives, Food processors, mixers, toaster ovens, and coffee makers  · Remove or lockup sharp outside equipment such as lawnmowers, chain saws, snowblowers, gasoline containers, ladders, boats, and farm implements  · Remove or lock up weapons, especially guns  · Remove or lock up items in the basement such as electric power tools or paint  · Remove or lock up poison such as cleaning supplies, insecticides, or poisonous plants  · Remove or lock up alcohol and drugs  · Remove objects that may be eaten such as buttons, pins, etc.      Thank you for allowing me the opportunity to assist you in Ms. Taylor elizabeth. Please do not hesitate to contact me should you have additional questions that I may not have addressed. 34340 x 1  96137 x 1  96138 x 1  96139 x 4  96132 x 1  96133 x 1    [facility] Eva Zavaleta, Ph.D.   Licensed Clinical Psychologist        Time Documentation:     33229 x 1   19855 x 1 Neuropsych testing/data gathering by Neuropsychologist: (1 hour). 18289 x1 (first 30 minutes), 96137 x 1 (additional 30 minutes)     96138 x 1  96139 x 4 Test Administration/Data Gathering By Technician: (2.5 hours). 83878 x 1 (first 30 minutes), 96139 x 4 (each additional 30 minutes)     96132 x 1  96133 x 1 Testing Evaluation Services by Neuropsychologist (1 hour, 50 minutes), 51420 x1 (first hour), 41455 x1 (additional 50 minutes)     The above includes: Record review. Review of history provided by patient. Review of collaborative information. Testing by Clinician. Review of raw data. Scoring. Report writing of individual tests administered by Clinician. Integration of individual tests administered by psychometrist with NSE/testing by clinician, review of records/history/collaborative information, case Conceptualization, treatment planning, clinical decision making, report writing, coordination Of Care.  Psychometry test codes as time spent by psychometrist administering and scoring neurocognitive/psychological tests under supervision of neuropsychologist.  Integral services including scoring of raw data, data interpretation, case conceptualization, report writing etcetera were initiated after the patient finished testing/raw data collected and was completed on the date the report was signed. This note will not be viewable in [address] This note was created using voice recognition software. Despite editing, there may be syntax errors.

## 2022-05-13 ENCOUNTER — TELEPHONE (OUTPATIENT)
Dept: NEUROLOGY | Age: 77
End: 2022-05-13

## 2022-06-01 ENCOUNTER — HOSPITAL ENCOUNTER (OUTPATIENT)
Dept: LAB | Age: 77
Discharge: HOME | End: 2022-06-01
Payer: MEDICARE

## 2022-06-01 ENCOUNTER — HOSPITAL ENCOUNTER (OUTPATIENT)
Dept: MAMMOGRAPHY | Age: 77
Discharge: HOME | End: 2022-06-01
Attending: NURSE PRACTITIONER
Payer: MEDICARE

## 2022-06-01 DIAGNOSIS — N18.31 HYPERTENSIVE KIDNEY DISEASE WITH STAGE 3A CHRONIC KIDNEY DISEASE (HCC): ICD-10-CM

## 2022-06-01 DIAGNOSIS — N18.31 STAGE 3A CHRONIC KIDNEY DISEASE (HCC): ICD-10-CM

## 2022-06-01 DIAGNOSIS — Z12.11 SCREEN FOR COLON CANCER: ICD-10-CM

## 2022-06-01 DIAGNOSIS — Z12.31 VISIT FOR SCREENING MAMMOGRAM: ICD-10-CM

## 2022-06-01 DIAGNOSIS — R73.01 IFG (IMPAIRED FASTING GLUCOSE): ICD-10-CM

## 2022-06-01 DIAGNOSIS — E78.5 HYPERLIPIDEMIA LDL GOAL <100: ICD-10-CM

## 2022-06-01 DIAGNOSIS — I12.9 HYPERTENSIVE KIDNEY DISEASE WITH STAGE 3A CHRONIC KIDNEY DISEASE (HCC): ICD-10-CM

## 2022-06-01 DIAGNOSIS — D64.9 NORMOCYTIC NORMOCHROMIC ANEMIA: ICD-10-CM

## 2022-06-01 DIAGNOSIS — R44.1 VISUAL HALLUCINATIONS: ICD-10-CM

## 2022-06-01 LAB
ALBUMIN SERPL-MCNC: 3.9 G/DL (ref 3.4–5)
ALBUMIN/GLOB SERPL: 1.3 {RATIO} (ref 0.8–1.7)
ALP SERPL-CCNC: 56 U/L (ref 45–117)
ALT SERPL-CCNC: 21 U/L (ref 13–56)
ANION GAP SERPL CALC-SCNC: 3 MMOL/L (ref 3–18)
AST SERPL-CCNC: 13 U/L (ref 10–38)
BASOPHILS # BLD: 0 K/UL (ref 0–0.1)
BASOPHILS NFR BLD: 1 % (ref 0–2)
BILIRUB SERPL-MCNC: 0.9 MG/DL (ref 0.2–1)
BUN SERPL-MCNC: 21 MG/DL (ref 7–18)
BUN/CREAT SERPL: 20 (ref 12–20)
CALCIUM SERPL-MCNC: 9.4 MG/DL (ref 8.5–10.1)
CHLORIDE SERPL-SCNC: 107 MMOL/L (ref 100–111)
CHOLEST SERPL-MCNC: 256 MG/DL
CO2 SERPL-SCNC: 29 MMOL/L (ref 21–32)
CREAT SERPL-MCNC: 1.05 MG/DL (ref 0.6–1.3)
DIFFERENTIAL METHOD BLD: ABNORMAL
EOSINOPHIL # BLD: 0.1 K/UL (ref 0–0.4)
EOSINOPHIL NFR BLD: 1 % (ref 0–5)
ERYTHROCYTE [DISTWIDTH] IN BLOOD BY AUTOMATED COUNT: 13.9 % (ref 11.6–14.5)
EST. AVERAGE GLUCOSE BLD GHB EST-MCNC: 117 MG/DL
GLOBULIN SER CALC-MCNC: 3.1 G/DL (ref 2–4)
GLUCOSE SERPL-MCNC: 103 MG/DL (ref 74–99)
HBA1C MFR BLD: 5.7 % (ref 4.2–5.6)
HCT VFR BLD AUTO: 36.9 % (ref 35–45)
HDLC SERPL-MCNC: 79 MG/DL (ref 40–60)
HDLC SERPL: 3.2 {RATIO} (ref 0–5)
HGB BLD-MCNC: 11.6 G/DL (ref 12–16)
IMM GRANULOCYTES # BLD AUTO: 0 K/UL (ref 0–0.04)
IMM GRANULOCYTES NFR BLD AUTO: 0 % (ref 0–0.5)
LDLC SERPL CALC-MCNC: 156.6 MG/DL (ref 0–100)
LIPID PROFILE,FLP: ABNORMAL
LYMPHOCYTES # BLD: 2.8 K/UL (ref 0.9–3.6)
LYMPHOCYTES NFR BLD: 33 % (ref 21–52)
MCH RBC QN AUTO: 29.4 PG (ref 24–34)
MCHC RBC AUTO-ENTMCNC: 31.4 G/DL (ref 31–37)
MCV RBC AUTO: 93.7 FL (ref 78–100)
MONOCYTES # BLD: 0.6 K/UL (ref 0.05–1.2)
MONOCYTES NFR BLD: 7 % (ref 3–10)
NEUTS SEG # BLD: 4.8 K/UL (ref 1.8–8)
NEUTS SEG NFR BLD: 58 % (ref 40–73)
NRBC # BLD: 0 K/UL (ref 0–0.01)
NRBC BLD-RTO: 0 PER 100 WBC
PLATELET # BLD AUTO: 240 K/UL (ref 135–420)
PMV BLD AUTO: 10.6 FL (ref 9.2–11.8)
POTASSIUM SERPL-SCNC: 4 MMOL/L (ref 3.5–5.5)
PROT SERPL-MCNC: 7 G/DL (ref 6.4–8.2)
RBC # BLD AUTO: 3.94 M/UL (ref 4.2–5.3)
SODIUM SERPL-SCNC: 139 MMOL/L (ref 136–145)
TRIGL SERPL-MCNC: 102 MG/DL (ref ?–150)
VLDLC SERPL CALC-MCNC: 20.4 MG/DL
WBC # BLD AUTO: 8.4 K/UL (ref 4.6–13.2)

## 2022-06-01 PROCEDURE — 85025 COMPLETE CBC W/AUTO DIFF WBC: CPT

## 2022-06-01 PROCEDURE — 77067 SCR MAMMO BI INCL CAD: CPT

## 2022-06-01 PROCEDURE — 36415 COLL VENOUS BLD VENIPUNCTURE: CPT

## 2022-06-01 PROCEDURE — 80053 COMPREHEN METABOLIC PANEL: CPT

## 2022-06-01 PROCEDURE — 83036 HEMOGLOBIN GLYCOSYLATED A1C: CPT

## 2022-06-01 PROCEDURE — 80061 LIPID PANEL: CPT

## 2022-06-06 DIAGNOSIS — N18.31 HYPERTENSIVE KIDNEY DISEASE WITH STAGE 3A CHRONIC KIDNEY DISEASE (HCC): ICD-10-CM

## 2022-06-06 DIAGNOSIS — I12.9 HYPERTENSIVE KIDNEY DISEASE WITH STAGE 3A CHRONIC KIDNEY DISEASE (HCC): ICD-10-CM

## 2022-06-06 RX ORDER — LOSARTAN POTASSIUM 25 MG/1
25 TABLET ORAL DAILY
Qty: 90 TABLET | Refills: 0 | Status: SHIPPED | OUTPATIENT
Start: 2022-06-06 | End: 2022-08-09 | Stop reason: SDUPTHER

## 2022-06-06 RX ORDER — AMLODIPINE BESYLATE 10 MG/1
10 TABLET ORAL DAILY
Qty: 90 TABLET | Refills: 0 | Status: SHIPPED | OUTPATIENT
Start: 2022-06-06 | End: 2022-08-09 | Stop reason: SDUPTHER

## 2022-06-06 NOTE — TELEPHONE ENCOUNTER
Patient need a medication refill. Please advise     Requested Prescriptions     Pending Prescriptions Disp Refills    amLODIPine (NORVASC) 10 mg tablet 90 Tablet 0     Sig: Take 1 Tablet by mouth daily.  losartan (COZAAR) 25 mg tablet 90 Tablet 0     Sig: Take 1 Tablet by mouth daily.

## 2022-06-07 ENCOUNTER — TELEPHONE (OUTPATIENT)
Dept: FAMILY MEDICINE CLINIC | Age: 77
End: 2022-06-07

## 2022-06-07 NOTE — TELEPHONE ENCOUNTER
----- Message from Esthela Woodson NP sent at 6/1/2022 10:10 AM EDT -----  Mammogram, normal.  Follow up as scheduled.

## 2022-06-09 ENCOUNTER — OFFICE VISIT (OUTPATIENT)
Dept: NEUROLOGY | Age: 77
End: 2022-06-09
Payer: MEDICARE

## 2022-06-09 DIAGNOSIS — F03.91 DEMENTIA WITH BEHAVIORAL DISTURBANCE, UNSPECIFIED DEMENTIA TYPE: Primary | ICD-10-CM

## 2022-06-09 DIAGNOSIS — F69 BEHAVIOR CONCERN IN ADULT: ICD-10-CM

## 2022-06-09 PROCEDURE — 1123F ACP DISCUSS/DSCN MKR DOCD: CPT | Performed by: PSYCHOLOGIST

## 2022-06-09 PROCEDURE — 90847 FAMILY PSYTX W/PT 50 MIN: CPT | Performed by: PSYCHOLOGIST

## 2022-06-09 NOTE — PROGRESS NOTES
Interactive Psychotherapy/office feedback        Interactive office feedback session with Ms. Fred Estrada and her adult son. I reviewed the results of the recent Neuropsychological Evaluation  including the observed areas of neurocognitive strengths and weaknesses. Education was provided regarding my diagnostic impressions, and treatment plan/options were discussed. I also answered numerous questions related to the clinical findings, including the various methods to improve cognition and mood. CBT, psychoeducation, and supportive psychotherapy techniques were utilized. A copy of the report, a [facility] directory, information on pursuing guardianship in [place], and general information on activity scheduling and avoiding sundowning were provided. Ms. Taylor Briggs son has moved here from [place] and is providing 24/7 supervision. Prior to seeing the patient I reviewed the records, including the previously completed report, the records in [place], and any updated visits from other providers since I saw the patient last.       Diagnoses:      1. Dementia, with behavioral disturbance (psychosis)  2. Behavior concern in adult    The patient will follow up with the referring provider, and reported being very pleased with the services provided. [facility] [id number] psychotherapy with pt and adult son. 45 minutes     This note was created using voice recognition software. Despite editing, there may be syntax errors.

## 2022-06-22 PROBLEM — R73.03 PREDIABETES: Status: ACTIVE | Noted: 2017-03-13

## 2022-06-22 NOTE — PROGRESS NOTES
Chief Complaint   Patient presents with    Cholesterol Problem    Chronic Kidney Disease    Labs     Assessment & Plan:     1. Hyperlipidemia LDL goal <100  Assessment & Plan:  LDL worsening  The 10-year ASCVD risk score (Jayde Taylor., et al., 2013) is: 24.4%  Therapy impact 18.3%  Discussed risks vs benefits of statin  Start atorvastatin 10 mg and recheck labs in 3 mos  Physical activity and dietary modifications discussed in detail, handout given  Orders:  -     LIPID PANEL; Future  -     METABOLIC PANEL, COMPREHENSIVE; Future  -     atorvastatin (LIPITOR) 10 mg tablet; Take 1 Tablet by mouth daily. Indications: excessive fat in the blood, Normal, Disp-90 Tablet, R-0  2. Hypertensive kidney disease with stage 3a chronic kidney disease ([facility] [address] [facility]  Assessment & Plan:  BP at goal, <130/80, continue regimen  3. Prediabetes  Assessment & Plan:  Lifestyle modifications advised  Recheck A1c in 6 mos  4. Stage 3a [facility] ([facility]  Assessment & Plan:  Slightly worsening, continue ARBs  Avoid nephrotoxins, continue hydration  5. Dementia with behavioral disturbance, unspecified dementia type [facility])  Assessment & Plan:  Neuropsychology notes reviewed, refer to neurology for management  Orders:  -     REFERRAL TO NEUROLOGY  6. Normocytic normochromic anemia  Assessment & Plan:  Stable, encouraged colonoscopy, wants to think about it  7. Screen for colon cancer  Comments:  Colonoscopy encouraged, she wants to think about it  8. Encounter to discuss test results    Follow-up and Dispositions    · Return in about 3 months (around 9/23/2022) for cholesterol, prediabetes, blood pressure, lab results (CMP, lipid panel).        Subjective:     HPI    Patient presents today for follow-up, accompanied by accompanied by Luisa Strong, who is the main contact for the patient at [phone].     Hyperlipidemia  Treatment:  None  Comorbid:  HTN    Hypertension-  Symptoms:  None   BP readings at home are not being taken consistently  Comorbid: HLD  Current treatment: losartan 25 mg, amlodipine 10 mg    Prediabetes -  Risk factors:  HTN, HLD  Treatment:  None    Anemia -   Current symptoms:  None  Risk factors:  None  Treatment:  None  Has not had colonoscopy  Has not completed FIT kit    CKD-  Stage of Chronic Kidney Disease III   Denies any BLE swelling, SOB, chest pain  NSAID use:  Uses OTC occasionally  Risk factors/Comorbid: HTN, HLD    Dementia-  Has completed workup with Dr. Kori Naik  Diagnosis confirmed  Pharmacologic treatment recommended  Has not been referred to neurology  Continues to have occasional hallucinations  She now lives with her son    Health Maintenance:  COVID-19 vac -  Received two doses  Shingles vac - recommended  Pneumonia vac- recommended    Review of Systems   Constitutional: Negative for chills, fever and malaise/fatigue. Respiratory: Negative for shortness of breath. Cardiovascular: Negative for chest pain. Psychiatric/Behavioral: Positive for hallucinations and memory loss. Objective:   /74   Pulse 97   Temp 97.9 °F (36.6 °C) (Oral)   Resp 17   Ht 5' 8\" (1.727 m)   Wt 159 lb (72.1 kg)   SpO2 97%   BMI 24.18 kg/m²      Physical Exam  Vitals and nursing note reviewed. Constitutional:       General: She is not in acute distress. Appearance: She is not ill-appearing. HENT:      Head: Normocephalic and atraumatic. Cardiovascular:      Rate and Rhythm: Normal rate and regular rhythm. Pulmonary:      Effort: Pulmonary effort is normal. No respiratory distress. Breath sounds: No wheezing, rhonchi or rales. Skin:     General: Skin is warm and dry. Neurological:      General: No focal deficit present. Mental Status: She is alert and oriented to person, place, and time. Psychiatric:         Mood and Affect: Mood normal.         Thought Content:  Thought content normal.         Judgment: Judgment normal.        Total time spent:  40 minutes  Joleen Nava FNP-C

## 2022-06-22 NOTE — ASSESSMENT & PLAN NOTE
LDL worsening  The 10-year ASCVD risk score (Kae Miner., et al., 2013) is: 24.4%  Therapy impact 18.3%  Discussed risks vs benefits of statin  Start atorvastatin 10 mg and recheck labs in 3 mos  Physical activity and dietary modifications discussed in detail, handout given

## 2022-06-23 ENCOUNTER — OFFICE VISIT (OUTPATIENT)
Dept: FAMILY MEDICINE CLINIC | Age: 77
End: 2022-06-23
Payer: MEDICARE

## 2022-06-23 VITALS
OXYGEN SATURATION: 97 % | DIASTOLIC BLOOD PRESSURE: 74 MMHG | SYSTOLIC BLOOD PRESSURE: 119 MMHG | HEIGHT: 68 IN | BODY MASS INDEX: 24.1 KG/M2 | HEART RATE: 97 BPM | WEIGHT: 159 LBS | TEMPERATURE: 97.9 F | RESPIRATION RATE: 17 BRPM

## 2022-06-23 DIAGNOSIS — E78.5 HYPERLIPIDEMIA LDL GOAL <100: Primary | ICD-10-CM

## 2022-06-23 DIAGNOSIS — N18.31 HYPERTENSIVE KIDNEY DISEASE WITH STAGE 3A CHRONIC KIDNEY DISEASE (HCC): ICD-10-CM

## 2022-06-23 DIAGNOSIS — F03.91 DEMENTIA WITH BEHAVIORAL DISTURBANCE, UNSPECIFIED DEMENTIA TYPE: ICD-10-CM

## 2022-06-23 DIAGNOSIS — Z12.11 SCREEN FOR COLON CANCER: ICD-10-CM

## 2022-06-23 DIAGNOSIS — D64.9 NORMOCYTIC NORMOCHROMIC ANEMIA: ICD-10-CM

## 2022-06-23 DIAGNOSIS — R73.03 PREDIABETES: ICD-10-CM

## 2022-06-23 DIAGNOSIS — Z71.2 ENCOUNTER TO DISCUSS TEST RESULTS: ICD-10-CM

## 2022-06-23 DIAGNOSIS — N18.31 STAGE 3A CHRONIC KIDNEY DISEASE (HCC): ICD-10-CM

## 2022-06-23 DIAGNOSIS — I12.9 HYPERTENSIVE KIDNEY DISEASE WITH STAGE 3A CHRONIC KIDNEY DISEASE (HCC): ICD-10-CM

## 2022-06-23 PROBLEM — F03.918 DEMENTIA WITH BEHAVIORAL DISTURBANCE: Status: ACTIVE | Noted: 2022-06-23

## 2022-06-23 PROBLEM — F03.918 DEMENTIA WITH BEHAVIORAL DISTURBANCE (HCC): Status: ACTIVE | Noted: 2022-06-23

## 2022-06-23 PROCEDURE — 1123F ACP DISCUSS/DSCN MKR DOCD: CPT | Performed by: NURSE PRACTITIONER

## 2022-06-23 PROCEDURE — 1101F PT FALLS ASSESS-DOCD LE1/YR: CPT | Performed by: NURSE PRACTITIONER

## 2022-06-23 PROCEDURE — G8420 CALC BMI NORM PARAMETERS: HCPCS | Performed by: NURSE PRACTITIONER

## 2022-06-23 PROCEDURE — G8432 DEP SCR NOT DOC, RNG: HCPCS | Performed by: NURSE PRACTITIONER

## 2022-06-23 PROCEDURE — G8399 PT W/DXA RESULTS DOCUMENT: HCPCS | Performed by: NURSE PRACTITIONER

## 2022-06-23 PROCEDURE — 1090F PRES/ABSN URINE INCON ASSESS: CPT | Performed by: NURSE PRACTITIONER

## 2022-06-23 PROCEDURE — 3017F COLORECTAL CA SCREEN DOC REV: CPT | Performed by: NURSE PRACTITIONER

## 2022-06-23 PROCEDURE — 99215 OFFICE O/P EST HI 40 MIN: CPT | Performed by: NURSE PRACTITIONER

## 2022-06-23 PROCEDURE — G8427 DOCREV CUR MEDS BY ELIG CLIN: HCPCS | Performed by: NURSE PRACTITIONER

## 2022-06-23 PROCEDURE — G8536 NO DOC ELDER MAL SCRN: HCPCS | Performed by: NURSE PRACTITIONER

## 2022-06-23 RX ORDER — ATORVASTATIN CALCIUM 10 MG/1
10 TABLET, FILM COATED ORAL DAILY
Qty: 90 TABLET | Refills: 0 | Status: SHIPPED | OUTPATIENT
Start: 2022-06-23 | End: 2022-10-02

## 2022-06-23 NOTE — PATIENT INSTRUCTIONS
Heart-Healthy Diet: Care Instructions  Your Care Instructions     A heart-healthy diet has lots of vegetables, fruits, nuts, beans, and whole grains, and is low in salt. It limits foods that are high in saturated fat, such as meats, cheeses, and fried foods. It may be hard to change your diet, but even small changes can lower your risk of heart attack and heart disease. Follow-up care is a key part of your treatment and safety. Be sure to make and go to all appointments, and call your doctor if you are having problems. It's also a good idea to know your test results and keep a list of the medicines you take. How can you care for yourself at home? Watch your portions  · Use food labels to learn what the recommended servings are for the foods you eat. · Eat only the number of calories you need to stay at a healthy weight. If you need to lose weight, eat fewer calories than your body burns (through exercise and other physical activity). Eat more fruits and vegetables  · Eat a variety of fruit and vegetables every day. Dark green, deep orange, red, or yellow fruits and vegetables are especially good for you. Examples include spinach, carrots, peaches, and berries. · Keep carrots, celery, and other veggies handy for snacks. Buy fruit that is in season and store it where you can see it so that you will be tempted to eat it. · Cook dishes that have a lot of veggies in them, such as stir-fries and soups. Limit saturated fat  · Read food labels, and try to avoid saturated fats. They increase your risk of heart disease. · Use olive or canola oil when you cook. · Bake, broil, grill, or steam foods instead of frying them. · Choose lean meats instead of high-fat meats such as hot dogs and sausages. Cut off all visible fat when you prepare meat. · Eat fish, skinless poultry, and meat alternatives such as soy products instead of high-fat meats.  Soy products, such as tofu, may be especially good for your heart.  · Choose low-fat or fat-free milk and dairy products. Eat foods high in fiber  · Eat a variety of grain products every day. Include whole-grain foods that have lots of fiber and nutrients. Examples of whole-grain foods include oats, whole wheat bread, and brown rice. · Buy whole-grain breads and cereals, instead of white bread or pastries. Limit salt and sodium  · Limit how much salt and sodium you eat to help lower your blood pressure. · Taste food before you salt it. Add only a little salt when you think you need it. With time, your taste buds will adjust to less salt. · Eat fewer snack items, fast foods, and other high-salt, processed foods. Check food labels for the amount of sodium in packaged foods. · Choose low-sodium versions of canned goods (such as soups, vegetables, and beans). Limit sugar  · Limit drinks and foods with added sugar. These include candy, desserts, and soda pop. Limit alcohol  · Limit alcohol to no more than 2 drinks a day for men and 1 drink a day for women. Too much alcohol can cause health problems. When should you call for help? Watch closely for changes in your health, and be sure to contact your doctor if:    · You would like help planning heart-healthy meals. Where can you learn more? Go to http://www.lujan.com/  Enter V137 in the search box to learn more about \"Heart-Healthy Diet: Care Instructions. \"  Current as of: September 8, 2021               Content Version: 13.2  © 2006-2022 [employer]. Care instructions adapted under license by [employer] (which disclaims liability or warranty for this information). If you have questions about a medical condition or this instruction, always ask your healthcare professional. [place] [address] any warranty or liability for your use of this information.

## 2022-06-23 NOTE — PROGRESS NOTES
Lynn Lr presents today for   Chief Complaint   Patient presents with    Cholesterol Problem    Chronic Kidney Disease    Labs       Is someone accompanying this pt? Yes partner     Is the patient using any DME equipment during [address]? No     Depression Screening:  3 most recent PHQ Screens 6/23/2022   Little interest or pleasure in doing things Not at all   Feeling down, depressed, irritable, or hopeless Not at all   Total Score PHQ 2 0       Learning Assessment:  Learning Assessment 10/6/2021   PRIMARY LEARNER Patient   HIGHEST LEVEL OF EDUCATION - PRIMARY LEARNER  SOME COLLEGE   BARRIERS PRIMARY LEARNER NONE   CO-LEARNER CAREGIVER No   PRIMARY LANGUAGE ENGLISH    NEED -   LEARNER PREFERENCE PRIMARY DEMONSTRATION     -   LEARNING SPECIAL TOPICS -   ANSWERED BY patient    RELATIONSHIP SELF       Fall Risk  Fall Risk Assessment, last 12 mths 10/6/2021   Able to walk? Yes   Fall in past 12 months? 0   Do you feel unsteady? 0   Are you worried about falling 0       ADL  No flowsheet data found. Travel Screening:    Travel Screening     Question   Response    In the last 10 days, have you been in contact with someone who was confirmed or suspected to have Coronavirus/COVID-19? No / Unsure    Have you had a COVID-19 viral test in the last 10 days? No    Do you have any of the following new or worsening symptoms? Have you traveled internationally or domestically in the last month? No      Travel History   Travel since 05/23/22    No documented travel since 05/23/22         Health Maintenance reviewed and discussed and ordered per Provider. Health Maintenance Due   Topic Date Due    COVID-19 Vaccine (1) Never done    Shingrix Vaccine Age 50> (1 of 2) Never done    Pneumococcal 65+ years (2 - PCV) 10/21/2014    Colorectal Cancer Screening Combo  02/13/2018   . Coordination of Care:  1. Have you been to the ER, urgent care clinic since your last visit?  Hospitalized since your last visit? No     2. Have you seen or consulted any other health care providers outside of the [address] [place] [address] since your last visit? Include any pap smears or colon screening.  No

## 2022-07-06 ENCOUNTER — OFFICE VISIT (OUTPATIENT)
Dept: NEUROLOGY | Age: 77
End: 2022-07-06
Payer: MEDICARE

## 2022-07-06 ENCOUNTER — TELEPHONE (OUTPATIENT)
Dept: PHYSICAL THERAPY | Age: 77
End: 2022-07-06

## 2022-07-06 VITALS
DIASTOLIC BLOOD PRESSURE: 70 MMHG | WEIGHT: 157 LBS | SYSTOLIC BLOOD PRESSURE: 128 MMHG | OXYGEN SATURATION: 98 % | HEIGHT: 67 IN | BODY MASS INDEX: 24.64 KG/M2 | RESPIRATION RATE: 18 BRPM | HEART RATE: 88 BPM

## 2022-07-06 DIAGNOSIS — F80.9 COMMUNICATION IMPAIRMENT: ICD-10-CM

## 2022-07-06 DIAGNOSIS — F03.91 DEMENTIA WITH BEHAVIORAL DISTURBANCE, UNSPECIFIED DEMENTIA TYPE: Primary | ICD-10-CM

## 2022-07-06 DIAGNOSIS — R68.89 OTHER GENERAL SYMPTOMS AND SIGNS: ICD-10-CM

## 2022-07-06 DIAGNOSIS — R44.1 VISUAL HALLUCINATIONS: ICD-10-CM

## 2022-07-06 DIAGNOSIS — R53.83 OTHER FATIGUE: ICD-10-CM

## 2022-07-06 DIAGNOSIS — R41.89 COGNITIVE DECLINE: ICD-10-CM

## 2022-07-06 DIAGNOSIS — F41.9 ANXIETY: ICD-10-CM

## 2022-07-06 PROCEDURE — G8427 DOCREV CUR MEDS BY ELIG CLIN: HCPCS | Performed by: NURSE PRACTITIONER

## 2022-07-06 PROCEDURE — 99205 OFFICE O/P NEW HI 60 MIN: CPT | Performed by: NURSE PRACTITIONER

## 2022-07-06 PROCEDURE — G8432 DEP SCR NOT DOC, RNG: HCPCS | Performed by: NURSE PRACTITIONER

## 2022-07-06 PROCEDURE — G8420 CALC BMI NORM PARAMETERS: HCPCS | Performed by: NURSE PRACTITIONER

## 2022-07-06 PROCEDURE — 3017F COLORECTAL CA SCREEN DOC REV: CPT | Performed by: NURSE PRACTITIONER

## 2022-07-06 PROCEDURE — 1101F PT FALLS ASSESS-DOCD LE1/YR: CPT | Performed by: NURSE PRACTITIONER

## 2022-07-06 PROCEDURE — 1123F ACP DISCUSS/DSCN MKR DOCD: CPT | Performed by: NURSE PRACTITIONER

## 2022-07-06 PROCEDURE — 1090F PRES/ABSN URINE INCON ASSESS: CPT | Performed by: NURSE PRACTITIONER

## 2022-07-06 PROCEDURE — G8536 NO DOC ELDER MAL SCRN: HCPCS | Performed by: NURSE PRACTITIONER

## 2022-07-06 PROCEDURE — G0463 HOSPITAL OUTPT CLINIC VISIT: HCPCS | Performed by: NURSE PRACTITIONER

## 2022-07-06 PROCEDURE — G8399 PT W/DXA RESULTS DOCUMENT: HCPCS | Performed by: NURSE PRACTITIONER

## 2022-07-06 RX ORDER — DONEPEZIL HYDROCHLORIDE 5 MG/1
5 TABLET, FILM COATED ORAL
Qty: 30 TABLET | Refills: 5 | Status: SHIPPED | OUTPATIENT
Start: 2022-07-06 | End: 2022-08-31 | Stop reason: SDUPTHER

## 2022-07-06 NOTE — PROGRESS NOTES
Franc Ruiz presents today for   Chief Complaint   Patient presents with    Dementia     follow up       Is someone accompanying this pt? Yes, Son    Is the patient using any DME equipment during [address]? no    Depression Screening:  3 most recent PHQ Screens 6/23/2022   Little interest or pleasure in doing things Not at all   Feeling down, depressed, irritable, or hopeless Not at all   Total Score PHQ 2 0       Learning Assessment:  Learning Assessment 10/6/2021   PRIMARY LEARNER Patient   HIGHEST LEVEL OF EDUCATION - PRIMARY LEARNER  SOME COLLEGE   BARRIERS PRIMARY LEARNER NONE   CO-LEARNER CAREGIVER No   PRIMARY LANGUAGE ENGLISH    NEED -   LEARNER PREFERENCE PRIMARY DEMONSTRATION     -   LEARNING SPECIAL TOPICS -   ANSWERED BY patient    RELATIONSHIP SELF       Abuse Screening:  Abuse Screening Questionnaire 2/29/2016   Do you ever feel afraid of your partner? N   Are you in a relationship with someone who physically or mentally threatens you? N   Is it safe for you to go home? Y       Fall Risk  Fall Risk Assessment, last 12 mths 7/6/2022   Able to walk? Yes   Fall in past 12 months? 0   Do you feel unsteady? 0   Are you worried about falling 0         Coordination of Care:  1. Have you been to the ER, urgent care clinic since your last visit? Hospitalized since your last visit? no    2. Have you seen or consulted any other health care providers outside of the [address] [place] [address] since your last visit? Include any pap smears or colon screening.  no

## 2022-07-06 NOTE — PROGRESS NOTES
[facility]  [address] [facility], [address], [place], [address]  [address] Chris Núñez, [address]  Office:  [phone]  Fax: [phone]  Chief Complaint   Patient presents with    Dementia     follow up       HPI: Fred Cheung presents in evaluation for dementia. She was seen in the hospital in neurology consultation by Dr. Ruth Branch on 7/6/2021. History per note is as follows. She has medical history of hypertension and hyperlipidemia. She was brought to the ER at that time by her friends for episodes of confusion and hallucinations. Poor historian. She stated that she has been having some forgetfulness for a few months. Endorsed forgetting the days. Over the past couple of months she had been having hallucinations mostly visual.  She sees a group of people in her house chatting with each other. They do not try to talk to her. She was also talking about helping with the movie her some people around July 4. Not sure about auditory hallucinations. From the medical records it was mentioned that patient has been having some behavioral problems over the past 4 months, sometimes getting easily upset and angry. No tremor. No difficulty walking. No weakness of her arms or legs. Denied having any recent illness. No fevers, chills, neck pain, or stiffness. No headache, nausea, or vomiting. No previous history of stroke. Denies use of alcohol or drugs. Had a CT of the head in the ER which did not show any acute changes. Impression was hallucinations and changes in her behavior, complex visual hallucinations over the past few months. No focal neurodeficits noted. On exam she was disoriented to time and place. MRI of the brain was pending. Need to rule out organic possibilities. Labs were ordered including thiamine, vitamin B12, folate levels, RPR. Vitamin B12 was 327. Folate normal.  RPR nonreactive. She did not have the MRI done.   It was noted that metabolic panel and UDS unremarkable. Urinalysis showed some signs of infection and she was on ceftriaxone. More recently, she underwent neuropsychological evaluation with Dr. Faustina Singh. The note from 4/14/2022 was reviewed. Diagnostic impressions included dementia, with behavioral disturbance. She presents today in evaluation. She is with her son Deya Torres. He moved from [place] to come help her. He moved in with her. He helps provide history. She continues to have hallucinations of seeing people. For example, he reports she was talking to people on July 4th telling them they would hear explosions (he had just been telling her as to prepare her about the fireworks). She noticed memory concern around 1 year ago. She noticed: she says something about being a little girl and pretend play. She made up invisible people so she wouldn't be alone. She reports trouble remembering what she is saying in conversation. Endorses misplacing items. Endorses forgetting why she went into a room. 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 Gets tearful, misses it. She denies issues with ADLs. He reports he checks on her a lot. He gives her meds. He takes care of the finances now, it's been split up between him and his brother. She does not drive, stopped a few years ago. Denies imbalance. No falls. No tremor. Eating/drinking ok, good appetite, denies swallowing problems. Denies sleep concerns. No snoring, witnessed apneic spells, or choking in sleep. She denies fatigue but he believes she has fatigue. He reports she naps at 12:30 usually every day. Denies history of stroke, seizures, LOC spell, or head injury. Endorses pain as she ages, sometimes in her knees. Used to play basketball all throughout school. Denies headaches but he says she has headaches sometimes.   Used to exercise by taking care of the house, mowing the lawn, etc, but now not much of that. He reports she probably walks 4 miles a day just by walking in the house. Denies wandering. In a typical day, she loves to read. Rarely sees friends, but sometimes. 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 She used to like to paint. She feels ok in terms of mood. Additional history from son: misplacing items daily; she comes to him and tells him there is someone in her room (she points and it's an item like a blanket on a chair); endorses seeing things on reflective surfaces all the time. She endorses the people talk back to her, she has conversations with them, but not distressing to her anymore, she has come to be alright with it. He reports it's only an issue to her if she says someone is there that won't leave, and she may get defensive. He reports it's few and far between that she gets frightened or stressed out about the situation. They have the handout from the [facility]. He reports his father is in assisted living and he does not want to do that to her. He reports she initially did not want to have testing because she was scared she was going to be taken away. Social history: She is . Her son moved here to live with her. Retired . Denies smoking, alcohol, or drug use. Family history: Positive for dementia in her mother. Her brother passed away, had diabetes. Her father passed away from cardiac disease.       Past Medical History:   Diagnosis Date    Anxiety     Arrhythmia 2011    Negative Stress test    Arthritis     Carotid artery stenosis 2009    <50%    Gestational diabetes     Glaucoma     Hypercholesterolemia     Hypertension        Past Surgical History:   Procedure Laterality Date    HX CHOLECYSTECTOMY  1990    gallstones    HX HYSTERECTOMY  1990    total hysterectomy       Current Outpatient Medications   Medication Sig Dispense Refill    donepeziL (ARICEPT) 5 mg tablet Take 1 Tablet by mouth nightly. 30 Tablet 5    atorvastatin (LIPITOR) 10 mg tablet Take 1 Tablet by mouth daily. Indications: excessive fat in the blood 90 Tablet 0    amLODIPine (NORVASC) 10 mg tablet Take 1 Tablet by mouth daily. 90 Tablet 0    losartan (COZAAR) 25 mg tablet Take 1 Tablet by mouth daily. 90 Tablet 0    melatonin 10 mg subl Take 20 mg by mouth nightly. Allergies   Allergen Reactions    Codeine Nausea Only       Social History     Tobacco Use    Smoking status: Never Smoker    Smokeless tobacco: Never Used   Vaping Use    Vaping Use: Never used   Substance Use Topics    Alcohol use: Yes     Comment: Socially    Drug use: Not on file       Family History   Problem Relation Age of Onset    OSTEOARTHRITIS Mother     Dementia Mother     Hypertension Mother     Coronary Art Dis Mother        Review of Systems:   Constitutional: no fever or chills  Skin denies rash or itching  HEENT:  Denies tinnitus, hearing loss, or visual changes  Respiratory: denies shortness of breath  Cardiovascular: denies chest pain, dyspnea on exertion  Gastrointestinal: does not report nausea or vomiting  Genitourinary: does not report dysuria or incontinence  Musculoskeletal: does not report joint pain or swelling. +occasional knee pains. Endocrine: denies weight change  Hematology: denies easy bruising or bleeding   Neurological: as above in HPI      PHYSICAL EXAMINATION:      Visit Vitals  /70 Comment: retaken   Pulse 88 Comment: retaken   Resp 18   Ht 5' 7\" (1.702 m)   Wt 71.2 kg (157 lb)   SpO2 98%   BMI 24.59 kg/m²       GENERAL: Well developed, well nourished, in no apparent distress. 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  Also appears in distress if she cannot remember the question or what we are talking about, or during MMSE when having a problem answering orientation questions. HEART: RR, no murmurs heard, no carotid bruits.   LUNGS: Respirations regular and unlabored. EXTREMITIES: No clubbing, cyanosis, or edema is identified. Pulses 2+    and symmetrical.  HEAD:   Normocephalic, atraumatic. NEUROLOGIC EXAMINATION    MENTAL STATUS: Awake, alert, and oriented x 2. Disoriented to time. Year as \"2-0-0-2\". Correct season. Not able to state the date, day, or month. Knew that the recent holiday was 4 July. Oriented to place but state was [place] (used to live there). Was able to spell the word 'world' backwards correctly. Registration 3 out of 3 objects. Recall at 1 minute 3 out of 3 objects. Attention and STM are impaired. Has trouble answering some questions, word finding difficulties. Occasionally has to have questions repeated. Mood and affect are appropriate but intermittently upset as above. She scored 24 out of 30 on the MMSE. She had a problem on clock drawing with placing the correct time. CRANIAL NERVES: Visual fields are full to confrontation. Pupils are reactive to light and accommodation. Extraocular movements are intact and there is no nystagmus. Facial sensation is normal.  Face is symmetrical.   Hearing is grossly intact. SCM/TPZ 5/5. Palate rises symmetrically. Tongue is in the midline. MOTOR:  Normal tone, bulk, and strength, 5/5 muscle strength throughout. No cogwheel rigidity present. Fine finger movements symmetrical.  No drift of the bilateral upper extremities. CEREBELLAR: Finger to nose was normal.   No tremors or dysmetria. SENSORY: Normal PP, vibration. DTRs: +2 throughout. GAIT:   Normal gait.       CT HEAD WO CONT 7/5/2021:  Study Result    Narrative & Impression   CT of the head without contrast     HISTORY: hallucinations, altered mental status     COMPARISON: None     TECHNIQUE: Helical axial scan to the head was performed from the skull base to  the vertex without IV contrast administration.     All CT scans at this facility performed using dose optimization techniques as  appreciated to a performed exam, to include automated exposure control,  adjustment of the mA and or KU according to patient size (including appropriate  matching for site specific examination), or use of iterative reconstruction  technique.     FINDINGS:     Brain volume appears unremarkable for age. No ventricular dilatation. The  gray-white matter differentiation is preserved. Mild periventricular white  matter hypodensities present. There is no evidence of acute intracranial  hemorrhage,  mass effect or midline shift identified. No skull fracture or extra  axial fluid collections identified. Visualized sinuses and mastoid air cells  appear unremarkable.      IMPRESSION     No acute intracranial abnormality. Note: If clinical concern of acute stroke, MRI with diffusion weighted images is  recommended for better evaluation.     Mild microvascular disease of white matter. Impression/Plan: This is a 63-year-old right-handed female who presents in evaluation for dementia. She was seen in the hospital in neurology consultation 1 year ago for episodes of confusion and hallucinations. She had a urinary tract infection at that time. More recently she was seen for neuropsychological evaluation with Dr. Jackie Phelps. An immediate increase in supervision was recommended. Fortunately now her son moved in with her to help. Pharmacotherapy for cognitive efficiency and psychosis was recommended. There were challenges such as refused treatment including neuroimaging; but now she is agreeable to obtain MRI brain. Discussed reasoning behind testing. Will obtain MRI brain. Will obtain labs for vitamin B12, MMA, also urine tests due to recently increased hallucinations. We discussed nonpharmacologic measures for hallucinations. Advised to monitor for UTI. Will start Aricept 5 mg nightly. Discussed administration and potential side effects. Advised to monitor for worsening.   Will refer for speech therapy for language and cognition as she appears to be experiencing distress around word-finding/ language difficulties. Follow up in 4 weeks. Diagnoses and all orders for this visit:    1. Dementia with behavioral disturbance, unspecified dementia type (HCC)  -     donepeziL (ARICEPT) 5 mg tablet; Take 1 Tablet by mouth nightly. -     MRI BRAIN W WO CONT; Future  -     VITAMIN B12 & FOLATE; Future  -     METHYLMALONIC ACID; Future  -     REFERRAL TO SPEECH THERAPY    2. Cognitive decline  -     donepeziL (ARICEPT) 5 mg tablet; Take 1 Tablet by mouth nightly. -     MRI BRAIN W WO CONT; Future  -     VITAMIN B12 & FOLATE; Future  -     METHYLMALONIC ACID; Future  -     URINALYSIS W/ RFLX MICROSCOPIC; Future  -     CULTURE, URINE; Future    3. Visual hallucinations  -     MRI BRAIN W WO CONT; Future  -     URINALYSIS W/ RFLX MICROSCOPIC; Future  -     CULTURE, URINE; Future    4. Anxiety    5. Other general symptoms and signs   -     VITAMIN B12 & FOLATE; Future    6. Other fatigue   -     CULTURE, URINE; Future    7. Communication impairment  -     REFERRAL TO SPEECH THERAPY      Total time 70 minutes with 40 minutes spent in counseling. Signed By: David Romero NP        PLEASE NOTE:   Portions of this document may have been produced using voice recognition software. Unrecognized errors in transcription may be present.

## 2022-07-06 NOTE — PATIENT INSTRUCTIONS
Patient instructions:  -MRI brain  -start donepezil/ Aricept 5 mg nightly  -speech therapy referral  -follow up in 4 weeks        Donepezil (By mouth)   Donepezil ([id number]-[id number]-m-[id number])  Treats Alzheimer disease. Brand Name(s): Aricept   There may be other brand names for this medicine. When This Medicine Should Not Be Used: This medicine is not right for everyone. Do not use it if you had an allergic reaction to donepezil or to medicines that contain piperidine. How to Use This Medicine:   Tablet, Dissolving Tablet  · Your doctor will tell you how much medicine to use. Do not use more than directed. · Read and follow the patient instructions that come with this medicine. Talk to your doctor or pharmacist if you have any questions. · Tablet: Swallow the 23-mg tablet whole. Do not crush, break, or chew it. · Disintegrating tablet:Make sure your hands are dry before you handle the disintegrating tablet. Peel back the foil from the blister pack, then remove the tablet. Do not push the tablet through the foil. Place the tablet in your mouth. After it has melted, swallow or take a drink of water. · Missed dose: Skip the missed dose and take your next dose at the regular time. Do not take extra medicine to make up for a missed dose. · Store the medicine in a closed container at room temperature, away from heat, moisture, and direct light. Drugs and Foods to Avoid:   Ask your doctor or pharmacist before using any other medicine, including over-the-counter medicines, vitamins, and herbal products. · Some medicines can affect how donepezil works.  Tell your doctor if you are using any of the following:   ¨ Bethanechol, carbamazepine, dexamethasone, ketoconazole, phenobarbital, phenytoin, quinidine, or rifampin  ¨ NSAID pain or arthritis medicine (such as aspirin, diclofenac, ibuprofen, naproxen)  Warnings While Using This Medicine:   · Tell your doctor if you are pregnant or breastfeeding, or if you have heart disease, lung disease, asthma or other breathing problems, stomach ulcers or bleeding, or trouble urinating. · This medicine may cause the following problems:   ¨ Slow heartbeat  ¨ Stomach or bowel bleeding  ¨ Seizures  · Tell any doctor or dentist who treats you that you are using this medicine. You may need to stop using this medicine several days before you have surgery or medical tests. · Your doctor will check your progress and the effects of this medicine at regular visits. Keep all appointments. · Keep all medicine out of the reach of children. Never share your medicine with anyone. Possible Side Effects While Using This Medicine:   Call your doctor right away if you notice any of these side effects:  · Allergic reaction: Itching or hives, swelling in your face or hands, swelling or tingling in your mouth or throat, chest tightness, trouble breathing  · Bloody or black, tarry stools  · Change in how much or how often you urinate  · Chest pain, slow or uneven heartbeat, trouble breathing  · Lightheadedness, dizziness, fainting  · Seizures  · Severe stomach pain  · Unusual bleeding, bruising, or weakness  · Vomiting of blood or material that looks like coffee grounds  If you notice these less serious side effects, talk with your doctor:   · Mild nausea, vomiting, or diarrhea  · Weight loss  If you notice other side effects that you think are caused by this medicine, tell your doctor. Call your doctor for medical advice about side effects. You may report side effects to FDA at 1-512-FDA-5241  © 2017 [facility] Information is for End User's use only and may not be sold, redistributed or otherwise used for commercial purposes. The above information is an  only. It is not intended as medical advice for individual conditions or treatments. Talk to your doctor, nurse or pharmacist before following any medical regimen to see if it is safe and effective for you.

## 2022-07-07 DIAGNOSIS — R41.89 COGNITIVE DECLINE: ICD-10-CM

## 2022-07-07 DIAGNOSIS — F03.91 DEMENTIA WITH BEHAVIORAL DISTURBANCE, UNSPECIFIED DEMENTIA TYPE: ICD-10-CM

## 2022-07-07 DIAGNOSIS — R44.1 VISUAL HALLUCINATIONS: ICD-10-CM

## 2022-07-14 ENCOUNTER — HOSPITAL ENCOUNTER (OUTPATIENT)
Dept: PHYSICAL THERAPY | Age: 77
Discharge: HOME | End: 2022-07-14
Payer: MEDICARE

## 2022-07-14 PROCEDURE — 92523 SPEECH SOUND LANG COMPREHEN: CPT

## 2022-07-14 PROCEDURE — 97129 THER IVNTJ 1ST 15 MIN: CPT

## 2022-07-14 NOTE — PROGRESS NOTES
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 Assessment/ key information: This 79 y/o female was referred to OP ST by neuro d/t reported cognitive decline. Pt also had a recent  neuro psych eval completed, indicating new onset of dementia with behavioral disturbances. Pt's son currently residing with pt and providing  supervision. OUTPATIENT COGNITIVE-COMMUNICATION EVALUATION  The Brief Cognitive Assessment Tool (BCAT) was administered. The instrument assesses orientation, verbal recall, visual recognition, visual recall, attention, abstraction, language, executive functions, visuospacial processing in adults and older adult population.  Results of the sub-tests: scored as correct/total possible.      Orientation: Patient was oriented to person, month, day of week, city, state, and situation: /  Immediate verbal memory: 2/4 words; delayed verbal memory: 2/4 words  (+2 mod cues)  Visual recognition/naming 3 common objects: 3/3; delayed visual recall 0/3. Attention: tap when he heard a certain letter called out:   Mental control: count backwards 20-1:  Days of week backwards:   digits forward ; digits backwards:   Abstraction: tell the similarities: 3/3   Language: repeat a sentence    Fluency: list girls names:   Executive: cognitive shiftin/2  Arithmetic reasonin/1  Judgement: (how much time to a lot to get to an appointment: 0  Visuospatial; design:  Clock   Immediate story recall:  facts recalled:  Delayed story recall:   Story recognition:      Patient attained a score of 37 points out of a total of 50, indicating moderate cognitive deficits, specifically deficits in regard to STM, immediate memory, problem solving, alternating attention/mental flexibility. Additionally pt exhibited anomia intermittently throughout the evaluation and within conversation. Pt further reports she has difficulty finding her words at times. It is recommended that she receive skilled speech therapy services as it is medically necessary to restore/maintain cognitive-linguistic skill  for ADL tasks related to home and community, and for her QOL. Problem List:    []aphasic  []dysarthric  []dysphagic       []alexic  []agraphic  []dysphonia       []dysfluency   [x]Cognitive-Linguistic Disorder       []other   Treatment Plan may include any combination of the following: Cognitive/Language Treatment and Patient Education      Patient / Family readiness to learn indicated by: asking questions, trying to perform skills and interest    Persons(s) to be included in education:   patient (P) and family support person (FSP);list son    Barriers to Learning/Limitations: yes;  cognitive and sensory deficits-vision/hearing/speech    Patient Goal (s): help with my memory    Patient Self Reported Health Status: good    Rehabilitation Potential: good    Short Term Goals:  To be accomplished in 4  weeks  1. Pt/family will demo use of semantic feature analysis in structured tasks to reduce anomia and communication breakdowns with 90% acc given min-mod cue in 3/3 sessions. 2. Pt will recall x3 comp strategies and apply as deemed appropriate in order to complete delayed recall  Verbal and visual tasks  given a  3-5 minute delay with 75% acc given min cues to restore/maintain STM for safety and independence. 3. Pt will implement/utilize a memory book and/or calendar to promote orientation x4 and recall personal information/ recent events for x1-2 weeks with 75% acc given Tl to enhance communication competence, dignity,  and independence within her environment. 4. Pt will utilize comp strategies to complete immediate recall tasks presented verbally with 85% acc given min-mod cues to restore/maintain safety and independence within her environment and for activities of choice. 5. Pt will complete everyday problem solving tasks with 75% acc given modA to restore/maintain safety/indpendence within her environment. 6. Pt will complete mental flexibility tasks with 80% acc given min-modA to improve alternating attention, topic maintenance, and safety with ADLs. Long Term Goals: To be accomplished in 4 weeks   1) . Patient will develop improved functional cognitive-linguistic based communication skills with use of comp strategies to communicate wants/ needs effectively, maintain safety, and participate in social activities of choice with >80% acc with min cues as supported by BCAT reassessment. Frequency / Duration: Patient to be seen 2 times per week for 4 weeks:    Patient/ Caregiver education and instruction: Diagnosis, prognosis,comp strategies, and mutually establishing goals for POC.      CIPRIANO Mckeon 7/14/2022 11:19 AM  MA, [facility]  Speech-Language Pathologist    ________________________________________________________________________    I certify that the above Therapy Services are being furnished while the patient is under my care. I agree with the treatment plan and certify that this therapy is necessary.     Physician's Signature:____________Date:_________TIME:________     [place] Александр NP  ** Signature, Date and Time must be completed for valid certification **    Please sign and return to [facility]  [address] [place] [address]  [phone] fax     Thank you

## 2022-07-14 NOTE — PROGRESS NOTES
ST DAILY TREATMENT NOTE/. COGNITIVE EVAL    Patient Name: Orin العلي  Date:2022  : 1945  [x]  Patient  Verified  Payor: [place] MEDICARE / Plan: VA MEDICARE PART A & B / Product Type: Medicare /   In time: 3:00 Out time:3:45  Total Treatment Time (min): 39  Visit #: 1 of 8    SUBJECTIVE  Pain Level (0-10 scale): 0  Any medication changes, allergies to medications, adverse drug reactions, diagnosis change, or new procedure performed?: [x] No    [] Yes (see summary sheet for update)  Subjective functional status/changes:   [] No changes reported   This 79 y/o female was referred to OP ST by neuro d/t reported cognitive decline. Pt also had a recent  neuro psych eval completed, indicating new onset of dementia with behavioral disturbances. Pt's son currently residing with pt and providing  supervision. Date of Onset: ~ 1 year ago; referred 22  Social History: Retired ; divorce; supportive son. Enjoys reading  Prior Functional level: All aspects of speech/ language, cognition, voice and swallowing WNLs, per pt report  Radiology: n/a   OBJECTIVE    OUTPATIENT COGNITIVE-COMMUNICATION EVALUATION  The Brief Cognitive Assessment Tool (BCAT) was administered. The instrument assesses orientation, verbal recall, visual recognition, visual recall, attention, abstraction, language, executive functions, visuospacial processing in adults and older adult population. Results of the sub-tests: scored as correct/total possible. Orientation: Patient was oriented to person, month, day of week, city, state, and situation: /6  Immediate verbal memory: 2/4 words; delayed verbal memory: 2/4 words  (+2 mod cues)  Visual recognition/naming 3 common objects: 3/3; delayed visual recall 0/3.    Attention: tap when he heard a certain letter called out:   Mental control: count backwards 20-1: / Days of week backwards:   digits forward /2; digits backwards: 2/2  Abstraction: tell the similarities: 3/3   Language: repeat a sentence    Fluency: list girls names:   Executive: cognitive shiftin/2  Arithmetic reasonin/1  Judgement: (how much time to a lot to get to an appointment:   Visuospatial; design:  Clock   Immediate story recall:  facts recalled:  Delayed story recall:   Story recognition:     Patient attained a score of 37 points out of a total of 50, indicating moderate cognitive deficits, specifically deficits in regard to STM, immediate memory, problem solving, alternating attention/mental flexibility. Additionally pt exhibited anomia intermittently throughout the evaluation and reported at times she has difficulty finding her words.      OBJECTIVE  Receptive Language:  Receptive vocabulary    [x] WNL    [] Impaired [] Mild [] Mod [] Severe  Reliability of yes/no responses to questions [x] WNL    [] Impaired [] Mild [] Mod [] Severe   Reliability of responses to complex ideation [x] WNL    [] Impaired [] Mild [] Mod [] Severe  Auditory retention/processing   [] WNL    [x] Impaired [x] Mild [] Mod [] Severe  Follow commands [] 1 Step [] 2 Step [] 3 Step [] complex  Objective Information:    Expressive Language  Automatic speech   [x] WNL    [] Impaired [] Mild [] Mod [] Severe  Able to identify objects/pictures  [x] WNL    [] Impaired [] Mild [] Mod [] Severe  Preservations    [x] WNL    [] Impaired [] Mild [] Mod [] Severe  Word retrieval    [x] WNL    [] Impaired [] Mild [] Mod [] Severe  Paraphasias   [x]None[] Literal    [] Phenomic   [] Jargon   [] Semantic  Able to make needs known at level [] Word   [] Phrase   [x] Sentence   [] Gesture        [] Unable   Objective Information:    Cognition:  Attention:  [x] Alert    [] Drowsy  Orientation:  [x] Person   [] Place    [] Time  Attention to task: [x] Able to sustain for (min):     [] Able to redirect with cues Min:    Mod:    Max:   Focused Attention:  Memory:  [] WNL    [x] Impaired ST   [] Impaired LT   Digit Span:   Sentence Length:   Paragraph Length:  Objective information:    Executive Functions:  Neglect:  [x] None    [] Left   [] Right  Self-regulation  [x] Appropriate   [] Impulsive   [] Requires verbal cues  Awareness:  [] Poor initiation   [] Disinhibition   [] Constant supervision [] WNL  Problem Solving: [] WNL    [] Impaired [] Mild [] Mod [] Severe  Verbal Problem Solving:  Situational Problem Solving:  Judgement:  Reasoning:  Inferences:  Abstract Language:  Language Organization:    Speech:  Oral Verbal Apraxia: [x] None    [] Mild    [] Moderate    [] Severe   Dysarthria:  [x] None    [] Mild    [] Moderate    [] Severe   Intelligibility:  [x] WNL    [] Words   [] Phrases   [] Sentences   [] Conversation      % Intelligible:>95    Writing: [] L or [x] R [x] WNL    [] Impaired @ [] Word [] Sentence [] Paragraph    Reading:  [x] WNL    [] Impaired @ [] Word [] Sentence [] Paragraph        Patient/Caregiver instruction/education: [x] Review HEP    [] Progressed/Changed    HEP/Handouts given: Memory comp strategies    Pain Level (0-10 scale) post treatment: 0    ASSESSMENT  [x]  See Plan of Care    PLAN  []  Upgrade activities as tolerated     [x]  Continue plan of care  []  Discharge due to:__  [x] Other: Tx initiated:     CIPRIANO Chavez 7/14/2022  11:14 AM  MA, CCC-SLP  Speech-Language Pathologist

## 2022-07-21 ENCOUNTER — HOSPITAL ENCOUNTER (OUTPATIENT)
Dept: LAB | Age: 77
Discharge: HOME | End: 2022-07-21
Attending: NURSE PRACTITIONER
Payer: MEDICARE

## 2022-07-21 ENCOUNTER — HOSPITAL ENCOUNTER (OUTPATIENT)
Dept: MRI IMAGING | Age: 77
Discharge: HOME | End: 2022-07-21
Attending: NURSE PRACTITIONER
Payer: MEDICARE

## 2022-07-21 DIAGNOSIS — R41.89 COGNITIVE DECLINE: ICD-10-CM

## 2022-07-21 DIAGNOSIS — R44.1 VISUAL HALLUCINATIONS: ICD-10-CM

## 2022-07-21 DIAGNOSIS — R68.89 OTHER GENERAL SYMPTOMS AND SIGNS: ICD-10-CM

## 2022-07-21 DIAGNOSIS — F03.91 DEMENTIA WITH BEHAVIORAL DISTURBANCE, UNSPECIFIED DEMENTIA TYPE: ICD-10-CM

## 2022-07-21 DIAGNOSIS — R53.83 OTHER FATIGUE: ICD-10-CM

## 2022-07-21 LAB
APPEARANCE UR: CLEAR
BACTERIA URNS QL MICRO: ABNORMAL /HPF
BILIRUB UR QL: NEGATIVE
COLOR UR: YELLOW
CREAT UR-MCNC: 1 MG/DL (ref 0.6–1.3)
EPITH CASTS URNS QL MICRO: ABNORMAL /LPF (ref 0–5)
FOLATE SERPL-MCNC: 10.5 NG/ML (ref 3.1–17.5)
GLUCOSE UR STRIP.AUTO-MCNC: NEGATIVE MG/DL
HGB UR QL STRIP: NEGATIVE
KETONES UR QL STRIP.AUTO: ABNORMAL MG/DL
LEUKOCYTE ESTERASE UR QL STRIP.AUTO: ABNORMAL
NITRITE UR QL STRIP.AUTO: NEGATIVE
PH UR STRIP: 5.5 [PH] (ref 5–8)
PROT UR STRIP-MCNC: NEGATIVE MG/DL
RBC #/AREA URNS HPF: ABNORMAL /HPF (ref 0–5)
SP GR UR REFRACTOMETRY: 1.01 (ref 1–1.03)
UROBILINOGEN UR QL STRIP.AUTO: 1 EU/DL (ref 0.2–1)
VIT B12 SERPL-MCNC: 455 PG/ML (ref 211–911)
WBC URNS QL MICRO: ABNORMAL /HPF (ref 0–4)

## 2022-07-21 PROCEDURE — 82565 ASSAY OF CREATININE: CPT

## 2022-07-21 PROCEDURE — 81001 URINALYSIS AUTO W/SCOPE: CPT

## 2022-07-21 PROCEDURE — 36415 COLL VENOUS BLD VENIPUNCTURE: CPT

## 2022-07-21 PROCEDURE — 82607 VITAMIN B-12: CPT

## 2022-07-21 PROCEDURE — A9577 INJ MULTIHANCE: HCPCS | Performed by: NURSE PRACTITIONER

## 2022-07-21 PROCEDURE — 70553 MRI BRAIN STEM W/O & W/DYE: CPT

## 2022-07-21 PROCEDURE — 83921 ORGANIC ACID SINGLE QUANT: CPT

## 2022-07-21 PROCEDURE — 74011250636 HC RX REV CODE- 250/636: Performed by: NURSE PRACTITIONER

## 2022-07-21 PROCEDURE — 87086 URINE CULTURE/COLONY COUNT: CPT

## 2022-07-21 RX ADMIN — GADOBENATE DIMEGLUMINE 15 ML: 529 INJECTION, SOLUTION INTRAVENOUS at 11:00

## 2022-07-22 LAB
BACTERIA SPEC CULT: NORMAL
SERVICE CMNT-IMP: NORMAL

## 2022-07-24 LAB — METHYLMALONATE SERPL-SCNC: 161 NMOL/L (ref 0–378)

## 2022-07-28 ENCOUNTER — OFFICE VISIT (OUTPATIENT)
Dept: FAMILY MEDICINE CLINIC | Age: 77
End: 2022-07-28
Payer: MEDICARE

## 2022-07-28 ENCOUNTER — APPOINTMENT (OUTPATIENT)
Dept: PHYSICAL THERAPY | Age: 77
End: 2022-07-28
Payer: MEDICARE

## 2022-07-28 VITALS
HEART RATE: 99 BPM | SYSTOLIC BLOOD PRESSURE: 112 MMHG | OXYGEN SATURATION: 96 % | BODY MASS INDEX: 24.17 KG/M2 | TEMPERATURE: 98.1 F | DIASTOLIC BLOOD PRESSURE: 74 MMHG | HEIGHT: 67 IN | WEIGHT: 154 LBS | RESPIRATION RATE: 16 BRPM

## 2022-07-28 DIAGNOSIS — F03.91 DEMENTIA WITH BEHAVIORAL DISTURBANCE, UNSPECIFIED DEMENTIA TYPE: Primary | ICD-10-CM

## 2022-07-28 DIAGNOSIS — N18.31 STAGE 3A CHRONIC KIDNEY DISEASE (HCC): ICD-10-CM

## 2022-07-28 DIAGNOSIS — N18.31 HYPERTENSIVE KIDNEY DISEASE WITH STAGE 3A CHRONIC KIDNEY DISEASE (HCC): ICD-10-CM

## 2022-07-28 DIAGNOSIS — I12.9 HYPERTENSIVE KIDNEY DISEASE WITH STAGE 3A CHRONIC KIDNEY DISEASE (HCC): ICD-10-CM

## 2022-07-28 PROCEDURE — G8399 PT W/DXA RESULTS DOCUMENT: HCPCS | Performed by: NURSE PRACTITIONER

## 2022-07-28 PROCEDURE — G8536 NO DOC ELDER MAL SCRN: HCPCS | Performed by: NURSE PRACTITIONER

## 2022-07-28 PROCEDURE — G8427 DOCREV CUR MEDS BY ELIG CLIN: HCPCS | Performed by: NURSE PRACTITIONER

## 2022-07-28 PROCEDURE — 1101F PT FALLS ASSESS-DOCD LE1/YR: CPT | Performed by: NURSE PRACTITIONER

## 2022-07-28 PROCEDURE — 99214 OFFICE O/P EST MOD 30 MIN: CPT | Performed by: NURSE PRACTITIONER

## 2022-07-28 PROCEDURE — 3017F COLORECTAL CA SCREEN DOC REV: CPT | Performed by: NURSE PRACTITIONER

## 2022-07-28 PROCEDURE — 1123F ACP DISCUSS/DSCN MKR DOCD: CPT | Performed by: NURSE PRACTITIONER

## 2022-07-28 PROCEDURE — G8432 DEP SCR NOT DOC, RNG: HCPCS | Performed by: NURSE PRACTITIONER

## 2022-07-28 PROCEDURE — G8420 CALC BMI NORM PARAMETERS: HCPCS | Performed by: NURSE PRACTITIONER

## 2022-07-28 PROCEDURE — 1090F PRES/ABSN URINE INCON ASSESS: CPT | Performed by: NURSE PRACTITIONER

## 2022-07-28 NOTE — ASSESSMENT & PLAN NOTE
Urine culture reviewed, negative, no indication for abx at this time  Recent episode likely d/t dementia, advised to contact neurology for sooner appointment with recurrence

## 2022-07-28 NOTE — PROGRESS NOTES
Manuel Cuevas presents today for   Chief Complaint   Patient presents with    Urinary Frequency     Patient son states that patient was treated at [facility] last Thursday for UTI. He states that his mother had high WBC in urine. Patient is hallucinating, not wanting to sleep in her room, and is seeing people. Is someone accompanying this pt? Yes patient son is with her     Is the patient using any DME equipment during [address]? no    Depression Screening:  3 most recent PHQ Screens 7/28/2022   Little interest or pleasure in doing things Not at all   Feeling down, depressed, irritable, or hopeless Not at all   Total Score PHQ 2 0       Learning Assessment:  Learning Assessment 10/6/2021   PRIMARY LEARNER Patient   HIGHEST LEVEL OF EDUCATION - PRIMARY LEARNER  SOME COLLEGE   BARRIERS PRIMARY LEARNER NONE   CO-LEARNER CAREGIVER No   PRIMARY LANGUAGE ENGLISH    NEED -   LEARNER PREFERENCE PRIMARY DEMONSTRATION     -   LEARNING SPECIAL TOPICS -   ANSWERED BY patient    RELATIONSHIP SELF       Fall Risk  Fall Risk Assessment, last 12 mths 7/6/2022   Able to walk? Yes   Fall in past 12 months? 0   Do you feel unsteady? 0   Are you worried about falling 0       ADL  No flowsheet data found. Travel Screening:    Travel Screening       Question Response    In the last 10 days, have you been in contact with someone who was confirmed or suspected to have Coronavirus/COVID-19? No / Unsure    Have you had a COVID-19 viral test in the last 10 days? No    Do you have any of the following new or worsening symptoms? None of these    Have you traveled internationally or domestically in the last month? No          Travel History   Travel since 06/28/22    No documented travel since 06/28/22         Health Maintenance reviewed and discussed and ordered per Provider.     Health Maintenance Due   Topic Date Due    COVID-19 Vaccine (1) Never done    Shingrix Vaccine Age 50> (1 of 2) Never done    Pneumococcal 65+ years (2 - PCV) 10/21/2014    Colorectal Cancer Screening Combo  02/13/2018   . Coordination of Care:  1. Have you been to the ER, urgent care clinic since your last visit? Hospitalized since your last visit? Yes [facility] last Thursday for UTI. 2. Have you seen or consulted any other health care providers outside of the [address] [place] [address] since your last visit? Include any pap smears or colon screening.  no

## 2022-08-02 ENCOUNTER — HOSPITAL ENCOUNTER (OUTPATIENT)
Dept: PHYSICAL THERAPY | Age: 77
Discharge: HOME | End: 2022-08-02
Payer: MEDICARE

## 2022-08-02 PROCEDURE — 97129 THER IVNTJ 1ST 15 MIN: CPT

## 2022-08-02 PROCEDURE — 92507 TX SP LANG VOICE COMM INDIV: CPT

## 2022-08-02 NOTE — PROGRESS NOTES
ST DAILY TREATMENT NOTE    Patient Name: Jane Padgett  Date:2022  : 1945  [x]  Patient  Verified  Payor: Payor: VA MEDICARE / Plan: VA MEDICARE PART A & B / Product Type: Medicare /   In time:348  Out time:423  Total Treatment Time (min): 35  Visit #: 2 of 8    Treatment Diagnosis: Cognitive communication deficit [R41.841]    SUBJECTIVE  Pain Level (0-10 scale): Not assessed  Any medication changes, allergies to medications, adverse drug reactions, diagnosis change, or new procedure performed?: [x] No    [] Yes (see summary sheet for update)    Subjective functional status/changes:   [] No changes reported  Patient reported word finding and STM difficulties    OBJECTIVE  Treatment provided includes:  Increase/Improve:  []  Voice Quality []  Cognitive Linguistic Skills []  Laryngeal/Pharyngeal Exercises   []  Vocal Loudness []  Reading Comprehension []  Swallowing Skills    []  Vocal Cord Function []  Auditory Comprehension []  Oral Motor Skills   []  Resonance []  Writing Skills []  Compensatory strategies    []  Speech Intelligibility []  Expressive Language []  Attention   []  Breath Support/Coord.  []  Receptive language []  Memory   []  Articulation []  Safety Awareness []    []  Fluency []  Word Retrieval []        Treatment Provided:  Patient education   -Circumlocution   -Memory strategies   -Use of memory log   -brain exercises  Word association    Patient/Caregiver  Education: [x] Review HEP      HEP/Handouts given: WRAP strategies    Pain Level (0-10 scale) post treatment: Not assessed    ASSESSMENT     []   Improving appropriately and progressing toward goals  [x]   Improving slowly and progressing toward goals  []   Approximating goals/maximum potential  [x]   Continues to benefit from skilled therapy to address remaining functional deficits  []   Not progressing toward goals and plan of care will be adjusted    Patient will continue to benefit from skilled therapy to address remaining functional deficits: cognitive communication and anomia    Progress towards goals / Updated goals:  1. Pt/family will demo use of semantic feature analysis in structured tasks to reduce anomia and communication breakdowns with 90% acc given min-mod cue in 3/3 sessions. 8/2/22: Circumlocution introduced. Patient verbalized understanding  2. Pt will recall x3 comp strategies and apply as deemed appropriate in order to complete delayed recall  Verbal and visual tasks  given a  3-5 minute delay with 75% acc given min cues to restore/maintain STM for safety and independence. 3. Pt will implement/utilize a memory book and/or calendar to promote orientation x4 and recall personal information/ recent events for x1-2 weeks with 75% acc given Tl to enhance communication competence, dignity,  and independence within her environment. 8/2/22: Memory log introduced with exlicit instructions/exxamples provided on every day use for ADLs. Patient verbalized understanding  4. Pt will utilize comp strategies to complete immediate recall tasks presented verbally with 85% acc given min-mod cues to restore/maintain safety and independence within her environment and for activities of choice. 5. Pt will complete everyday problem solving tasks with 75% acc given modA to restore/maintain safety/indpendence within her environment.      6. Pt will complete mental flexibility tasks with 80% acc given min-modA to improve alternating attention, topic maintenance, and safety with ADLs.   8/2/22: Word association 100% acc given fading cues    PLAN  []  Continue plan of care  []  Modify Goals/Treatment Plan      []  Discharge due to:  [] Other:    CIPRIANO Hinojosa 8/2/2022  4:37 PM    Future Appointments   Date Time Provider Den Peterson   8/4/2022  3:45 PM Deshawn Nieves, CIPRIANO [facility]   8/9/2022  3:45 PM Murl Poag, [address] [facility], SLP [facility]   8/11/2022  3:45 PM Murl Poag, [address] [facility], SLP [facility]   8/16/2022  3:45 PM Samara Pewamo Client [facility]   8/18/2022  3:45 PM CIPRIANO Wesley [facility]   8/31/2022  2:00 PM Brittni Albert NP [facility] BS AMB   9/26/2022  4:15 PM Fritz Craft NP [facility] BS AMB

## 2022-08-04 ENCOUNTER — HOSPITAL ENCOUNTER (OUTPATIENT)
Dept: PHYSICAL THERAPY | Age: 77
Discharge: HOME | End: 2022-08-04
Payer: MEDICARE

## 2022-08-04 PROCEDURE — 92507 TX SP LANG VOICE COMM INDIV: CPT

## 2022-08-04 PROCEDURE — 97129 THER IVNTJ 1ST 15 MIN: CPT

## 2022-08-04 NOTE — PROGRESS NOTES
ST DAILY TREATMENT NOTE    Patient Name: Edmundo Hurst  Date:2022  : 1945  [x]  Patient  Verified  Payor: Payor: Kelvin Keep / Plan: VA MEDICARE PART A & B / Product Type: Medicare /   In time: 331  Out time:415  Total Treatment Time (min): 44  Visit #: 3 of 8    Treatment Diagnosis: Cognitive communication deficit [R41.841]    SUBJECTIVE  Pain Level (0-10 scale): Not assessed  Any medication changes, allergies to medications, adverse drug reactions, diagnosis change, or new procedure performed?: [x] No    [] Yes (see summary sheet for update)    Subjective functional status/changes:   [x] No changes reported  Patient reported finding brain puzzles in newspaper. OBJECTIVE  Treatment provided includes:  Increase/Improve:  []  Voice Quality [x]  Cognitive Linguistic Skills []  Laryngeal/Pharyngeal Exercises   []  Vocal Loudness []  Reading Comprehension []  Swallowing Skills    []  Vocal Cord Function []  Auditory Comprehension []  Oral Motor Skills   []  Resonance []  Writing Skills []  Compensatory strategies    []  Speech Intelligibility []  Expressive Language []  Attention   []  Breath Support/Coord.  []  Receptive language []  Memory   []  Articulation []  Safety Awareness []    []  Fluency [x]  Word Retrieval []        Treatment Provided:  -sudoku  -fill in leters    Patient/Caregiver  Education: [x] Review HEP      HEP/Handouts given: WRAP strategies    Pain Level (0-10 scale) post treatment: Not assessed    ASSESSMENT     []   Improving appropriately and progressing toward goals  [x]   Improving slowly and progressing toward goals  []   Approximating goals/maximum potential  [x]   Continues to benefit from skilled therapy to address remaining functional deficits  []   Not progressing toward goals and plan of care will be adjusted    Patient will continue to benefit from skilled therapy to address remaining functional deficits: cognitive communication and anomia    Progress towards goals / Updated goals:  1. Pt/family will demo use of semantic feature analysis in structured tasks to reduce anomia and communication breakdowns with 90% acc given min-mod cue in 3/3 sessions. 2. Pt will recall x3 comp strategies and apply as deemed appropriate in order to complete delayed recall  Verbal and visual tasks  given a  3-5 minute delay with 75% acc given min cues to restore/maintain STM for safety and independence. 3. Pt will implement/utilize a memory book and/or calendar to promote orientation x4 and recall personal information/ recent events for x1-2 weeks with 75% acc given Tl to enhance communication competence, dignity,  and independence within her environment. 8/4/22: patient brought memory log with daily checklist and important phone numbers  4. Pt will utilize comp strategies to complete immediate recall tasks presented verbally with 85% acc given min-mod cues to restore/maintain safety and independence within her environment and for activities of choice. 5. Pt will complete everyday problem solving tasks with 75% acc given modA to restore/maintain safety/indpendence within her environment.      6. Pt will complete mental flexibility tasks with 80% acc given min-modA to improve alternating attention, topic maintenance, and safety with ADLs.   8/4/22: 4x4 sudoku introduced and completed with 100% acc give direct instruction and then fading cues  Fill in letter for words ~60% acc independently, 100% given mod-max cue    PLAN  []  Continue plan of care  []  Modify Goals/Treatment Plan      []  Discharge due to:  [] Other:    Aliene Dandy, SLP 8/4/2022  4:37 PM    Future Appointments   Date Time Provider Den Peterson   8/9/2022  3:45 PM Lali Huffman SLP [facility]   8/11/2022  3:45 PM Galgiovanna Huffman, SLP [facility]   8/16/2022  3:45 PM Lali Huffman, SLP [facility]   8/18/2022  3:45 PM Galgiovanna Huffman, SLP [facility]   8/31/2022  2:00 PM Damaris Vasquez, NP [facility] AMB 9/26/2022  4:15 PM REESE StringerAM BS AMB

## 2022-08-09 ENCOUNTER — TELEPHONE (OUTPATIENT)
Dept: NEUROLOGY | Age: 77
End: 2022-08-09

## 2022-08-09 ENCOUNTER — HOSPITAL ENCOUNTER (OUTPATIENT)
Dept: PHYSICAL THERAPY | Age: 77
Discharge: HOME | End: 2022-08-09
Payer: MEDICARE

## 2022-08-09 DIAGNOSIS — N18.31 HYPERTENSIVE KIDNEY DISEASE WITH STAGE 3A CHRONIC KIDNEY DISEASE (HCC): ICD-10-CM

## 2022-08-09 DIAGNOSIS — I12.9 HYPERTENSIVE KIDNEY DISEASE WITH STAGE 3A CHRONIC KIDNEY DISEASE (HCC): ICD-10-CM

## 2022-08-09 PROCEDURE — 97130 THER IVNTJ EA ADDL 15 MIN: CPT

## 2022-08-09 PROCEDURE — 97129 THER IVNTJ 1ST 15 MIN: CPT

## 2022-08-09 RX ORDER — AMLODIPINE BESYLATE 10 MG/1
10 TABLET ORAL DAILY
Qty: 90 TABLET | Refills: 0 | Status: SHIPPED | OUTPATIENT
Start: 2022-08-09

## 2022-08-09 RX ORDER — LOSARTAN POTASSIUM 25 MG/1
25 TABLET ORAL DAILY
Qty: 90 TABLET | Refills: 0 | Status: SHIPPED | OUTPATIENT
Start: 2022-08-09

## 2022-08-09 NOTE — TELEPHONE ENCOUNTER
Patient son called and said his mom needs her medication refilled. Please advise. Requested Prescriptions     Pending Prescriptions Disp Refills    amLODIPine (NORVASC) 10 mg tablet 90 Tablet 0     Sig: Take 1 Tablet by mouth in the morning. losartan (COZAAR) 25 mg tablet 90 Tablet 0     Sig: Take 1 Tablet by mouth in the morning.

## 2022-08-09 NOTE — PROGRESS NOTES
ST DAILY TREATMENT NOTE    Patient Name: Meir Maldonado  Date:2022  : 1945  [x]  Patient  Verified  Payor: Payor: [place] MEDICARE / Plan: VA MEDICARE PART A & B / Product Type: Medicare /   In time: 46  Out time:425  Total Treatment Time (min): 40  Visit #: 3 of 8  PN Due 22  Treatment Diagnosis: Cognitive communication deficit [R41.841]    SUBJECTIVE  Pain Level (0-10 scale): Not assessed  Any medication changes, allergies to medications, adverse drug reactions, diagnosis change, or new procedure performed?: [x] No    [] Yes (see summary sheet for update)    Subjective functional status/changes:   [x] No changes reported      OBJECTIVE  Treatment provided includes:  Increase/Improve:  []  Voice Quality [x]  Cognitive Linguistic Skills []  Laryngeal/Pharyngeal Exercises   []  Vocal Loudness []  Reading Comprehension []  Swallowing Skills    []  Vocal Cord Function []  Auditory Comprehension []  Oral Motor Skills   []  Resonance []  Writing Skills []  Compensatory strategies    []  Speech Intelligibility []  Expressive Language []  Attention   []  Breath Support/Coord. []  Receptive language []  Memory   []  Articulation []  Safety Awareness []    []  Fluency [x]  Word Retrieval []        Treatment Provided:  -Unscramble words  -ID abstract category      Patient/Caregiver  Education: [x] Review HEP      HEP/Handouts given: 6x6 sudoku    Pain Level (0-10 scale) post treatment: Not assessed    ASSESSMENT     [x]   Improving appropriately and progressing toward goals  []   Improving slowly and progressing toward goals  []   Approximating goals/maximum potential  [x]   Continues to benefit from skilled therapy to address remaining functional deficits  []   Not progressing toward goals and plan of care will be adjusted    Patient will continue to benefit from skilled therapy to address remaining functional deficits: cognitive communication and anomia    Progress towards goals / Updated goals:  1. Pt/family will demo use of semantic feature analysis in structured tasks to reduce anomia and communication breakdowns with 90% acc given min-mod cue in 3/3 sessions. 2. Pt will recall x3 comp strategies and apply as deemed appropriate in order to complete delayed recall  Verbal and visual tasks  given a  3-5 minute delay with 75% acc given min cues to restore/maintain STM for safety and independence. 3. Pt will implement/utilize a memory book and/or calendar to promote orientation x4 and recall personal information/ recent events for x1-2 weeks with 75% acc given Tl to enhance communication competence, dignity,  and independence within her environment. 8/9/22: Patient brought memory log and reported that it makes a difference during IADLs. She reported that she still has to remind herself to put it back in the same place at home everyday. ST recommended visual to help  4. Pt will utilize comp strategies to complete immediate recall tasks presented verbally with 85% acc given min-mod cues to restore/maintain safety and independence within her environment and for activities of choice. 5. Pt will complete everyday problem solving tasks with 75% acc given modA to restore/maintain safety/indpendence within her environment.      6. Pt will complete mental flexibility tasks with 80% acc given min-modA to improve alternating attention, topic maintenance, and safety with ADLs.   8/9/22: Unscramble words ~80% acc independently, 100% given min-modA  ID abstract category member ~70-75% acc independently, 100% given min-modA    PLAN  [x]  Continue plan of care  []  Modify Goals/Treatment Plan      []  Discharge due to:  [] Other:    CIPRIANO Parker 8/9/2022  4:37 PM    Future Appointments   Date Time Provider Den Peterson   8/9/2022  3:45 PM CIPRIANO Jensen [facility]   8/11/2022  3:45 PM Barbi Varner, CIPRIANO [facility]   8/16/2022  3:45 PM Barbi Varner, CIPRIANO [facility]   8/18/2022  3:45 PM Giovana Kumari, SLP [facility]   8/31/2022  2:00 PM Catherine Katz NP [facility] BS AMB   9/26/2022  4:15 PM Tyshawn Pablo NP [facility] BS AMB

## 2022-08-09 NOTE — TELEPHONE ENCOUNTER
Pt. Son Lali Amin called because he would like to go over his mother MRI results. Contact: [phone]  Please advise.

## 2022-08-11 ENCOUNTER — HOSPITAL ENCOUNTER (OUTPATIENT)
Dept: PHYSICAL THERAPY | Age: 77
Discharge: HOME | End: 2022-08-11
Payer: MEDICARE

## 2022-08-11 PROCEDURE — 92507 TX SP LANG VOICE COMM INDIV: CPT

## 2022-08-11 NOTE — PROGRESS NOTES
ST DAILY TREATMENT NOTE    Patient Name: Maday Kerns  Date:2022  : 1945  [x]  Patient  Verified  Payor: Payor: [place] MEDICARE / Plan: [place] MEDICARE PART A & B / Product Type: Medicare /   In time: 350  Out time:430  Total Treatment Time (min): 40  Visit #: 4 of 8  PN Due 22  Treatment Diagnosis: Cognitive communication deficit [R41.841]    SUBJECTIVE  Pain Level (0-10 scale): Not assessed  Any medication changes, allergies to medications, adverse drug reactions, diagnosis change, or new procedure performed?: [x] No    [] Yes (see summary sheet for update)    Subjective functional status/changes:   [x] No changes reported      OBJECTIVE  Treatment provided includes:  Increase/Improve:  []  Voice Quality [x]  Cognitive Linguistic Skills []  Laryngeal/Pharyngeal Exercises   []  Vocal Loudness []  Reading Comprehension []  Swallowing Skills    []  Vocal Cord Function []  Auditory Comprehension []  Oral Motor Skills   []  Resonance []  Writing Skills []  Compensatory strategies    []  Speech Intelligibility []  Expressive Language []  Attention   []  Breath Support/Coord. []  Receptive language []  Memory   []  Articulation []  Safety Awareness []    []  Fluency [x]  Word Retrieval []        Treatment Provided:  -Category grid  -Add concrete category member    Patient/Caregiver  Education: [x] Review HEP      HEP/Handouts given: Crosswords    Pain Level (0-10 scale) post treatment: Not assessed    ASSESSMENT     [x]   Improving appropriately and progressing toward goals  []   Improving slowly and progressing toward goals  []   Approximating goals/maximum potential  [x]   Continues to benefit from skilled therapy to address remaining functional deficits  []   Not progressing toward goals and plan of care will be adjusted    Patient will continue to benefit from skilled therapy to address remaining functional deficits: cognitive communication and anomia    Progress towards goals / Updated goals:  1. Pt/family will demo use of semantic feature analysis in structured tasks to reduce anomia and communication breakdowns with 90% acc given min-mod cue in 3/3 sessions. 8/11/22: ST demo'd circumlocution during episode of anomia, patient later independently circumlocuted with 100% acc during episode of anomia     2. Pt will recall x3 comp strategies and apply as deemed appropriate in order to complete delayed recall  Verbal and visual tasks  given a  3-5 minute delay with 75% acc given min cues to restore/maintain STM for safety and independence. 3. Pt will implement/utilize a memory book and/or calendar to promote orientation x4 and recall personal information/ recent events for x1-2 weeks with 75% acc given Tl to enhance communication competence, dignity,  and independence within her environment. 8/9/22: Patient brought memory log and reported that it makes a difference during IADLs. She reported that she still has to remind herself to put it back in the same place at home everyday. ST recommended visual to help  4. Pt will utilize comp strategies to complete immediate recall tasks presented verbally with 85% acc given min-mod cues to restore/maintain safety and independence within her environment and for activities of choice. 5. Pt will complete everyday problem solving tasks with 75% acc given modA to restore/maintain safety/indpendence within her environment. 6. Pt will complete mental flexibility tasks with 80% acc given min-modA to improve alternating attention, topic maintenance, and safety with ADLs. 8/11/22: Category grid ~60% acc independently  Add concrete category member to list ~40% acc independently, 100% given modA.  Demonstrated use of circumlocution during task independently    PLAN  [x]  Continue plan of care  []  Modify Goals/Treatment Plan      []  Discharge due to:  [] Other:    Marsha Porter, SLP 8/11/2022  4:37 PM    Future Appointments   Date Time Provider Den Peterson 8/16/2022  3:45 PM Irena Pascual, SLP [facility]   8/18/2022  3:45 PM CIPRIANO Morrison [facility]   8/31/2022  2:00 PM Eduardo Duenas NP [facility] BS AMB   9/26/2022  4:15 PM Misty Bashir NP [facility] BS AMB

## 2022-08-15 ENCOUNTER — TELEPHONE (OUTPATIENT)
Dept: NEUROLOGY | Age: 77
End: 2022-08-15

## 2022-08-16 ENCOUNTER — HOSPITAL ENCOUNTER (OUTPATIENT)
Dept: PHYSICAL THERAPY | Age: 77
Discharge: HOME | End: 2022-08-16
Payer: MEDICARE

## 2022-08-16 PROCEDURE — 92507 TX SP LANG VOICE COMM INDIV: CPT

## 2022-08-16 PROCEDURE — 97129 THER IVNTJ 1ST 15 MIN: CPT

## 2022-08-16 NOTE — PROGRESS NOTES
[facility]  [place]  [address] [facility]  [phone] fax    Continued Plan of Care/ Re-certification for Speech Therapy Services    Patient name: Meir Maldonado Start of Care: 22   Referral source: Lorena Beck NP : 1945   Medical/Treatment Diagnosis: Cognitive communication deficit [R41.841]  Payor: [place] MEDICARE / Plan: [place] MEDICARE PART A & B / Product Type: Medicare /  Onset Date:~1 year ago     Prior Hospitalization: see medical history Provider#: [id number]   Medications: Verified on Patient Summary List    Comorbidities: glaucoma; anxiety; prediabetes; CKD III; visual hallucinations; anemia; dementia with behavioral disturbance   Prior Level of Function: All aspects of speech/ language, cognition, voice and swallowing WNLs, per pt report                                      Visits from Start of Care: 5    Missed Visits: 2    The Plan of Care and following information is based on the patient's current status:  Goal:Pt/family will demo use of semantic feature analysis in structured tasks to reduce anomia and communication breakdowns with 90% acc given min-mod cue in 3/3 sessions. Status at last note/certification: New goal, word finding impaired  Current Status: not met; improved; Min cue for circumlocution during episodes of anomia in structured task. Fill in blank task 100% acc ivan. Structured word finding tasks completed ~60-80% acc IVAN. Goal:Pt will recall x3 comp strategies and apply as deemed appropriate in order to complete delayed recall  Verbal and visual tasks  given a  3-5 minute delay with 75% acc given min cues to restore/maintain STM for safety and independence.    Status at last note/certification: new goal, impaired STM  Current Status: not met; inititiated with WRAP memory strategies, will continue    Goal: Pt will implement/utilize a memory book and/or calendar to promote orientation x4 and recall personal information/ recent events for x1-2 weeks with 75% acc given Tl to enhance communication competence, dignity,  and independence within her environment. Status at last note/certification: New goal, STM  Current Status: not met; improved; patient educated on STM strategies and bringing memory log with evidence of use to 2/3 sessions. Some difficulty reported keeping it in same place. Will provide with visuals per patient request.    Goal: Pt will utilize comp strategies to complete immediate recall tasks presented verbally with 85% acc given min-mod cues to restore/maintain safety and independence within her environment and for activities of choice. Status at last note/certification: new goal, safety  Current Status: not met; initiated, will continue    Goal: Pt will complete mental flexibility tasks with 80% acc given min-modA to improve alternating attention, topic maintenance, and safety with ADLs  Status at last note/certification: new goal, cognitive communication  Current Status: not met; improved, alternating attention tasks including sorting and categorization  grids completed with Tl. Goal:Pt will complete everyday problem solving tasks with 75% acc given modA to restore/maintain safety/indpendence within her environment. Status at last note/certification: New goal, reasoning  Current Status: not met; initiated, will continue    Key functional changes: Improvement in use of STM strategies, word finding in structured contexts. Problems/ barriers to goal attainment: None     Problem List:      []aphasic  []dysarthric  []dysphagic       []alexic  []agraphic  []dysphonia       []dysfluency  [x]Cognitive-Linguistic Disorder       []other     Treatment Plan: Cognitive/Language Treatment and Patient Education    Patient Goal (s) has been updated and includes: NA     Goals for this certification period to be accomplished in 4 weeks:  1.  Pt/family will demo use of semantic feature analysis in structured tasks to reduce anomia and communication breakdowns with 90% acc given min-mod cue in 3/3 sessions. 2. Pt will recall x3 comp strategies and apply as deemed appropriate in order to complete delayed recall  Verbal and visual tasks  given a  3-5 minute delay with 75% acc given min cues to restore/maintain STM for safety and independence. 3. Pt will implement/utilize a memory book and/or calendar to promote orientation x4 and recall personal information/ recent events for x1-2 weeks with 75% acc given Tl to enhance communication competence, dignity,  and independence within her environment. 4. Pt will utilize comp strategies to complete immediate recall tasks presented verbally with 85% acc given min-mod cues to restore/maintain safety and independence within her environment and for activities of choice. 5. Pt will complete everyday problem solving tasks with 75% acc given modA to restore/maintain safety/indpendence within her environment. 6. Pt will complete mental flexibility tasks with 80% acc given min-modA to improve alternating attention, topic maintenance, and safety with ADLs. Frequency / Duration: Patient to be seen 2 times per week for 4 weeks:    Assessment/Recommendations: Patient is making steady gains in word finding in structured contexts and use of compensatory strategies for STM. She will continue to benefit from skilled therapy address remaining deficits in cognitive communication. Certification Period: 8/16/22-9/14/22    CIPRIANO Mcadams 8/16/2022 5:08 PM    _____________________________________________________________________    I certify that the above Therapy Services are being furnished while the patient is under my care. I agree with the treatment plan and certify that this therapy is necessary. []  I have read the above report and request that my patient continue as recommended.   []  I have read the above report and request that my patient continue therapy with the following changes/special instructions:________________________________________  []I have read the above report and request that my patient be discharged from therapy.     Physician's Signature:____________Date:_________TIME:________     Zay Jon NP  ** Signature, Date and Time must be completed for valid certification **      Please sign and return to [facility]   [address] [facility]  [phone] fax

## 2022-08-16 NOTE — PROGRESS NOTES
ST DAILY TREATMENT NOTE    Patient Name: Johan Emerson  Date:2022  : 1945  [x]  Patient  Verified  Payor: Payor: [place] MEDICARE / Plan: [place] MEDICARE PART A & B / Product Type: Medicare /   In time: 151  Out time: 425  Total Treatment Time (min): 40  Visit #: 6 of 8  PN Due 22  Treatment Diagnosis: Cognitive communication deficit [R41.841]    SUBJECTIVE  Pain Level (0-10 scale): Any medication changes, allergies to medications, adverse drug reactions, diagnosis change, or new procedure performed?: [x] No    [] Yes (see summary sheet for update)    Subjective functional status/changes:   [x] No changes reported  Patient reported some difficulty with 6x6 sudoku. Did not bring glasses to session    OBJECTIVE  Treatment provided includes:  Increase/Improve:  []  Voice Quality [x]  Cognitive Linguistic Skills []  Laryngeal/Pharyngeal Exercises   []  Vocal Loudness []  Reading Comprehension []  Swallowing Skills    []  Vocal Cord Function []  Auditory Comprehension []  Oral Motor Skills   []  Resonance []  Writing Skills []  Compensatory strategies    []  Speech Intelligibility []  Expressive Language []  Attention   []  Breath Support/Coord.  []  Receptive language []  Memory   []  Articulation []  Safety Awareness []    []  Fluency [x]  Word Retrieval []        Treatment Provided:  Word deduction  -Add item to list  -Complete sentences    Patient/Caregiver  Education: [x] Review HEP      HEP/Handouts given: Category grid    Pain Level (0-10 scale) post treatment: Not assessed    ASSESSMENT     [x]   Improving appropriately and progressing toward goals  []   Improving slowly and progressing toward goals  []   Approximating goals/maximum potential  [x]   Continues to benefit from skilled therapy to address remaining functional deficits  []   Not progressing toward goals and plan of care will be adjusted    Patient will continue to benefit from skilled therapy to address remaining functional deficits: cognitive communication and anomia    Progress towards goals / Updated goals:  1. Pt/family will demo use of semantic feature analysis in structured tasks to reduce anomia and communication breakdowns with 90% acc given min-mod cue in 3/3 sessions. 8/16/22: Min cue for circumlocution during episodes of anomia in structured task. Fill in blank task 100% acc ivan. 2. Pt will recall x3 comp strategies and apply as deemed appropriate in order to complete delayed recall  Verbal and visual tasks  given a  3-5 minute delay with 75% acc given min cues to restore/maintain STM for safety and independence. 3. Pt will implement/utilize a memory book and/or calendar to promote orientation x4 and recall personal information/ recent events for x1-2 weeks with 75% acc given Tl to enhance communication competence, dignity,  and independence within her environment. 4. Pt will utilize comp strategies to complete immediate recall tasks presented verbally with 85% acc given min-mod cues to restore/maintain safety and independence within her environment and for activities of choice. 5. Pt will complete everyday problem solving tasks with 75% acc given modA to restore/maintain safety/indpendence within her environment. 6. Pt will complete mental flexibility tasks with 80% acc given min-modA to improve alternating attention, topic maintenance, and safety with ADLs.    8/16/22: Some initial difficulty with word deduction given three clues, mod-max cues needed for redirection  Add concrete category member to list 76% acc ivan, 100% given min-modA  PLAN  [x]  Continue plan of care  []  Modify Goals/Treatment Plan      []  Discharge due to:  [] Other:    CIPRIANO Francois 8/16/2022  4:37 PM    Future Appointments   Date Time Provider Den Peterson   8/18/2022  3:45 PM Maykel Quick, CIPRIANO [facility]   8/31/2022  2:00 PM Lay Forbes NP [facility] BS AMB   9/26/2022  4:15 PM Aaron Dominguez NP [facility] BS AMB

## 2022-08-18 ENCOUNTER — HOSPITAL ENCOUNTER (OUTPATIENT)
Dept: PHYSICAL THERAPY | Age: 77
Discharge: HOME | End: 2022-08-18
Payer: MEDICARE

## 2022-08-18 PROCEDURE — 97129 THER IVNTJ 1ST 15 MIN: CPT

## 2022-08-18 PROCEDURE — 92507 TX SP LANG VOICE COMM INDIV: CPT

## 2022-08-18 NOTE — PROGRESS NOTES
ST DAILY TREATMENT NOTE    Patient Name: Flavio Phoenix  Date:2022  : 1945  [x]  Patient  Verified  Payor: Payor: [place] MEDICARE / Plan: [place] MEDICARE PART A & B / Product Type: Medicare /   In time: 341  Out time: 425  Total Treatment Time (min): 35  Visit #: 2 of 8  PN Due 22  Treatment Diagnosis: Cognitive communication deficit [R41.841]    SUBJECTIVE  Pain Level (0-10 scale): Any medication changes, allergies to medications, adverse drug reactions, diagnosis change, or new procedure performed?: [x] No    [] Yes (see summary sheet for update)    Subjective functional status/changes:   [x] No changes reported  Patient reported that she started new medication, Losartan and Donepezil. OBJECTIVE  Treatment provided includes:  Increase/Improve:  []  Voice Quality [x]  Cognitive Linguistic Skills []  Laryngeal/Pharyngeal Exercises   []  Vocal Loudness []  Reading Comprehension []  Swallowing Skills    []  Vocal Cord Function []  Auditory Comprehension []  Oral Motor Skills   []  Resonance []  Writing Skills []  Compensatory strategies    []  Speech Intelligibility []  Expressive Language []  Attention   []  Breath Support/Coord.  []  Receptive language []  Memory   []  Articulation []  Safety Awareness []    []  Fluency [x]  Word Retrieval []        Treatment Provided:      Patient/Caregiver  Education: [x] Review HEP      HEP/Handouts given: Category grid    Pain Level (0-10 scale) post treatment: Not assessed    ASSESSMENT     [x]   Improving appropriately and progressing toward goals  []   Improving slowly and progressing toward goals  []   Approximating goals/maximum potential  [x]   Continues to benefit from skilled therapy to address remaining functional deficits  []   Not progressing toward goals and plan of care will be adjusted    Patient will continue to benefit from skilled therapy to address remaining functional deficits: cognitive communication and anomia    Progress towards goals / Updated goals:  1. Pt/family will demo use of semantic feature analysis in structured tasks to reduce anomia and communication breakdowns with 90% acc given min-mod cue in 3/3 sessions. 8/18/22: Graded word finding/expressive language tasks  ID category member given initial phoneme 100% acc ivan  Add concrete category member to list. 100% acc ivan    2. Pt will recall x3 comp strategies and apply as deemed appropriate in order to complete delayed recall  Verbal and visual tasks  given a  3-5 minute delay with 75% acc given min cues to restore/maintain STM for safety and independence. 8/18/22: Immediate recall visual information 90% given two trials. 3. Pt will implement/utilize a memory book and/or calendar to promote orientation x4 and recall personal information/ recent events for x1-2 weeks with 75% acc given Tl to enhance communication competence, dignity,  and independence within her environment. 4. Pt will utilize comp strategies to complete immediate recall tasks presented verbally with 85% acc given min-mod cues to restore/maintain safety and independence within her environment and for activities of choice. 5. Pt will complete everyday problem solving tasks with 75% acc given modA to restore/maintain safety/indpendence within her environment. 6. Pt will complete mental flexibility tasks with 80% acc given min-modA to improve alternating attention, topic maintenance, and safety with ADLs. 8/18/22: Sequencing: scaffolding/fading cues needed to order items by intensity/degree/chronological position.  Reached independence and maintained for last ~70%  PLAN  [x]  Continue plan of care  []  Modify Goals/Treatment Plan      []  Discharge due to:  [] Other:    Shahid Bennett, CIPRIANO 8/18/2022  4:37 PM    Future Appointments   Date Time Provider Den Peterson   8/31/2022  2:00 PM Derek Burnett NP [facility] BS AMB   9/26/2022  4:15 PM Mike Priest NP [facility] BS AMB

## 2022-08-26 ENCOUNTER — HOSPITAL ENCOUNTER (OUTPATIENT)
Dept: PHYSICAL THERAPY | Age: 77
Discharge: HOME | End: 2022-08-26
Payer: MEDICARE

## 2022-08-26 PROCEDURE — 92507 TX SP LANG VOICE COMM INDIV: CPT

## 2022-08-26 NOTE — PROGRESS NOTES
ST DAILY TREATMENT NOTE    Patient Name: Maryann Guzmán  Date:2022  : 1945  [x]  Patient  Verified  Payor: Payor: [place] MEDICARE / Plan: VA MEDICARE PART A & B / Product Type: Medicare /   In time:1115  Out time:1150  Total Treatment Time (min): 35  Visit #: 3 of 8    Treatment Diagnosis: Cognitive communication deficit [R41.841]    SUBJECTIVE  Pain Level (0-10 scale): 0  Any medication changes, allergies to medications, adverse drug reactions, diagnosis change, or new procedure performed?: [] No    [] Yes (see summary sheet for update)    Subjective functional status/changes:   [] No changes reported  Pt subjectively states therapy has been going well and she has been enjoying what she has been taught. OBJECTIVE  Treatment provided includes:  Increase/Improve:  []  Voice Quality [x]  Cognitive Linguistic Skills []  Laryngeal/Pharyngeal Exercises   []  Vocal Loudness []  Reading Comprehension []  Swallowing Skills    []  Vocal Cord Function []  Auditory Comprehension []  Oral Motor Skills   []  Resonance []  Writing Skills [x]  Compensatory strategies    []  Speech Intelligibility [x]  Expressive Language []  Attention   []  Breath Support/Coord.  []  Receptive language [x]  Memory   []  Articulation []  Safety Awareness []    []  Fluency [x]  Word Retrieval []        Treatment Provided:  - Pt Education  - Semantic Feature Analysis  - Memory/Recall   - Compensatory Strategies    Patient/Caregiver  Education: [x] Review HEP      HEP/Handouts given:     Pain Level (0-10 scale) post treatment: 0    ASSESSMENT   [x]   Improving appropriately and progressing toward goals  []   Improving slowly and progressing toward goals  []   Approximating goals/maximum potential  []   Continues to benefit from skilled therapy to address remaining functional deficits  []   Not progressing toward goals and plan of care will be adjusted    Patient will continue to benefit from skilled therapy to address remaining functional deficits: cognitive communication and anomia. Progress towards goals / Updated goals:  1. Pt/family will demo use of semantic feature analysis in structured tasks to reduce anomia and communication breakdowns with 90% acc given min-mod cue in 3/3 sessions. 8/26/22: Semantic Feature Analysis used to name concrete objects. Tl provided, pt benefited from verbal cues, ~80% accuracy. 2. Pt will recall x3 comp strategies and apply as deemed appropriate in order to complete delayed recall  Verbal and visual tasks  given a  3-5 minute delay with 75% acc given min cues to restore/maintain STM for safety and independence. 8/26/22: Delayed recall of 10 minutes provided 5 items and taught compensatory memory strategy of WRAP. Pt ivan recalled 3/5 items (60% accuracy) , when provided verbal cues pt recalled 5/5.     3. Pt will implement/utilize a memory book and/or calendar to promote orientation x4 and recall personal information/ recent events for x1-2 weeks with 75% acc given Tl to enhance communication competence, dignity,  and independence within her environment. 8/26/22: Education provided on utilizing notebook/planner and \"write it down\" strategy to reference important information. 4. Pt will utilize comp strategies to complete immediate recall tasks presented verbally with 85% acc given min-mod cues to restore/maintain safety and independence within her environment and for activities of choice. 8/26/22: WRAP educated and implemented. Immediate recall of 5 unrelated items: 100% following instruction of memory strategy. 5. Pt will complete everyday problem solving tasks with 75% acc given modA to restore/maintain safety/indpendence within her environment. 8/26/22: Not targeted      6.  Pt will complete mental flexibility tasks with 80% acc given min-modA to improve alternating attention, topic maintenance, and safety with ADLs.  8/26/22: Not targeted     PLAN  [x]  Continue plan of care  []  Modify Goals/Treatment Plan      []  Discharge due to:  [] Other:    Jana Mcgrath, CIPRIANO 8/26/2022  11:15 AM    Future Appointments   Date Time Provider Den Peterson   8/31/2022  2:00 PM Anita Kim NP [facility] BS AMB   9/2/2022  3:00 PM Abigail Escalera, CIPRIANO [facility]   9/13/2022 12:00 PM Toula Dundee, [address] [facility], SLP [facility]   9/15/2022 12:00 PM Toula Dundee, [address] [facility], SLP [facility]   9/20/2022  2:15 PM Toula Dundee, [address] [facility], SLP [facility]   9/22/2022 12:00 PM CIPRIANO Arias [facility]   9/26/2022  4:15 PM Shanita Rayo NP [facility] BS AMB   9/27/2022  2:15 PM Toula Dundee, [address] [facility], SLP [facility]   9/29/2022  2:15 PM Toula Dundee, [address] [facility], SLP [facility]   10/4/2022  3:45 PM Toula Dundee, [address] [facility], SLP [facility]   10/6/2022  3:45 PM Toula Dundee, [address] [facility], SLP [facility]   10/11/2022  3:00 PM Toula Dundee, [address] [facility], SLP [facility]   10/13/2022  3:45 PM Toula Dundee, [address] [facility], SLP [facility]   10/18/2022  3:00 PM Toula Dundee, [address] [facility], SLP [facility]   10/20/2022  3:45 PM Toula Dundee, [address] [facility], SLP [facility]

## 2022-08-31 ENCOUNTER — OFFICE VISIT (OUTPATIENT)
Dept: NEUROLOGY | Age: 77
End: 2022-08-31
Payer: MEDICARE

## 2022-08-31 VITALS
HEART RATE: 97 BPM | RESPIRATION RATE: 18 BRPM | SYSTOLIC BLOOD PRESSURE: 130 MMHG | BODY MASS INDEX: 24.01 KG/M2 | HEIGHT: 67 IN | DIASTOLIC BLOOD PRESSURE: 80 MMHG | OXYGEN SATURATION: 98 % | WEIGHT: 153 LBS

## 2022-08-31 DIAGNOSIS — R44.1 VISUAL HALLUCINATIONS: ICD-10-CM

## 2022-08-31 DIAGNOSIS — R41.89 COGNITIVE DECLINE: ICD-10-CM

## 2022-08-31 DIAGNOSIS — F03.91 DEMENTIA WITH BEHAVIORAL DISTURBANCE, UNSPECIFIED DEMENTIA TYPE: Primary | ICD-10-CM

## 2022-08-31 DIAGNOSIS — I67.9 CEREBRAL VASCULAR DISEASE: ICD-10-CM

## 2022-08-31 PROCEDURE — 1123F ACP DISCUSS/DSCN MKR DOCD: CPT | Performed by: NURSE PRACTITIONER

## 2022-08-31 PROCEDURE — G8432 DEP SCR NOT DOC, RNG: HCPCS | Performed by: NURSE PRACTITIONER

## 2022-08-31 PROCEDURE — 1090F PRES/ABSN URINE INCON ASSESS: CPT | Performed by: NURSE PRACTITIONER

## 2022-08-31 PROCEDURE — G8536 NO DOC ELDER MAL SCRN: HCPCS | Performed by: NURSE PRACTITIONER

## 2022-08-31 PROCEDURE — 99215 OFFICE O/P EST HI 40 MIN: CPT | Performed by: NURSE PRACTITIONER

## 2022-08-31 PROCEDURE — G8427 DOCREV CUR MEDS BY ELIG CLIN: HCPCS | Performed by: NURSE PRACTITIONER

## 2022-08-31 PROCEDURE — G8420 CALC BMI NORM PARAMETERS: HCPCS | Performed by: NURSE PRACTITIONER

## 2022-08-31 PROCEDURE — G8399 PT W/DXA RESULTS DOCUMENT: HCPCS | Performed by: NURSE PRACTITIONER

## 2022-08-31 PROCEDURE — 3017F COLORECTAL CA SCREEN DOC REV: CPT | Performed by: NURSE PRACTITIONER

## 2022-08-31 PROCEDURE — G0463 HOSPITAL OUTPT CLINIC VISIT: HCPCS | Performed by: NURSE PRACTITIONER

## 2022-08-31 PROCEDURE — 1101F PT FALLS ASSESS-DOCD LE1/YR: CPT | Performed by: NURSE PRACTITIONER

## 2022-08-31 RX ORDER — ASPIRIN 81 MG/1
81 TABLET ORAL DAILY
Qty: 30 TABLET | Refills: 6 | Status: SHIPPED | OUTPATIENT
Start: 2022-08-31

## 2022-08-31 RX ORDER — DONEPEZIL HYDROCHLORIDE 10 MG/1
10 TABLET, FILM COATED ORAL
Qty: 30 TABLET | Refills: 5 | Status: SHIPPED | OUTPATIENT
Start: 2022-08-31

## 2022-08-31 NOTE — PROGRESS NOTES
[facility]  [address], [address], [facility], [address]  [address] [place] Chris Núñez, [address]  Office:  [phone]  Fax: [phone]  Chief Complaint   Patient presents with    Dementia       HPI: Darren España presents in follow-up for memory loss. Feels last seen here on 7/6 of 2022. MRI of the brain was ordered and she was referred to speech therapy. Donepezil 5 mg nightly was started. She has seen Dr. Rufus Sapp in neuropsychological evaluation. MRI brain reported mild generalized atrophy pattern may fit clinically with the diagnosis of mild cognitive impairment versus Alzheimer's. There is also significant small vessel disease of vascular burden an element of vascular dementia may play a role. No acute infarct or bleed. Urinalysis reported moderate leukocyte esterase, few bacteria. Urine culture no growth. Vitamin B12 level normal, 455 and MMA normal.  Folate normal.    She presents in follow-up. She is with her son. We discussed results of testing. She started Aricept. Unsure if any benefit yet but she does note that she seems to be losing train of thought less in conversation. It is occurring less frequently. She is also in speech therapy which may be helping for this. She finds speech therapy is helpful. She does crosswords and word finds which her son believes is helping. She does her homework from speech therapy. Denies side effects on Aricept. Endorses sleeping okay. Denies GI changes or diarrhea. She continues to have hallucinations but her son believes that she is generally fine but they occur mostly late at night. She reports that she sees people in the room. They do not talk to her. They do not seem to scare her like they used to. Denies safety concerns or dangerous behaviors. She loves to read. She has been reading books and discussing them with friends.   She is artistic and likes to paint but has not really been painting outside lately. Her son believes she is doing better than 3 months ago. Past Medical History:   Diagnosis Date    Anxiety     Arrhythmia 2011    Negative Stress test    Arthritis     Carotid artery stenosis 2009    <50%    Gestational diabetes     Glaucoma     Hypercholesterolemia     Hypertension        Past Surgical History:   Procedure Laterality Date    HX CHOLECYSTECTOMY  1990    gallstones    HX HYSTERECTOMY  1990    total hysterectomy       Current Outpatient Medications   Medication Sig Dispense Refill    aspirin delayed-release 81 mg tablet Take 1 Tablet by mouth daily. 30 Tablet 6    donepeziL (ARICEPT) 10 mg tablet Take 1 Tablet by mouth nightly. 30 Tablet 5    amLODIPine (NORVASC) 10 mg tablet Take 1 Tablet by mouth in the morning. 90 Tablet 0    losartan (COZAAR) 25 mg tablet Take 1 Tablet by mouth in the morning. 90 Tablet 0    atorvastatin (LIPITOR) 10 mg tablet Take 1 Tablet by mouth daily. Indications: excessive fat in the blood 90 Tablet 0    melatonin 10 mg subl Take 20 mg by mouth nightly. (Patient not taking: Reported on 8/31/2022)          Allergies   Allergen Reactions    Codeine Nausea Only       Social History     Tobacco Use    Smoking status: Never    Smokeless tobacco: Never   Vaping Use    Vaping Use: Never used   Substance Use Topics    Alcohol use: Yes     Comment: Socially       Family History   Problem Relation Age of Onset    OSTEOARTHRITIS Mother     Dementia Mother     Hypertension Mother     Coronary Art Dis Mother        Review of Systems:  GENERAL: Denies fever or fatigue  CARDIAC: No CP or SOB  PULMONARY: No cough or SOB  MUSCULOSKELETAL: No new joint pain  NEURO: SEE HPI    Physical Examination:  Visit Vitals  /80   Pulse 97   Resp 18   Ht 5' 7\" (1.702 m)   Wt 69.4 kg (153 lb)   SpO2 98%   BMI 23.96 kg/m²       Alert, in NAD. Heart is regular. Oriented x3. Correct place, month, year. Speech is fluent but some word finding difficulties. Speech clear.  Son helped with history. EOMs are full, PERRL, VFFTC, no nystagmus. Mask on. Strength and tone are normal. No drift of the bilateral upper extremities. Fine finger movements symmetrical. FNF intact bilaterally. Steady gait. Impression/Plan: This is a 30-year-old right-handed female who presents in follow-up for dementia. She completed neuropsychological evaluation in April 2022 with diagnostic impressions of dementia with behavioral disturbance and behavior concern in adult. At the last visit she started Aricept 5 mg nightly and initiated speech therapy. They notice improvements. She noticed better communication and losing train of thought less during conversation. She continues to have hallucinations which occur at nighttime but they do not seem to scare her anymore or be particularly disturbing. We will increase the Aricept to 10 mg nightly. Continue speech therapy. We discussed the results of MRI of the brain. MRI brain reported 'mild generalized atrophy pattern may fit clinically with a diagnosis of MCI versus Alzheimer's'. Also noted 'a significant small vessel disease vascular burden, an element of vascular dementia may play a role'. Recommended taking aspirin 81 mg daily preventatively. She was was on this in the past in 2009 per records in [address] [place] [address] Loop but does not remember. Denies history of bleeding issues. Advised good management of vascular risk factors including lipid management and blood pressure control. She was started on atorvastatin after last lipid panel and she will follow up with PCP for vascular risk factor management. Encouraged to continue to stay engaged in activities to stimulate the mind as well as engaging in exercise. Follow up in 2 months. Diagnoses and all orders for this visit:    1. Dementia with behavioral disturbance, unspecified dementia type (HCC)  -     donepeziL (ARICEPT) 10 mg tablet; Take 1 Tablet by mouth nightly. 2. Cerebral vascular disease    3. Cognitive decline  -     donepeziL (ARICEPT) 10 mg tablet; Take 1 Tablet by mouth nightly. 4. Visual hallucinations    Other orders  -     aspirin delayed-release 81 mg tablet; Take 1 Tablet by mouth daily. Total time 40 minutes with 25 minutes spent in counseling. Signed By: Javon Grimm NP        PLEASE NOTE:   Portions of this document may have been produced using voice recognition software. Unrecognized errors in transcription may be present.

## 2022-09-02 ENCOUNTER — HOSPITAL ENCOUNTER (OUTPATIENT)
Dept: PHYSICAL THERAPY | Age: 77
Discharge: HOME | End: 2022-09-02
Payer: MEDICARE

## 2022-09-02 PROCEDURE — 92507 TX SP LANG VOICE COMM INDIV: CPT

## 2022-09-02 NOTE — PROGRESS NOTES
ST DAILY TREATMENT NOTE    Patient Name: Kathleen Plascencia  Date:2022  : 1945  [x]  Patient  Verified  Payor: Payor: [place] MEDICARE / Plan: VA MEDICARE PART A & B / Product Type: Medicare /   In time: 6002  Out time:1548  Total Treatment Time (min): 38  Visit #: 4 of 8    Treatment Diagnosis: Cognitive communication deficit [R41.841]    SUBJECTIVE  Pain Level (0-10 scale): 0  Any medication changes, allergies to medications, adverse drug reactions, diagnosis change, or new procedure performed?: [x] No    [] Yes (see summary sheet for update)    Subjective functional status/changes:   [] No changes reported   Pt subjectively stated, \"last week was a rough week for me, and I am not as prepared as I usually am for today. \"    OBJECTIVE  Treatment provided includes:  Increase/Improve:  []  Voice Quality [x]  Cognitive Linguistic Skills []  Laryngeal/Pharyngeal Exercises   []  Vocal Loudness []  Reading Comprehension []  Swallowing Skills    []  Vocal Cord Function []  Auditory Comprehension []  Oral Motor Skills   []  Resonance []  Writing Skills [x]  Compensatory strategies    []  Speech Intelligibility [x]  Expressive Language []  Attention   []  Breath Support/Coord.  [x]  Receptive language [x]  Memory   []  Articulation []  Safety Awareness [x] Pt Education   []  Fluency [x]  Word Retrieval []        Treatment Provided:  - memory compensatory strategies  - problem solving  - pt education    Patient/Caregiver  Education: [x] Review HEP      HEP/Handouts given: Semantic Feature Analysis Chart    Pain Level (0-10 scale) post treatment: 0    ASSESSMENT   [x]   Improving appropriately and progressing toward goals  []   Improving slowly and progressing toward goals  []   Approximating goals/maximum potential  [x]   Continues to benefit from skilled therapy to address remaining functional deficits  []   Not progressing toward goals and plan of care will be adjusted    Patient will continue to benefit from skilled therapy to address remaining functional deficits: cognitive linguistic deficits, expressive language. Progress towards goals / Updated goals:    1. Pt/family will demo use of semantic feature analysis in structured tasks to reduce anomia and communication breakdowns with 90% acc given min-mod cue in 3/3 sessions. 9/2/22 - SFA used for home improvement terms: ModA. 80% accuracy; verbal cues required for thought organization. 2. Pt will recall x3 comp strategies and apply as deemed appropriate in order to complete delayed recall  Verbal and visual tasks  given a  3-5 minute delay with 75% acc given min cues to restore/maintain STM for safety and independence. 9/2/22: Pt ivan recalled 4/5 items (80% accuracy) , when provided verbal cues pt recalled 5/5. Pt benefits from verbal cues for thought organization and topic maintenance. 3. Pt will implement/utilize a memory book and/or calendar to promote orientation x4 and recall personal information/ recent events for x1-2 weeks with 75% acc given Tl to enhance communication competence, dignity,  and independence within her environment. 9/2/22 - education provided. 4. Pt will utilize comp strategies to complete immediate recall tasks presented verbally with 85% acc given min-mod cues to restore/maintain safety and independence within her environment and for activities of choice. 9/2/22: WRAP educated and implemented. Immediate recall of 5 items: 100% following instruction of memory strategy. Pt required visual/verbal cues, and implemented the strategy of \"write it down\" to recall information presented. 5. Pt will complete everyday problem solving tasks with 75% acc given modA to restore/maintain safety/indpendence within her environment. 9/2/22 - problem solving \"home scenarios\" - 80% accuracy Mod A. Pt provided appropriate solutions to everyday home scenarios, but required minimal verbal cues to stay on topic, and organize thoughts.      6. Pt will complete mental flexibility tasks with 80% acc given min-modA to improve alternating attention, topic maintenance, and safety with ADLs.  8/26/22: Not targeted     PLAN  [x]  Continue plan of care  []  Modify Goals/Treatment Plan      []  Discharge due to:  [] Other:    CIPRIANO Hill 9/2/2022  3:09 PM    Future Appointments   Date Time Provider Den Peterson   9/14/2022  1:30 PM [facility], SLP [facility]   9/15/2022 12:00 PM Babak Varner [address] [place] [address], SLP [facility]   9/20/2022  2:15 PM Babak Varner [address] [place] [address], SLP [facility]   9/22/2022 12:00 PM Babak Varner [address] [place] [address], SLP [facility]   9/26/2022  4:15 PM Michael Carey NP [facility] BS AMB   9/27/2022  2:15 PM Babak Varner [address] [place] [address], SLP [facility]   9/29/2022  2:15 PM Babak Varner [address] [place] [address], SLP [facility]   10/4/2022  3:45 PM Babak Varner [address] [place] [address], SLP [facility]   10/6/2022  3:45 PM Irena Pascual [facility], SLP [facility]   10/11/2022  3:00 PM Irena Pascual [facility], SLP [facility]   10/13/2022  3:45 PM Irena Pascual [facility], SLP [facility]   10/18/2022  3:00 PM MarcelaAdena [facility], SLP [facility]   10/20/2022  3:45 PM MarcelaAdena [facility], SLP [facility]   11/1/2022  2:00 PM Jim Vasquez NP [facility] BS AMB

## 2022-09-13 ENCOUNTER — HOSPITAL ENCOUNTER (OUTPATIENT)
Dept: LAB | Age: 77
Discharge: HOME | End: 2022-09-13
Payer: MEDICARE

## 2022-09-13 ENCOUNTER — APPOINTMENT (OUTPATIENT)
Dept: PHYSICAL THERAPY | Age: 77
End: 2022-09-13
Payer: MEDICARE

## 2022-09-13 DIAGNOSIS — E78.5 HYPERLIPIDEMIA LDL GOAL <100: ICD-10-CM

## 2022-09-13 LAB
ALBUMIN SERPL-MCNC: 3.7 G/DL (ref 3.4–5)
ALBUMIN/GLOB SERPL: 1.2 {RATIO} (ref 0.8–1.7)
ALP SERPL-CCNC: 80 U/L (ref 45–117)
ALT SERPL-CCNC: 37 U/L (ref 13–56)
ANION GAP SERPL CALC-SCNC: 4 MMOL/L (ref 3–18)
AST SERPL-CCNC: 24 U/L (ref 10–38)
BILIRUB SERPL-MCNC: 0.6 MG/DL (ref 0.2–1)
BUN SERPL-MCNC: 20 MG/DL (ref 7–18)
BUN/CREAT SERPL: 17 (ref 12–20)
CALCIUM SERPL-MCNC: 9.7 MG/DL (ref 8.5–10.1)
CHLORIDE SERPL-SCNC: 110 MMOL/L (ref 100–111)
CHOLEST SERPL-MCNC: 162 MG/DL
CO2 SERPL-SCNC: 29 MMOL/L (ref 21–32)
CREAT SERPL-MCNC: 1.15 MG/DL (ref 0.6–1.3)
GLOBULIN SER CALC-MCNC: 3 G/DL (ref 2–4)
GLUCOSE SERPL-MCNC: 105 MG/DL (ref 74–99)
HDLC SERPL-MCNC: 69 MG/DL (ref 40–60)
HDLC SERPL: 2.3 {RATIO} (ref 0–5)
LDLC SERPL CALC-MCNC: 80.2 MG/DL (ref 0–100)
LIPID PROFILE,FLP: ABNORMAL
POTASSIUM SERPL-SCNC: 4.5 MMOL/L (ref 3.5–5.5)
PROT SERPL-MCNC: 6.7 G/DL (ref 6.4–8.2)
SODIUM SERPL-SCNC: 143 MMOL/L (ref 136–145)
TRIGL SERPL-MCNC: 64 MG/DL (ref ?–150)
VLDLC SERPL CALC-MCNC: 12.8 MG/DL

## 2022-09-13 PROCEDURE — 80053 COMPREHEN METABOLIC PANEL: CPT

## 2022-09-13 PROCEDURE — 80061 LIPID PANEL: CPT

## 2022-09-13 PROCEDURE — 36415 COLL VENOUS BLD VENIPUNCTURE: CPT

## 2022-09-14 ENCOUNTER — HOSPITAL ENCOUNTER (OUTPATIENT)
Dept: PHYSICAL THERAPY | Age: 77
Discharge: HOME | End: 2022-09-14
Payer: MEDICARE

## 2022-09-14 PROCEDURE — 97129 THER IVNTJ 1ST 15 MIN: CPT

## 2022-09-14 PROCEDURE — 92507 TX SP LANG VOICE COMM INDIV: CPT

## 2022-09-14 NOTE — PROGRESS NOTES
ST DAILY TREATMENT NOTE    Patient Name: Deepti Mayers  Date:2022  : 1945  [x]  Patient  Verified  Payor: Payor: [address] / Plan: VA MEDICARE PART A & B / Product Type: Medicare /   In time: 135  Out time: 220  Total Treatment Time (min): 45  Visit #: 5 of 8    Treatment Diagnosis: Cognitive communication deficit [R41.841]    SUBJECTIVE  Pain Level (0-10 scale): 0  Any medication changes, allergies to medications, adverse drug reactions, diagnosis change, or new procedure performed?: [x] No    [] Yes (see summary sheet for update)    Subjective functional status/changes:   [] No changes reported   Patient stated that she felt at ease working with ST.    OBJECTIVE  Treatment provided includes:  Increase/Improve:  []  Voice Quality [x]  Cognitive Linguistic Skills []  Laryngeal/Pharyngeal Exercises   []  Vocal Loudness []  Reading Comprehension []  Swallowing Skills    []  Vocal Cord Function []  Auditory Comprehension []  Oral Motor Skills   []  Resonance []  Writing Skills [x]  Compensatory strategies    []  Speech Intelligibility [x]  Expressive Language []  Attention   []  Breath Support/Coord.  [x]  Receptive language [x]  Memory   []  Articulation []  Safety Awareness [x] Pt Education   []  Fluency [x]  Word Retrieval []        Treatment Provided:  -ID whole from parts  -list abstract category members  -add category member to list    Patient/Caregiver  Education: [x] Review HEP      HEP/Handouts given: Semantic Feature Analysis Chart    Pain Level (0-10 scale) post treatment: 0    ASSESSMENT   [x]   Improving appropriately and progressing toward goals  []   Improving slowly and progressing toward goals  []   Approximating goals/maximum potential  [x]   Continues to benefit from skilled therapy to address remaining functional deficits  []   Not progressing toward goals and plan of care will be adjusted    Patient will continue to benefit from skilled therapy to address remaining functional deficits: cognitive linguistic deficits, expressive language. Progress towards goals / Updated goals:    1. Pt/family will demo use of semantic feature analysis in structured tasks to reduce anomia and communication breakdowns with 90% acc given min-mod cue in 3/3 sessions. 9/14/22: Patient observed to ivan use circumlocution during episodes of anomia with 100% acc in 3/3 opportunites     2. Pt will recall x3 comp strategies and apply as deemed appropriate in order to complete immediate and delayed recall  Verbal and visual tasks  given a  3-5 minute delay with 75% acc given min cues to restore/maintain STM for safety and independence. 9/14/22: Patient ivan used repetition strategy during immediate recall task at word level. Given min cue, successfully used writing strategy. Acc went from ~80%-100% acc with writing strategy use. 3. Pt will implement/utilize a memory book and/or calendar to promote orientation x4 and recall personal information/ recent events for x1-2 weeks with 75% acc given Tl to enhance communication competence, dignity,  and independence within her environment. 4. Pt will complete everyday problem solving tasks with 75% acc given modA to restore/maintain safety/indpendence within her environment. 6. Pt will complete mental flexibility tasks with 80% acc given min-modA to improve alternating attention, topic maintenance, and safety with ADLs. 9/14/22: list abstract category members ~90% acc ivan; mod-maxA for adding members to list by abstract category.     PLAN  [x]  Continue plan of care  []  Modify Goals/Treatment Plan      []  Discharge due to:  [] Other:    CIPRIANO Leyva 9/14/2022  3:09 PM    Future Appointments   Date Time Provider Den Peterson   9/15/2022 12:00 PM CIPRIANO Prieto [facility]   9/20/2022  2:15 PM CIPRIANO Prieto [facility]   9/22/2022 12:00 PM CIPRIANO Prieto [facility]   9/26/2022  4:15 PM Da Turcios NP [facility] BS AMB 9/27/2022  2:15 PM Claudene Colla, [address] [place] [address], SLP [facility]   9/29/2022  2:15 PM Claudene Colla, [address] [place] [address], SLP [facility]   10/4/2022  3:45 PM Claudene Colla, [address] [place] [address], SLP [facility]   10/6/2022  3:45 PM Claudene Colla, [address] [place] [address], SLP [facility]   10/11/2022  3:00 PM Samara [address] [place] [address], SLP [facility]   10/13/2022  3:45 PM Samara [address] [place] [address], SLP [facility]   10/18/2022  3:00 PM Bolivar Corona, SLP [facility]   10/20/2022  3:45 PM Bolivar Corona, SLP [facility]   11/1/2022  2:00 PM Michelle Vasquez, NP [facility] AMB

## 2022-09-20 ENCOUNTER — HOSPITAL ENCOUNTER (OUTPATIENT)
Dept: PHYSICAL THERAPY | Age: 77
Discharge: HOME | End: 2022-09-20
Payer: MEDICARE

## 2022-09-20 PROCEDURE — 97129 THER IVNTJ 1ST 15 MIN: CPT

## 2022-09-20 PROCEDURE — 97130 THER IVNTJ EA ADDL 15 MIN: CPT

## 2022-09-20 NOTE — PROGRESS NOTES
ST DAILY TREATMENT NOTE    Patient Name: Julieta Goddard  Date:2022  : 1945  [x]  Patient  Verified  Payor: Payor: [place] MEDICARE / Plan: [place] MEDICARE PART A & B / Product Type: Medicare /   In time: 595  Out time: 300  Total Treatment Time (min): 40  Visit #: 1 of 8    Treatment Diagnosis: Cognitive communication deficit [R41.841]    SUBJECTIVE  Pain Level (0-10 scale): 0  Any medication changes, allergies to medications, adverse drug reactions, diagnosis change, or new procedure performed?: [x] No    [] Yes (see summary sheet for update)    Subjective functional status/changes:   [] No changes reported   Patient reports that her ability to remember where things are/where she left them has improved. OBJECTIVE  Treatment provided includes:  Increase/Improve:  []  Voice Quality [x]  Cognitive Linguistic Skills []  Laryngeal/Pharyngeal Exercises   []  Vocal Loudness []  Reading Comprehension []  Swallowing Skills    []  Vocal Cord Function []  Auditory Comprehension []  Oral Motor Skills   []  Resonance []  Writing Skills [x]  Compensatory strategies    []  Speech Intelligibility [x]  Expressive Language []  Attention   []  Breath Support/Coord.  [x]  Receptive language [x]  Memory   []  Articulation []  Safety Awareness [x] Pt Education   []  Fluency [x]  Word Retrieval []        Treatment Provided:  -sequencing by degree    Patient/Caregiver  Education: [x] Review HEP      HEP/Handouts given: Semantic Feature Analysis Chart    Pain Level (0-10 scale) post treatment: 0    ASSESSMENT   [x]   Improving appropriately and progressing toward goals  []   Improving slowly and progressing toward goals  []   Approximating goals/maximum potential  [x]   Continues to benefit from skilled therapy to address remaining functional deficits  []   Not progressing toward goals and plan of care will be adjusted    Patient will continue to benefit from skilled therapy to address remaining functional deficits: cognitive linguistic deficits, expressive language. Progress towards goals / Updated goals:    1. Pt/family will demo use of semantic feature analysis in structured tasks to reduce anomia and communication breakdowns with 90% acc given min-mod cue in 3/3 sessions. 2. Pt will recall x3 comp strategies and apply as deemed appropriate in order to complete immediate and delayed recall  Verbal and visual tasks  given a  3-5 minute delay with 75% acc given min cues to restore/maintain STM for safety and independence. 3. Pt will implement/utilize a memory book and/or calendar to promote orientation x4 and recall personal information/ recent events for x1-2 weeks with 75% acc given Tl to enhance communication competence, dignity,  and independence within her environment. 4. Pt will complete everyday problem solving tasks with 75% acc given modA to restore/maintain safety/indpendence within her environment. 6. Pt will complete mental flexibility tasks with 80% acc given min-modA to improve alternating attention, topic maintenance, and safety with ADLs.   9/20/22: Sequencing concepts by degree/superlative ~80% acc ivan, 100% given min-modA  PLAN  [x]  Continue plan of care  []  Modify Goals/Treatment Plan      []  Discharge due to:  [] Other:    CIPRIANO Rodriguez 9/20/2022  3:09 PM    Future Appointments   Date Time Provider Den Peterson   9/22/2022 12:00 PM CIPRIANO Rob [facility]   9/26/2022  4:15 PM General Alison, NP [facility] BS AMB   9/27/2022  2:15 PM Rozenpatricia Ferrara, [address] [place] [address], SLP [facility]   9/29/2022  2:15 PM Rozena Grippe, [address] [place] [address], SLP [facility]   10/4/2022  3:45 PM Rozena Grippe, [address] [place] [address], SLP [facility]   10/6/2022  3:45 PM Rozena Grippe, [address] [facility], SLP [facility]   10/11/2022  3:00 PM Irena Davis [facility], SLP [facility]   10/13/2022  3:45 PM Irena Davis [facility], SLP [facility]   10/18/2022  3:00 PM CIPRIANO Rob [facility]   10/20/2022  3:45 PM CIPRIANO Rob [facility]   11/1/2022  2:00 PM Iglesia Troy A, NP [facility] BS AMB

## 2022-09-22 ENCOUNTER — HOSPITAL ENCOUNTER (OUTPATIENT)
Dept: PHYSICAL THERAPY | Age: 77
Discharge: HOME | End: 2022-09-22
Payer: MEDICARE

## 2022-09-22 PROCEDURE — 92507 TX SP LANG VOICE COMM INDIV: CPT

## 2022-09-22 PROCEDURE — 97129 THER IVNTJ 1ST 15 MIN: CPT

## 2022-09-22 NOTE — PROGRESS NOTES
ST DAILY TREATMENT NOTE    Patient Name: Lindsey Duque  Date:2022  : 1945  [x]  Patient  Verified  Payor: Payor: [place] MEDICARE / Plan: VA MEDICARE PART A & B / Product Type: Medicare /   In time: 1200  Out time: 9555  Total Treatment Time (min): 45  Visit #: 2 of 8    Treatment Diagnosis: Cognitive communication deficit [R41.841]    SUBJECTIVE  Pain Level (0-10 scale): 0  Any medication changes, allergies to medications, adverse drug reactions, diagnosis change, or new procedure performed?: [x] No    [] Yes (see summary sheet for update)    Subjective functional status/changes:   [] No changes reported   Patient reported that she is practicing not leaving her purse behind. She returned homework completed with 100% acc. OBJECTIVE  Treatment provided includes:  Increase/Improve:  []  Voice Quality [x]  Cognitive Linguistic Skills []  Laryngeal/Pharyngeal Exercises   []  Vocal Loudness []  Reading Comprehension []  Swallowing Skills    []  Vocal Cord Function []  Auditory Comprehension []  Oral Motor Skills   []  Resonance []  Writing Skills [x]  Compensatory strategies    []  Speech Intelligibility [x]  Expressive Language []  Attention   []  Breath Support/Coord.  [x]  Receptive language [x]  Memory   []  Articulation []  Safety Awareness [x] Pt Education   []  Fluency [x]  Word Retrieval []        Treatment Provided:  -word deduction  -immediate recall  -ID objects by exclusion  -Semantic features analysis review    Patient/Caregiver  Education: [x] Review HEP      HEP/Handouts given: NA    Pain Level (0-10 scale) post treatment: 0    ASSESSMENT   [x]   Improving appropriately and progressing toward goals  []   Improving slowly and progressing toward goals  []   Approximating goals/maximum potential  [x]   Continues to benefit from skilled therapy to address remaining functional deficits  []   Not progressing toward goals and plan of care will be adjusted    Patient will continue to benefit from skilled therapy to address remaining functional deficits: cognitive linguistic deficits, expressive language. Progress towards goals / Updated goals:    1. Pt/family will demo use of semantic feature analysis in structured tasks to reduce anomia and communication breakdowns with 90% acc given min-mod cue in 3/3 sessions. 9/22/22: patient demo'd semantic features analysis in homeworked and reviewed with ST with 100% acc. ST restated purpose of SFA and patient verbalized understanding. 2. Pt will recall x3 comp strategies and apply as deemed appropriate in order to complete immediate and delayed recall  Verbal and visual tasks  given a  3-5 minute delay with 75% acc given min cues to restore/maintain STM for safety and independence. 9/22/22: Immediate recall 4 words ~80% acc given modA     3. Pt will implement/utilize a memory book and/or calendar to promote orientation x4 and recall personal information/ recent events for x1-2 weeks with 75% acc given Tl to enhance communication competence, dignity,  and independence within her environment. 9/22/22: Patient brought memory log to session and reported use of strategies outside of therapy. 4. Pt will complete everyday problem solving tasks with 75% acc given modA to restore/maintain safety/indpendence within her environment. 6. Pt will complete mental flexibility tasks with 80% acc given min-modA to improve alternating attention, topic maintenance, and safety with ADLs.   9/22/22: word deduction give 3 clues 100% given fading cues  ID word by exclusion of attribute ~90% acc ivan, 100% given Tl  PLAN  [x]  Continue plan of care  []  Modify Goals/Treatment Plan      []  Discharge due to:  [] Other:    CIPRIANO Randall 9/22/2022  3:09 PM    Future Appointments   Date Time Provider Den Peterson   9/26/2022  4:15 PM Estuardo Boateng NP [facility] BS AMB   9/27/2022  2:15 PM Ming Muniz [address] [place] [address], SLP [facility]   9/29/2022  2:15 PM Pat Tristan, CIPRIANO [facility] [address]   10/4/2022  3:45 PM Mercedes Joyner Oms, SLP [facility] [address]   10/6/2022  3:45 PM Mercedes Joyner Oms, SLP [facility] [address]   10/11/2022  3:00 PM Mercedes Pascual, SLP [facility] [address]   10/13/2022  3:45 PM Mercedes Pascual, SLP [facility] [address]   10/18/2022  3:00 PM Eldon Prinec SLP [facility] [address]   10/20/2022  3:45 PM Eldon Prince, SLP [facility] [address]   11/1/2022  2:00 PM Danny Vasquez, NP [facility] BS AMB

## 2022-09-26 ENCOUNTER — OFFICE VISIT (OUTPATIENT)
Dept: FAMILY MEDICINE CLINIC | Age: 77
End: 2022-09-26
Payer: MEDICARE

## 2022-09-26 VITALS
DIASTOLIC BLOOD PRESSURE: 72 MMHG | WEIGHT: 152.8 LBS | OXYGEN SATURATION: 98 % | HEIGHT: 67 IN | BODY MASS INDEX: 23.98 KG/M2 | HEART RATE: 109 BPM | RESPIRATION RATE: 16 BRPM | SYSTOLIC BLOOD PRESSURE: 123 MMHG | TEMPERATURE: 98 F

## 2022-09-26 DIAGNOSIS — Z71.2 ENCOUNTER TO DISCUSS TEST RESULTS: ICD-10-CM

## 2022-09-26 DIAGNOSIS — R73.03 PREDIABETES: ICD-10-CM

## 2022-09-26 DIAGNOSIS — N18.31 HYPERTENSIVE KIDNEY DISEASE WITH STAGE 3A CHRONIC KIDNEY DISEASE (HCC): ICD-10-CM

## 2022-09-26 DIAGNOSIS — I12.9 HYPERTENSIVE KIDNEY DISEASE WITH STAGE 3A CHRONIC KIDNEY DISEASE (HCC): ICD-10-CM

## 2022-09-26 DIAGNOSIS — Z23 NEEDS FLU SHOT: ICD-10-CM

## 2022-09-26 DIAGNOSIS — N18.31 STAGE 3A CHRONIC KIDNEY DISEASE (HCC): ICD-10-CM

## 2022-09-26 DIAGNOSIS — E78.5 HYPERLIPIDEMIA LDL GOAL <100: Primary | ICD-10-CM

## 2022-09-26 DIAGNOSIS — Z12.11 SCREEN FOR COLON CANCER: ICD-10-CM

## 2022-09-26 PROCEDURE — G8536 NO DOC ELDER MAL SCRN: HCPCS | Performed by: NURSE PRACTITIONER

## 2022-09-26 PROCEDURE — 90694 VACC AIIV4 NO PRSRV 0.5ML IM: CPT | Performed by: NURSE PRACTITIONER

## 2022-09-26 PROCEDURE — G8432 DEP SCR NOT DOC, RNG: HCPCS | Performed by: NURSE PRACTITIONER

## 2022-09-26 PROCEDURE — 1090F PRES/ABSN URINE INCON ASSESS: CPT | Performed by: NURSE PRACTITIONER

## 2022-09-26 PROCEDURE — 1123F ACP DISCUSS/DSCN MKR DOCD: CPT | Performed by: NURSE PRACTITIONER

## 2022-09-26 PROCEDURE — 1101F PT FALLS ASSESS-DOCD LE1/YR: CPT | Performed by: NURSE PRACTITIONER

## 2022-09-26 PROCEDURE — 99214 OFFICE O/P EST MOD 30 MIN: CPT | Performed by: NURSE PRACTITIONER

## 2022-09-26 PROCEDURE — 3017F COLORECTAL CA SCREEN DOC REV: CPT | Performed by: NURSE PRACTITIONER

## 2022-09-26 PROCEDURE — G8427 DOCREV CUR MEDS BY ELIG CLIN: HCPCS | Performed by: NURSE PRACTITIONER

## 2022-09-26 PROCEDURE — G8420 CALC BMI NORM PARAMETERS: HCPCS | Performed by: NURSE PRACTITIONER

## 2022-09-26 PROCEDURE — G0008 ADMIN INFLUENZA VIRUS VAC: HCPCS | Performed by: NURSE PRACTITIONER

## 2022-09-26 PROCEDURE — G8399 PT W/DXA RESULTS DOCUMENT: HCPCS | Performed by: NURSE PRACTITIONER

## 2022-09-26 NOTE — PROGRESS NOTES
Chief Complaint   Patient presents with    Cholesterol Problem     High chol    Blood sugar problem     prediabetes    Hypertension    Results     Discuss lab results     Assessment & Plan:     1. Hyperlipidemia LDL goal <100  Assessment & Plan:  Excellent drop in total cholesterol, LDL  Continue regimen and repeat lipid panel in 6 mos  2. Hypertensive kidney disease with stage 3a chronic kidney disease ([facility] [address]  Assessment & Plan:  BP at goal, <130/80, continue regimen  3. Stage 3a chronic kidney disease ([facility] [address]  Assessment & Plan:  Slightly worsening, continue ARBs for now  Avoid nephrotoxins  Repeat CMP in 3 mos  4. Prediabetes  Assessment & Plan:  Repeat A1c in 3 mos  Continue preventive measures  Orders:  -     METABOLIC PANEL, COMPREHENSIVE; Future  -     HEMOGLOBIN A1C WITH EAG; Future  5. Screen for colon cancer  Comments:  Given another stool kit to complete  6. Needs flu shot  -     INFLUENZA, FLUAD, (AGE 65 Y+), IM, PF, 0.5 ML  7. Encounter to discuss test results    Follow-up and Dispositions    Return in about 3 months (around 12/26/2022) for prediabetes, blood pressure, cholesterol, lab results (CMP, A1c). Subjective:     HPI- Patient presents today, accompanied by friend, [place].     Hyperlipidemia  Compliant with meds  Comorbid: HTN, CKD  Current treatment: atorvastatin 10 mg    Hypertension-  Symptoms:  None  BP readings at home are low 841K to 845Y systolic  Comorbid: HLD, CKD  Current treatment: Losartan 25 mg, amlodipine 10 mg    CKD-  Stage of Chronic Kidney Disease III   Denies any BLE swelling, SOB, chest pain  NSAID use: None  Risk factors/Comorbid: HTN, HLD    Prediabetes -  Risk factors: HTN, HLD  Treatment:  None  Trying to eat better, admits she is not physically active    Health Maintenance:  COVID-19 vac - due for booster  Flu vac - will give today  Shingles vac - recommended  Colonoscopy - FIT kit previously given, advised to complete      Review of Systems   Constitutional: Negative for chills, fever and malaise/fatigue. Respiratory:  Negative for shortness of breath. Cardiovascular:  Negative for chest pain. Objective:   /72 (BP 1 Location: Right upper arm, BP Patient Position: Sitting, BP Cuff Size: Adult)   Pulse (!) 109   Temp 98 °F (36.7 °C) (Oral)   Resp 16   Ht 5' 7\" (1.702 m)   Wt 152 lb 12.8 oz (69.3 kg)   SpO2 98%   BMI 23.93 kg/m²      Physical Exam  Vitals and nursing note reviewed. Constitutional:       General: She is not in acute distress. Appearance: She is not ill-appearing. HENT:      Head: Normocephalic and atraumatic. Cardiovascular:      Rate and Rhythm: Regular rhythm. Tachycardia present. Pulmonary:      Effort: Pulmonary effort is normal. No respiratory distress. Breath sounds: No wheezing, rhonchi or rales. Skin:     General: Skin is warm and dry. Neurological:      General: No focal deficit present. Mental Status: She is alert and oriented to person, place, and time. Psychiatric:         Mood and Affect: Mood normal.         Thought Content:  Thought content normal.         Judgment: Judgment normal.          Davin Morales, FNP-C

## 2022-09-26 NOTE — PROGRESS NOTES
Rui Barnes presents today for   Chief Complaint   Patient presents with    Cholesterol Problem     High chol    Blood sugar problem     prediabetes    Hypertension    Results     Discuss lab results       Is someone accompanying this pt? Yes, friend, [employer]    Is the patient using any DME equipment during [address]? no    Depression Screening:  3 most recent PHQ Screens 9/26/2022   Little interest or pleasure in doing things Not at all   Feeling down, depressed, irritable, or hopeless Not at all   Total Score PHQ 2 0       Learning Assessment:  Learning Assessment 9/26/2022   PRIMARY LEARNER Patient   HIGHEST LEVEL OF EDUCATION - PRIMARY LEARNER  -   BARRIERS PRIMARY LEARNER -   [facility]    NEED -   LEARNER PREFERENCE PRIMARY DEMONSTRATION     -   LEARNING SPECIAL TOPICS -   ANSWERED BY patient   RELATIONSHIP SELF       Abuse Screening:  Abuse Screening Questionnaire 9/26/2022   Do you ever feel afraid of your partner? N   Are you in a relationship with someone who physically or mentally threatens you? N   Is it safe for you to go home? Y       Fall Screening  Fall Risk Assessment, last 12 mths 8/31/2022   Able to walk? Yes   Fall in past 12 months? 0   Do you feel unsteady? 0   Are you worried about falling 0       Generalized Anxiety  No flowsheet data found. Health Maintenance Due   Topic Date Due    COVID-19 Vaccine (1) Never done    Shingrix Vaccine Age 50> (1 of 2) Never done    Colorectal Cancer Screening Combo  02/13/2018    Flu Vaccine (1) 08/01/2022   . Health Maintenance reviewed and discussed and ordered per Provider. Coordination of Care  1. Have you been to the ER, urgent care clinic since your last visit? Hospitalized since your last visit? no    2. Have you seen or consulted any other health care providers outside of the [address] [place] [address] since your last visit? Include any pap smears or colon screening.  no

## 2022-10-01 DIAGNOSIS — E78.5 HYPERLIPIDEMIA LDL GOAL <100: ICD-10-CM

## 2022-10-02 RX ORDER — ATORVASTATIN CALCIUM 10 MG/1
TABLET, FILM COATED ORAL
Qty: 90 TABLET | Refills: 0 | Status: SHIPPED | OUTPATIENT
Start: 2022-10-02

## 2022-10-03 DIAGNOSIS — E78.5 HYPERLIPIDEMIA LDL GOAL <100: ICD-10-CM

## 2022-10-03 RX ORDER — ATORVASTATIN CALCIUM 10 MG/1
TABLET, FILM COATED ORAL
Qty: 90 TABLET | Refills: 0 | OUTPATIENT
Start: 2022-10-03

## 2022-10-03 NOTE — TELEPHONE ENCOUNTER
Patient need a medication refill.  Please advise     Requested Prescriptions     Pending Prescriptions Disp Refills    atorvastatin (LIPITOR) 10 mg tablet 90 Tablet 0

## 2022-10-04 ENCOUNTER — HOSPITAL ENCOUNTER (OUTPATIENT)
Dept: PHYSICAL THERAPY | Age: 77
Discharge: HOME | End: 2022-10-04
Payer: MEDICARE

## 2022-10-04 PROCEDURE — 92507 TX SP LANG VOICE COMM INDIV: CPT

## 2022-10-04 PROCEDURE — 97129 THER IVNTJ 1ST 15 MIN: CPT

## 2022-10-04 NOTE — PROGRESS NOTES
ST DAILY TREATMENT NOTE    Patient Name: Dov Francisco  Date:10/4/2022  : 1945  [x]  Patient  Verified  Payor: Payor: Molly Cabrera / Plan: [place] MEDICARE PART A & B / Product Type: Medicare /   In time 350  Out time:435  Total Treatment Time (min): 45  Visit #: 3 of 8  Recert Due 74/84/57  Treatment Diagnosis: Cognitive communication deficit [R41.841]    SUBJECTIVE  Pain Level (0-10 scale): 0  Any medication changes, allergies to medications, adverse drug reactions, diagnosis change, or new procedure performed?: [x] No    [] Yes (see summary sheet for update)    Subjective functional status/changes:   [] No changes reported   Patient weeklong gap in treatment d/t GI illness, which is not resolved. OBJECTIVE  Treatment provided includes:  Increase/Improve:  []  Voice Quality [x]  Cognitive Linguistic Skills []  Laryngeal/Pharyngeal Exercises   []  Vocal Loudness []  Reading Comprehension []  Swallowing Skills    []  Vocal Cord Function []  Auditory Comprehension []  Oral Motor Skills   []  Resonance []  Writing Skills [x]  Compensatory strategies    []  Speech Intelligibility [x]  Expressive Language []  Attention   []  Breath Support/Coord.  [x]  Receptive language [x]  Memory   []  Articulation []  Safety Awareness [x] Pt Education   []  Fluency [x]  Word Retrieval []        Treatment Provided:  -Answering where questions  -ID two categories objects belong to    Patient/Caregiver  Education: [x] Review HEP      HEP/Handouts given: NA    Pain Level (0-10 scale) post treatment: 0    ASSESSMENT   [x]   Improving appropriately and progressing toward goals  []   Improving slowly and progressing toward goals  []   Approximating goals/maximum potential  [x]   Continues to benefit from skilled therapy to address remaining functional deficits  []   Not progressing toward goals and plan of care will be adjusted    Patient will continue to benefit from skilled therapy to address remaining functional deficits: cognitive linguistic deficits, expressive language. Progress towards goals / Updated goals:    1. Pt/family will demo use of semantic feature analysis in structured tasks to reduce anomia and communication breakdowns with 90% acc given min-mod cue in 3/3 sessions. 2. Pt will recall x3 comp strategies and apply as deemed appropriate in order to complete immediate and delayed recall  Verbal and visual tasks  given a  3-5 minute delay with 75% acc given min cues to restore/maintain STM for safety and independence. 3. Pt will implement/utilize a memory book and/or calendar to promote orientation x4 and recall personal information/ recent events for x1-2 weeks with 75% acc given Tl to enhance communication competence, dignity,  and independence within her environment. 4. Pt will complete everyday problem solving tasks with 75% acc given modA to restore/maintain safety/indpendence within her environment. 6. Pt will complete mental flexibility tasks with 80% acc given min-modA to improve alternating attention, topic maintenance, and safety with ADLs  10/4/22: ID categories items belong to 70% acc ivan, 100% given modA including verbal and visual cues.  Acc also improved with elimination of background noise, which patient reported made her forget her train of thought  PLAN  [x]  Continue plan of care  []  Modify Goals/Treatment Plan      []  Discharge due to:  [] Other:    CIPRIANO Chapa 10/4/2022  3:09 PM    Future Appointments   Date Time Provider Den Peterson   10/6/2022  3:45 PM CIPRIANO Jimenez [facility]   10/11/2022  3:00 PM CIPRIANO Jimenez [facility]   10/13/2022  3:45 PM Abel Bustillos, [address] [place] [address], SLP [facility]   10/18/2022  3:00 PM CIPRIANO Jimenez [facility]   10/20/2022  3:45 PM CIPRIANO Jimenez [facility]   11/1/2022  2:00 PM Frida Wells, RE[facility]   12/22/2022  9:15 AM REESE [facility]millanAM BS AMB

## 2022-10-06 ENCOUNTER — HOSPITAL ENCOUNTER (OUTPATIENT)
Dept: PHYSICAL THERAPY | Age: 77
Discharge: HOME | End: 2022-10-06
Payer: MEDICARE

## 2022-10-06 PROCEDURE — 92507 TX SP LANG VOICE COMM INDIV: CPT

## 2022-10-06 NOTE — PROGRESS NOTES
ST DAILY TREATMENT NOTE    Patient Name: Fredy Crow  Date:10/6/2022  : 1945  [x]  Patient  Verified  Payor: Payor: Lizette Delgado / Plan: [place] MEDICARE PART A & B / Product Type: Medicare /   In time 46 Out time:430  Total Treatment Time (min): 45  Visit #: 4 of 8  Recert Due   Treatment Diagnosis: Cognitive communication deficit [R41.841]    SUBJECTIVE  Pain Level (0-10 scale): 0  Any medication changes, allergies to medications, adverse drug reactions, diagnosis change, or new procedure performed?: [x] No    [] Yes (see summary sheet for update)    Subjective functional status/changes:   [] No changes reported   Patient expressed that she finds the prospect of driving scary and has let her license . OBJECTIVE  Treatment provided includes:  Increase/Improve:  []  Voice Quality [x]  Cognitive Linguistic Skills []  Laryngeal/Pharyngeal Exercises   []  Vocal Loudness []  Reading Comprehension []  Swallowing Skills    []  Vocal Cord Function []  Auditory Comprehension []  Oral Motor Skills   []  Resonance []  Writing Skills [x]  Compensatory strategies    []  Speech Intelligibility [x]  Expressive Language []  Attention   []  Breath Support/Coord.  [x]  Receptive language [x]  Memory   []  Articulation []  Safety Awareness [x] Pt Education   []  Fluency [x]  Word Retrieval []        Treatment Provided:  -ID two object functions    Patient/Caregiver  Education: [x] Review HEP      HEP/Handouts given: NA    Pain Level (0-10 scale) post treatment: 0    ASSESSMENT   [x]   Improving appropriately and progressing toward goals  []   Improving slowly and progressing toward goals  []   Approximating goals/maximum potential  [x]   Continues to benefit from skilled therapy to address remaining functional deficits  []   Not progressing toward goals and plan of care will be adjusted    Patient will continue to benefit from skilled therapy to address remaining functional deficits: cognitive linguistic deficits, expressive language. Progress towards goals / Updated goals:    1. Pt/family will demo use of semantic feature analysis in structured tasks to reduce anomia and communication breakdowns with 90% acc given min-mod cue in 3/3 sessions. 2. Pt will recall x3 comp strategies and apply as deemed appropriate in order to complete immediate and delayed recall  Verbal and visual tasks  given a  3-5 minute delay with 75% acc given min cues to restore/maintain STM for safety and independence. 3. Pt will implement/utilize a memory book and/or calendar to promote orientation x4 and recall personal information/ recent events for x1-2 weeks with 75% acc given Tl to enhance communication competence, dignity,  and independence within her environment. 10/6/22: Patient Giovanna writing down date of next session for external memory aid. 4. Pt will complete everyday problem solving tasks with 75% acc given modA to restore/maintain safety/indpendence within her environment. 10/6/22: ID two functions for objects ~90% acc il, 100% given Tl    6.  Pt will complete mental flexibility tasks with 80% acc given min-modA to improve alternating attention, topic maintenance, and safety with ADLs    PLAN  [x]  Continue plan of care  []  Modify Goals/Treatment Plan      []  Discharge due to:  [] Other:    CIPRIANO Gaviria 10/6/2022  3:09 PM    Future Appointments   Date Time Provider Den Peterson   10/11/2022  3:00 PM CIPRIANO Sahu [facility]   10/13/2022  3:45 PM Angelica Watson [address] [place] [address], SLP [facility]   10/18/2022  3:00 PM CIPRIANO Sahu [facility]   10/20/2022  3:45 PM CIPRIANO Sahu [facility]   11/1/2022  2:00 PM Luis Angel Spring NP [facility] BS AMB   12/22/2022  9:15 AM Sandy Chaidez NP [facility] BS AMB

## 2022-10-11 ENCOUNTER — HOSPITAL ENCOUNTER (OUTPATIENT)
Dept: PHYSICAL THERAPY | Age: 77
Discharge: HOME | End: 2022-10-11
Payer: MEDICARE

## 2022-10-11 PROCEDURE — 97129 THER IVNTJ 1ST 15 MIN: CPT

## 2022-10-11 PROCEDURE — 97130 THER IVNTJ EA ADDL 15 MIN: CPT

## 2022-10-11 NOTE — PROGRESS NOTES
ST DAILY TREATMENT NOTE    Patient Name: Dov Francisco  Date:10/11/2022  : 1945  [x]  Patient  Verified  Payor: Payor: [place] MED[place] / Plan: [place] MEDICARE PART A & B / Product Type: Medicare /   In time 310 Out time:345  Total Treatment Time (min): 35  Visit #: 5 of 8  Recert Due   Treatment Diagnosis: Cognitive communication deficit [R41.841]    SUBJECTIVE  Pain Level (0-10 scale): 0  Any medication changes, allergies to medications, adverse drug reactions, diagnosis change, or new procedure performed?: [x] No    [] Yes (see summary sheet for update)    Subjective functional status/changes:   [] No changes reported   Patient reported no physical pain, but did state that she is in some emotional distress and has had a hectic day. Preferred not to go into detail. OBJECTIVE  Treatment provided includes:  Increase/Improve:  []  Voice Quality [x]  Cognitive Linguistic Skills []  Laryngeal/Pharyngeal Exercises   []  Vocal Loudness []  Reading Comprehension []  Swallowing Skills    []  Vocal Cord Function []  Auditory Comprehension []  Oral Motor Skills   []  Resonance []  Writing Skills [x]  Compensatory strategies    []  Speech Intelligibility [x]  Expressive Language []  Attention   []  Breath Support/Coord.  [x]  Receptive language [x]  Memory   []  Articulation []  Safety Awareness [x] Pt Education   []  Fluency [x]  Word Retrieval []        Treatment Provided:  -Number association  -Visual reasoning-finish sequence    Patient/Caregiver  Education: [x] Review HEP      HEP/Handouts given: NA    Pain Level (0-10 scale) post treatment: 0    ASSESSMENT   [x]   Improving appropriately and progressing toward goals  []   Improving slowly and progressing toward goals  []   Approximating goals/maximum potential  [x]   Continues to benefit from skilled therapy to address remaining functional deficits  []   Not progressing toward goals and plan of care will be adjusted    Patient will continue to benefit from skilled therapy to address remaining functional deficits: cognitive linguistic deficits, expressive language. Progress towards goals / Updated goals:    1. Pt/family will demo use of semantic feature analysis in structured tasks to reduce anomia and communication breakdowns with 90% acc given min-mod cue in 3/3 sessions. 2. Pt will recall x3 comp strategies and apply as deemed appropriate in order to complete immediate and delayed recall  Verbal and visual tasks  given a  3-5 minute delay with 75% acc given min cues to restore/maintain STM for safety and independence. 3. Pt will implement/utilize a memory book and/or calendar to promote orientation x4 and recall personal information/ recent events for x1-2 weeks with 75% acc given Tl to enhance communication competence, dignity,  and independence within her environment. 4. Pt will complete everyday problem solving tasks with 75% acc given modA to restore/maintain safety/indpendence within her environment. 10/11/22: ID number by association ~95% acc ivan    6. Pt will complete mental flexibility tasks with 80% acc given min-modA to improve alternating attention, topic maintenance, and safety with ADLs  10/11/22: ID next figure in sequence mod-max cues required. Patient with some difficulty transferring pattern to next figure.    PLAN  [x]  Continue plan of care  []  Modify Goals/Treatment Plan      []  Discharge due to:  [] Other:    CIPRIANO Servin 10/11/2022  3:09 PM    Future Appointments   Date Time Provider Den Peterson   10/13/2022  3:45 PM CIPRIANO An [facility]   10/18/2022  3:00 PM CIPRIANO An [facility]   10/20/2022  3:45 PM CIPRIANO An [facility]   11/1/2022  2:00 PM Cindy Díaz NP [facility] BS AMB   12/22/2022  9:15 AM Saulo Kerr NP [facility] BS AMB

## 2022-10-20 ENCOUNTER — HOSPITAL ENCOUNTER (OUTPATIENT)
Dept: PHYSICAL THERAPY | Age: 77
Discharge: HOME | End: 2022-10-20
Payer: MEDICARE

## 2022-10-20 PROCEDURE — 97129 THER IVNTJ 1ST 15 MIN: CPT

## 2022-10-20 PROCEDURE — 97130 THER IVNTJ EA ADDL 15 MIN: CPT

## 2022-10-20 NOTE — PROGRESS NOTES
[facility] [employer] [facility]  [address] [facility]  [phone] fax    Continued Plan of Care/ Re-certification for Speech Therapy Services    Patient name: Alonso Zelaya Start of Care: 22   Referral source: Leticia Wu NP : 1945   Medical/Treatment Diagnosis: Cognitive communication deficit [R41.841]  Payor: [place] MEDICARE / Plan: [place] MEDICARE PART A & B / Product Type: Medicare /  Onset Date:~1 year ago     Prior Hospitalization: see medical history Provider#: [id number]   Medications: Verified on Patient Summary List    Comorbidities: glaucoma; anxiety; prediabetes; CKD III; visual hallucinations; anemia; dementia with behavioral disturbance   Prior Level of Function: All aspects of speech/ language, cognition, voice and swallowing WNLs, per pt report                                      Visits from Start of Care: 16  Missed Visits: 4    The Plan of Care and following information is based on the patient's current status:  Goal:Pt/family will demo use of semantic feature analysis in structured tasks to reduce anomia and communication breakdowns with 90% acc given min-mod cue in 3/3 sessions. Status at last note/certification: Not met  Current Status:not met, improved; successful use in conversation in across 2 sessions. Goal:Pt will recall x3 comp strategies and apply as deemed appropriate in order to complete immediate and delayed recall  Verbal and visual tasks  given a  3-5 minute delay with 75% acc given min cues to restore/maintain STM for safety and independence. Status at last note/certification: not met  Current Status: not met; progressing, will continue    Goal: Pt will implement/utilize a memory book and/or calendar to promote orientation x4 and recall personal information/ recent events for x1-2 weeks with 75% acc given Tl to enhance communication competence, dignity,  and independence within her environment.   Status at last note/certification: not met  Current Status:not met; continued inconsistent use of memory log, but improved in last session. Goal: Pt will utilize comp strategies to complete immediate recall tasks presented verbally with 85% acc given min-mod cues to restore/maintain safety and independence within her environment and for activities of choice. Status at last note/certification: not met  Current Status: d/c and blended with delayed recall goal    Goal: Pt will complete mental flexibility tasks with 80% acc given min-modA to improve alternating attention, topic maintenance, and safety with ADLs  Status at last note/certification: not met  Current Status: not met; marked improvement in verbal tasks, ~80-90% acc ivan. Difficulty noted with verbal reasoning tasks. Goal:Pt will complete everyday problem solving tasks with 75% acc given modA to restore/maintain safety/indpendence within her environment. Status at last note/certification: not met  Current Status: not met; progressing, marked impovement in ID of logical fallacies/problems and correcting. Key functional changes: marked improvement in verbal reasoning  Problems/ barriers to goal attainment: None     Problem List:      []aphasic  []dysarthric  []dysphagic       []alexic  []agraphic  []dysphonia       []dysfluency  [x]Cognitive-Linguistic Disorder       []other     Treatment Plan: Cognitive/Language Treatment and Patient Education    Patient Goal (s) has been updated and includes: NA     Goals for this certification period to be accomplished in 4 weeks:  1. Pt/family will demo use of semantic feature analysis in structured tasks to reduce anomia and communication breakdowns with 90% acc given min-mod cue in 3/3 sessions.      2. Pt will recall x3 comp strategies and apply as deemed appropriate in order to complete delayed recall  Verbal and visual tasks  given a  3-5 minute delay with 75% acc given min cues to restore/maintain STM for safety and independence. 3. Pt will implement/utilize a memory book and/or calendar to promote orientation x4 and recall personal information/ recent events for x1-2 weeks with 75% acc given Tl to enhance communication competence, dignity,  and independence within her environment. 5. Pt will complete everyday problem solving tasks with 75% acc given modA to restore/maintain safety/indpendence within her environment. 6. Pt will complete mental flexibility tasks with 80% acc given min-modA to improve alternating attention, topic maintenance, and safety with ADLs. Frequency / Duration: Patient to be seen 2 times per week for 4 weeks:    Assessment/Recommendations: The patient is progressing appropriately toward all goals. She will continue to benefit from skilled therapy to address remaining deficits in cognitive communication. Certification Period: 10/20/22-11/18/22    CIPRIANO Saini 10/20/2022 5:08 PM    _____________________________________________________________________    I certify that the above Therapy Services are being furnished while the patient is under my care. I agree with the treatment plan and certify that this therapy is necessary. []  I have read the above report and request that my patient continue as recommended. []  I have read the above report and request that my patient continue therapy with the following changes/special instructions:________________________________________  []I have read the above report and request that my patient be discharged from therapy.     Physician's Signature:____________Date:_________TIME:________     Heike Domingo NP  ** Signature, Date and Time must be completed for valid certification **      Please sign and return to [facility]   [address] [place] [address]  [phone] fax

## 2022-10-20 NOTE — PROGRESS NOTES
ST DAILY TREATMENT NOTE    Patient Name: Susan English  Date:10/20/2022  : 1945  [x]  Patient  Verified  Payor: Payor: Beulah Hi / Plan: [place] MEDICARE PART A & B / Product Type: Medicare /   In time 345 Out time: 430  Total Treatment Time (min): 45  Visit #: 1 of 8  Recert Due 80/57/83  Treatment Diagnosis: Cognitive communication deficit [R41.841]    SUBJECTIVE  Pain Level (0-10 scale): 0  Any medication changes, allergies to medications, adverse drug reactions, diagnosis change, or new procedure performed?: [x] No    [] Yes (see summary sheet for update)    Subjective functional status/changes:   [] No changes reported   Patient reported absence from last session d/t getting schedule mixed up. She reports that she is getting better at repairing communication breakdowns  OBJECTIVE  Treatment provided includes:  Increase/Improve:  []  Voice Quality [x]  Cognitive Linguistic Skills []  Laryngeal/Pharyngeal Exercises   []  Vocal Loudness []  Reading Comprehension []  Swallowing Skills    []  Vocal Cord Function []  Auditory Comprehension []  Oral Motor Skills   []  Resonance []  Writing Skills [x]  Compensatory strategies    []  Speech Intelligibility [x]  Expressive Language []  Attention   []  Breath Support/Coord.  [x]  Receptive language [x]  Memory   []  Articulation []  Safety Awareness [x] Pt Education   []  Fluency [x]  Word Retrieval []        Treatment Provided:  -Id sentence incongruities  -sentence un scramble  -deductive reasonign    Patient/Caregiver  Education: [x] Review HEP      HEP/Handouts given: NA    Pain Level (0-10 scale) post treatment: 0    ASSESSMENT   [x]   Improving appropriately and progressing toward goals  []   Improving slowly and progressing toward goals  []   Approximating goals/maximum potential  [x]   Continues to benefit from skilled therapy to address remaining functional deficits  []   Not progressing toward goals and plan of care will be adjusted    Patient will continue to benefit from skilled therapy to address remaining functional deficits: cognitive linguistic deficits, expressive language. Progress towards goals / Updated goals:    1. Pt/family will demo use of semantic feature analysis in structured tasks to reduce anomia and communication breakdowns with 90% acc given min-mod cue in 3/3 sessions. 10/20/22: Patient reports improved use of circumlocution in repairing communication breakdowns    2. Pt will recall x3 comp strategies and apply as deemed appropriate in order to complete immediate and delayed recall  Verbal and visual tasks  given a  3-5 minute delay with 75% acc given min cues to restore/maintain STM for safety and independence. 3. Pt will implement/utilize a memory book and/or calendar to promote orientation x4 and recall personal information/ recent events for x1-2 weeks with 75% acc given Tl to enhance communication competence, dignity,  and independence within her environment. 4. Pt will complete everyday problem solving tasks with 75% acc given modA to restore/maintain safety/indpendence within her environment. 10/20/22: Eliminating sentence incongruities 100% acc ivan    6.  Pt will complete mental flexibility tasks with 80% acc given min-modA to improve alternating attention, topic maintenance, and safety with ADLs  10/20/22: Deductive reasoning puzzle with elimination 100% acc ivan  Unscramble sentences ~95% acc ivan  PLAN  [x]  Continue plan of care  []  Modify Goals/Treatment Plan      []  Discharge due to:  [] Other:    Opal Gimenez, SLP 10/20/2022  3:09 PM    Future Appointments   Date Time Provider Den Peterson   11/1/2022  2:00 PM Damaris Jeans, NP [facility] BS AMB   12/22/2022  9:15 AM REESE Contreras BS AMB

## 2022-11-01 ENCOUNTER — APPOINTMENT (OUTPATIENT)
Dept: PHYSICAL THERAPY | Age: 77
End: 2022-11-01

## 2022-11-03 ENCOUNTER — OFFICE VISIT (OUTPATIENT)
Dept: NEUROLOGY | Age: 77
End: 2022-11-03

## 2022-11-03 ENCOUNTER — APPOINTMENT (OUTPATIENT)
Dept: PHYSICAL THERAPY | Age: 77
End: 2022-11-03

## 2022-11-03 VITALS
RESPIRATION RATE: 20 BRPM | HEART RATE: 98 BPM | OXYGEN SATURATION: 98 % | SYSTOLIC BLOOD PRESSURE: 128 MMHG | WEIGHT: 153 LBS | BODY MASS INDEX: 24.01 KG/M2 | DIASTOLIC BLOOD PRESSURE: 72 MMHG | HEIGHT: 67 IN

## 2022-11-03 RX ORDER — UREA 10 %
100 LOTION (ML) TOPICAL DAILY
COMMUNITY
End: 2022-11-07

## 2022-11-08 ENCOUNTER — OFFICE VISIT (OUTPATIENT)
Dept: NEUROLOGY | Age: 77
End: 2022-11-08
Payer: MEDICARE

## 2022-11-08 VITALS
RESPIRATION RATE: 18 BRPM | HEIGHT: 67 IN | SYSTOLIC BLOOD PRESSURE: 120 MMHG | OXYGEN SATURATION: 98 % | HEART RATE: 92 BPM | BODY MASS INDEX: 24.01 KG/M2 | WEIGHT: 153 LBS | DIASTOLIC BLOOD PRESSURE: 70 MMHG

## 2022-11-08 DIAGNOSIS — I67.9 CEREBRAL VASCULAR DISEASE: ICD-10-CM

## 2022-11-08 DIAGNOSIS — F41.9 ANXIETY: ICD-10-CM

## 2022-11-08 DIAGNOSIS — F03.918 DEMENTIA WITH BEHAVIORAL DISTURBANCE: Primary | ICD-10-CM

## 2022-11-08 PROCEDURE — 1101F PT FALLS ASSESS-DOCD LE1/YR: CPT | Performed by: NURSE PRACTITIONER

## 2022-11-08 PROCEDURE — 1090F PRES/ABSN URINE INCON ASSESS: CPT | Performed by: NURSE PRACTITIONER

## 2022-11-08 PROCEDURE — 1123F ACP DISCUSS/DSCN MKR DOCD: CPT | Performed by: NURSE PRACTITIONER

## 2022-11-08 PROCEDURE — G8427 DOCREV CUR MEDS BY ELIG CLIN: HCPCS | Performed by: NURSE PRACTITIONER

## 2022-11-08 PROCEDURE — G8399 PT W/DXA RESULTS DOCUMENT: HCPCS | Performed by: NURSE PRACTITIONER

## 2022-11-08 PROCEDURE — 99214 OFFICE O/P EST MOD 30 MIN: CPT | Performed by: NURSE PRACTITIONER

## 2022-11-08 PROCEDURE — G0463 HOSPITAL OUTPT CLINIC VISIT: HCPCS | Performed by: NURSE PRACTITIONER

## 2022-11-08 PROCEDURE — G8432 DEP SCR NOT DOC, RNG: HCPCS | Performed by: NURSE PRACTITIONER

## 2022-11-08 PROCEDURE — 3017F COLORECTAL CA SCREEN DOC REV: CPT | Performed by: NURSE PRACTITIONER

## 2022-11-08 PROCEDURE — G8420 CALC BMI NORM PARAMETERS: HCPCS | Performed by: NURSE PRACTITIONER

## 2022-11-08 PROCEDURE — G8536 NO DOC ELDER MAL SCRN: HCPCS | Performed by: NURSE PRACTITIONER

## 2022-11-08 RX ORDER — SERTRALINE HYDROCHLORIDE 25 MG/1
25 TABLET, FILM COATED ORAL DAILY
Qty: 60 TABLET | Refills: 4 | Status: SHIPPED | OUTPATIENT
Start: 2022-11-08

## 2022-11-08 NOTE — PROGRESS NOTES
[address] [address] Mackenzie Osborne. [facility], [address]  Office:  [phone]  Fax: [phone]  Chief Complaint   Patient presents with    Dementia     Follow up       HPI: Roddy Herron presents in follow-up for dementia. She was last seen here on 8/31/2022. She had started the Aricept. Unsure if any benefit yet but she did note that she seemed to be losing train of thought less in conversation. It was occurring less frequently. She was in speech therapy which also might be helping. She does the homework from speech therapy. Denies side effects on Aricept. Continues to have hallucinations but her son believes that she is generally fine but they occur mostly late at night. She sees people in the room. They do not talk to her. They do not scare her like they used to. Denied safety concerns or dangerous behaviors. She likes to read. Aricept was increased to 10 mg nightly. Recommended to take aspirin 81 mg daily preventatively. MRI brain reported mild generalized atrophy pattern may fit clinically with a diagnosis of MCI versus Alzheimer's and also noted a significant small vessel disease vascular burden, an element of vascular dementia may play a role. She presents today in follow-up. Her son is here in the waiting room. She endorses forgetting things people just told her. 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 She continues reading. Denies dangerous behaviors. Still some hallucinations at night. During her typical day: cleaning, reading, filing, napping. Does not drive. Her son cooks. Denies needing help for ADLs. He gives medications. She's not sure about the med names (donepezil and aspirin). Thinks she takes aspirin. Denies HAs. Denies GI side effects unless related to foods. Denies pain. Used to walk around outside for exercise. She is looking into what sounds like a gym or senior center. She would like to go somewhere but does not have a ride.   She is not sure if she continues in [address] [place] [address]. She would like to continue ST. Denies problems with walking or balance. Endorses eating and drinking ok. She endorses anxiety. Denies suicidal ideations. Spoke with son outside in the waiting room and we went over plan. He endorses she is on Aricept and aspirin. She reports she has not talked about any of the hallucinations. It is not a concern. He was in the waiting room because she gets anxious. Past Medical History:   Diagnosis Date    Anxiety     Arrhythmia 2011    Negative Stress test    Arthritis     Carotid artery stenosis 2009    <50%    Gestational diabetes     Glaucoma     Hypercholesterolemia     Hypertension        Past Surgical History:   Procedure Laterality Date    HX CHOLECYSTECTOMY  1990    gallstones    HX HYSTERECTOMY  1990    total hysterectomy       Current Outpatient Medications   Medication Sig Dispense Refill    CYANOCOBALAMIN, VITAMIN B-12, by Does Not Apply route. sertraline (ZOLOFT) 25 mg tablet Take 1 Tablet by mouth daily. After 1 week increase to 2 tabs daily. 60 Tablet 4    atorvastatin (LIPITOR) 10 mg tablet TAKE 1 TABLET BY MOUTH ONCE DAILY FOR  EXCESSIVE  FAT  IN  THE  BLOOD 90 Tablet 0    aspirin delayed-release 81 mg tablet Take 1 Tablet by mouth daily. 30 Tablet 6    donepeziL (ARICEPT) 10 mg tablet Take 1 Tablet by mouth nightly. 30 Tablet 5    amLODIPine (NORVASC) 10 mg tablet Take 1 Tablet by mouth in the morning. 90 Tablet 0    losartan (COZAAR) 25 mg tablet Take 1 Tablet by mouth in the morning. 90 Tablet 0    melatonin 10 mg subl Take 20 mg by mouth nightly.           Allergies   Allergen Reactions    Codeine Nausea Only       Social History     Tobacco Use    Smoking status: Never    Smokeless tobacco: Never   Vaping Use    Vaping Use: Never used   Substance Use Topics    Alcohol use: Yes     Comment: Socially       Family History   Problem Relation Age of Onset    OSTEOARTHRITIS Mother     Dementia Mother Hypertension Mother     Coronary Art Dis Mother        Review of Systems:  GENERAL: Denies fever or fatigue  CARDIAC: No CP or SOB  PULMONARY: No cough or SOB  MUSCULOSKELETAL: No new joint pain  NEURO: SEE HPI    Physical Examination:  Visit Vitals  /70   Pulse 92   Resp 18   Ht 5' 7\" (1.702 m)   Wt 69.4 kg (153 lb)   SpO2 98%   BMI 23.96 kg/m²       Alert, in NAD. Heart is regular. Oriented x3. Correct place, month, year. Speech: word finding difficulties. Speech clear. Became a little tearful during clock drawing because she thought she was going to get it wrong. She scored 28 out of 30 on the MMSE. Issues on clock drawing. Placed numbers 12, 3, 6, 9. EOMs are full, PERRL, VFFTC, no nystagmus. Face is symmetrical.  Tongue is midline. . Strength and tone are normal. No drift of the bilateral upper extremities. Fine finger movements symmetrical. FNF intact bilaterally. DTRs 2+ and symmetrical.  Gait is normal.      Impression/Plan: This is a 81-year-old right-handed female who presents in follow-up for dementia. She completed neuropsychological evaluation in April 2022 with diagnostic impressions of dementia with behavioral disturbance and behavior concern in adult. She is on a regimen of Aricept 10 mg nightly. She is noted to be losing train of thought in conversation less after starting this, but also probably due to benefits with speech therapy. She was in speech therapy which was helpful for communication/word finding difficulties. Will place new referral to restart ST. She is taking aspirin 81 mg. Continue follow-up with PCP for vascular risk factor management as MRI brain showed significant small vessel disease vascular burden. We will start sertraline 25 mg daily for 1 week then 50 mg daily after that to help with mood and anxiety. She endorses anxiety because of her speech and memory. The hallucinations are not an issue now.   We will place referral for neuropsychological reevaluation in April at about 1 year's time since the prior neuropsychological evaluation. Discussed plan with patient and her son and provided written instructions. Follow up in around 2 months. Diagnoses and all orders for this visit:    1. Dementia with behavioral disturbance  -     REFERRAL TO NEUROPSYCHOLOGY  -     REFERRAL TO SPEECH THERAPY    2. Cerebral vascular disease    3. Anxiety    Other orders  -     sertraline (ZOLOFT) 25 mg tablet; Take 1 Tablet by mouth daily. After 1 week increase to 2 tabs daily. Total time 35 minutes with 20 minutes spent in counseling. Signed By: Natalie Gongora NP        PLEASE NOTE:   Portions of this document may have been produced using voice recognition software. Unrecognized errors in transcription may be present.

## 2022-11-08 NOTE — PATIENT INSTRUCTIONS
Patient instructions:  -continue donepezil/Aricept 10 mg by mouth nightly  -continue aspirin 81 mg daily  -start sertaline/Zoloft 25 mg daily then after 1 week, 50 mg daily  -resume speech therapy  -neuropsychology re-evaluation at 1 year from previous (around April 2023)- Dr. Joan Hughes  -follow up in around 2 months

## 2022-11-08 NOTE — PROGRESS NOTES
Susan Amador presents today for   Chief Complaint   Patient presents with    Dementia     Follow up       Is someone accompanying this pt? Yes, Son    Is the patient using any DME equipment during [address]? no    Depression Screening:  3 most recent PHQ Screens 9/26/2022   Little interest or pleasure in doing things Not at all   Feeling down, depressed, irritable, or hopeless Not at all   Total Score PHQ 2 0       Learning Assessment:  Learning Assessment 9/26/2022   PRIMARY LEARNER Patient   HIGHEST LEVEL OF EDUCATION - PRIMARY LEARNER  -   BARRIERS PRIMARY LEARNER -   [facility]    NEED -   LEARNER PREFERENCE PRIMARY DEMONSTRATION     -   LEARNING SPECIAL TOPICS -   ANSWERED BY patient   RELATIONSHIP SELF       Abuse Screening:  Abuse Screening Questionnaire 9/26/2022   Do you ever feel afraid of your partner? N   Are you in a relationship with someone who physically or mentally threatens you? N   Is it safe for you to go home? Y       Fall Risk  Fall Risk Assessment, last 12 mths 11/8/2022   Able to walk? Yes   Fall in past 12 months? 0   Do you feel unsteady? 0   Are you worried about falling 0         Coordination of Care:  1. Have you been to the ER, urgent care clinic since your last visit? Hospitalized since your last visit? no    2. Have you seen or consulted any other health care providers outside of the [address] [place] [address] since your last visit? Include any pap smears or colon screening.  no

## 2022-11-25 DIAGNOSIS — I12.9 HYPERTENSIVE KIDNEY DISEASE WITH STAGE 3A CHRONIC KIDNEY DISEASE (HCC): ICD-10-CM

## 2022-11-25 DIAGNOSIS — N18.31 HYPERTENSIVE KIDNEY DISEASE WITH STAGE 3A CHRONIC KIDNEY DISEASE (HCC): ICD-10-CM

## 2022-11-25 RX ORDER — LOSARTAN POTASSIUM 25 MG/1
25 TABLET ORAL DAILY
Qty: 90 TABLET | Refills: 0 | Status: SHIPPED | OUTPATIENT
Start: 2022-11-25

## 2022-12-19 DIAGNOSIS — I12.9 HYPERTENSIVE KIDNEY DISEASE WITH STAGE 3A CHRONIC KIDNEY DISEASE (HCC): ICD-10-CM

## 2022-12-19 DIAGNOSIS — N18.31 HYPERTENSIVE KIDNEY DISEASE WITH STAGE 3A CHRONIC KIDNEY DISEASE (HCC): ICD-10-CM

## 2022-12-19 RX ORDER — AMLODIPINE BESYLATE 10 MG/1
10 TABLET ORAL DAILY
Qty: 90 TABLET | Refills: 0 | Status: SHIPPED | OUTPATIENT
Start: 2022-12-19

## 2023-01-22 PROBLEM — R19.5 POSITIVE FIT (FECAL IMMUNOCHEMICAL TEST): Status: ACTIVE | Noted: 2023-01-22

## 2023-01-23 DIAGNOSIS — E78.5 HYPERLIPIDEMIA LDL GOAL <100: ICD-10-CM

## 2023-01-23 RX ORDER — ATORVASTATIN CALCIUM 10 MG/1
TABLET, FILM COATED ORAL
Qty: 90 TABLET | Refills: 0 | Status: SHIPPED | OUTPATIENT
Start: 2023-01-23

## 2023-03-16 RX ORDER — ATORVASTATIN CALCIUM 10 MG/1
TABLET, FILM COATED ORAL
Qty: 90 TABLET | Refills: 0 | OUTPATIENT
Start: 2023-03-16

## 2023-03-16 RX ORDER — LOSARTAN POTASSIUM 25 MG/1
TABLET ORAL
Qty: 90 TABLET | Refills: 0 | Status: SHIPPED | OUTPATIENT
Start: 2023-03-16 | End: 2023-04-24

## 2023-03-16 RX ORDER — ATORVASTATIN CALCIUM 10 MG/1
TABLET, FILM COATED ORAL
Qty: 90 TABLET | Refills: 0 | Status: SHIPPED | OUTPATIENT
Start: 2023-03-16

## 2023-03-16 RX ORDER — AMLODIPINE BESYLATE 10 MG/1
TABLET ORAL
Qty: 90 TABLET | Refills: 0 | OUTPATIENT
Start: 2023-03-16

## 2023-03-17 ENCOUNTER — TELEPHONE (OUTPATIENT)
Age: 78
End: 2023-03-17

## 2023-03-20 RX ORDER — DONEPEZIL HYDROCHLORIDE 10 MG/1
10 TABLET, FILM COATED ORAL NIGHTLY
Qty: 30 TABLET | Refills: 1 | Status: SHIPPED | OUTPATIENT
Start: 2023-03-20

## 2023-03-21 RX ORDER — DONEPEZIL HYDROCHLORIDE 5 MG/1
TABLET, FILM COATED ORAL
Qty: 30 TABLET | Refills: 0 | OUTPATIENT
Start: 2023-03-21

## 2023-04-19 DIAGNOSIS — I12.9 HYPERTENSIVE KIDNEY DISEASE WITH STAGE 3A CHRONIC KIDNEY DISEASE (HCC): Primary | ICD-10-CM

## 2023-04-19 DIAGNOSIS — N18.31 HYPERTENSIVE KIDNEY DISEASE WITH STAGE 3A CHRONIC KIDNEY DISEASE (HCC): Primary | ICD-10-CM

## 2023-04-19 RX ORDER — AMLODIPINE BESYLATE 10 MG/1
10 TABLET ORAL DAILY
Qty: 30 TABLET | Refills: 0 | Status: SHIPPED | OUTPATIENT
Start: 2023-04-19

## 2023-04-19 NOTE — TELEPHONE ENCOUNTER
----- Message from Janina Menendez sent at 4/19/2023 10:43 AM EDT -----  Subject: Refill Request    QUESTIONS  Name of Medication? amLODIPine (NORVASC) 10 MG tablet  Patient-reported dosage and instructions? once a day  How many days do you have left? 0  Preferred Pharmacy? [facility] phone number (if available)? [phone]  ---------------------------------------------------------------------------  --------------,  Name of Medication? losartan (COZAAR) 25 MG tablet  Patient-reported dosage and instructions? once a day  How many days do you have left? 0  [facility]if available)? [phone]  ---------------------------------------------------------------------------  --------------  CALL BACK INFO  What is the best way for the office to contact you? OK to leave message on   voicemail  Preferred Call Back Phone Number? [phone]  ---------------------------------------------------------------------------  --------------  SCRIPT ANSWERS  Relationship to Patient? Other/Third Party  Representative Name? Sera Posada  Is the representative on the Communication Release of Information (MARZENA)   form in Epic?  Yes

## 2023-04-24 RX ORDER — LOSARTAN POTASSIUM 25 MG/1
TABLET ORAL
Qty: 90 TABLET | Refills: 0 | Status: SHIPPED | OUTPATIENT
Start: 2023-04-24

## 2023-05-18 DIAGNOSIS — N18.31 HYPERTENSIVE KIDNEY DISEASE WITH STAGE 3A CHRONIC KIDNEY DISEASE (HCC): ICD-10-CM

## 2023-05-18 DIAGNOSIS — I12.9 HYPERTENSIVE KIDNEY DISEASE WITH STAGE 3A CHRONIC KIDNEY DISEASE (HCC): ICD-10-CM

## 2023-05-18 RX ORDER — AMLODIPINE BESYLATE 10 MG/1
10 TABLET ORAL DAILY
Qty: 30 TABLET | Refills: 0 | Status: SHIPPED | OUTPATIENT
Start: 2023-05-18

## 2023-05-18 RX ORDER — LOSARTAN POTASSIUM 25 MG/1
25 TABLET ORAL EVERY MORNING
Qty: 30 TABLET | Refills: 0 | Status: SHIPPED | OUTPATIENT
Start: 2023-05-18

## 2023-05-18 RX ORDER — ATORVASTATIN CALCIUM 10 MG/1
TABLET, FILM COATED ORAL
Qty: 30 TABLET | Refills: 0 | Status: SHIPPED | OUTPATIENT
Start: 2023-05-18

## 2023-05-18 RX ORDER — SERTRALINE HYDROCHLORIDE 25 MG/1
25 TABLET, FILM COATED ORAL DAILY
Qty: 30 TABLET | Refills: 0 | Status: SHIPPED | OUTPATIENT
Start: 2023-05-18 | End: 2023-06-14

## 2023-05-24 ASSESSMENT — ENCOUNTER SYMPTOMS: SHORTNESS OF BREATH: 0

## 2023-05-24 NOTE — PROGRESS NOTES
Chief Complaint   Patient presents with    Hypertension    Chronic Kidney Disease    Cholesterol Problem    Dementia     Assessment & Plan:     1. Hypertensive kidney disease with stage 3a chronic kidney disease ([facility]  Assessment & Plan:  BP recheck improved, continue amlodipine 10 mg, Losartan 25 mg daily  Orders:  -     CBC with Auto Differential; Future  -     Comprehensive Metabolic Panel; Future  2. Stage 3a chronic kidney disease ([facility]  Assessment & Plan:  Continue ACE  Orders:  -     Comprehensive Metabolic Panel; Future  3. Hyperlipidemia LDL goal <100  Assessment & Plan:  Overdue for repeat labs, advised to complete  Continue atorvastatin 10 mg nightly  Orders:  -     Comprehensive Metabolic Panel; Future  -     Lipid Panel; Future  4. Prediabetes  Assessment & Plan:  Overdue for repeat labs, advised to complete  Orders:  -     Hemoglobin A1C; Future  5. Unspecified dementia, unspecified severity, with other behavioral disturbance ([facility]  Assessment & Plan:  Worsening per son, given contact info for neurology and advised to schedule follow up asap  Orders:  -     CBC with Auto Differential; Future  -     Comprehensive Metabolic Panel; Future    Follow-up and Dispositions    Return in about 4 weeks (around 6/23/2023) for medicare wellness visit, blood pressure, cholesterol, prediabetes, lab results. Subjective:     HPI    HPI- Patient presents today, accompanied by son, Alesia Aguilera, who lives with patient and is the caregiver.      Hyperlipidemia  Compliant with meds  Comorbid: HTN, CKD  Current treatment: atorvastatin 10 mg     Hypertension-  Symptoms:  None  BP readings at home are low 101J to 979R systolic  Comorbid: HLD, CKD  Current treatment: Losartan 25 mg, amlodipine 10 mg     CKD-  Stage of Chronic Kidney Disease III   Denies any BLE swelling, SOB, chest pain  NSAID use: None  Risk factors/Comorbid: HTN, HLD     Prediabetes -  Risk factors: HTN, HLD  Treatment:  None  Trying to eat better, admits she

## 2023-05-26 ENCOUNTER — OFFICE VISIT (OUTPATIENT)
Facility: CLINIC | Age: 78
End: 2023-05-26
Payer: MEDICARE

## 2023-05-26 VITALS
HEART RATE: 101 BPM | DIASTOLIC BLOOD PRESSURE: 86 MMHG | TEMPERATURE: 98 F | BODY MASS INDEX: 22.29 KG/M2 | RESPIRATION RATE: 16 BRPM | OXYGEN SATURATION: 99 % | SYSTOLIC BLOOD PRESSURE: 136 MMHG | WEIGHT: 142 LBS | HEIGHT: 67 IN

## 2023-05-26 DIAGNOSIS — I12.9 HYPERTENSIVE KIDNEY DISEASE WITH STAGE 3A CHRONIC KIDNEY DISEASE (HCC): Primary | ICD-10-CM

## 2023-05-26 DIAGNOSIS — R73.03 PREDIABETES: ICD-10-CM

## 2023-05-26 DIAGNOSIS — E78.5 HYPERLIPIDEMIA LDL GOAL <100: ICD-10-CM

## 2023-05-26 DIAGNOSIS — F03.918 UNSPECIFIED DEMENTIA, UNSPECIFIED SEVERITY, WITH OTHER BEHAVIORAL DISTURBANCE (HCC): ICD-10-CM

## 2023-05-26 DIAGNOSIS — N18.31 HYPERTENSIVE KIDNEY DISEASE WITH STAGE 3A CHRONIC KIDNEY DISEASE (HCC): Primary | ICD-10-CM

## 2023-05-26 DIAGNOSIS — N18.31 STAGE 3A CHRONIC KIDNEY DISEASE (HCC): ICD-10-CM

## 2023-05-26 PROCEDURE — G8420 CALC BMI NORM PARAMETERS: HCPCS | Performed by: NURSE PRACTITIONER

## 2023-05-26 PROCEDURE — 99214 OFFICE O/P EST MOD 30 MIN: CPT | Performed by: NURSE PRACTITIONER

## 2023-05-26 PROCEDURE — 1090F PRES/ABSN URINE INCON ASSESS: CPT | Performed by: NURSE PRACTITIONER

## 2023-05-26 PROCEDURE — G8399 PT W/DXA RESULTS DOCUMENT: HCPCS | Performed by: NURSE PRACTITIONER

## 2023-05-26 PROCEDURE — 1123F ACP DISCUSS/DSCN MKR DOCD: CPT | Performed by: NURSE PRACTITIONER

## 2023-05-26 PROCEDURE — G8427 DOCREV CUR MEDS BY ELIG CLIN: HCPCS | Performed by: NURSE PRACTITIONER

## 2023-05-26 PROCEDURE — 1036F TOBACCO NON-USER: CPT | Performed by: NURSE PRACTITIONER

## 2023-05-26 SDOH — ECONOMIC STABILITY: HOUSING INSECURITY
IN THE LAST 12 MONTHS, WAS THERE A TIME WHEN YOU DID NOT HAVE A STEADY PLACE TO SLEEP OR SLEPT IN A SHELTER (INCLUDING NOW)?: NO

## 2023-05-26 SDOH — ECONOMIC STABILITY: FOOD INSECURITY: WITHIN THE PAST 12 MONTHS, YOU WORRIED THAT YOUR FOOD WOULD RUN OUT BEFORE YOU GOT MONEY TO BUY MORE.: NEVER TRUE

## 2023-05-26 SDOH — ECONOMIC STABILITY: FOOD INSECURITY: WITHIN THE PAST 12 MONTHS, THE FOOD YOU BOUGHT JUST DIDN'T LAST AND YOU DIDN'T HAVE MONEY TO GET MORE.: NEVER TRUE

## 2023-05-26 SDOH — ECONOMIC STABILITY: INCOME INSECURITY: HOW HARD IS IT FOR YOU TO PAY FOR THE VERY BASICS LIKE FOOD, HOUSING, MEDICAL CARE, AND HEATING?: NOT HARD AT ALL

## 2023-05-26 ASSESSMENT — PATIENT HEALTH QUESTIONNAIRE - PHQ9
2. FEELING DOWN, DEPRESSED OR HOPELESS: 0
SUM OF ALL RESPONSES TO PHQ QUESTIONS 1-9: 0
SUM OF ALL RESPONSES TO PHQ9 QUESTIONS 1 & 2: 0
SUM OF ALL RESPONSES TO PHQ QUESTIONS 1-9: 0
1. LITTLE INTEREST OR PLEASURE IN DOING THINGS: 0

## 2023-05-26 NOTE — PROGRESS NOTES
Vargas Elmore presents today for   Chief Complaint   Patient presents with    Hypertension    Chronic Kidney Disease    Cholesterol Problem    Dementia       Is someone accompanying this pt? Yes with her son Homero Plata    Is the patient using any DME equipment during OV? no    Depression Screening:  PHQ-9 Questionaire 2023 2022 2022 2022 2022 10/6/2021   Little interest or pleasure in doing things 0 0 0 0 0 1   Feeling down, depressed, or hopeless 0 0 0 0 0 0   PHQ-9 Total Score 0 0 0 0 0 1        STEPHENIE 7-Anxiety   No flowsheet data found. Learning Assessment:  No question data found. Fall Risk  No flowsheet data found. Travel Screening:    Travel Screening       Question Response    In the last 10 days, have you been in contact with someone who was confirmed or suspected to have Coronavirus/COVID-19? --    Have you had a COVID-19 viral test in the last 10 days? No    Do you have any of the following new or worsening symptoms? None of these    Have you traveled internationally or domestically in the last month? No          Travel History   Travel since 23    No documented travel since 23          Health Maintenance reviewed and discussed and ordered per Provider. Social Determinants of Health     Tobacco Use: Low Risk     Smoking Tobacco Use: Never    Smokeless Tobacco Use: Never    Passive Exposure: Not on file   Alcohol Use: Not on file   Financial Resource Strain: Low Risk     Difficulty of Paying Living Expenses: Not hard at all   Food Insecurity: No Food Insecurity    Worried About [address] in the Last Year: Never true    [address] in the Last Year: Never true   Transportation Needs: Unknown    Lack of Transportation (Medical): Not on file    Lack of Transportation (Non-Medical):  No   Physical Activity: Not on file   Stress: Not on file   Social Connections: Not on file   Intimate Partner Violence: Not on file   Depression: Not at risk    PHQ-2

## 2023-07-24 DIAGNOSIS — N18.31 HYPERTENSIVE KIDNEY DISEASE WITH STAGE 3A CHRONIC KIDNEY DISEASE (HCC): ICD-10-CM

## 2023-07-24 DIAGNOSIS — I12.9 HYPERTENSIVE KIDNEY DISEASE WITH STAGE 3A CHRONIC KIDNEY DISEASE (HCC): ICD-10-CM

## 2023-07-24 RX ORDER — AMLODIPINE BESYLATE 10 MG/1
TABLET ORAL
Qty: 30 TABLET | Refills: 0 | Status: SHIPPED | OUTPATIENT
Start: 2023-07-24 | End: 2023-09-13 | Stop reason: SDUPTHER

## 2023-09-13 DIAGNOSIS — N18.31 HYPERTENSIVE KIDNEY DISEASE WITH STAGE 3A CHRONIC KIDNEY DISEASE (HCC): ICD-10-CM

## 2023-09-13 DIAGNOSIS — I12.9 HYPERTENSIVE KIDNEY DISEASE WITH STAGE 3A CHRONIC KIDNEY DISEASE (HCC): ICD-10-CM

## 2023-09-13 RX ORDER — LOSARTAN POTASSIUM 25 MG/1
25 TABLET ORAL EVERY MORNING
Qty: 90 TABLET | Refills: 0 | OUTPATIENT
Start: 2023-09-13

## 2023-09-13 RX ORDER — AMLODIPINE BESYLATE 10 MG/1
TABLET ORAL
Qty: 30 TABLET | Refills: 0 | Status: SHIPPED | OUTPATIENT
Start: 2023-09-13

## 2023-09-13 RX ORDER — LOSARTAN POTASSIUM 25 MG/1
25 TABLET ORAL EVERY MORNING
Qty: 30 TABLET | Refills: 0 | Status: SHIPPED | OUTPATIENT
Start: 2023-09-13

## 2023-09-13 RX ORDER — ATORVASTATIN CALCIUM 10 MG/1
TABLET, FILM COATED ORAL
Qty: 30 TABLET | Refills: 0 | Status: SHIPPED | OUTPATIENT
Start: 2023-09-13

## 2023-09-13 RX ORDER — AMLODIPINE BESYLATE 10 MG/1
TABLET ORAL
Qty: 30 TABLET | Refills: 0 | OUTPATIENT
Start: 2023-09-13

## 2023-09-13 NOTE — TELEPHONE ENCOUNTER
Next appt: none  Last appt: 5/26/23  Last lab: 9/13/22    Lm for pt instructing her to schedule fu and have fasting labs drawn prior to appt when scheduled

## 2023-09-15 ENCOUNTER — OFFICE VISIT (OUTPATIENT)
Age: 78
End: 2023-09-15
Payer: MEDICARE

## 2023-09-15 VITALS
RESPIRATION RATE: 18 BRPM | WEIGHT: 137 LBS | HEART RATE: 85 BPM | DIASTOLIC BLOOD PRESSURE: 70 MMHG | HEIGHT: 67 IN | SYSTOLIC BLOOD PRESSURE: 100 MMHG | OXYGEN SATURATION: 96 % | BODY MASS INDEX: 21.5 KG/M2
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
## 2023-09-15 DIAGNOSIS — Z74.09 IMPAIRED MOBILITY AND ADLS: ICD-10-CM

## 2023-09-15 DIAGNOSIS — Z78.9 IMPAIRED MOBILITY AND ADLS: ICD-10-CM

## 2023-09-15 DIAGNOSIS — F41.9 ANXIETY DISORDER, UNSPECIFIED TYPE: ICD-10-CM

## 2023-09-15 DIAGNOSIS — F03.918 UNSPECIFIED DEMENTIA, UNSPECIFIED SEVERITY, WITH OTHER BEHAVIORAL DISTURBANCE (HCC): Primary | ICD-10-CM

## 2023-09-15 DIAGNOSIS — R41.82 ALTERED MENTAL STATUS, UNSPECIFIED ALTERED MENTAL STATUS TYPE: ICD-10-CM

## 2023-09-15 DIAGNOSIS — I67.9 CEREBROVASCULAR DISEASE, UNSPECIFIED: ICD-10-CM

## 2023-09-15 PROCEDURE — G8399 PT W/DXA RESULTS DOCUMENT: HCPCS | Performed by: NURSE PRACTITIONER

## 2023-09-15 PROCEDURE — 1036F TOBACCO NON-USER: CPT | Performed by: NURSE PRACTITIONER

## 2023-09-15 PROCEDURE — 1090F PRES/ABSN URINE INCON ASSESS: CPT | Performed by: NURSE PRACTITIONER

## 2023-09-15 PROCEDURE — 1123F ACP DISCUSS/DSCN MKR DOCD: CPT | Performed by: NURSE PRACTITIONER

## 2023-09-15 PROCEDURE — G8420 CALC BMI NORM PARAMETERS: HCPCS | Performed by: NURSE PRACTITIONER

## 2023-09-15 PROCEDURE — 99214 OFFICE O/P EST MOD 30 MIN: CPT | Performed by: NURSE PRACTITIONER

## 2023-09-15 PROCEDURE — G8427 DOCREV CUR MEDS BY ELIG CLIN: HCPCS | Performed by: NURSE PRACTITIONER

## 2023-09-15 RX ORDER — DONEPEZIL HYDROCHLORIDE 10 MG/1
10 TABLET, FILM COATED ORAL NIGHTLY
Qty: 30 TABLET | Refills: 0 | OUTPATIENT
Start: 2023-09-15

## 2023-09-15 RX ORDER — SERTRALINE HYDROCHLORIDE 25 MG/1
TABLET, FILM COATED ORAL
Qty: 90 TABLET | Refills: 5 | Status: SHIPPED | OUTPATIENT
Start: 2023-09-15

## 2023-09-15 RX ORDER — DONEPEZIL HYDROCHLORIDE 10 MG/1
TABLET, FILM COATED ORAL
Qty: 30 TABLET | Refills: 5 | Status: SHIPPED | OUTPATIENT
Start: 2023-09-15

## 2023-09-15 NOTE — PROGRESS NOTES
[address] [place] [address] Drive  09 [facility]. [address] [place] [address]  Office:  [phone]  Fax: [phone]  Chief Complaint   Patient presents with    Memory Loss    Follow-up       HPI: Aaron Haley presents in follow-up for dementia. She was last seen here on 11/8/2022. She continued Aricept 10 mg nightly. She reported losing train of thought in conversation last after starting the Aricept but also may be due to benefits with speech therapy. She was referred to restart speech therapy. She takes aspirin 81 mg daily. Vascular risk factor management. She was started on sertraline 25 mg daily for 1 week then 50 mg daily to help with mood and anxiety. She was referred for neuropsychological reevaluation to be done after 1 years time since previous. She presents today in follow-up. She is with her son Mitra Avilez. He provides history. She engages but is not able to provide much of history due to speech issues related to dementia. She has dysphasia, confabulation. Son reports she wakes up between 2 and 4. Most days of the month. Wanders. Took everything out of the cabinets. She has no recollection of that. Or taking everything out of the fridge and placing it on the stove. Son is there. She does not do much during the day. Eats well. Balance is not great, doesn't walk much anymore, gets short of breath after 15 steps, always wants to sit down. She does ADLs independently. He gives meds and makes meals. Taking donepezil 10 mg at night. Needs a refill. Just ran out of about a week ago. Goes to bed around 8-10. No alcohol, smoking, or excessive caffeine. He was wondering about helping sleep- tried melatonin, sleep gels, tablets, and she's even quicker to get up with taking those. Taking the Zoloft 50 mg (was taking a 25 mg tab twice a day). 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   Deteriorated considerably in the last month

## 2023-09-15 NOTE — PATIENT INSTRUCTIONS
Patient instructions:  -change donepezil/ Aricept to morning dosing. Restart at 5 mg (take a half tab) in AM for 1 week then 1 full tab in AM thereafter.  -increase sertraline/ Zoloft to 75 mg daily (once a day, can try at night)  -check labs and urine  -home health referral (PT, ST cognitive therapy, and medical social worker):  [facility], [address]  [place], [address]  [phone]  -neuropsychological re-evaluation: [facility] [phone]- call them if you do not hear from them by next week

## 2023-09-17 ENCOUNTER — HOME HEALTH ADMISSION (OUTPATIENT)
Age: 78
End: 2023-09-17
Payer: MEDICARE

## 2023-09-20 ENCOUNTER — HOME CARE VISIT (OUTPATIENT)
Age: 78
End: 2023-09-20

## 2023-09-20 VITALS
HEART RATE: 91 BPM | RESPIRATION RATE: 14 BRPM | OXYGEN SATURATION: 99 % | DIASTOLIC BLOOD PRESSURE: 70 MMHG | SYSTOLIC BLOOD PRESSURE: 130 MMHG | TEMPERATURE: 97.5 F

## 2023-09-20 PROCEDURE — 0221000100 HH NO PAY CLAIM PROCEDURE

## 2023-09-20 PROCEDURE — G0151 HHCP-SERV OF PT,EA 15 MIN: HCPCS

## 2023-09-20 ASSESSMENT — ENCOUNTER SYMPTOMS
DYSPNEA ACTIVITY LEVEL: AFTER AMBULATING 10 - 20 FT
BOWEL INCONTINENCE: 1

## 2023-09-20 NOTE — HOME HEALTH
walk to the bathroom and back. Verbal HEP provided, patient/caregiver verbalized understanding; however, will need reinforcement and continued education for additional exercises and progression of HEP. .    . ASSESSMENT AND CONTINUED NEED FOR THE FOLLOWING SKILLS:   Patient presents with dementia and deficits, as described, in strength, gait and balance that impairs her ability to transfer, navigate her home and that increases her risk for falling and her dependence. HHPT is indicated in order to provide skilled interventions to improve and restore balance, strength, transfers and gait, that will improve the patient's overall safety and lower her risk for falls and injury. Skilled interventions will include: Instruction in ther-ex, NMR, HEP for strength, balance improvement; Training in gait, transfers, balance; Education in fall prevention, safety, energy conservation. PT will monitor vital signs, pain and patient response to therapy. .   POC:  Patient/caregiver instructed on POC and are agreeable to POC at this time. POC and admission to [facility] status sent via inSimris Alget to CAMERON Argueta NP. PLAN:  PT 1w1, 2w3    DISCHARGE PLANNING DISCUSSED: Discharge to self and family under MD supervision once all goals have been met or patient has reached max potential. Patient/caregiver verbalized understanding.

## 2023-09-22 ENCOUNTER — HOME CARE VISIT (OUTPATIENT)
Age: 78
End: 2023-09-22

## 2023-09-25 ENCOUNTER — HOME CARE VISIT (OUTPATIENT)
Age: 78
End: 2023-09-25
Payer: MEDICARE

## 2023-09-25 PROCEDURE — G0155 HHCP-SVS OF CSW,EA 15 MIN: HCPCS

## 2023-09-26 ENCOUNTER — HOME CARE VISIT (OUTPATIENT)
Age: 78
End: 2023-09-26
Payer: MEDICARE

## 2023-09-26 VITALS
SYSTOLIC BLOOD PRESSURE: 121 MMHG | HEART RATE: 89 BPM | DIASTOLIC BLOOD PRESSURE: 72 MMHG | TEMPERATURE: 98.7 F | OXYGEN SATURATION: 97 % | RESPIRATION RATE: 16 BRPM

## 2023-09-26 PROCEDURE — G0153 HHCP-SVS OF S/L PATH,EA 15MN: HCPCS

## 2023-09-26 NOTE — HOME HEALTH
MSW met with the pt and her son/caregiver Danette Darden, who has cared for the pt for almost two years. Pt's son lives in the home, is not currently employed due to care/supervision needed, and provides meals and business management assistance. MSW provided the pt's son documentation of social service resources, 211 (resource hotline), prescription assistance, and support programs. MSW provided information about care assistance options/costs/funding sources and discussed obtaining assistance to review financial resources. MSW also provided information about Medicaid, discussed [employer] and [address] [place] [address] services, Advanced Directives, POA and self-care. MSW invited a return call if resource questions arise.

## 2023-09-27 ENCOUNTER — HOME CARE VISIT (OUTPATIENT)
Age: 78
End: 2023-09-27
Payer: MEDICARE

## 2023-09-27 PROCEDURE — G0157 HHC PT ASSISTANT EA 15: HCPCS

## 2023-09-28 VITALS
DIASTOLIC BLOOD PRESSURE: 64 MMHG | TEMPERATURE: 97.5 F | HEART RATE: 92 BPM | RESPIRATION RATE: 18 BRPM | SYSTOLIC BLOOD PRESSURE: 94 MMHG

## 2023-09-29 ENCOUNTER — HOME CARE VISIT (OUTPATIENT)
Age: 78
End: 2023-09-29
Payer: MEDICARE

## 2023-09-29 PROCEDURE — G0157 HHC PT ASSISTANT EA 15: HCPCS

## 2023-10-02 ENCOUNTER — HOME CARE VISIT (OUTPATIENT)
Age: 78
End: 2023-10-02
Payer: MEDICARE

## 2023-10-02 VITALS
HEART RATE: 84 BPM | DIASTOLIC BLOOD PRESSURE: 66 MMHG | SYSTOLIC BLOOD PRESSURE: 92 MMHG | OXYGEN SATURATION: 97 % | RESPIRATION RATE: 18 BRPM | TEMPERATURE: 97.2 F

## 2023-10-02 PROCEDURE — G0157 HHC PT ASSISTANT EA 15: HCPCS

## 2023-10-02 ASSESSMENT — ENCOUNTER SYMPTOMS: PAIN LOCATION - PAIN QUALITY: SORE

## 2023-10-03 VITALS
HEART RATE: 91 BPM | DIASTOLIC BLOOD PRESSURE: 61 MMHG | RESPIRATION RATE: 18 BRPM | TEMPERATURE: 97.5 F | OXYGEN SATURATION: 97 % | SYSTOLIC BLOOD PRESSURE: 95 MMHG

## 2023-10-03 NOTE — HOME HEALTH
SUBJECTIVE: Patient with no changes with medications and no falls were reported by pt/CG. Son present during session. CAREGIVER INVOLVEMENT/ASSISTANCE NEEDED FOR: Patient resides with son who assist with ADL's and transfers as needed. HOME HEALTH SUPPLIES BY TYPE AND QUANTITY ORDERED/DELIVERED THIS VISIT INCLUDE: none   OBJECTIVE: See interventions. PATIENT RESPONSE TO TREATMENT: Patient required 1x ~1 minute rest break due to c/o dizziness, however, subsided during rest break. PATIENT LEVEL OF UNDERSTANDING OF EDUCATION PROVIDED: Patient/CG required reinforcement on transfer training, HEP 2-3/x a day, fall prevention techniques, energy conservation, signs/sx of hyper/hypotension and to continue to monitor BP, proper footwear, increasing activity throughout day, use of AD, proper nutritional diet and repositioning frequently to reduce risk for pressure sores. ASSESSMENT OF PROGRESS TOWARD GOALS: Patient is progressing towards goals. Patient demonstrated thera ex in sitting/standing with ~50% verbal cueing for proper technique. Patient transferred from sit<->stand with ~50% verbal cueing for UE placement to prevent from flopping into sitting. Patient with 1x ~1 minute rest break throughout thera ex due to dizziness during standing. Patient BP decreased during standing activities. CONTINUED NEED FOR THE FOLLOWING SKILLS: Continuation of PT to further improve patient balance, functional mobility and strength to decrease pts risk for falls. PLAN FOR NEXT VISIT: Attempt outdoor gait training next visit. THE FOLLOWING DISCHARGE PLANNING WAS DISCUSSED WITH THE PATIENT/CAREGIVER: D/C from HHPT when goals are met or max potential benefit achieved.

## 2023-10-03 NOTE — HOME HEALTH
SUBJECTIVE: Patient with no changes with medications and no falls were reported by pt/CG. Son notes pt with increase wandering and activity at night and expressed concerns for pt safety. CAREGIVER INVOLVEMENT/ASSISTANCE NEEDED FOR: Patient resides with son who assist with ADL's and transfers as needed. HOME HEALTH SUPPLIES BY TYPE AND QUANTITY ORDERED/DELIVERED THIS VISIT INCLUDE: none  OBJECTIVE: See interventions. PATIENT RESPONSE TO TREATMENT: Patient limited during activities due to increase dizziness upon standing. Patient with decrease in BP to 80/60 in standing. PATIENT LEVEL OF UNDERSTANDING OF EDUCATION PROVIDED: Patient/CG required reinforcement on transfer training, HEP 2-3/x a day, fall prevention techniques, energy conservation, signs/sx of hyper/hypotension and to continue to monitor BP and if any changes or worsening in sx to contact PCP or seek medical support, proper footwear, increasing activity throughout day, use of AD, proper nutritional diet and repositioning frequently to reduce risk for pressure sores. ASSESSMENT OF PROGRESS TOWARD GOALS: Patient with limited progression towards goals due to increase c/o dizziness and decrease in BP upon standing. Patient able to demonstrate sitting thera ex with no increase dizziness, however, minimal pain throughout left hip. Patient ambulated short distances throughout home with increase c/o dizziness. Patient BP at rest 90/60 in sitting, 113/64 in supine and 80/60 upon standing for ~30 seconds. CONTINUED NEED FOR THE FOLLOWING SKILLS: Continuation of PT to further improve patient balance, functional mobility and strength to decrease pts risk for falls. PLAN FOR NEXT VISIT: Attempt outdoor gait training next visit. THE FOLLOWING DISCHARGE PLANNING WAS DISCUSSED WITH THE PATIENT/CAREGIVER: D/C from HHPT when goals are met or max potential benefit achieved. OTHER: See case communication regarding BP.

## 2023-10-04 ENCOUNTER — HOME CARE VISIT (OUTPATIENT)
Age: 78
End: 2023-10-04
Payer: MEDICARE

## 2023-10-04 VITALS
OXYGEN SATURATION: 96 % | SYSTOLIC BLOOD PRESSURE: 119 MMHG | TEMPERATURE: 97.6 F | HEART RATE: 86 BPM | DIASTOLIC BLOOD PRESSURE: 71 MMHG | RESPIRATION RATE: 16 BRPM

## 2023-10-04 PROCEDURE — G0153 HHCP-SVS OF S/L PATH,EA 15MN: HCPCS

## 2023-10-04 PROCEDURE — G0157 HHC PT ASSISTANT EA 15: HCPCS

## 2023-10-05 ENCOUNTER — TELEPHONE (OUTPATIENT)
Age: 78
End: 2023-10-05

## 2023-10-05 NOTE — TELEPHONE ENCOUNTER
Patients son called and states that his mother has not been sleeping well and states that she is up wondering around and he is not sure what to do. His callback number is [phone]. Please advise.

## 2023-10-05 NOTE — HOME HEALTH
SUBJECTIVE: Per son, patient is having a hard time sleeping through the night as she is wandering. CAREGIVER INVOLVEMENT / ASSISTANCE NEEDED FOR: transportation, medication management, meal prep, ADLs    OBJECTIVE / PATIENT RESPONSE TO TREATMENT / PATIENT LEVEL OF UNDERSTANDING OF EDUCATION PROVIDED: Patient demonstrated great participation in cognitive communication therapy with implementation of phonemic cueing and clozed phrases to encourage word retrieval for daily conversations. Patient completed Fay Chapman Cognitive Staging with suggested level of 3.4. Patient continues to demonstrate difficulty with word retrieval and stm recall for participation in daily conversations. Re-educated son on implementation of safety precautions (alarms, monitors) to ensure patient safety in home due to wandering to which her verbalized understanding. Patient would benefit from increase of therapy to 2X a week in order to ensure carryover of progress/strategies. ASSESSMENT OF PROGRESS TOWARD GOALS: Patient demonstrated great improvement in word retrieval with graded cueing from SLP. CONTINUED NEED FOR THE FOLLOWING SKILLS: Patient requires additional speech therapy services in order to improve participation in daily cognitive communication tasks, safety in home, and in order to reduce caregiver burden. PLAN FOR NEXT VISIT : Plans to educate caregiver of cognitive staging and continue graded exercises for word retrieval.    HOME EXERCISE PROGRAM: defer to next session    THE FOLLOWING DISCHARGE PLANNING WAS DISCUSSED WITH THE PATIENT/CAREGIVER: ST to d/c in 4 more visits/wks or when goals met/max potential achieved, Pt/caregiver verbalized understanding.

## 2023-10-06 ENCOUNTER — TELEPHONE (OUTPATIENT)
Facility: CLINIC | Age: 78
End: 2023-10-06

## 2023-10-06 NOTE — TELEPHONE ENCOUNTER
Noted, agree with plan. Appears that neuro placed an order for home health/. Will further discuss on Monday. Thank you.

## 2023-10-06 NOTE — TELEPHONE ENCOUNTER
Spoke w/ Maryellen Knapp (son) and he stated pt has not been sleeping well and seems out of it. Maryellen Pac did check pt BP this morning and stated top number was a little over the 110'E and diastolic was more in the 06F he could not remember exactly. He stated that the low reading must have been done when PT was there Wednesday 10/4/23 but that day he wasn't informed of any concerns instead you were informed of concerns on Monday which included the low BP and dizziness. Maryellen [facility] stated pt has been drinking plenty of fluids. Zuhair did sound as overwhelmed and stated its becoming too much. Pt instructed to continue to attempt to get in contact w/ Neurology to discuss his concerns in ref to sundowning. He was advised that if he could not get in contact w/ Neuro today and her symptoms worsen to take her to ER. He was also informed to continue w/ BP readings and given VV appt for Monday. He was instructed to have BP monitor nearby for when its time for her VV appt. He did ask if would be ok to make future appts virtually.

## 2023-10-06 NOTE — TELEPHONE ENCOUNTER
Message from PT:  Left message with Laisha @ [facility] office regarding pts decreased BP to 80/60 upon standing and pts increase c/o dizziness with activity. Requested further instruction and advice on pts current sx. Son with c/o pt presenting with increase wandering at night and concerned for pts safety. Advised son to contact Neurologist in regards to nighttime medications and follow-up with /ST for further advice regarding memory strategies for dementia and community resources. Son verbalized understanding. Please triage patient. Thank you.

## 2023-10-08 VITALS
HEART RATE: 93 BPM | SYSTOLIC BLOOD PRESSURE: 122 MMHG | DIASTOLIC BLOOD PRESSURE: 65 MMHG | OXYGEN SATURATION: 97 % | TEMPERATURE: 97.3 F | RESPIRATION RATE: 18 BRPM

## 2023-10-09 ENCOUNTER — TELEPHONE (OUTPATIENT)
Facility: CLINIC | Age: 78
End: 2023-10-09

## 2023-10-09 ENCOUNTER — TELEMEDICINE (OUTPATIENT)
Facility: CLINIC | Age: 78
End: 2023-10-09
Payer: MEDICARE

## 2023-10-09 ENCOUNTER — HOME CARE VISIT (OUTPATIENT)
Age: 78
End: 2023-10-09
Payer: MEDICARE

## 2023-10-09 DIAGNOSIS — I12.9 HYPERTENSIVE KIDNEY DISEASE WITH STAGE 3A CHRONIC KIDNEY DISEASE (HCC): ICD-10-CM

## 2023-10-09 DIAGNOSIS — N18.31 STAGE 3A CHRONIC KIDNEY DISEASE (HCC): ICD-10-CM

## 2023-10-09 DIAGNOSIS — N18.31 HYPERTENSIVE KIDNEY DISEASE WITH STAGE 3A CHRONIC KIDNEY DISEASE (HCC): ICD-10-CM

## 2023-10-09 DIAGNOSIS — F03.918 UNSPECIFIED DEMENTIA, UNSPECIFIED SEVERITY, WITH OTHER BEHAVIORAL DISTURBANCE (HCC): Primary | ICD-10-CM

## 2023-10-09 PROCEDURE — G8399 PT W/DXA RESULTS DOCUMENT: HCPCS | Performed by: NURSE PRACTITIONER

## 2023-10-09 PROCEDURE — G8420 CALC BMI NORM PARAMETERS: HCPCS | Performed by: NURSE PRACTITIONER

## 2023-10-09 PROCEDURE — 1036F TOBACCO NON-USER: CPT | Performed by: NURSE PRACTITIONER

## 2023-10-09 PROCEDURE — G8484 FLU IMMUNIZE NO ADMIN: HCPCS | Performed by: NURSE PRACTITIONER

## 2023-10-09 PROCEDURE — G8427 DOCREV CUR MEDS BY ELIG CLIN: HCPCS | Performed by: NURSE PRACTITIONER

## 2023-10-09 PROCEDURE — 1090F PRES/ABSN URINE INCON ASSESS: CPT | Performed by: NURSE PRACTITIONER

## 2023-10-09 PROCEDURE — 1123F ACP DISCUSS/DSCN MKR DOCD: CPT | Performed by: NURSE PRACTITIONER

## 2023-10-09 PROCEDURE — 99215 OFFICE O/P EST HI 40 MIN: CPT | Performed by: NURSE PRACTITIONER

## 2023-10-09 PROCEDURE — G0151 HHCP-SERV OF PT,EA 15 MIN: HCPCS

## 2023-10-09 SDOH — ECONOMIC STABILITY: FOOD INSECURITY: WITHIN THE PAST 12 MONTHS, YOU WORRIED THAT YOUR FOOD WOULD RUN OUT BEFORE YOU GOT MONEY TO BUY MORE.: NEVER TRUE

## 2023-10-09 SDOH — ECONOMIC STABILITY: FOOD INSECURITY: WITHIN THE PAST 12 MONTHS, THE FOOD YOU BOUGHT JUST DIDN'T LAST AND YOU DIDN'T HAVE MONEY TO GET MORE.: NEVER TRUE

## 2023-10-09 SDOH — ECONOMIC STABILITY: INCOME INSECURITY: HOW HARD IS IT FOR YOU TO PAY FOR THE VERY BASICS LIKE FOOD, HOUSING, MEDICAL CARE, AND HEATING?: NOT HARD AT ALL

## 2023-10-09 ASSESSMENT — PATIENT HEALTH QUESTIONNAIRE - PHQ9
SUM OF ALL RESPONSES TO PHQ QUESTIONS 1-9: 0
SUM OF ALL RESPONSES TO PHQ QUESTIONS 1-9: 0
1. LITTLE INTEREST OR PLEASURE IN DOING THINGS: 0
SUM OF ALL RESPONSES TO PHQ9 QUESTIONS 1 & 2: 0
SUM OF ALL RESPONSES TO PHQ QUESTIONS 1-9: 0
SUM OF ALL RESPONSES TO PHQ QUESTIONS 1-9: 0
2. FEELING DOWN, DEPRESSED OR HOPELESS: 0

## 2023-10-09 NOTE — PROGRESS NOTES
Gopi Cowan is a 70 y.o. female who was seen by synchronous (real-time) audio-video technology on 10/9/2023 for Dementia, Chronic Kidney Disease, and Other (Patient son states that she wonders in the middle of the night and she does not remember doing it. He states that he woke up at 3 am to glass breaking, patient was in the kitchen stacking glasses in the middle of the floor. He states that his main concern is that patient is not sleeping and it is becoming a burden on both. He states that she is unable to get out of the house. He states that her not having sleep affecting her mood swings. )    Assessment & Plan:     1. Unspecified dementia, unspecified severity, with other behavioral disturbance ([address])  Assessment & Plan:  Continues to decline, will send high priority message to neurology to set patient up for follow up and possible medication changes  Home health ordered for vitals, medication check, lab draws as patient is unable to leave home at this time  Discussed possibility that patient may need to be considered for SNF if she continues to decline  Orders:  -     [id number] [facility]/[place]  2. Hypertensive kidney disease with stage 3a chronic kidney disease ([address])  Assessment & Plan:  BP reportedly normotensive, continue Losartan 25 mg, amlodipine 10 mg daily  3. Stage 3a chronic kidney disease ([address])  Assessment & Plan:  Overdue for labs, home health ordered for completion     Follow-up and Dispositions    Return in about 6 weeks (around 11/20/2023) for dementia, cholesterol, ckd, prediabetes, lab results (per home health).        SUBJECTIVE:     HPI    Patient is in the state of [place]:   Yes    Presents today for dementia follow up, accompanied by son Todd Hameed    Dementia -  Diagnosed in April 2022  Treatment:  Aricept 10 mg qhs  Completed speech therapy  MRI showed significant small vessle disease vascular burden  Has insomnia, wakes up in the night and wanders  Aricept
care clinic since your last visit? Hospitalized since your last visit? no    2. Have you seen or consulted any other health care providers outside of the [address] [place] [address] Avenue since your last visit? Include any pap smears or colon screening.  no

## 2023-10-09 NOTE — HOME HEALTH
SUBJECTIVE:   CG reports he has been trying to contact NP Manjit [facility] re: concern they have but has not receved a call back. CAREGIVER INVOLVEMENT/ASSISTANCE NEEDED FOR: All care and supervision to prevent falls and injury. HOME HEALTH SUPPLIES BY TYPE AND QUANTITY ORDERED/DELIVERED THIS VISIT INCLUDE: n/a  OBJECTIVE:  See interventions. Informal re-assessment of progress toward goals. PATIENT RESPONSE TO TREATMENT:  Patient was cooperative throughout the visit. She needed continuous verbal reminders to follow instructions throughout the visit. PATIENT LEVEL OF UNDERSTANDING OF EDUCATION PROVIDED: Patient and CG ed re: progress made, areas of need and plan for PT to extend. Ed re: process and PT to obtain the orders to extend. ASSESSMENT OF PROGRESS TOWARD GOALS:  Patient demonstrates gains in all areas except the strength goal.    Special tests:    Tinetti:  /, improved from /, goal / bot met, progressing. SCHULER/56, improved from 43/56, progressing. TU seconds, improved from 11 seconds, progressing. Gait:   No AD. Reciprocal pattern, narriw YOCASTA, occasional veering to the side, but able to maintain straight pathway for TUG. Improved YOCASTA and improved ability to wallk without steadying self on wall with hand. Strength:  Used FTSST to assess functional strength gains. At [facility] the FTSST was from chair with pad:  17.5 seconds. Today assessed from chair without pad:  22 seconds. Transfers:  Patient demonstrates safe sit to/from stand transfers in the home. CONTINUED NEED FOR THE FOLLOWING SKILLS: HHPT  is needed in order to cintinue to progress strength, balance and gait to restore  patient safety with mobility in the home. PLAN FOR NEXT VISIT: Continue balance, gait ad strength training. THE FOLLOWING DISCHARGE PLANNING WAS DISCUSSED WITH THE PATIENT/CAREGIVER: PT to contact [facility] to obtain orders [facility].  Plan:  2w3 starting next week.

## 2023-10-09 NOTE — ASSESSMENT & PLAN NOTE
Continues to decline, will send high priority message to neurology to set patient up for follow up and possible medication changes  Home health ordered for vitals, medication check, lab draws as patient is unable to leave home at this time  Discussed possibility that patient may need to be considered for SNF if she continues to decline

## 2023-10-11 ENCOUNTER — HOME CARE VISIT (OUTPATIENT)
Age: 78
End: 2023-10-11
Payer: MEDICARE

## 2023-10-11 VITALS
SYSTOLIC BLOOD PRESSURE: 143 MMHG | TEMPERATURE: 97.4 F | HEART RATE: 77 BPM | RESPIRATION RATE: 18 BRPM | DIASTOLIC BLOOD PRESSURE: 87 MMHG | OXYGEN SATURATION: 97 %

## 2023-10-11 VITALS
HEART RATE: 81 BPM | OXYGEN SATURATION: 99 % | SYSTOLIC BLOOD PRESSURE: 120 MMHG | DIASTOLIC BLOOD PRESSURE: 72 MMHG | TEMPERATURE: 98.2 F | RESPIRATION RATE: 16 BRPM

## 2023-10-11 VITALS
OXYGEN SATURATION: 97 % | TEMPERATURE: 97.2 F | RESPIRATION RATE: 18 BRPM | HEART RATE: 73 BPM | DIASTOLIC BLOOD PRESSURE: 73 MMHG | SYSTOLIC BLOOD PRESSURE: 126 MMHG

## 2023-10-11 PROCEDURE — G0153 HHCP-SVS OF S/L PATH,EA 15MN: HCPCS

## 2023-10-11 PROCEDURE — G0157 HHC PT ASSISTANT EA 15: HCPCS

## 2023-10-11 NOTE — HOME HEALTH
SUBJECTIVE: Patient with no changes with medications and no falls were reported by pt/CG. CAREGIVER INVOLVEMENT/ASSISTANCE NEEDED FOR: Patient resides with son who assist with ADL's and transfers as needed. HOME HEALTH SUPPLIES BY TYPE AND QUANTITY ORDERED/DELIVERED THIS VISIT INCLUDE: none   OBJECTIVE: See interventions. PATIENT RESPONSE TO TREATMENT: Patient with no c/o dizziness or decrease in BP throughout session. BP at 108/74 post activities. PATIENT LEVEL OF UNDERSTANDING OF EDUCATION PROVIDED: Patient/CG required reinforcement on transfer training, HEP 2-3/x a day, fall prevention techniques, energy conservation, signs/sx of hyper/hypotension and to continue to monitor BP and if any changes or worsening in sx to contact PCP or seek medical support, proper footwear, increasing activity throughout day, use of AD, proper nutritional diet and repositioning frequently to reduce risk for pressure sores. ASSESSMENT OF PROGRESS TOWARD GOALS: Patient is progressing well towards goals. Patient transferred sit<->stand with decrease assistance. Patient able to demonstrate sitting and standing thera ex with no increase in pain and no reports of dizziness. Patient required redirection due to easily distracted and off task. CONTINUED NEED FOR THE FOLLOWING SKILLS: Continuation of PT to further improve patient balance, functional mobility and strength to decrease pts risk for falls. PLAN FOR NEXT VISIT: Progress with outdoor gait training next visit. THE FOLLOWING DISCHARGE PLANNING WAS DISCUSSED WITH THE PATIENT/CAREGIVER: D/C from HHPT when goals are met or max potential benefit achieved.

## 2023-10-11 NOTE — HOME HEALTH
SUBJECTIVE: Patient reported some knee pain while walking around. CAREGIVER INVOLVEMENT / ASSISTANCE NEEDED FOR: transportation, medication management, meal prep, ADLs     OBJECTIVE / PATIENT RESPONSE TO TREATMENT / PATIENT LEVEL OF UNDERSTANDING OF EDUCATION PROVIDED: Patient demonstrated improvement in word retrieval for categorization tasks as well as improvement for word retrieval for picture scenes. Patient continues to demonstrate difficulty with recall/word retrieval in daily conversations and benefited from graded cueing. Patient demonstrated good response to spaced retrieval training with orientation information. Son reported recent difficulty with getting dressed exhibited by patient putting on additional layers of clothing. ASSESSMENT OF PROGRESS TOWARD GOALS: Patient demonstrated great improvement in word retrieval with graded cueing from SLP for picture scenes. CONTINUED NEED FOR THE FOLLOWING SKILLS: Patient requires additional speech therapy services in order to improve participation in daily cognitive communication tasks, safety in home, and in order to reduce caregiver burden. PLAN FOR NEXT VISIT : Plans to educate caregiver of cognitive staging, continue graded exercises for word retrieval, and implementing visual aids for sequencing to get dressed. HOME EXERCISE PROGRAM: defer to next session     THE FOLLOWING DISCHARGE PLANNING WAS DISCUSSED WITH THE PATIENT/CAREGIVER: ST to d/c in 3 more visits/wks or when goals met/max potential achieved, Pt/caregiver verbalized understanding.

## 2023-10-13 ENCOUNTER — HOME CARE VISIT (OUTPATIENT)
Age: 78
End: 2023-10-13
Payer: MEDICARE
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
## 2023-10-14 ENCOUNTER — HOME CARE VISIT (OUTPATIENT)
Age: 78
End: 2023-10-14
Payer: MEDICARE

## 2023-10-14 PROCEDURE — G0299 HHS/HOSPICE OF RN EA 15 MIN: HCPCS

## 2023-10-16 VITALS
HEART RATE: 68 BPM | TEMPERATURE: 97.8 F | DIASTOLIC BLOOD PRESSURE: 62 MMHG | SYSTOLIC BLOOD PRESSURE: 106 MMHG | OXYGEN SATURATION: 99 % | RESPIRATION RATE: 16 BRPM

## 2023-10-16 ASSESSMENT — ENCOUNTER SYMPTOMS: HEMOPTYSIS: 0

## 2023-10-16 NOTE — HOME HEALTH
Caregiver involvement: Son Janina Nance is available 24/7, he .Assists with ADLs, Medication management, Transportation to appointments, Meal prep and assists with ambulation. .    Medications reconciled and all medications are available in the home this visit. The following education was provided regarding medications, All medications should be given the same time daily. Medications  are somewhat effective  at this time. Home health supplies by type and quantity ordered/delivered this visit include: n/a    Patient education provided this visit: Patient is a fall risk, I recommend supervision at all times, keeping walk ways/halls clear of clutter. She is also a wander risk. Discussed ways to keep patient safe from wandering at night. Door locks, door knob covers, alarms. Work with other disciplines to increase endurance and strength. Progress toward goals: patient ambulates easily without DME. Home exercise program: Continue with ways to prevent patient from wandering/falls , especially at night. Continue giving all medications as prescribed. F/U with PCP /Neurology as instructed. Continued need for the following skills: SN/PT/MSW/SLP    The following discharge planning was discussed with the pt/caregiver: Will discharge when education is completed and patient is medically stable. NP Renetta Angel notified by case communication of SN initial Assessment.

## 2023-10-16 NOTE — CASE COMMUNICATION
SN Initial completed 10/14/23. SN, PHYSICAL THERAPY, Speech Therapy, TO MSW ordered and accepted by patient/caregiver. She is ambulatory with no DME. She is confused alot during interview.

## 2023-10-17 ENCOUNTER — HOME CARE VISIT (OUTPATIENT)
Age: 78
End: 2023-10-17
Payer: MEDICARE

## 2023-10-17 DIAGNOSIS — N18.31 HYPERTENSIVE KIDNEY DISEASE WITH STAGE 3A CHRONIC KIDNEY DISEASE (HCC): ICD-10-CM

## 2023-10-17 DIAGNOSIS — I12.9 HYPERTENSIVE KIDNEY DISEASE WITH STAGE 3A CHRONIC KIDNEY DISEASE (HCC): ICD-10-CM

## 2023-10-17 PROCEDURE — G0156 HHCP-SVS OF AIDE,EA 15 MIN: HCPCS

## 2023-10-17 NOTE — TELEPHONE ENCOUNTER
Pt son called in to get pt a rx refill of atorvastatin (LIPITOR) 10 MG tablet [[id number]]   amLODIPine (NORVASC) 10 MG tablet [[id number]]      Medication  losartan (COZAAR) 25 MG tablet [[id number]]  losartan (COZAAR) 25 MG tablet [[id number]]     O

## 2023-10-18 ENCOUNTER — HOME CARE VISIT (OUTPATIENT)
Age: 78
End: 2023-10-18
Payer: MEDICARE

## 2023-10-18 VITALS
TEMPERATURE: 97.9 F | DIASTOLIC BLOOD PRESSURE: 82 MMHG | SYSTOLIC BLOOD PRESSURE: 120 MMHG | OXYGEN SATURATION: 98 % | HEART RATE: 93 BPM | RESPIRATION RATE: 16 BRPM

## 2023-10-18 PROCEDURE — G0157 HHC PT ASSISTANT EA 15: HCPCS

## 2023-10-18 PROCEDURE — G0153 HHCP-SVS OF S/L PATH,EA 15MN: HCPCS

## 2023-10-18 RX ORDER — LOSARTAN POTASSIUM 25 MG/1
25 TABLET ORAL EVERY MORNING
Qty: 30 TABLET | Refills: 0 | Status: SHIPPED | OUTPATIENT
Start: 2023-10-18

## 2023-10-18 RX ORDER — AMLODIPINE BESYLATE 10 MG/1
TABLET ORAL
Qty: 30 TABLET | Refills: 0 | Status: SHIPPED | OUTPATIENT
Start: 2023-10-18

## 2023-10-18 RX ORDER — ATORVASTATIN CALCIUM 10 MG/1
TABLET, FILM COATED ORAL
Qty: 30 TABLET | Refills: 0 | Status: SHIPPED | OUTPATIENT
Start: 2023-10-18

## 2023-10-18 NOTE — TELEPHONE ENCOUNTER
Last filled 9/13/23  QTY:30 R:0     Last appt 5/26/23    Next appt - none scheduled     Labs 9/13/22    (Requested f/u 10/9/23 for [facility]V LVM per )

## 2023-10-19 VITALS
TEMPERATURE: 97.9 F | HEART RATE: 93 BPM | DIASTOLIC BLOOD PRESSURE: 82 MMHG | SYSTOLIC BLOOD PRESSURE: 120 MMHG | RESPIRATION RATE: 16 BRPM | OXYGEN SATURATION: 98 %

## 2023-10-19 NOTE — HOME HEALTH
SUBJECTIVE: Son reported increased difficulty with cognitive tasks this week. CAREGIVER INVOLVEMENT / ASSISTANCE NEEDED FOR: transportation, medication management, meal prep, ADLs     OBJECTIVE / PATIENT RESPONSE TO TREATMENT / PATIENT LEVEL OF UNDERSTANDING OF EDUCATION PROVIDED: Patient demonstrated improvement in word retrieval for picture scenes. Patient continues to demonstrate difficulty with recall/word retrieval in daily conversations and benefited from graded cueing. Patient demonstrated good participation in additional [address] [place] [address] and son verbalized understanding of recommendations based on level 3.4. ASSESSMENT OF PROGRESS TOWARD GOALS: Patient demonstrated great improvement in word retrieval with graded cueing from SLP for picture scenes. CONTINUED NEED FOR THE FOLLOWING SKILLS: Patient requires additional speech therapy services in order to improve participation in daily cognitive communication tasks, safety in home, and in order to reduce caregiver burden. PLAN FOR NEXT VISIT : Plans to reassess for further speech therapy needs. HOME EXERCISE PROGRAM: Implement cognitive staging recommendations    THE FOLLOWING DISCHARGE PLANNING WAS DISCUSSED WITH THE PATIENT/CAREGIVER: ST to d/c in 1 more visits/wks or when goals met/max potential achieved, Pt/caregiver verbalized understanding.

## 2023-10-19 NOTE — HOME HEALTH
SUBJECTIVE: Patient with no changes with medications and no falls were reported by pt/CG. ST present with pt at start of session. CAREGIVER INVOLVEMENT/ASSISTANCE NEEDED FOR: Patient resides with son who assist with ADL's and transfers as needed. HOME HEALTH SUPPLIES BY TYPE AND QUANTITY ORDERED/DELIVERED THIS VISIT INCLUDE: none   OBJECTIVE: See interventions. PATIENT RESPONSE TO TREATMENT: Patient with 1x c/o dizziness during outdoor ambulation requiring ~1 minute rest break, however, BP remained at 107/75 in left UE and HR 89 bpm.   PATIENT LEVEL OF UNDERSTANDING OF EDUCATION PROVIDED: Patient/CG required reinforcement on transfer training, HEP 2-3/x a day, fall prevention techniques, energy conservation, signs/sx of hyper/hypotension and to continue to monitor BP and if any changes or worsening in sx to contact PCP or seek medical support, proper footwear, increasing activity throughout day, use of AD, proper nutritional diet and repositioning frequently to reduce risk for pressure sores. ASSESSMENT OF PROGRESS TOWARD GOALS: Patient is progressing well towards goals. Patient ambulated ~2.5 minutes and increase distances on indoor/outdoor surfaces with ~25% verbal cueing for proper technique. Patient with no increase in pain during activities. Patient with 1x c/o dizziness during ambulation requiring ~1 minute rest break. Patient transferred sit<->stand with decrease verbal cueing for proper technique. CONTINUED NEED FOR THE FOLLOWING SKILLS: Continuation of PT to further improve patient balance, functional mobility and strength to decrease pts risk for falls. PLAN FOR NEXT VISIT: Progress with balance activities next visit. THE FOLLOWING DISCHARGE PLANNING WAS DISCUSSED WITH THE PATIENT/CAREGIVER: D/C from HHPT when goals are met or max potential benefit achieved.

## 2023-10-20 ENCOUNTER — HOME CARE VISIT (OUTPATIENT)
Age: 78
End: 2023-10-20
Payer: MEDICARE

## 2023-10-20 VITALS
TEMPERATURE: 97.8 F | RESPIRATION RATE: 16 BRPM | DIASTOLIC BLOOD PRESSURE: 70 MMHG | HEART RATE: 89 BPM | OXYGEN SATURATION: 97 % | SYSTOLIC BLOOD PRESSURE: 130 MMHG

## 2023-10-20 VITALS
TEMPERATURE: 98.1 F | HEART RATE: 73 BPM | RESPIRATION RATE: 16 BRPM | DIASTOLIC BLOOD PRESSURE: 62 MMHG | OXYGEN SATURATION: 96 % | SYSTOLIC BLOOD PRESSURE: 130 MMHG

## 2023-10-20 PROCEDURE — G0153 HHCP-SVS OF S/L PATH,EA 15MN: HCPCS

## 2023-10-20 PROCEDURE — G0157 HHC PT ASSISTANT EA 15: HCPCS

## 2023-10-20 NOTE — CASE COMMUNICATION
Patient seen for speech therapy reassessment today. Patient demonstrated improvement in cognitive lingusitic deficits for generative naming and stm recall evidenced by increased SLUMS Assessment score from 9/30(initial evaluation) to 12/30 (reassessment). With graded exercises, patient continues to progress with word retrieval for daily conversations. Patient continues to demonstrate difficulty with stm recall for daily events/conversat ions as well as sequencing for daily cognitive communication tasks. Son was able to teach-back cognitive staging and recommendations based on [facility] Cognitive Staging of level 3.2. Throughout session, patient demonstrated some periods of hallucinations of individuals not present. Patient would benefit from continued memory care with speech therapy services for 2wk2 to implement additional cognitive supports and improve sequencing for inc reased safety in cognitive communication tasks.

## 2023-10-20 NOTE — HOME HEALTH
SUBJECTIVE: Patient presented with hallucinations while SLP present for session. CAREGIVER INVOLVEMENT / ASSISTANCE NEEDED FOR: transportation, medication management, meal prep, ADLs     OBJECTIVE / PATIENT RESPONSE TO TREATMENT / PATIENT LEVEL OF UNDERSTANDING OF EDUCATION PROVIDED: Patient seen for speech therapy reassessment today. Patient demonstrated improvement in cognitive lingusitic deficits for generative naming and stm recall evidenced by increased SLUMS Assessment score from 9/30(initial evaluation) to 12/30 (reassessment). With graded exercises, patient continues to progress with word retrieval for daily conversations. Patient continues to demonstrate difficulty with stm recall for daily events/conversations as well as sequencing for daily cognitive communication tasks. Son was able to teach-back cognitive staging and recommendations based on [facility] Cognitive Staging of level 3.2. Throughout session, patient demonstrated some periods of hallucinations of individuals not present. Patient would benefit from continued memory care with speech therapy services for 2wk2 to implement additional cognitive supports and improve sequencing for increased safety in cognitive communication tasks. ASSESSMENT OF PROGRESS TOWARD GOALS: Patient demonstrated great improvement in word retrieval and caregiver able to teach-back cognitive staging/recommendations. CONTINUED NEED FOR THE FOLLOWING SKILLS: Patient requires additional speech therapy services in order to improve participation in daily cognitive communication tasks, safety in home, and in order to reduce caregiver burden. PLAN FOR NEXT VISIT : Plans continue speech therapy for 2wk2 to target memory care.     HOME EXERCISE PROGRAM: Implement cognitive staging recommendations     THE FOLLOWING DISCHARGE PLANNING WAS DISCUSSED WITH THE PATIENT/CAREGIVER: ST to d/c in 4 more visits/wks or when goals met/max potential achieved, Pt/caregiver verbalized

## 2023-10-21 ENCOUNTER — HOME CARE VISIT (OUTPATIENT)
Age: 78
End: 2023-10-21
Payer: MEDICARE

## 2023-10-21 PROCEDURE — G0299 HHS/HOSPICE OF RN EA 15 MIN: HCPCS

## 2023-10-21 PROCEDURE — G0156 HHCP-SVS OF AIDE,EA 15 MIN: HCPCS

## 2023-10-23 ENCOUNTER — HOME CARE VISIT (OUTPATIENT)
Age: 78
End: 2023-10-23
Payer: MEDICARE

## 2023-10-23 VITALS
SYSTOLIC BLOOD PRESSURE: 112 MMHG | HEART RATE: 78 BPM | OXYGEN SATURATION: 98 % | TEMPERATURE: 97.9 F | DIASTOLIC BLOOD PRESSURE: 64 MMHG | RESPIRATION RATE: 20 BRPM

## 2023-10-23 ASSESSMENT — ENCOUNTER SYMPTOMS
STOOL DESCRIPTION: FORMED
HEMOPTYSIS: 0

## 2023-10-23 NOTE — HOME HEALTH
Patient was being seen due to Dementia, medication and disease process education, fall precautions and safety. Patient/caregiver aware to continue to take medications as prescribed, fall precautions, safety is very important, especially with patient's impaired cognition and judgement. Patient's son has been caring for his mother for 2 years now, very knowledgeable regarding medications and disease processes. Advised to communicate further concerns with physicians and to keep all MD appointments. All medications available in the home. Patient is being discharged from skilled nursing as goals have been met, and patient's son able to care for patient.

## 2023-10-25 ENCOUNTER — HOME CARE VISIT (OUTPATIENT)
Age: 78
End: 2023-10-25
Payer: MEDICARE

## 2023-10-25 VITALS
OXYGEN SATURATION: 97 % | TEMPERATURE: 97.4 F | HEART RATE: 77 BPM | DIASTOLIC BLOOD PRESSURE: 72 MMHG | RESPIRATION RATE: 18 BRPM | SYSTOLIC BLOOD PRESSURE: 121 MMHG

## 2023-10-25 PROCEDURE — G0157 HHC PT ASSISTANT EA 15: HCPCS

## 2023-10-25 PROCEDURE — G0153 HHCP-SVS OF S/L PATH,EA 15MN: HCPCS

## 2023-10-26 NOTE — HOME HEALTH
SUBJECTIVE: Son reported significantly more difficulty lately with patient maintaining daily schedule. CAREGIVER INVOLVEMENT / ASSISTANCE NEEDED FOR: transportation, medication management, meal prep, ADLs     OBJECTIVE / PATIENT RESPONSE TO TREATMENT / PATIENT LEVEL OF UNDERSTANDING OF EDUCATION PROVIDED: Patient demonstrated good response to spaced retrieval training trials in order to recall safety awareness pertaining to house following delay. Patient continues to demonstrate difficulty with stm recall information in order to ensure carryover between sessions. Patient also demonstrated improvement in word retrieval for provided prompts in order to communicate basic needs/wants. ASSESSMENT OF PROGRESS TOWARD GOALS: Patient demonstrated great improvement in word retrieval with graded cueing from SLP for picture scenes. CONTINUED NEED FOR THE FOLLOWING SKILLS: Patient requires additional speech therapy services in order to improve participation in daily cognitive communication tasks, safety in home, and in order to reduce caregiver burden. PLAN FOR NEXT VISIT : Plans to continue graded exercises and implementation of cognitive communication supports in order to promote safety in home. HOME EXERCISE PROGRAM: Implement cognitive staging recommendations     THE FOLLOWING DISCHARGE PLANNING WAS DISCUSSED WITH THE PATIENT/CAREGIVER: ST to d/c in 3 more visits/wks or when goals met/max potential achieved, Pt/caregiver verbalized understanding.

## 2023-10-27 ENCOUNTER — HOME CARE VISIT (OUTPATIENT)
Age: 78
End: 2023-10-27
Payer: MEDICARE

## 2023-10-27 VITALS
DIASTOLIC BLOOD PRESSURE: 70 MMHG | HEART RATE: 90 BPM | SYSTOLIC BLOOD PRESSURE: 120 MMHG | RESPIRATION RATE: 16 BRPM | OXYGEN SATURATION: 95 % | TEMPERATURE: 97.7 F

## 2023-10-27 PROCEDURE — G0153 HHCP-SVS OF S/L PATH,EA 15MN: HCPCS

## 2023-10-27 PROCEDURE — G0156 HHCP-SVS OF AIDE,EA 15 MIN: HCPCS

## 2023-10-27 PROCEDURE — G0157 HHC PT ASSISTANT EA 15: HCPCS

## 2023-10-27 NOTE — TELEPHONE ENCOUNTER
Patient Son report's Mom sleeping a better, taking Aricept  and Sertraline in morning. He says he tries to keep her awake during the Day. He continue's to request a small medication that would help her to relax for sleep at night. Also made Son aware to monitor if potential fall, if medication interacts.

## 2023-10-27 NOTE — HOME HEALTH
SUBJECTIVE: Patient reported feeling good today. CAREGIVER INVOLVEMENT / ASSISTANCE NEEDED FOR: transportation, medication management, meal prep, ADLs     OBJECTIVE / PATIENT RESPONSE TO TREATMENT / PATIENT LEVEL OF UNDERSTANDING OF EDUCATION PROVIDED: Patient demonstrated good carryover between sessions of safety precautions by implementing spaced retrieval training trials. With implementing graded exercises, patient demonstrated improvement in generative naming, but continued to demonstrate difficulty with word retrieval for daily conversations. With SLP moderate cues, patient was able to initiate daily routine check list to improve participation in daily cognitive communication tasks. ASSESSMENT OF PROGRESS TOWARD GOALS: Patient demonstrated great improvement in word retrieval with graded cueing from SLP for picture scenes. CONTINUED NEED FOR THE FOLLOWING SKILLS: Patient requires additional speech therapy services in order to improve participation in daily cognitive communication tasks, safety in home, and in order to reduce caregiver burden. PLAN FOR NEXT VISIT : Plans to continue graded exercises and implementation of cognitive communication supports in order to promote safety in home and improve expressive language skills. HOME EXERCISE PROGRAM: Implement cognitive staging recommendations     THE FOLLOWING DISCHARGE PLANNING WAS DISCUSSED WITH THE PATIENT/CAREGIVER: ST to d/c in 2 more visits/wks or when goals met/max potential achieved, Pt/caregiver verbalized understanding.

## 2023-10-28 VITALS
DIASTOLIC BLOOD PRESSURE: 83 MMHG | HEART RATE: 65 BPM | SYSTOLIC BLOOD PRESSURE: 132 MMHG | OXYGEN SATURATION: 99 % | RESPIRATION RATE: 18 BRPM | TEMPERATURE: 97.1 F

## 2023-10-28 NOTE — HOME HEALTH
SUBJECTIVE: Patient with no changes with medications and no falls were reported by pt/CG. CAREGIVER INVOLVEMENT/ASSISTANCE NEEDED FOR: Patient resides with son who assist with ADL's and transfers as needed. HOME HEALTH SUPPLIES BY TYPE AND QUANTITY ORDERED/DELIVERED THIS VISIT INCLUDE: none   OBJECTIVE: See interventions. PATIENT RESPONSE TO TREATMENT: BP decreased to 90/65 and 99/65 in right UE post standing activities and gait training requiring ~1 minute rest break due to occasional c/o dizziness and increased to 136/83 during rest break. PATIENT LEVEL OF UNDERSTANDING OF EDUCATION PROVIDED: Patient/CG required reinforcement on transfer training, HEP 2-3/x a day, fall prevention techniques, energy conservation, signs/sx of hyper/hypotension and to continue to monitor BP and if any changes or worsening in sx to contact PCP or seek medical support, proper footwear, increasing activity throughout day, use of AD, proper nutritional diet and repositioning frequently to reduce risk for pressure sores. ASSESSMENT OF PROGRESS TOWARD GOALS: Patient is progressing well towards goals. Patient ambulated increase distances on indoor/outdoor surfaces with 1x ~1 minute rest break due to fatigue and c/o dizziness. BP decreased during gait training to 90/65. Patient transferred sit<->stand with decrease verbal cueing for technique. Patient/CG able to demonstrate HEP independently. CONTINUED NEED FOR THE FOLLOWING SKILLS: Continuation of PT to further improve patient balance, functional mobility and strength to decrease pts risk for falls. PLAN FOR NEXT VISIT: Progress with gait training. THE FOLLOWING DISCHARGE PLANNING WAS DISCUSSED WITH THE PATIENT/CAREGIVER: D/C from HHPT when goals are met or max potential benefit achieved.

## 2023-10-29 ENCOUNTER — HOME CARE VISIT (OUTPATIENT)
Age: 78
End: 2023-10-29
Payer: MEDICARE

## 2023-10-29 VITALS
RESPIRATION RATE: 18 BRPM | HEART RATE: 72 BPM | DIASTOLIC BLOOD PRESSURE: 74 MMHG | SYSTOLIC BLOOD PRESSURE: 130 MMHG | OXYGEN SATURATION: 97 % | TEMPERATURE: 97 F

## 2023-10-29 PROCEDURE — G0157 HHC PT ASSISTANT EA 15: HCPCS

## 2023-10-30 VITALS
DIASTOLIC BLOOD PRESSURE: 85 MMHG | SYSTOLIC BLOOD PRESSURE: 141 MMHG | OXYGEN SATURATION: 96 % | RESPIRATION RATE: 18 BRPM | HEART RATE: 80 BPM | TEMPERATURE: 97.5 F

## 2023-10-30 NOTE — HOME HEALTH
SUBJECTIVE: Patient with no changes with medications and no falls were reported by pt/CG. Son present during session. CAREGIVER INVOLVEMENT/ASSISTANCE NEEDED FOR: Patient resides with son who assist with ADL's and transfers as needed. HOME HEALTH SUPPLIES BY TYPE AND QUANTITY ORDERED/DELIVERED THIS VISIT INCLUDE: none   OBJECTIVE: See interventions. PATIENT RESPONSE TO TREATMENT: BP decreased to 85/65 in right UE post gait training requiring ~1 minute rest break due to occasional c/o dizziness. PATIENT LEVEL OF UNDERSTANDING OF EDUCATION PROVIDED: Patient/CG required reinforcement on transfer training, HEP 2-3/x a day, fall prevention techniques, energy conservation, signs/sx of hyper/hypotension and to continue to monitor BP and if any changes or worsening in sx to contact PCP or seek medical support, proper footwear, increasing activity throughout day, use of AD, proper nutritional diet and repositioning frequently to reduce risk for pressure sores. ASSESSMENT OF PROGRESS TOWARD GOALS: Patient is progressing well towards goals. Patient with no increase in pain during activities. Patient transferred sit<->stand mod I. Patient demonstrated thera ex with decrease rest breaks and verbal cueing for technique. Patient improved on SCHULER balance assessment to 52/56. CONTINUED NEED FOR THE FOLLOWING SKILLS: none  PLAN FOR NEXT VISIT: D/C  THE FOLLOWING DISCHARGE PLANNING WAS DISCUSSED WITH THE PATIENT/CAREGIVER: D/C from PT next visit due to goals met.

## 2023-11-01 ENCOUNTER — HOME CARE VISIT (OUTPATIENT)
Age: 78
End: 2023-11-01
Payer: MEDICARE

## 2023-11-01 VITALS
SYSTOLIC BLOOD PRESSURE: 90 MMHG | HEART RATE: 80 BPM | TEMPERATURE: 97.1 F | DIASTOLIC BLOOD PRESSURE: 62 MMHG | RESPIRATION RATE: 16 BRPM | OXYGEN SATURATION: 97 %

## 2023-11-01 PROCEDURE — G0151 HHCP-SERV OF PT,EA 15 MIN: HCPCS

## 2023-11-01 PROCEDURE — G0153 HHCP-SVS OF S/L PATH,EA 15MN: HCPCS

## 2023-11-01 NOTE — HOME HEALTH
SUBJECTIVE: Son reported good BP this morning, however upon SLP arrival BP had dropped from 120/85 to 90/52. Educated patient to drink water throughout speech therapy visit. CAREGIVER INVOLVEMENT / ASSISTANCE NEEDED FOR: transportation, medication management, meal prep, ADLs     OBJECTIVE / PATIENT RESPONSE TO TREATMENT / PATIENT LEVEL OF UNDERSTANDING OF EDUCATION PROVIDED: Patient demonstrated plateau in progress with SLUMS Assessment score of 12/30, however patient has demonstrated significant improvement in stm recall for functional information with ability to recall information between sessions by implementing spaced retrieval training trials. With standby assistance from caregiver, patient was able to implement daily routine checklist to improve participation in daily cognitive communication tasks. Patient also demonstrated improvement in word retrieval for daily conversations. Patient would benefit from implementation of home maintenance program to ensure safe and sustainable discharge from  speech therapy services. ASSESSMENT OF PROGRESS TOWARD GOALS: Patient demonstrated great improvement in stm with implementation of spaced retrieval training    CONTINUED NEED FOR THE FOLLOWING SKILLS: Patient requires implementation of home maintenance program to ensure safe and sustainable discharge. PLAN FOR NEXT VISIT : Plans to discharge. HOME EXERCISE PROGRAM: Implement cognitive staging recommendations     THE FOLLOWING DISCHARGE PLANNING WAS DISCUSSED WITH THE PATIENT/CAREGIVER: ST to d/c in 1 more visits/wks or when goals met/max potential achieved, Pt/caregiver verbalized understanding.

## 2023-11-02 VITALS
OXYGEN SATURATION: 90 % | TEMPERATURE: 98.5 F | RESPIRATION RATE: 16 BRPM | SYSTOLIC BLOOD PRESSURE: 110 MMHG | HEART RATE: 68 BPM | DIASTOLIC BLOOD PRESSURE: 66 MMHG

## 2023-11-03 ENCOUNTER — HOME CARE VISIT (OUTPATIENT)
Age: 78
End: 2023-11-03
Payer: MEDICARE

## 2023-11-03 VITALS
DIASTOLIC BLOOD PRESSURE: 72 MMHG | SYSTOLIC BLOOD PRESSURE: 110 MMHG | TEMPERATURE: 97.5 F | OXYGEN SATURATION: 98 % | HEART RATE: 72 BPM | RESPIRATION RATE: 16 BRPM

## 2023-11-03 PROCEDURE — G0153 HHCP-SVS OF S/L PATH,EA 15MN: HCPCS

## 2023-11-03 NOTE — HOME HEALTH
Pt. clinically discharged and documentation finalized for completion of PT discipline discharge. Please see Discharge Summary for details.

## 2023-11-04 NOTE — HOME HEALTH
SUBJECTIVE: Son reported improved BP this morning. CAREGIVER INVOLVEMENT / ASSISTANCE NEEDED FOR: transportation, medication management, meal prep, ADLs   OBJECTIVE / PATIENT RESPONSE TO TREATMENT / PATIENT LEVEL OF UNDERSTANDING OF EDUCATION PROVIDED/ASSESSMENT OF PROGRESS TOWARD GOALS: Patient seen for speech therapy [facility] discharge visit today. Patient has demonstrated significant improvement with implementation of speech therapy services for cognitive staging, caregiver education, spaced retrieval training for recall of functional information, memory aids to improve participation in daily cognitive tasks, graded exercises for sequencing of daily communication tasks, and word retrieval practice for conversation. Patient demonstrated improvement in SLUMS Assessment score from 9/30 (initial evaluation) to 12/30 (reassessment), however patient demonstrated recent plateau in progress with discharge assessment score at 12/30. Patient and son benefited from [address] [place] [address] with recommendations based on level 3.2 in order to improve patient safety and cognitive activities. Patient met all  speech therapy goals and is appropriate for discharge.

## 2023-11-04 NOTE — CASE COMMUNICATION
Patient seen for speech therapy [facility] discharge visit today. Patient has demonstrated significant improvement with implementation of speech therapy services for cognitive staging, caregiver education, spaced retrieval training for recall of functional information, memory aids to improve participation in daily cognitive tasks, graded exercises for sequencing of daily communication tasks, and word retrieval practice for conversation. Darling ent demonstrated improvement in SLUMS Assessment score from 9/30 (initial evaluation) to 12/30 (reassessment), however patient demonstrated recent plateau in progress with discharge assessment score at 12/30. Patient and son benefited from [address] [place] [address] with recommendations based on level 3.2 in order to improve patient safety and cognitive activities. Patient met all  speech therapy goals and is appropriate for discharge.  [id number] campuzano for the referral!

## 2023-11-15 ENCOUNTER — OFFICE VISIT (OUTPATIENT)
Age: 78
End: 2023-11-15
Payer: MEDICARE

## 2023-11-15 VITALS
HEIGHT: 67 IN | HEART RATE: 104 BPM | OXYGEN SATURATION: 98 % | WEIGHT: 131 LBS | SYSTOLIC BLOOD PRESSURE: 150 MMHG | DIASTOLIC BLOOD PRESSURE: 90 MMHG | BODY MASS INDEX: 20.56 KG/M2 | RESPIRATION RATE: 18 BRPM

## 2023-11-15 DIAGNOSIS — F03.C18 SEVERE DEMENTIA WITH OTHER BEHAVIORAL DISTURBANCE, UNSPECIFIED DEMENTIA TYPE (HCC): Primary | ICD-10-CM

## 2023-11-15 DIAGNOSIS — F41.9 ANXIETY DISORDER, UNSPECIFIED TYPE: ICD-10-CM

## 2023-11-15 PROCEDURE — 1036F TOBACCO NON-USER: CPT | Performed by: NURSE PRACTITIONER

## 2023-11-15 PROCEDURE — G8484 FLU IMMUNIZE NO ADMIN: HCPCS | Performed by: NURSE PRACTITIONER

## 2023-11-15 PROCEDURE — G8420 CALC BMI NORM PARAMETERS: HCPCS | Performed by: NURSE PRACTITIONER

## 2023-11-15 PROCEDURE — 99213 OFFICE O/P EST LOW 20 MIN: CPT | Performed by: NURSE PRACTITIONER

## 2023-11-15 PROCEDURE — 1123F ACP DISCUSS/DSCN MKR DOCD: CPT | Performed by: NURSE PRACTITIONER

## 2023-11-15 PROCEDURE — G8427 DOCREV CUR MEDS BY ELIG CLIN: HCPCS | Performed by: NURSE PRACTITIONER

## 2023-11-15 PROCEDURE — 1090F PRES/ABSN URINE INCON ASSESS: CPT | Performed by: NURSE PRACTITIONER

## 2023-11-15 PROCEDURE — G8399 PT W/DXA RESULTS DOCUMENT: HCPCS | Performed by: NURSE PRACTITIONER

## 2023-11-15 RX ORDER — SERTRALINE HYDROCHLORIDE 25 MG/1
TABLET, FILM COATED ORAL
Qty: 90 TABLET | Refills: 5 | Status: SHIPPED | OUTPATIENT
Start: 2023-11-15

## 2023-11-15 RX ORDER — TRAZODONE HYDROCHLORIDE 50 MG/1
TABLET ORAL
Qty: 30 TABLET | Refills: 5 | Status: SHIPPED | OUTPATIENT
Start: 2023-11-15

## 2023-11-15 RX ORDER — MEMANTINE HYDROCHLORIDE 5 MG/1
TABLET ORAL
Qty: 180 TABLET | Refills: 1 | Status: SHIPPED | OUTPATIENT
Start: 2023-11-15

## 2023-11-15 RX ORDER — DONEPEZIL HYDROCHLORIDE 10 MG/1
TABLET, FILM COATED ORAL
Qty: 30 TABLET | Refills: 5 | Status: SHIPPED | OUTPATIENT
Start: 2023-11-15

## 2023-11-15 NOTE — PATIENT INSTRUCTIONS
Patient instructions:  -please call for neuropsychology re-eval (also psychiatry referral): [facility] [phone]   -start memantine/Namenda 5 mg nightly for 1 week then 5 mg twice daily  -trazodone for sleep  -  [email or web address] -- Help and Support -- Caregiving -- Stages and Behaviors

## 2023-11-15 NOTE — PROGRESS NOTES
[address]  [facility]. [address] [place] [address]  Office:  [phone]  Fax: [phone]  Chief Complaint   Patient presents with    Dementia       HPI: Yola Gonzalez presents in follow-up for dementia. She was last seen here on 9/15/2023. She takes Aricept 10 mg nightly. She has had some improvements since starting Aricept. She has had speech therapy in the past which was helpful. She takes aspirin 81 mg. MRI brain showed significant small vessel disease vascular burden. Sertraline was started at the prior visit in November 20, 2012 to help for mood and anxiety. She has had decline since that time. Son reported more decline over the past month and a half and reported insomnia. Wakes in the night and wanders. Plan was to check urine for UTI. Change Aricept to morning dosing. She has been off for a week and a half so this was restarted in increments. Sertraline was increased to 75 mg daily to help for mood and anxiety. She has had falls. She was referred to home PT for safety and mobility due to falls and recommendation for home exercise program.  Home speech therapy for cognitive therapy and communication deficit. Home health medical social worker to help with resources as her son is her sole caregiver and is with her 24/7. She was referred for neuropsychological reevaluation. May consider starting Namenda at the next visit. Her son called in the interim saying that she has not sleeping well and is up wandering in the night. Advised to avoid napping in the daytime, especially not in the afternoon which can make it hard to sleep at night. Advised physical activity in the daytime to help promote sleep. He reported never when she is sleeping better, taking Aricept and sertraline in the morning. He tries to keep her awake during the daytime. Would still like a medication that would help her relax to sleep at night. She presents today in follow-up.

## 2023-11-25 NOTE — PROGRESS NOTES
conducted an initial consultation and Spiritual Assessment for Concha Mays, who is a 78 y.o.,female. Patients Primary Language is: [place]. According to the patients EMR Restorationism Affiliation is: No Judaism. The reason the Patient came to the hospital is:   Patient Active Problem List    Diagnosis Date Noted    UTI (urinary tract infection) 07/06/2021    Impaired fasting blood sugar/ borderline diabetes 03/13/2017    Glaucoma     Anxiety         The  provided the following Interventions:  Initiated a relationship of care and support. Explored issues of rommel, spirituality and/or Church needs while hospitalized. Listened empathically. Provided chaplaincy education. Provided information about Spiritual Care Services. Offered prayer and assurance of continued prayers on patient's behalf. Chart reviewed. The following outcomes were achieved:  Patient shared some information about their medical narrative and spiritual journey/beliefs. Patient processed feeling about current hospitalization. Patient expressed gratitude for the 's visit. Assessment:  Patient did not indicate any spiritual or Church issues which require Spiritual Care Services interventions at this time. Patient does not have any Church/cultural needs that will affect patients preferences in health care. Plan:  Chaplains will continue to follow and will provide pastoral care on an as needed or requested basis.  recommends bedside caregivers page  on duty if patient shows signs of acute spiritual or emotional distress. No

## 2024-01-04 DIAGNOSIS — N18.31 HYPERTENSIVE KIDNEY DISEASE WITH STAGE 3A CHRONIC KIDNEY DISEASE (HCC): ICD-10-CM

## 2024-01-04 DIAGNOSIS — I12.9 HYPERTENSIVE KIDNEY DISEASE WITH STAGE 3A CHRONIC KIDNEY DISEASE (HCC): ICD-10-CM

## 2024-01-04 RX ORDER — ATORVASTATIN CALCIUM 10 MG/1
TABLET, FILM COATED ORAL
Qty: 30 TABLET | Refills: 0 | OUTPATIENT
Start: 2024-01-04

## 2024-01-04 RX ORDER — AMLODIPINE BESYLATE 10 MG/1
TABLET ORAL
Qty: 30 TABLET | Refills: 0 | OUTPATIENT
Start: 2024-01-04

## 2024-01-04 RX ORDER — LOSARTAN POTASSIUM 25 MG/1
25 TABLET ORAL EVERY MORNING
Qty: 30 TABLET | Refills: 0 | OUTPATIENT
Start: 2024-01-04

## 2024-01-04 NOTE — TELEPHONE ENCOUNTER
Last Filled: 10/18/23 all 30 tabs no refills     Last appt: 10/19/23 VV    Next appt: none    Labs: no labs since 9/13/22    UDS:    CSA:    Additional Notes: Patient was to return in 6 weeks.   Patient need appt and labs completed.    Please schedule patient appt....thank you

## 2024-01-11 ENCOUNTER — HOSPITAL ENCOUNTER (INPATIENT)
Facility: HOSPITAL | Age: 79
LOS: 4 days | Discharge: HOME HEALTH | DRG: 689 | End: 2024-01-16
Attending: EMERGENCY MEDICINE | Admitting: STUDENT IN AN ORGANIZED HEALTH CARE EDUCATION/TRAINING PROGRAM
Payer: MEDICARE

## 2024-01-11 ENCOUNTER — APPOINTMENT (OUTPATIENT)
Facility: HOSPITAL | Age: 79
DRG: 689 | End: 2024-01-11
Payer: MEDICARE

## 2024-01-11 PROBLEM — G93.41 METABOLIC ENCEPHALOPATHY: Status: ACTIVE | Noted: 2024-01-11

## 2024-01-11 LAB
ALBUMIN SERPL-MCNC: 3.5 G/DL (ref 3.4–5)
ALBUMIN/GLOB SERPL: 1.1 (ref 0.8–1.7)
ALP SERPL-CCNC: 59 U/L (ref 45–117)
ALT SERPL-CCNC: 31 U/L (ref 13–56)
AMPHET UR QL SCN: NEGATIVE
ANION GAP SERPL CALC-SCNC: 5 MMOL/L (ref 3–18)
APPEARANCE UR: ABNORMAL
AST SERPL-CCNC: 14 U/L (ref 10–38)
BACTERIA URNS QL MICRO: ABNORMAL /HPF
BARBITURATES UR QL SCN: NEGATIVE
BENZODIAZ UR QL: NEGATIVE
BILIRUB SERPL-MCNC: 0.6 MG/DL (ref 0.2–1)
BILIRUB UR QL: NEGATIVE
BUN SERPL-MCNC: 19 MG/DL (ref 7–18)
BUN/CREAT SERPL: 21 (ref 12–20)
CALCIUM SERPL-MCNC: 9.2 MG/DL (ref 8.5–10.1)
CANNABINOIDS UR QL SCN: NEGATIVE
CHLORIDE SERPL-SCNC: 103 MMOL/L (ref 100–111)
CO2 SERPL-SCNC: 28 MMOL/L (ref 21–32)
COCAINE UR QL SCN: NEGATIVE
COLOR UR: YELLOW
CREAT SERPL-MCNC: 0.91 MG/DL (ref 0.6–1.3)
EKG ATRIAL RATE: 80 BPM
EKG DIAGNOSIS: NORMAL
EKG P AXIS: 61 DEGREES
EKG P-R INTERVAL: 132 MS
EKG Q-T INTERVAL: 376 MS
EKG QRS DURATION: 108 MS
EKG QTC CALCULATION (BAZETT): 433 MS
EKG R AXIS: -9 DEGREES
EKG T AXIS: 82 DEGREES
EKG VENTRICULAR RATE: 80 BPM
EPITH CASTS URNS QL MICRO: ABNORMAL /LPF (ref 0–5)
ERYTHROCYTE [DISTWIDTH] IN BLOOD BY AUTOMATED COUNT: 14.3 % (ref 11.6–14.5)
ETHANOL SERPL-MCNC: <3 MG/DL (ref 0–3)
FLUAV RNA SPEC QL NAA+PROBE: NOT DETECTED
FLUBV RNA SPEC QL NAA+PROBE: NOT DETECTED
GLOBULIN SER CALC-MCNC: 3.3 G/DL (ref 2–4)
GLUCOSE SERPL-MCNC: 114 MG/DL (ref 74–99)
GLUCOSE UR STRIP.AUTO-MCNC: NEGATIVE MG/DL
HCT VFR BLD AUTO: 35.3 % (ref 35–45)
HGB BLD-MCNC: 11.6 G/DL (ref 12–16)
HGB UR QL STRIP: ABNORMAL
KETONES UR QL STRIP.AUTO: NEGATIVE MG/DL
LEUKOCYTE ESTERASE UR QL STRIP.AUTO: ABNORMAL
Lab: NORMAL
MCH RBC QN AUTO: 31.2 PG (ref 24–34)
MCHC RBC AUTO-ENTMCNC: 32.9 G/DL (ref 31–37)
MCV RBC AUTO: 94.9 FL (ref 78–100)
METHADONE UR QL: NEGATIVE
NITRITE UR QL STRIP.AUTO: NEGATIVE
NRBC # BLD: 0 K/UL (ref 0–0.01)
NRBC BLD-RTO: 0 PER 100 WBC
OPIATES UR QL: NEGATIVE
PCP UR QL: NEGATIVE
PH UR STRIP: 6.5 (ref 5–8)
PLATELET # BLD AUTO: 239 K/UL (ref 135–420)
PMV BLD AUTO: 9.9 FL (ref 9.2–11.8)
POTASSIUM SERPL-SCNC: 3.9 MMOL/L (ref 3.5–5.5)
PROT SERPL-MCNC: 6.8 G/DL (ref 6.4–8.2)
PROT UR STRIP-MCNC: NEGATIVE MG/DL
RBC # BLD AUTO: 3.72 M/UL (ref 4.2–5.3)
RBC #/AREA URNS HPF: ABNORMAL /HPF (ref 0–5)
SARS-COV-2 RNA RESP QL NAA+PROBE: NOT DETECTED
SODIUM SERPL-SCNC: 136 MMOL/L (ref 136–145)
SP GR UR REFRACTOMETRY: 1.01 (ref 1–1.03)
UROBILINOGEN UR QL STRIP.AUTO: 0.2 EU/DL (ref 0.2–1)
WBC # BLD AUTO: 10.4 K/UL (ref 4.6–13.2)
WBC URNS QL MICRO: ABNORMAL /HPF (ref 0–4)

## 2024-01-11 PROCEDURE — 87636 SARSCOV2 & INF A&B AMP PRB: CPT

## 2024-01-11 PROCEDURE — 80053 COMPREHEN METABOLIC PANEL: CPT

## 2024-01-11 PROCEDURE — 87077 CULTURE AEROBIC IDENTIFY: CPT

## 2024-01-11 PROCEDURE — 87086 URINE CULTURE/COLONY COUNT: CPT

## 2024-01-11 PROCEDURE — 94761 N-INVAS EAR/PLS OXIMETRY MLT: CPT

## 2024-01-11 PROCEDURE — 96374 THER/PROPH/DIAG INJ IV PUSH: CPT

## 2024-01-11 PROCEDURE — 93005 ELECTROCARDIOGRAM TRACING: CPT | Performed by: EMERGENCY MEDICINE

## 2024-01-11 PROCEDURE — 93010 ELECTROCARDIOGRAM REPORT: CPT | Performed by: INTERNAL MEDICINE

## 2024-01-11 PROCEDURE — 99285 EMERGENCY DEPT VISIT HI MDM: CPT

## 2024-01-11 PROCEDURE — 85027 COMPLETE CBC AUTOMATED: CPT

## 2024-01-11 PROCEDURE — 70450 CT HEAD/BRAIN W/O DYE: CPT

## 2024-01-11 PROCEDURE — 81001 URINALYSIS AUTO W/SCOPE: CPT

## 2024-01-11 PROCEDURE — 2580000003 HC RX 258: Performed by: EMERGENCY MEDICINE

## 2024-01-11 PROCEDURE — 99223 1ST HOSP IP/OBS HIGH 75: CPT | Performed by: STUDENT IN AN ORGANIZED HEALTH CARE EDUCATION/TRAINING PROGRAM

## 2024-01-11 PROCEDURE — 6360000002 HC RX W HCPCS: Performed by: EMERGENCY MEDICINE

## 2024-01-11 PROCEDURE — 82077 ASSAY SPEC XCP UR&BREATH IA: CPT

## 2024-01-11 PROCEDURE — 80307 DRUG TEST PRSMV CHEM ANLYZR: CPT

## 2024-01-11 PROCEDURE — G0378 HOSPITAL OBSERVATION PER HR: HCPCS

## 2024-01-11 PROCEDURE — 87186 SC STD MICRODIL/AGAR DIL: CPT

## 2024-01-11 PROCEDURE — 87040 BLOOD CULTURE FOR BACTERIA: CPT

## 2024-01-11 RX ADMIN — WATER 1000 MG: 1 INJECTION INTRAMUSCULAR; INTRAVENOUS; SUBCUTANEOUS at 17:47

## 2024-01-11 NOTE — ED TRIAGE NOTES
Pt arrive via ems for mental health issues. Per ems pt son called stating she has hallucination and violent tendencies. Per son pt was violent toward her cat throwing things at it. Pt does not recall the events.  Denies Si or HI. Pt and pt son believe she has dementia undiagnosed. A&Ox4. Ambulatory.

## 2024-01-11 NOTE — HOME HEALTH
Skilled reason for visit: Skilled nursing assessment, medication review and education    Caregiver involvement:NA    Medications reviewed and all medications are available in the home this visit. The following education was provided regarding medications, medication interactions, and look alike medications (specify):   Norvasc should be taken at the same time each day, do not take a missed dose more than 12 hours late, do not take double doses if a dose is missed. Monitor blood pressure closely when taking Norvasc. Avoid Grapefruit juice as it may increase the effects of the medication. Medications  are effective at this time. Home health supplies by type and quantity ordered/delivered this visit include: NA    Patient education provided this visit: Monitor for s/s of UTI such as pain or burning when urinating, foul smelling urine, dark colored urine and/or fever. Notify physician or nurse immediately if UTI symptoms develop.     Skilled Care Performed this visit: Skilled nursing assessment, medication review and education    Patient's Progress towards personal goals: Patient continues to comply with physicians orders and keep medical appointments to work toward accomplishing goals set and discharge    Home exercise program: sn instructed patient to perform deep breathing exercises, 5-6 breaths 5 x daily to promote air exchange and prevent pneumonia     Continued need for the following skills: Nursing    Plan for next visit: Patient continues to comply with physicians orders and keep medical appointments to work toward accomplishing goals set and discharge    Patient and/or caregiver notified and agrees to changes in the Plan of Care N/A      The following discharge planning was discussed with the pt/caregiver: Patient to discharge when goals are met and patient is ablw to independently manage medications 05-Jan-2024

## 2024-01-11 NOTE — ED PROVIDER NOTES
EMERGENCY DEPARTMENT HISTORY AND PHYSICAL EXAM      Patient Name: Mona Olea  MRN: [id number]  YOB: 1945  Provider: Fariba Lou MD  PCP: Lachelle Holguin NP-C   Time/Date of evaluation: 6:08 PM EST on 1/11/24    History of Presenting Illness     Chief Complaint   Patient presents with    Mental Health Problem       History Provided By: EMS and Patient     History (Narative):   Mona Olea is a 78 y.o. female with a PMHX of anxiety, carotid stenosis, hypercholesterolemia, and hypertension  who presents to the emergency department  by EMS C/O mental health that she is at home.  EMS states that her son called EMS because she is having hallucinations and aggression.  Earlier today, she threw a cup of coffee at the cat.    The patient is able to provide some history and states that her son told her that he is tired and that he is not really able to take care of her anymore.    Both the son and the patient believe that the patient may have dementia.        Past History     Past Medical History:  Past Medical History:   Diagnosis Date    Anxiety     Arrhythmia 2011    Negative Stress test    Arthritis     Carotid artery stenosis 2009    <50%    Gestational diabetes     Glaucoma     Hypercholesterolemia     Hypertension        Past Surgical History:  Past Surgical History:   Procedure Laterality Date    CHOLECYSTECTOMY  1990    gallstones    HYSTERECTOMY (CERVIX STATUS UNKNOWN)  1990    total hysterectomy       Family History:  Family History   Problem Relation Age of Onset    Osteoarthritis Mother     Dementia Mother     Hypertension Mother     Coronary Art Dis Mother        Social History:  Social History     Tobacco Use    Smoking status: Never    Smokeless tobacco: Never   Vaping Use    Vaping Use: Never used   Substance Use Topics    Alcohol use: Yes    Drug use: Never       Medications:  No current facility-administered medications for this encounter.     Current Outpatient Medications

## 2024-01-12 ENCOUNTER — APPOINTMENT (OUTPATIENT)
Facility: HOSPITAL | Age: 79
DRG: 689 | End: 2024-01-12
Payer: MEDICARE

## 2024-01-12 PROBLEM — G92.8 TOXIC METABOLIC ENCEPHALOPATHY: Status: ACTIVE | Noted: 2024-01-12

## 2024-01-12 PROBLEM — N39.0 UTI (URINARY TRACT INFECTION): Status: ACTIVE | Noted: 2024-01-12

## 2024-01-12 PROBLEM — I10 PRIMARY HYPERTENSION: Status: ACTIVE | Noted: 2024-01-12

## 2024-01-12 PROCEDURE — 2580000003 HC RX 258: Performed by: STUDENT IN AN ORGANIZED HEALTH CARE EDUCATION/TRAINING PROGRAM

## 2024-01-12 PROCEDURE — 1100000003 HC PRIVATE W/ TELEMETRY

## 2024-01-12 PROCEDURE — 99232 SBSQ HOSP IP/OBS MODERATE 35: CPT | Performed by: STUDENT IN AN ORGANIZED HEALTH CARE EDUCATION/TRAINING PROGRAM

## 2024-01-12 PROCEDURE — 6360000002 HC RX W HCPCS: Performed by: STUDENT IN AN ORGANIZED HEALTH CARE EDUCATION/TRAINING PROGRAM

## 2024-01-12 PROCEDURE — 97166 OT EVAL MOD COMPLEX 45 MIN: CPT

## 2024-01-12 PROCEDURE — 71045 X-RAY EXAM CHEST 1 VIEW: CPT

## 2024-01-12 PROCEDURE — 97116 GAIT TRAINING THERAPY: CPT

## 2024-01-12 PROCEDURE — 36415 COLL VENOUS BLD VENIPUNCTURE: CPT

## 2024-01-12 PROCEDURE — 87040 BLOOD CULTURE FOR BACTERIA: CPT

## 2024-01-12 PROCEDURE — 6370000000 HC RX 637 (ALT 250 FOR IP): Performed by: STUDENT IN AN ORGANIZED HEALTH CARE EDUCATION/TRAINING PROGRAM

## 2024-01-12 PROCEDURE — 97161 PT EVAL LOW COMPLEX 20 MIN: CPT

## 2024-01-12 RX ORDER — SODIUM CHLORIDE 9 MG/ML
INJECTION, SOLUTION INTRAVENOUS PRN
Status: DISCONTINUED | OUTPATIENT
Start: 2024-01-12 | End: 2024-01-16 | Stop reason: HOSPADM

## 2024-01-12 RX ORDER — DONEPEZIL HYDROCHLORIDE 5 MG/1
10 TABLET, FILM COATED ORAL NIGHTLY
Status: DISCONTINUED | OUTPATIENT
Start: 2024-01-12 | End: 2024-01-16 | Stop reason: HOSPADM

## 2024-01-12 RX ORDER — LOSARTAN POTASSIUM 25 MG/1
25 TABLET ORAL EVERY MORNING
Status: DISCONTINUED | OUTPATIENT
Start: 2024-01-12 | End: 2024-01-16 | Stop reason: HOSPADM

## 2024-01-12 RX ORDER — ATORVASTATIN CALCIUM 10 MG/1
10 TABLET, FILM COATED ORAL NIGHTLY
Status: DISCONTINUED | OUTPATIENT
Start: 2024-01-12 | End: 2024-01-16 | Stop reason: HOSPADM

## 2024-01-12 RX ORDER — SODIUM CHLORIDE 0.9 % (FLUSH) 0.9 %
5-40 SYRINGE (ML) INJECTION PRN
Status: DISCONTINUED | OUTPATIENT
Start: 2024-01-12 | End: 2024-01-16 | Stop reason: HOSPADM

## 2024-01-12 RX ORDER — POTASSIUM CHLORIDE 7.45 MG/ML
10 INJECTION INTRAVENOUS PRN
Status: DISCONTINUED | OUTPATIENT
Start: 2024-01-12 | End: 2024-01-16 | Stop reason: HOSPADM

## 2024-01-12 RX ORDER — ENOXAPARIN SODIUM 100 MG/ML
40 INJECTION SUBCUTANEOUS DAILY
Status: DISCONTINUED | OUTPATIENT
Start: 2024-01-12 | End: 2024-01-16 | Stop reason: HOSPADM

## 2024-01-12 RX ORDER — AMLODIPINE BESYLATE 10 MG/1
10 TABLET ORAL DAILY
Status: DISCONTINUED | OUTPATIENT
Start: 2024-01-12 | End: 2024-01-16 | Stop reason: HOSPADM

## 2024-01-12 RX ORDER — ONDANSETRON 2 MG/ML
4 INJECTION INTRAMUSCULAR; INTRAVENOUS EVERY 6 HOURS PRN
Status: DISCONTINUED | OUTPATIENT
Start: 2024-01-12 | End: 2024-01-16 | Stop reason: HOSPADM

## 2024-01-12 RX ORDER — ACETAMINOPHEN 325 MG/1
650 TABLET ORAL EVERY 6 HOURS PRN
Status: DISCONTINUED | OUTPATIENT
Start: 2024-01-12 | End: 2024-01-16 | Stop reason: HOSPADM

## 2024-01-12 RX ORDER — POTASSIUM CHLORIDE 20 MEQ/1
40 TABLET, EXTENDED RELEASE ORAL PRN
Status: DISCONTINUED | OUTPATIENT
Start: 2024-01-12 | End: 2024-01-16 | Stop reason: HOSPADM

## 2024-01-12 RX ORDER — SODIUM CHLORIDE 0.9 % (FLUSH) 0.9 %
5-40 SYRINGE (ML) INJECTION EVERY 12 HOURS SCHEDULED
Status: DISCONTINUED | OUTPATIENT
Start: 2024-01-12 | End: 2024-01-16 | Stop reason: HOSPADM

## 2024-01-12 RX ORDER — POLYETHYLENE GLYCOL 3350 17 G/17G
17 POWDER, FOR SOLUTION ORAL DAILY PRN
Status: DISCONTINUED | OUTPATIENT
Start: 2024-01-12 | End: 2024-01-16 | Stop reason: HOSPADM

## 2024-01-12 RX ORDER — ACETAMINOPHEN 650 MG/1
650 SUPPOSITORY RECTAL EVERY 6 HOURS PRN
Status: DISCONTINUED | OUTPATIENT
Start: 2024-01-12 | End: 2024-01-16 | Stop reason: HOSPADM

## 2024-01-12 RX ORDER — TRAZODONE HYDROCHLORIDE 50 MG/1
25 TABLET ORAL NIGHTLY
Status: DISCONTINUED | OUTPATIENT
Start: 2024-01-12 | End: 2024-01-16 | Stop reason: HOSPADM

## 2024-01-12 RX ORDER — ASPIRIN 81 MG/1
81 TABLET ORAL DAILY
Status: DISCONTINUED | OUTPATIENT
Start: 2024-01-12 | End: 2024-01-16 | Stop reason: HOSPADM

## 2024-01-12 RX ORDER — ONDANSETRON 4 MG/1
4 TABLET, ORALLY DISINTEGRATING ORAL EVERY 8 HOURS PRN
Status: DISCONTINUED | OUTPATIENT
Start: 2024-01-12 | End: 2024-01-16 | Stop reason: HOSPADM

## 2024-01-12 RX ORDER — MEMANTINE HYDROCHLORIDE 5 MG/1
5 TABLET ORAL 2 TIMES DAILY
Status: DISCONTINUED | OUTPATIENT
Start: 2024-01-12 | End: 2024-01-16 | Stop reason: HOSPADM

## 2024-01-12 RX ORDER — MAGNESIUM SULFATE IN WATER 40 MG/ML
2000 INJECTION, SOLUTION INTRAVENOUS PRN
Status: DISCONTINUED | OUTPATIENT
Start: 2024-01-12 | End: 2024-01-16 | Stop reason: HOSPADM

## 2024-01-12 RX ADMIN — MEMANTINE HYDROCHLORIDE 5 MG: 5 TABLET ORAL at 09:30

## 2024-01-12 RX ADMIN — ATORVASTATIN CALCIUM 10 MG: 10 TABLET, FILM COATED ORAL at 21:06

## 2024-01-12 RX ADMIN — LOSARTAN POTASSIUM 25 MG: 25 TABLET, FILM COATED ORAL at 09:30

## 2024-01-12 RX ADMIN — SODIUM CHLORIDE, PRESERVATIVE FREE 10 ML: 5 INJECTION INTRAVENOUS at 09:32

## 2024-01-12 RX ADMIN — SODIUM CHLORIDE, PRESERVATIVE FREE 10 ML: 5 INJECTION INTRAVENOUS at 21:30

## 2024-01-12 RX ADMIN — AMLODIPINE BESYLATE 10 MG: 10 TABLET ORAL at 09:30

## 2024-01-12 RX ADMIN — WATER 1000 MG: 1 INJECTION INTRAMUSCULAR; INTRAVENOUS; SUBCUTANEOUS at 17:06

## 2024-01-12 RX ADMIN — SERTRALINE HYDROCHLORIDE 75 MG: 50 TABLET ORAL at 09:30

## 2024-01-12 RX ADMIN — TRAZODONE HYDROCHLORIDE 25 MG: 50 TABLET ORAL at 21:06

## 2024-01-12 RX ADMIN — DONEPEZIL HYDROCHLORIDE 10 MG: 5 TABLET, FILM COATED ORAL at 21:06

## 2024-01-12 RX ADMIN — ASPIRIN 81 MG: 81 TABLET, COATED ORAL at 09:30

## 2024-01-12 RX ADMIN — ENOXAPARIN SODIUM 40 MG: 100 INJECTION SUBCUTANEOUS at 09:30

## 2024-01-12 RX ADMIN — MEMANTINE HYDROCHLORIDE 5 MG: 5 TABLET ORAL at 21:06

## 2024-01-12 NOTE — H&P
HISTORY & PHYSICAL      Patient: Mona Olea MRN: [id number]  [facility]: [id number]    YOB: 1945  Age: 78 y.o.  Sex: female    DOA: 1/11/2024 LOS:  LOS: 0 days        DOA: 1/11/2024        Assessment/Plan     Principal Problem:    Metabolic encephalopathy  Active Problems:    Visual hallucinations    Dementia with behavioral disturbance (HCC)    Toxic metabolic encephalopathy    UTI (urinary tract infection)    Primary hypertension  Resolved Problems:    * No resolved hospital problems. *      Patient Active Problem List   Diagnosis    Stage 3a chronic kidney disease (HCC)    Hyperlipidemia LDL goal <100    Anxiety    Normocytic normochromic anemia    Visual hallucinations    Glaucoma    Hypertensive kidney disease with stage 3a chronic kidney disease (HCC)    Prediabetes    Dementia with behavioral disturbance (HCC)    Positive FIT (fecal immunochemical test)    Metabolic encephalopathy    Toxic metabolic encephalopathy    UTI (urinary tract infection)    Primary hypertension         Plan:  Toxic-Metabolic Encephalopathy vs Baseline Dementia w/ Behavioral w/ Disturbance & Known Confabulation   Anxiety Disorder  - Obtain Xray to complete AMS w/u  - Note from 11/19/23 documents known dementia w/ behavioral disturbance, hx of confabulation and hallucinations  - In setting of UTI suspect acute worsening of underlying baseline condition but w/o additional need for diagnostic work-up to eval for meningo/encepelatits, etc.  - PT/OT Consult  - Cont Home Meds  --- Memantine 5mg BID  --- Trazodone 25mg QHS  --- Setraline 75mg daily  --- Donepezil 10mg daily    Severe Asymptomatic HTN  - Initial BP elevated to 192/91, did consider PRES as possible etiology however BP normalized by time of assesment and per EHR patient appears at her baseline dementia w/ confabulation/hallucination baseline  - Cont Home BP Meds  --- Amlodipine 10mg daily  --- Losartan 25mg daily    Complicated Urinary Tract Infection, in setting  tablet Take 1 tablet by mouth daily 8/31/22   Automatic Reconciliation, Ar       Allergies   Allergen Reactions    Codeine Nausea Only       Review of Systems:    Pertinent Positives noted in HPI.  Rest all other ROS were noted to be negative.     A comprehensive review of systems was negative except for that written in the History of Present Illness.             Physical Exam:      Visit Vitals  BP (!) 156/66   Pulse 84   Temp 97.9 °F (36.6 °C) (Oral)   Resp 18   SpO2 97%       Physical Exam:    Gen: In general, this is a frail disheveled elderly female  in no acute distress  HEENT: Sclerae nonicteric. Oral mucous membranes moist. Dentition poor  Neck: Supple with midline trachea.   CV: RRR without murmur or rub appreciated.   Resp:Respirations are unlabored without use of accessory muscles. Lung fields B/L without wheezes or rhonchi.   Abd: Soft, nontender, nondistended.  Extrem: Extremities are warm, without cyanosis or clubbing. 0 pitting pretibial edema.   Skin: Warm, no visible rashes.  Neuro: Patient is alert, oriented, and cooperative as described in HPI. No obvious focal defects. Moves all 4 extremities.    Labs Reviewed:    Recent Results (from the past 24 hour(s))   CBC    Collection Time: 01/11/24  2:13 PM   Result Value Ref Range    WBC 10.4 4.6 - 13.2 K/uL    RBC 3.72 (L) 4.20 - 5.30 M/uL    Hemoglobin 11.6 (L) 12.0 - 16.0 g/dL    Hematocrit 35.3 35.0 - 45.0 %    MCV 94.9 78.0 - 100.0 FL    MCH 31.2 24.0 - 34.0 PG    MCHC 32.9 31.0 - 37.0 g/dL    RDW 14.3 11.6 - 14.5 %    Platelets 239 135 - 420 K/uL    MPV 9.9 9.2 - 11.8 FL    Nucleated RBCs 0.0 0  WBC    nRBC 0.00 0.00 - 0.01 K/uL   Comprehensive Metabolic Panel    Collection Time: 01/11/24  2:13 PM   Result Value Ref Range    Sodium 136 136 - 145 mmol/L    Potassium 3.9 3.5 - 5.5 mmol/L    Chloride 103 100 - 111 mmol/L    CO2 28 21 - 32 mmol/L    Anion Gap 5 3.0 - 18 mmol/L    Glucose 114 (H) 74 - 99 mg/dL    BUN 19 (H) 7.0 - 18 MG/DL

## 2024-01-12 NOTE — ED NOTES
TRANSFER - OUT REPORT:    Verbal report given to MARCOS Dupont on Mona Olea  being transferred to [facility] for routine progression of patient care       Report consisted of patient's Situation, Background, Assessment and   Recommendations(SBAR).     Information from the following report(s) Nurse Handoff Report, ED SBAR, Recent Results, and Med Rec Status was reviewed with the receiving nurse.    [place] Fall Assessment:                           Lines:   Peripheral IV 01/11/24 Left;Proximal;Anterior Forearm (Active)       Peripheral IV 01/11/24 Left;Posterior Hand (Active)        Opportunity for questions and clarification was provided.      Patient transported with:  Transportation

## 2024-01-12 NOTE — CARE COORDINATION
1/12/24  Case Management Assessment  Initial Evaluation    Date/Time of Evaluation: 1/12/2024 1:17 PM  Assessment Completed by: Mara Barkley    If patient is discharged prior to next notation, then this note serves as note for discharge by case management.    Patient Name: Mona Olea                   YOB: 1945  Diagnosis: Metabolic encephalopathy [G93.41]  Toxic metabolic encephalopathy [G92.8]                   Date / Time: 1/11/2024  2:12 PM    Patient Admission Status: Inpatient   Readmission Risk (Low < 19, Mod (19-27), High > 27): Readmission Risk Score: 8.6    Current PCP: Lachelle Holguin NP-C  PCP verified by CM? Yes    Chart Reviewed: Yes      History Provided by:    Patient Orientation: Alert and Oriented    Patient Cognition: Alert    Hospitalization in the last 30 days (Readmission):  No    If yes, Readmission Assessment in CM Navigator will be completed.    Advance Directives:      Code Status: Full Code   Patient's Primary Decision Maker is: Legal Next of Kin    Primary Decision Maker: Stevie Bunn  [place] - [phone]    Discharge Planning:    Patient lives with: (P) Children Type of Home: (P) House  Primary Care Giver: Self  Patient Support Systems include: Children   Current Financial resources: Medicare  Current community resources: None  Current services prior to admission: (P) None            Current DME:              Type of Home Care services:  (P) None    ADLS  Prior functional level: Independent in ADLs/IADLs  Current functional level: Independent in ADLs/IADLs    PT AM-PAC: 21 /24  OT AM-PAC: 19 /24    Family can provide assistance at DC: Yes  Would you like Case Management to discuss the discharge plan with any other family members/significant others, and if so, who? (P) Yes (Delio Osborne [phone])  Plans to Return to Present Housing: Yes  Potential Assistance needed at discharge: (P) N/A            Potential DME:    Patient expects to discharge to: (P)  prior living arrangement Yes   Ability to make needs known: Good   Family able to assist with home care needs: Yes   Would you like for me to discuss the discharge plan with any other family members/significant others, and if so, who? Yes  (Delio Osborne [phone])   Financial Resources Medicare   Community Resources None   Social/Functional History   Lives With Son  (Delio Osborne)   Type of Home House   Home Layout One level   Home Access Level entry   Bathroom Shower/Tub Tub/Shower unit   Bathroom Toilet Standard   Bathroom Equipment Grab bars in shower   Bathroom Accessibility Accessible   Home Equipment None   Receives Help From Family   ADL Assistance Independent   Homemaking Assistance Independent   Homemaking Responsibilities No   Ambulation Assistance Independent   Transfer Assistance Independent   Active  No   Mode of Transportation Car   Occupation Unemployed   Discharge Planning   Type of Residence House   Living Arrangements Children   Current Services Prior To Admission None   Potential Assistance Needed N/A   Potential Assistance Purchasing Medications No   Type of Home Care Services None   Patient expects to be discharged to: House   One/Two Story Residence One story   History of falls? 0   Services At/After Discharge   Transition of Care Consult (CM Consult) N/A   Services At/After Discharge None   Confirm Follow Up Transport Family   Condition of Participation: Discharge Planning   The Plan for Transition of Care is related to the following treatment goals: Metabolic encephalopathy   Freedom of Choice list was provided with basic dialogue that supports the patient's individualized plan of care/goals, treatment preferences, and shares the quality data associated with the providers?  Yes     Mara Barkley  Case Management Department

## 2024-01-12 NOTE — PROGRESS NOTES
OCCUPATIONAL THERAPY EVALUATION/DISCHARGE    Patient: Mona Olea (78 y.o. female)  Date: 1/12/2024  Primary Diagnosis: Metabolic encephalopathy [G93.41]  Toxic metabolic encephalopathy [G92.8]       Precautions: General Precautions,  ,  ,  ,  ,  ,  ,    PLOF: Pt's son/caregiver provides assist with ADLs and functional mobility w/o AD     ASSESSMENT AND RECOMMENDATIONS:  Bed mobility: Mod I supine <-> sit edge of bed. LB dress: supv with supv with vc's to initiate doff and don slipper socks with good balance using cross leg technique. Toilet transfer: Mod I w/o AD, pt declined need to void and/or wash hands in stance at sink, pt states she already toileted recently. Pt laying semi-reclined in bed at end of tx session, call bell within reach & pt verbalized understanding to utilize for assist e.g. functional transfers in order to prevent falls.     Maximum therapeutic gains met at current level of care and patient will be discharged from occupational therapy at this time.    Further Equipment Recommendations for Discharge: shower chair    AMPAC: AM-PAC Inpatient Daily Activity Raw Score: 19    At this time and based on an AM-PAC score, no further OT is recommended upon discharge due to patient at baseline functional status. Recommend patient returns to prior setting with prior services.    This AMPA score should be considered in conjunction with interdisciplinary team recommendations to determine the most appropriate discharge setting. Patient's social support, diagnosis, medical stability, and prior level of function should also be taken into consideration.     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.”    OBJECTIVE DATA SUMMARY:     Past Medical History:   Diagnosis Date    Anxiety     Arrhythmia 2011    Negative Stress test    Arthritis     Carotid artery stenosis 2009    <50%    Gestational diabetes     Glaucoma     Hypercholesterolemia     Hypertension      Past Surgical History:   Procedure Laterality  Transfers for ADLs:  Bed Mobility:     Bed Mobility Training  Bed Mobility Training: Yes  Supine to Sit: Modified independent  Sit to Supine: Modified independent  Scooting: Modified independent  Transfers:                 Transfer Training  Transfer Training: Yes  Interventions: Safety awareness training  Sit to Stand: Modified independent  Stand to Sit: Modified independent  Bed to Chair: Supervision  Toilet Transfer: Modified independent    ADL Assessment:   Feeding: Modified independent   Grooming: Supervision;Verbal cueing  UE Bathing: Supervision;Verbal cueing  LE Bathing: Supervision  UE Dressing: Supervision;Verbal cueing  LE Dressing: Supervision;Verbal cueing  Toileting: Supervision  Functional Mobility: Modified independent     Pain:  Pain level pre-treatment: 0/10   Pain level post-treatment: 0/10     Activity Tolerance:   Activity Tolerance: Treatment limited secondary to decreased cognition  Please refer to the flowsheet for vital signs taken during this treatment.    After treatment:   [] Patient left in no apparent distress sitting up in chair  [x] Patient left in no apparent distress in bed  [x] Call bell left within reach  [x] Nursing notified  [] Caregiver present  [] Bed alarm activated    COMMUNICATION/EDUCATION:   Patient Education  Education Given To: Patient  Education Provided: Role of Therapy;Transfer Training;Fall Prevention Strategies  Education Method: Verbal;Teach Back  Barriers to Learning: Cognition  Education Outcome: Unable to demonstrate understanding    Thank you for this referral.  Shasta Chavez OT  Minutes: 9    Eval Complexity: Decision Making: Medium Complexity

## 2024-01-12 NOTE — PROGRESS NOTES
Physical Therapy  PHYSICAL THERAPY EVALUATION/DISCHARGE    Patient: Mona Olea (78 y.o. female)  Date: 1/12/2024  Primary Diagnosis: Metabolic encephalopathy [G93.41]  Toxic metabolic encephalopathy [G92.8]       Precautions: General Precautions, Fall Risk  PLOF: 1 story home with level entry. Lives with son. Had home health prior to admission, mod I prior to admission     ASSESSMENT AND RECOMMENDATIONS:  Pt received in bed eating breakfast in NAD. Explained the role of PT and pt agreeable to PT evaluation. Supine to sit mod I with HHA from bed rails. Scoots independently. Demonstrates good strength in knee extension, hip flexion, and DF/PF via MMT. Pt poor historian of home equipment and PLOF as her reports were contradicted by her son when he arrived in the room shortly after the eval began. Demosntrates ability to walk about 140 feet with RW with no rest breaks with an overall decreased pace and path deviations present. No LOB, but demonstrates increase postural sway. Returns to the room to recline with cueing for stand to sit for UE placement. Returns to EOB to finish eating her breakfast, ambulates 10 feet with SBA with no AD and demonstrates good decent from stand to sit. Call bell and all needs left within reach. Per son, pt was receiving home health and would benefit from home health PT     Patient does not require further skilled physical therapy intervention at this level of care.    Further Equipment Recommendations for Discharge: rolling walker    [facility]: AM-PAC Inpatient Mobility Raw Score : 21       At this time and based on an AM-PAC score, no further PT is recommended upon discharge due to (i.e. patient at baseline functional status…etc…).  Recommend patient returns to prior setting with prior services.     This [facility] score should be considered in conjunction with interdisciplinary team recommendations to determine the most appropriate discharge setting. Patient's social support, diagnosis,  medical stability, and prior level of function should also be taken into consideration.     SUBJECTIVE:   Patient stated “I want to get up.”    OBJECTIVE DATA SUMMARY:     Past Medical History:   Diagnosis Date    Anxiety     Arrhythmia 2011    Negative Stress test    Arthritis     Carotid artery stenosis 2009    <50%    Gestational diabetes     Glaucoma     Hypercholesterolemia     Hypertension      Past Surgical History:   Procedure Laterality Date    CHOLECYSTECTOMY  1990    gallstones    HYSTERECTOMY (CERVIX STATUS UNKNOWN)  1990    total hysterectomy       Home Situation:  Social/Functional History  Lives With: Son  Type of Home: House  Home Layout: One level  Home Access: Level entry  Has the patient had two or more falls in the past year or any fall with injury in the past year?: Unknown  Receives Help From: Family  Ambulation Assistance: Independent  Transfer Assistance: Independent  Critical Behavior:  Orientation  Overall Orientation Status: Impaired  Orientation Level: Oriented to person  Cognition  Overall Cognitive Status: Exceptions  Arousal/Alertness: Appropriate responses to stimuli  Following Commands: Follows one step commands consistently;Follows multistep commands with increased time  Attention Span: Appears intact  Memory: Decreased recall of biographical Information;Decreased recall of recent events  Safety Judgement: Good awareness of safety precautions  Problem Solving: Assistance required to identify errors made  Insights: Decreased awareness of deficits  Initiation: Does not require cues  Sequencing: Requires cues for some    Strength:    Strength: Within functional limits            Range Of Motion:  AROM: Within functional limits  PROM: Within functional limits    Functional Mobility:  Bed Mobility:     Bed Mobility Training  Bed Mobility Training: Yes  Supine to Sit: Modified independent  Sit to Supine: Modified independent  Scooting: Independent  Transfers:     Transfer

## 2024-01-12 NOTE — PROGRESS NOTES
[facility]  Progress Note  Date:2024       Room:[facility]  Patient Name:Mona Olea     YOB: 1945     Age:78 y.o.        Subjective    Subjective   Review of Systems    No acute events overnight.  Patient feels well that she has her baseline.  She has urinary frequency.    Objective         Vitals Last 24 Hours:  TEMPERATURE:  Temp  Av °F (36.7 °C)  Min: 97.4 °F (36.3 °C)  Max: 98.6 °F (37 °C)  RESPIRATIONS RANGE: Resp  Av.7  Min: 16  Max: 18  PULSE OXIMETRY RANGE: SpO2  Av.9 %  Min: 93 %  Max: 97 %  PULSE RANGE: Pulse  Av.2  Min: 83  Max: 91  BLOOD PRESSURE RANGE: Systolic (24hrs), Av , Min:127 , Max:192     ; Diastolic (24hrs), Av, Min:66, Max:91      I/O (24Hr):  No intake or output data in the 24 hours ending 24 0858  Objective:  General Appearance:  Comfortable.    Vital signs: (most recent): Blood pressure (!) 141/78, pulse 83, temperature 99.3 °F (37.4 °C), resp. rate 18, height 1.727 m (5' 8\"), weight 69.4 kg (153 lb), SpO2 97 %.    Output: Producing urine.    HEENT: Normal HEENT exam.    Lungs:  Normal effort and normal respiratory rate.  Breath sounds clear to auscultation.    Heart: Normal rate.  Regular rhythm.    Abdomen: Abdomen is soft and flat.  Bowel sounds are normal.   There is no abdominal tenderness.     Extremities: Normal range of motion.    Pulses: Distal pulses are intact.    Neurological: Patient is alert and oriented to person, place and time.    Skin:  Warm and dry.          Labs/Imaging/Diagnostics    Labs:  CBC:  Recent Labs     24  1413   WBC 10.4   RBC 3.72*   HGB 11.6*   HCT 35.3   MCV 94.9   RDW 14.3        CHEMISTRIES:  Recent Labs     24  1413      K 3.9      CO2 28   BUN 19*   CREATININE 0.91   GLUCOSE 114*     PT/INR:No results for input(s): \"PROTIME\", \"INR\" in the last 72 hours.  APTT:No results for input(s): \"APTT\" in the last 72 hours.  LIVER

## 2024-01-12 NOTE — BSMART NOTE
Behavioral Health Crisis Assessment     Chief Complaint: Mental Health      Voluntary or Involuntary Status:       C-SSRS current suicide Risk (High, Moderate, Low): No risk      Past Suicide Attempts (specify):  Patient denies past suicide attempts.      Self Injurious/Self Mutilation behaviors (specify): Patient denies self injurious/self mutilation behaviors.      Protective Factors (specify): Patient reports she has supportive friends.      Risk Factors (specify): Patient has dementia diagnosis.      Substance use (current or past): (See Below)    Alcohol: Patient denies.  Date started:  Route:  Frequency:  Amount:  Last use:  Withdrawals:   Rehab/Inpatient Services:   Hx of seizures:  Hx of blackouts:     Heroin: Patient denies.  Date started:  Route:  Frequency:  Amount:  Last use:  Withdrawals:   Rehab/Inpatient Services:      Cocaine: Patient denies.  Date started:   Route:  Frequency:  Amount:  Last use:   Withdrawals:   Rehab/Inpatient Services:     Marijuana: Patient denies.     Prescription/OTC Medications:      Hallucinogenics: Patient denies.     Cigarettes/Cigars/Vapors: Patient denies.         MH & Substance use Treatment  (current and/or past): Patient denies mental health and substance use treatment.      Violence towards others (current and/or past:(specify): Patient denies violence towards others.      Legal issues (current or past): Patient denies current and past legal issues.      Access to weapons: Patient denies access to weapons.      Trauma or Abuse (specify): Patient denies.      Living Situation: Patient reports she resides with her son.      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      Education level: Patient reports a bachelor degree.      ADLs: Patient reports she is able to perform independently.    Mental Status Exam    The patient's appearance dressed in blue paper scrubs and red

## 2024-01-13 LAB
ANION GAP SERPL CALC-SCNC: 4 MMOL/L (ref 3–18)
BUN SERPL-MCNC: 20 MG/DL (ref 7–18)
BUN/CREAT SERPL: 20 (ref 12–20)
CALCIUM SERPL-MCNC: 9.5 MG/DL (ref 8.5–10.1)
CHLORIDE SERPL-SCNC: 108 MMOL/L (ref 100–111)
CO2 SERPL-SCNC: 30 MMOL/L (ref 21–32)
CREAT SERPL-MCNC: 0.99 MG/DL (ref 0.6–1.3)
ERYTHROCYTE [DISTWIDTH] IN BLOOD BY AUTOMATED COUNT: 14.5 % (ref 11.6–14.5)
GLUCOSE SERPL-MCNC: 82 MG/DL (ref 74–99)
HCT VFR BLD AUTO: 35.3 % (ref 35–45)
HGB BLD-MCNC: 11.5 G/DL (ref 12–16)
MCH RBC QN AUTO: 31.3 PG (ref 24–34)
MCHC RBC AUTO-ENTMCNC: 32.6 G/DL (ref 31–37)
MCV RBC AUTO: 95.9 FL (ref 78–100)
NRBC # BLD: 0 K/UL (ref 0–0.01)
NRBC BLD-RTO: 0 PER 100 WBC
PLATELET # BLD AUTO: 238 K/UL (ref 135–420)
PMV BLD AUTO: 10.3 FL (ref 9.2–11.8)
POTASSIUM SERPL-SCNC: 4.3 MMOL/L (ref 3.5–5.5)
RBC # BLD AUTO: 3.68 M/UL (ref 4.2–5.3)
SODIUM SERPL-SCNC: 142 MMOL/L (ref 136–145)
WBC # BLD AUTO: 8.5 K/UL (ref 4.6–13.2)

## 2024-01-13 PROCEDURE — 99232 SBSQ HOSP IP/OBS MODERATE 35: CPT | Performed by: EMERGENCY MEDICINE

## 2024-01-13 PROCEDURE — 80048 BASIC METABOLIC PNL TOTAL CA: CPT

## 2024-01-13 PROCEDURE — 2580000003 HC RX 258: Performed by: STUDENT IN AN ORGANIZED HEALTH CARE EDUCATION/TRAINING PROGRAM

## 2024-01-13 PROCEDURE — 94761 N-INVAS EAR/PLS OXIMETRY MLT: CPT

## 2024-01-13 PROCEDURE — 6370000000 HC RX 637 (ALT 250 FOR IP): Performed by: STUDENT IN AN ORGANIZED HEALTH CARE EDUCATION/TRAINING PROGRAM

## 2024-01-13 PROCEDURE — 6360000002 HC RX W HCPCS: Performed by: STUDENT IN AN ORGANIZED HEALTH CARE EDUCATION/TRAINING PROGRAM

## 2024-01-13 PROCEDURE — 1100000003 HC PRIVATE W/ TELEMETRY

## 2024-01-13 PROCEDURE — 85027 COMPLETE CBC AUTOMATED: CPT

## 2024-01-13 PROCEDURE — 36415 COLL VENOUS BLD VENIPUNCTURE: CPT

## 2024-01-13 RX ADMIN — ASPIRIN 81 MG: 81 TABLET, COATED ORAL at 08:57

## 2024-01-13 RX ADMIN — LOSARTAN POTASSIUM 25 MG: 25 TABLET, FILM COATED ORAL at 08:56

## 2024-01-13 RX ADMIN — SERTRALINE HYDROCHLORIDE 75 MG: 50 TABLET ORAL at 08:57

## 2024-01-13 RX ADMIN — MEMANTINE HYDROCHLORIDE 5 MG: 5 TABLET ORAL at 21:00

## 2024-01-13 RX ADMIN — WATER 1000 MG: 1 INJECTION INTRAMUSCULAR; INTRAVENOUS; SUBCUTANEOUS at 17:00

## 2024-01-13 RX ADMIN — SODIUM CHLORIDE, PRESERVATIVE FREE 10 ML: 5 INJECTION INTRAVENOUS at 21:00

## 2024-01-13 RX ADMIN — DONEPEZIL HYDROCHLORIDE 10 MG: 5 TABLET, FILM COATED ORAL at 21:00

## 2024-01-13 RX ADMIN — ENOXAPARIN SODIUM 40 MG: 100 INJECTION SUBCUTANEOUS at 08:57

## 2024-01-13 RX ADMIN — MEMANTINE HYDROCHLORIDE 5 MG: 5 TABLET ORAL at 08:57

## 2024-01-13 RX ADMIN — AMLODIPINE BESYLATE 10 MG: 10 TABLET ORAL at 08:57

## 2024-01-13 RX ADMIN — ATORVASTATIN CALCIUM 10 MG: 10 TABLET, FILM COATED ORAL at 21:00

## 2024-01-13 RX ADMIN — SODIUM CHLORIDE, PRESERVATIVE FREE 10 ML: 5 INJECTION INTRAVENOUS at 08:58

## 2024-01-13 RX ADMIN — TRAZODONE HYDROCHLORIDE 25 MG: 50 TABLET ORAL at 21:00

## 2024-01-13 ASSESSMENT — PAIN SCALES - GENERAL
PAINLEVEL_OUTOF10: 0

## 2024-01-14 LAB
ANION GAP SERPL CALC-SCNC: 3 MMOL/L (ref 3–18)
BACTERIA SPEC CULT: ABNORMAL
BACTERIA SPEC CULT: ABNORMAL
BUN SERPL-MCNC: 20 MG/DL (ref 7–18)
BUN/CREAT SERPL: 25 (ref 12–20)
CALCIUM SERPL-MCNC: 8.7 MG/DL (ref 8.5–10.1)
CC UR VC: ABNORMAL
CHLORIDE SERPL-SCNC: 107 MMOL/L (ref 100–111)
CO2 SERPL-SCNC: 29 MMOL/L (ref 21–32)
CREAT SERPL-MCNC: 0.79 MG/DL (ref 0.6–1.3)
ERYTHROCYTE [DISTWIDTH] IN BLOOD BY AUTOMATED COUNT: 14.4 % (ref 11.6–14.5)
GLUCOSE SERPL-MCNC: 91 MG/DL (ref 74–99)
HCT VFR BLD AUTO: 31 % (ref 35–45)
HGB BLD-MCNC: 10 G/DL (ref 12–16)
MAGNESIUM SERPL-MCNC: 2.1 MG/DL (ref 1.6–2.6)
MCH RBC QN AUTO: 30.9 PG (ref 24–34)
MCHC RBC AUTO-ENTMCNC: 32.3 G/DL (ref 31–37)
MCV RBC AUTO: 95.7 FL (ref 78–100)
NRBC # BLD: 0 K/UL (ref 0–0.01)
NRBC BLD-RTO: 0 PER 100 WBC
PLATELET # BLD AUTO: 218 K/UL (ref 135–420)
PMV BLD AUTO: 10.4 FL (ref 9.2–11.8)
POTASSIUM SERPL-SCNC: 3.6 MMOL/L (ref 3.5–5.5)
RBC # BLD AUTO: 3.24 M/UL (ref 4.2–5.3)
SERVICE CMNT-IMP: ABNORMAL
SODIUM SERPL-SCNC: 139 MMOL/L (ref 136–145)
WBC # BLD AUTO: 8 K/UL (ref 4.6–13.2)

## 2024-01-14 PROCEDURE — 36415 COLL VENOUS BLD VENIPUNCTURE: CPT

## 2024-01-14 PROCEDURE — 85027 COMPLETE CBC AUTOMATED: CPT

## 2024-01-14 PROCEDURE — 99232 SBSQ HOSP IP/OBS MODERATE 35: CPT | Performed by: EMERGENCY MEDICINE

## 2024-01-14 PROCEDURE — 80048 BASIC METABOLIC PNL TOTAL CA: CPT

## 2024-01-14 PROCEDURE — 1100000003 HC PRIVATE W/ TELEMETRY

## 2024-01-14 PROCEDURE — 6360000002 HC RX W HCPCS: Performed by: STUDENT IN AN ORGANIZED HEALTH CARE EDUCATION/TRAINING PROGRAM

## 2024-01-14 PROCEDURE — 2580000003 HC RX 258: Performed by: STUDENT IN AN ORGANIZED HEALTH CARE EDUCATION/TRAINING PROGRAM

## 2024-01-14 PROCEDURE — 83735 ASSAY OF MAGNESIUM: CPT

## 2024-01-14 PROCEDURE — 6370000000 HC RX 637 (ALT 250 FOR IP): Performed by: STUDENT IN AN ORGANIZED HEALTH CARE EDUCATION/TRAINING PROGRAM

## 2024-01-14 PROCEDURE — 94761 N-INVAS EAR/PLS OXIMETRY MLT: CPT

## 2024-01-14 RX ADMIN — AMLODIPINE BESYLATE 10 MG: 10 TABLET ORAL at 09:39

## 2024-01-14 RX ADMIN — MEMANTINE HYDROCHLORIDE 5 MG: 5 TABLET ORAL at 09:40

## 2024-01-14 RX ADMIN — ASPIRIN 81 MG: 81 TABLET, COATED ORAL at 09:39

## 2024-01-14 RX ADMIN — LOSARTAN POTASSIUM 25 MG: 25 TABLET, FILM COATED ORAL at 09:39

## 2024-01-14 RX ADMIN — DONEPEZIL HYDROCHLORIDE 10 MG: 5 TABLET, FILM COATED ORAL at 22:15

## 2024-01-14 RX ADMIN — TRAZODONE HYDROCHLORIDE 25 MG: 50 TABLET ORAL at 22:15

## 2024-01-14 RX ADMIN — WATER 1000 MG: 1 INJECTION INTRAMUSCULAR; INTRAVENOUS; SUBCUTANEOUS at 17:39

## 2024-01-14 RX ADMIN — SODIUM CHLORIDE, PRESERVATIVE FREE 10 ML: 5 INJECTION INTRAVENOUS at 09:42

## 2024-01-14 RX ADMIN — SERTRALINE HYDROCHLORIDE 75 MG: 50 TABLET ORAL at 09:39

## 2024-01-14 RX ADMIN — ATORVASTATIN CALCIUM 10 MG: 10 TABLET, FILM COATED ORAL at 22:15

## 2024-01-14 RX ADMIN — SODIUM CHLORIDE, PRESERVATIVE FREE 10 ML: 5 INJECTION INTRAVENOUS at 22:16

## 2024-01-14 RX ADMIN — ENOXAPARIN SODIUM 40 MG: 100 INJECTION SUBCUTANEOUS at 09:40

## 2024-01-14 RX ADMIN — MEMANTINE HYDROCHLORIDE 5 MG: 5 TABLET ORAL at 22:15

## 2024-01-14 ASSESSMENT — PAIN SCALES - GENERAL
PAINLEVEL_OUTOF10: 0

## 2024-01-14 NOTE — PROGRESS NOTES
conducted a Follow up consultation and Spiritual Assessment for Mona Olea, who is a 78 y.o.,female.      The  provided the following Interventions:  Continued the relationship of care and support.   Listened empathically.  Offered prayer, holy communion, and assurance of continued prayer on behalf of the patient.    Intervention/Outcome:  Patient received holy communion  Patient expressed gratitude to 's visit.    Plan:  Continue support as per need or as a request basis.  All caregivers, family members, call  if patient develops any spiritual or emotional crises.     Keely Diaz, [facility]  Spiritual Care  [phone]

## 2024-01-14 NOTE — PROGRESS NOTES
[facility]  Progress Note  Date:2024       Room:[facility]  Patient Name:Mona Olea     YOB: 1945     Age:78 y.o.        Subjective    I personally saw and evaluated this patient face-to-face on 24. Patient is sitting in bed in no apparent distress, awake, denies any discomfort, pleasantly confused  Objective         Vitals Last 24 Hours:  TEMPERATURE:  Temp  Av °F (36.7 °C)  Min: 97.4 °F (36.3 °C)  Max: 98.2 °F (36.8 °C)  RESPIRATIONS RANGE: Resp  Av  Min: 18  Max: 18  PULSE OXIMETRY RANGE: SpO2  Av.7 %  Min: 92 %  Max: 99 %  PULSE RANGE: Pulse  Av.8  Min: 74  Max: 91  BLOOD PRESSURE RANGE: Systolic (24hrs), Av , Min:119 , Max:143     ; Diastolic (24hrs), Av, Min:60, Max:75      I/O (24Hr):  No intake or output data in the 24 hours ending 24    General:  Awake, follows simple commands  Cardiovascular:  S1S2+, RRR  Pulmonary:  CTA b/l  GI:  Soft, BS+, NT, ND  Extremities:  No edema        Labs/Imaging/Diagnostics    Labs:  CBC:  Recent Labs     24  1413 24  0600   WBC 10.4 8.5   RBC 3.72* 3.68*   HGB 11.6* 11.5*   HCT 35.3 35.3   MCV 94.9 95.9   RDW 14.3 14.5    238       CHEMISTRIES:  Recent Labs     24  1413 24  0600    142   K 3.9 4.3    108   CO2 28 30   BUN 19* 20*   CREATININE 0.91 0.99   GLUCOSE 114* 82       PT/INR:No results for input(s): \"PROTIME\", \"INR\" in the last 72 hours.  APTT:No results for input(s): \"APTT\" in the last 72 hours.  LIVER PROFILE:  Recent Labs     24  1413   AST 14   ALT 31   BILITOT 0.6   ALKPHOS 59         Imaging:  XR CHEST PORTABLE    Result Date: 2024  No acute chest finding. Right shoulder calcific tendinosis     CT HEAD WO CONTRAST    Result Date: 2024  No acute intracranial abnormality. Mild to moderate burden of chronic microvascular disease.       Current Medications:  Current Facility-Administered Medications:

## 2024-01-15 LAB
ANION GAP SERPL CALC-SCNC: 2 MMOL/L (ref 3–18)
BUN SERPL-MCNC: 25 MG/DL (ref 7–18)
BUN/CREAT SERPL: 28 (ref 12–20)
CALCIUM SERPL-MCNC: 9 MG/DL (ref 8.5–10.1)
CHLORIDE SERPL-SCNC: 108 MMOL/L (ref 100–111)
CO2 SERPL-SCNC: 30 MMOL/L (ref 21–32)
CREAT SERPL-MCNC: 0.89 MG/DL (ref 0.6–1.3)
ERYTHROCYTE [DISTWIDTH] IN BLOOD BY AUTOMATED COUNT: 14.5 % (ref 11.6–14.5)
GLUCOSE SERPL-MCNC: 81 MG/DL (ref 74–99)
HCT VFR BLD AUTO: 32.7 % (ref 35–45)
HGB BLD-MCNC: 10.5 G/DL (ref 12–16)
MCH RBC QN AUTO: 31 PG (ref 24–34)
MCHC RBC AUTO-ENTMCNC: 32.1 G/DL (ref 31–37)
MCV RBC AUTO: 96.5 FL (ref 78–100)
NRBC # BLD: 0 K/UL (ref 0–0.01)
NRBC BLD-RTO: 0 PER 100 WBC
PLATELET # BLD AUTO: 238 K/UL (ref 135–420)
PMV BLD AUTO: 10.1 FL (ref 9.2–11.8)
POTASSIUM SERPL-SCNC: 3.9 MMOL/L (ref 3.5–5.5)
RBC # BLD AUTO: 3.39 M/UL (ref 4.2–5.3)
SODIUM SERPL-SCNC: 140 MMOL/L (ref 136–145)
WBC # BLD AUTO: 7.7 K/UL (ref 4.6–13.2)

## 2024-01-15 PROCEDURE — 80048 BASIC METABOLIC PNL TOTAL CA: CPT

## 2024-01-15 PROCEDURE — 1100000003 HC PRIVATE W/ TELEMETRY

## 2024-01-15 PROCEDURE — 99232 SBSQ HOSP IP/OBS MODERATE 35: CPT | Performed by: FAMILY MEDICINE

## 2024-01-15 PROCEDURE — 2580000003 HC RX 258: Performed by: STUDENT IN AN ORGANIZED HEALTH CARE EDUCATION/TRAINING PROGRAM

## 2024-01-15 PROCEDURE — 85027 COMPLETE CBC AUTOMATED: CPT

## 2024-01-15 PROCEDURE — 6370000000 HC RX 637 (ALT 250 FOR IP): Performed by: STUDENT IN AN ORGANIZED HEALTH CARE EDUCATION/TRAINING PROGRAM

## 2024-01-15 PROCEDURE — 36415 COLL VENOUS BLD VENIPUNCTURE: CPT

## 2024-01-15 PROCEDURE — 6360000002 HC RX W HCPCS: Performed by: STUDENT IN AN ORGANIZED HEALTH CARE EDUCATION/TRAINING PROGRAM

## 2024-01-15 RX ORDER — LEVOFLOXACIN 500 MG/1
500 TABLET, FILM COATED ORAL DAILY
Status: DISCONTINUED | OUTPATIENT
Start: 2024-01-15 | End: 2024-01-15

## 2024-01-15 RX ORDER — LEVOFLOXACIN 500 MG/1
500 TABLET, FILM COATED ORAL DAILY
Status: DISCONTINUED | OUTPATIENT
Start: 2024-01-16 | End: 2024-01-16 | Stop reason: HOSPADM

## 2024-01-15 RX ADMIN — ENOXAPARIN SODIUM 40 MG: 100 INJECTION SUBCUTANEOUS at 09:01

## 2024-01-15 RX ADMIN — LOSARTAN POTASSIUM 25 MG: 25 TABLET, FILM COATED ORAL at 08:58

## 2024-01-15 RX ADMIN — SODIUM CHLORIDE, PRESERVATIVE FREE 10 ML: 5 INJECTION INTRAVENOUS at 20:00

## 2024-01-15 RX ADMIN — MEMANTINE HYDROCHLORIDE 5 MG: 5 TABLET ORAL at 20:00

## 2024-01-15 RX ADMIN — MEMANTINE HYDROCHLORIDE 5 MG: 5 TABLET ORAL at 08:58

## 2024-01-15 RX ADMIN — DONEPEZIL HYDROCHLORIDE 10 MG: 5 TABLET, FILM COATED ORAL at 20:00

## 2024-01-15 RX ADMIN — AMLODIPINE BESYLATE 10 MG: 10 TABLET ORAL at 08:59

## 2024-01-15 RX ADMIN — WATER 1000 MG: 1 INJECTION INTRAMUSCULAR; INTRAVENOUS; SUBCUTANEOUS at 16:37

## 2024-01-15 RX ADMIN — TRAZODONE HYDROCHLORIDE 25 MG: 50 TABLET ORAL at 20:00

## 2024-01-15 RX ADMIN — ATORVASTATIN CALCIUM 10 MG: 10 TABLET, FILM COATED ORAL at 20:00

## 2024-01-15 RX ADMIN — SODIUM CHLORIDE, PRESERVATIVE FREE 10 ML: 5 INJECTION INTRAVENOUS at 09:01

## 2024-01-15 RX ADMIN — SERTRALINE HYDROCHLORIDE 75 MG: 50 TABLET ORAL at 08:58

## 2024-01-15 RX ADMIN — ASPIRIN 81 MG: 81 TABLET, COATED ORAL at 08:58

## 2024-01-15 ASSESSMENT — PAIN SCALES - GENERAL
PAINLEVEL_OUTOF10: 0

## 2024-01-15 NOTE — PROGRESS NOTES
[facility] Hospitalist Group  Progress Note    Patient: Mona Olea Age: 78 y.o. : 1945 MR#: [id number] SSN: [id number]      Subjective/24-hour events:     No new new issues overnight.  Remains afebrile.  Confused.    Assessment:   E. Coli UTI  Acute metabolic encephalopathy  HTN  HLD  Anxiety  Dementia    Plan:   Discontinue rocephin, transition to levaquin based on sensitivity results.  PT/OT, mobilize.  Supportive care otherwise.   Follow.  Attempted to update son CHICA Osborne, by phone, HIPAA compliant voice message left.    Case discussed with:  []Patient  []Family  [x] Nursing  [x]Case Management  DVT Prophylaxis:  [x]Lovenox  []Hep SQ  []SCDs  []Coumadin   []On Heparin gtt []PO anticoagulant    Objective:   VS: /75   Pulse 72   Temp 97.6 °F (36.4 °C) (Oral)   Resp 14   Ht 1.727 m (5' 8\")   Wt 69.4 kg (153 lb)   SpO2 95%   BMI 23.26 kg/m²      Tmax/24hrs: Temp (24hrs), Av.2 °F (36.8 °C), Min:97.6 °F (36.4 °C), Max:98.7 °F (37.1 °C)  No intake or output data in the 24 hours ending 01/15/24 0845    Gen:  In NAD.  Lungs: Clear, no wheezes  Effort nonlabored.  CV: RRR.  Abdomen: Soft, NTTP.  Extremities: Warm, no pitting edema or ischemia.  Neuro:  Awake and alert but confused.  Moves extremities spontaneously.    Current Facility-Administered Medications   Medication Dose Route Frequency    sodium chloride flush 0.9 % injection 5-40 mL  5-40 mL IntraVENous 2 times per day    sodium chloride flush 0.9 % injection 5-40 mL  5-40 mL IntraVENous PRN    0.9 % sodium chloride infusion   IntraVENous PRN    potassium chloride (KLOR-CON M) extended release tablet 40 mEq  40 mEq Oral PRN    Or    potassium bicarb-citric acid (EFFER-K) effervescent tablet 40 mEq  40 mEq Oral PRN    Or    potassium chloride 10 mEq/100 mL IVPB (Peripheral Line)  10 mEq IntraVENous PRN    magnesium sulfate 2000 mg in 50 mL IVPB premix  2,000 mg IntraVENous PRN    enoxaparin (LOVENOX) injection  40 mg  40 mg SubCUTAneous Daily    ondansetron (ZOFRAN-ODT) disintegrating tablet 4 mg  4 mg Oral Q8H PRN    Or    ondansetron (ZOFRAN) injection 4 mg  4 mg IntraVENous Q6H PRN    polyethylene glycol (GLYCOLAX) packet 17 g  17 g Oral Daily PRN    acetaminophen (TYLENOL) tablet 650 mg  650 mg Oral Q6H PRN    Or    acetaminophen (TYLENOL) suppository 650 mg  650 mg Rectal Q6H PRN    cefTRIAXone (ROCEPHIN) 1,000 mg in sterile water 10 mL IV syringe  1,000 mg IntraVENous Q24H    amLODIPine (NORVASC) tablet 10 mg  10 mg Oral Daily    aspirin EC tablet 81 mg  81 mg Oral Daily    atorvastatin (LIPITOR) tablet 10 mg  10 mg Oral Nightly    donepezil (ARICEPT) tablet 10 mg  10 mg Oral Nightly    losartan (COZAAR) tablet 25 mg  25 mg Oral QAM    memantine (NAMENDA) tablet 5 mg  5 mg Oral BID    sertraline (ZOLOFT) tablet 75 mg  75 mg Oral Daily    traZODone (DESYREL) tablet 25 mg  25 mg Oral Nightly        Labs:    Recent Results (from the past 24 hour(s))   CBC    Collection Time: 01/15/24  4:57 AM   Result Value Ref Range    WBC 7.7 4.6 - 13.2 K/uL    RBC 3.39 (L) 4.20 - 5.30 M/uL    Hemoglobin 10.5 (L) 12.0 - 16.0 g/dL    Hematocrit 32.7 (L) 35.0 - 45.0 %    MCV 96.5 78.0 - 100.0 FL    MCH 31.0 24.0 - 34.0 PG    MCHC 32.1 31.0 - 37.0 g/dL    RDW 14.5 11.6 - 14.5 %    Platelets 238 135 - 420 K/uL    MPV 10.1 9.2 - 11.8 FL    Nucleated RBCs 0.0 0  WBC    nRBC 0.00 0.00 - 0.01 K/uL   Basic Metabolic Panel w/ Reflex to MG    Collection Time: 01/15/24  4:57 AM   Result Value Ref Range    Sodium 140 136 - 145 mmol/L    Potassium 3.9 3.5 - 5.5 mmol/L    Chloride 108 100 - 111 mmol/L    CO2 30 21 - 32 mmol/L    Anion Gap 2 (L) 3.0 - 18 mmol/L    Glucose 81 74 - 99 mg/dL    BUN 25 (H) 7.0 - 18 MG/DL    Creatinine 0.89 0.6 - 1.3 MG/DL    Bun/Cre Ratio 28 (H) 12 - 20      Est, Glom Filt Rate >60 >60 ml/min/1.73m2    Calcium 9.0 8.5 - 10.1 MG/DL         Signed By: Pedrito Donaldson MD

## 2024-01-15 NOTE — PROGRESS NOTES
[facility] Hospitalist Group  Progress Note  Date:2024       Room:[facility]  Patient Name:Mona Olea     YOB: 1945     Age:78 y.o.        Subjective    I personally saw and evaluated this patient face-to-face today.  Patient is sitting in bed in no apparent distress.  Pleasantly confused, has some dementia  Objective         Vitals Last 24 Hours:  TEMPERATURE:  Temp  Av.4 °F (36.9 °C)  Min: 97.9 °F (36.6 °C)  Max: 98.8 °F (37.1 °C)  RESPIRATIONS RANGE: Resp  Av  Min: 16  Max: 16  PULSE OXIMETRY RANGE: SpO2  Av.8 %  Min: 95 %  Max: 96 %  PULSE RANGE: Pulse  Av  Min: 72  Max: 88  BLOOD PRESSURE RANGE: Systolic (24hrs), Av , Min:103 , Max:154     ; Diastolic (24hrs), Av, Min:58, Max:78      I/O (24Hr):  No intake or output data in the 24 hours ending 24    General:  Awake, follows simple commands  Cardiovascular:  S1S2+, RRR  Pulmonary:  CTA b/l  GI:  Soft, BS+, NT, ND  Extremities:  No edema        Labs/Imaging/Diagnostics    Labs:  CBC:  Recent Labs     24  0600 24  0410   WBC 8.5 8.0   RBC 3.68* 3.24*   HGB 11.5* 10.0*   HCT 35.3 31.0*   MCV 95.9 95.7   RDW 14.5 14.4    218       CHEMISTRIES:  Recent Labs     24  0600 24  0410    139   K 4.3 3.6    107   CO2 30 29   BUN 20* 20*   CREATININE 0.99 0.79   GLUCOSE 82 91   MG  --  2.1       PT/INR:No results for input(s): \"PROTIME\", \"INR\" in the last 72 hours.  APTT:No results for input(s): \"APTT\" in the last 72 hours.  LIVER PROFILE:  No results for input(s): \"AST\", \"ALT\", \"BILIDIR\", \"BILITOT\", \"ALKPHOS\" in the last 72 hours.      Imaging:  XR CHEST PORTABLE    Result Date: 2024  No acute chest finding. Right shoulder calcific tendinosis     CT HEAD WO CONTRAST    Result Date: 2024  No acute intracranial abnormality. Mild to moderate burden of chronic microvascular disease.       Current Medications:  Current Facility-Administered

## 2024-01-16 ENCOUNTER — HOME HEALTH ADMISSION (OUTPATIENT)
Age: 79
End: 2024-01-16
Payer: MEDICARE

## 2024-01-16 VITALS
HEART RATE: 106 BPM | DIASTOLIC BLOOD PRESSURE: 68 MMHG | WEIGHT: 153 LBS | RESPIRATION RATE: 18 BRPM | TEMPERATURE: 98.4 F | SYSTOLIC BLOOD PRESSURE: 157 MMHG | BODY MASS INDEX: 23.19 KG/M2 | OXYGEN SATURATION: 96 % | HEIGHT: 68 IN

## 2024-01-16 LAB
ANION GAP SERPL CALC-SCNC: 2 MMOL/L (ref 3–18)
BUN SERPL-MCNC: 28 MG/DL (ref 7–18)
BUN/CREAT SERPL: 32 (ref 12–20)
CALCIUM SERPL-MCNC: 8.9 MG/DL (ref 8.5–10.1)
CHLORIDE SERPL-SCNC: 109 MMOL/L (ref 100–111)
CO2 SERPL-SCNC: 29 MMOL/L (ref 21–32)
CREAT SERPL-MCNC: 0.88 MG/DL (ref 0.6–1.3)
ERYTHROCYTE [DISTWIDTH] IN BLOOD BY AUTOMATED COUNT: 14.2 % (ref 11.6–14.5)
GLUCOSE SERPL-MCNC: 118 MG/DL (ref 74–99)
HCT VFR BLD AUTO: 31.3 % (ref 35–45)
HGB BLD-MCNC: 10.1 G/DL (ref 12–16)
MCH RBC QN AUTO: 31.2 PG (ref 24–34)
MCHC RBC AUTO-ENTMCNC: 32.3 G/DL (ref 31–37)
MCV RBC AUTO: 96.6 FL (ref 78–100)
NRBC # BLD: 0 K/UL (ref 0–0.01)
NRBC BLD-RTO: 0 PER 100 WBC
PLATELET # BLD AUTO: 232 K/UL (ref 135–420)
PMV BLD AUTO: 10.1 FL (ref 9.2–11.8)
POTASSIUM SERPL-SCNC: 3.6 MMOL/L (ref 3.5–5.5)
RBC # BLD AUTO: 3.24 M/UL (ref 4.2–5.3)
SODIUM SERPL-SCNC: 140 MMOL/L (ref 136–145)
WBC # BLD AUTO: 8.3 K/UL (ref 4.6–13.2)

## 2024-01-16 PROCEDURE — 36415 COLL VENOUS BLD VENIPUNCTURE: CPT

## 2024-01-16 PROCEDURE — 85027 COMPLETE CBC AUTOMATED: CPT

## 2024-01-16 PROCEDURE — 80048 BASIC METABOLIC PNL TOTAL CA: CPT

## 2024-01-16 PROCEDURE — 6370000000 HC RX 637 (ALT 250 FOR IP): Performed by: FAMILY MEDICINE

## 2024-01-16 PROCEDURE — 94761 N-INVAS EAR/PLS OXIMETRY MLT: CPT

## 2024-01-16 PROCEDURE — 6360000002 HC RX W HCPCS: Performed by: STUDENT IN AN ORGANIZED HEALTH CARE EDUCATION/TRAINING PROGRAM

## 2024-01-16 PROCEDURE — 2580000003 HC RX 258: Performed by: STUDENT IN AN ORGANIZED HEALTH CARE EDUCATION/TRAINING PROGRAM

## 2024-01-16 PROCEDURE — 99239 HOSP IP/OBS DSCHRG MGMT >30: CPT | Performed by: FAMILY MEDICINE

## 2024-01-16 PROCEDURE — 6370000000 HC RX 637 (ALT 250 FOR IP): Performed by: STUDENT IN AN ORGANIZED HEALTH CARE EDUCATION/TRAINING PROGRAM

## 2024-01-16 RX ORDER — LEVOFLOXACIN 500 MG/1
500 TABLET, FILM COATED ORAL DAILY
Qty: 2 TABLET | Refills: 0 | Status: SHIPPED | OUTPATIENT
Start: 2024-01-17 | End: 2024-01-19

## 2024-01-16 RX ADMIN — SERTRALINE HYDROCHLORIDE 75 MG: 50 TABLET ORAL at 09:04

## 2024-01-16 RX ADMIN — LEVOFLOXACIN 500 MG: 500 TABLET, FILM COATED ORAL at 09:04

## 2024-01-16 RX ADMIN — ENOXAPARIN SODIUM 40 MG: 100 INJECTION SUBCUTANEOUS at 09:05

## 2024-01-16 RX ADMIN — ASPIRIN 81 MG: 81 TABLET, COATED ORAL at 09:04

## 2024-01-16 RX ADMIN — AMLODIPINE BESYLATE 10 MG: 10 TABLET ORAL at 09:04

## 2024-01-16 RX ADMIN — MEMANTINE HYDROCHLORIDE 5 MG: 5 TABLET ORAL at 09:04

## 2024-01-16 RX ADMIN — LOSARTAN POTASSIUM 25 MG: 25 TABLET, FILM COATED ORAL at 09:04

## 2024-01-16 RX ADMIN — SODIUM CHLORIDE, PRESERVATIVE FREE 10 ML: 5 INJECTION INTRAVENOUS at 09:07

## 2024-01-16 ASSESSMENT — PAIN SCALES - GENERAL
PAINLEVEL_OUTOF10: 0
PAINLEVEL_OUTOF10: 0

## 2024-01-16 NOTE — HOME CARE
Received home health referral for [facility] for (SN/PT/OT).  Discharge order noted for today.    Spoke with patient's son via phone;  patient identifiers verified.  Explained home health care services and routines.  Demographics verified including insurance, phone and address confirmed.  Patient has the following DME: has available rolling walker and shower chair.  Caregivers available resides with son and family.  Son as primary caregiver.   Orders noted and arranged and sent to central intake and scheduling.     -   CIERA Magana [facility] Home Care Liaison

## 2024-01-16 NOTE — PLAN OF CARE
Problem: Discharge Planning  Goal: Discharge to home or other facility with appropriate resources  Outcome: Progressing  Flowsheets (Taken 1/15/2024 0800)  Discharge to home or other facility with appropriate resources:   Identify barriers to discharge with patient and caregiver   Arrange for needed discharge resources and transportation as appropriate     Problem: Chronic Conditions and Co-morbidities  Goal: Patient's chronic conditions and co-morbidity symptoms are monitored and maintained or improved  Outcome: Progressing  Flowsheets (Taken 1/15/2024 0800)  Care Plan - Patient's Chronic Conditions and Co-Morbidity Symptoms are Monitored and Maintained or Improved: Monitor and assess patient's chronic conditions and comorbid symptoms for stability, deterioration, or improvement     Problem: Safety - Adult  Goal: Free from fall injury  Outcome: Progressing    Patient receiving IV Abx for UTI - remains confused but easily redirected.  Walked to restroom - weak and unsteady.   Attempted to put patient in gown and perform some hygiene but she became upset.  Will attempt again later.She voided a large amount.  Will monitor.

## 2024-01-16 NOTE — PROGRESS NOTES
[facility] Hospitalist Group  Progress Note    Patient: Mona Olea Age: 78 y.o. : 1945 MR#: [id number] SSN: [id number]      Subjective/24-hour events:     No new new issues overnight.  Remains afebrile.  Confused.    Assessment:   E. Coli UTI  Acute metabolic encephalopathy  HTN  HLD  Anxiety  Dementia    Plan:   Discontinue rocephin, transition to levaquin based on sensitivity results.  PT/OT, mobilize.  Supportive care otherwise.   Follow.  Attempted to update son CHICA Osborne, by phone, HIPAA compliant voice message left.    Case discussed with:  []Patient  []Family  [x] Nursing  [x]Case Management  DVT Prophylaxis:  [x]Lovenox  []Hep SQ  []SCDs  []Coumadin   []On Heparin gtt []PO anticoagulant    Objective:   VS: /78   Pulse 72   Temp 97.7 °F (36.5 °C) (Oral)   Resp 18   Ht 1.727 m (5' 8\")   Wt 69.4 kg (153 lb)   SpO2 97%   BMI 23.26 kg/m²      Tmax/24hrs: Temp (24hrs), Av.5 °F (36.4 °C), Min:97.1 °F (36.2 °C), Max:98 °F (36.7 °C)  No intake or output data in the 24 hours ending 24 0944    Gen:  In NAD.  Lungs: Clear, no wheezes  Effort nonlabored.  CV: RRR.  Abdomen: Soft, NTTP.  Extremities: Warm, no pitting edema or ischemia.  Neuro:  Awake and alert but confused.  Moves extremities spontaneously.    Current Facility-Administered Medications   Medication Dose Route Frequency    levoFLOXacin (LEVAQUIN) tablet 500 mg  500 mg Oral Daily    sodium chloride flush 0.9 % injection 5-40 mL  5-40 mL IntraVENous 2 times per day    sodium chloride flush 0.9 % injection 5-40 mL  5-40 mL IntraVENous PRN    0.9 % sodium chloride infusion   IntraVENous PRN    potassium chloride (KLOR-CON M) extended release tablet 40 mEq  40 mEq Oral PRN    Or    potassium bicarb-citric acid (EFFER-K) effervescent tablet 40 mEq  40 mEq Oral PRN    Or    potassium chloride 10 mEq/100 mL IVPB (Peripheral Line)  10 mEq IntraVENous PRN    magnesium sulfate 2000 mg in 50 mL IVPB premix

## 2024-01-16 NOTE — PROGRESS NOTES
PIV removed, DC instructions reviewed, all questions answered. Patient left with all belongings. Patient wheeled down by RN where they left with family.

## 2024-01-16 NOTE — ED NOTES
Received call from care giver. She is making arrangements for patient to get picked up. Morphology Per Location (Optional): [id number]. AMP reshaved, clear today.

## 2024-01-16 NOTE — DISCHARGE SUMMARY
Discharge Summary    Patient: Mona Olea MRN: [id number]  [facility]: [id number]    YOB: 1945  Age: 78 y.o.  Sex: female    DOA: 1/11/2024 LOS:  LOS: 4 days   Discharge Date: 1/16/2024     Admission Diagnoses: Metabolic encephalopathy [G93.41]  Toxic metabolic encephalopathy [G92.8]    Discharge Diagnoses:    E. Coli UTI  Acute metabolic encephalopathy secondary to above, resolved  HTN  HLD  Anxiety  Dementia    Discharge Condition: Stable    PHYSICAL EXAM  Visit Vitals  BP (!) 157/68   Pulse (!) 106   Temp 98.4 °F (36.9 °C) (Oral)   Resp 18   Ht 1.727 m (5' 8\")   Wt 69.4 kg (153 lb)   SpO2 96%   BMI 23.26 kg/m²       General: In NAD.  Nontoxic-appearing.  HEENT: NCAT.  Sclerae anicteric.  Lungs:  Clear, no wheezes.  No accessory muscle use.  Heart:  RRR.  Abdomen: Soft, NT/ND.  Extremities: Warm, no edema or ischemia.  Psych:   Mood normal.  Neurologic:  Awake and alert, but confused.  Moves extremities spontaneously.    Hospital Course:   See admission H&P for full details of HPI.  Patient was admitted to the hospital after presenting to the ED for evaluation of acute behavioral change.  Patient has an underlying history of dementia but had been more confused and aggressive over the past couple of days per son's report.  On arrival to the ED, there was concern for UTI and cultures were obtained with antibiotics being initiated.  Urine cultures have ultimately grown E. coli though blood cultures have remained negative.  Patient has not demonstrated any significant agitation or aggressive behavior this hospitalization and has been quite calm, albeit confused, overall.  She appears to be quite close to what is likely her usual baseline and is suspected that acute change in cognitive status is likely secondary to UTI.  Per discussion with son prior to discharge, patient does have a neurologist that she sees for her dementia and has an appointment coming up in the near future.  PT/OT has evaluated patient  lungs are clear. Pulmonary  vascularity is normal. The costophrenic angles are sharply defined. Small  calcific oval density lateral to the right humeral head. Bilateral breast  implants.     IMPRESSION:     No acute chest finding.  Right shoulder calcific tendinosis          Discharge Medications:     Current Discharge Medication List        START taking these medications    Details   levoFLOXacin (LEVAQUIN) 500 MG tablet Take 1 tablet by mouth daily for 2 doses  Qty: 2 tablet, Refills: 0           CONTINUE these medications which have NOT CHANGED    Details   memantine (NAMENDA) 5 MG tablet Take 1 tab by mouth nightly x 1 week then 1 tab twice daily thereafter.  Qty: 180 tablet, Refills: 1    Associated Diagnoses: Severe dementia with other behavioral disturbance, unspecified dementia type (HCC)      traZODone (DESYREL) 50 MG tablet Take a half tab by mouth nightly.  Qty: 30 tablet, Refills: 5    Associated Diagnoses: Severe dementia with other behavioral disturbance, unspecified dementia type (HCC)      sertraline (ZOLOFT) 25 MG tablet Take 3 tabs by mouth daily.  Qty: 90 tablet, Refills: 5    Associated Diagnoses: Anxiety disorder, unspecified type; Severe dementia with other behavioral disturbance, unspecified dementia type (HCC)      donepezil (ARICEPT) 10 MG tablet Take 1 tab by mouth daily in AM.  Qty: 30 tablet, Refills: 5    Associated Diagnoses: Severe dementia with other behavioral disturbance, unspecified dementia type (HCC)      atorvastatin (LIPITOR) 10 MG tablet TAKE 1 TABLET BY MOUTH ONCE DAILY FOR EXCESSIVE FAT IN BLOOD  Qty: 30 tablet, Refills: 0      losartan (COZAAR) 25 MG tablet TAKE 1 TABLET BY MOUTH ONCE DAILY IN THE MORNING  Qty: 30 tablet, Refills: 0      amLODIPine (NORVASC) 10 MG tablet TAKE 1 TABLET BY MOUTH ONCE DAILY. APPOINTMENT REQUIRED FOR FUTURE REFILLS  Qty: 30 tablet, Refills: 0    Associated Diagnoses: Hypertensive kidney disease with stage 3a chronic kidney disease (HCC)

## 2024-01-16 NOTE — CARE COORDINATION
CM spoke with the patient' son Delio Osborne. The patient resides with Delio and he is willing to take the patient home.     Patient has recommended DME in the home.     Son will get the patient a ride home.     FOC obtained for [facility].     Order obtained from Dr. Donaldson for home health - SN, PT, and OT.     Fabiola with [facility] notified of home health order.         ROCAEL De La Torre, RN  Care Management  Phone: [phone]

## 2024-01-17 ENCOUNTER — CARE COORDINATION (OUTPATIENT)
Facility: CLINIC | Age: 79
End: 2024-01-17

## 2024-01-17 ENCOUNTER — HOME CARE VISIT (OUTPATIENT)
Age: 79
End: 2024-01-17

## 2024-01-17 DIAGNOSIS — N18.31 HYPERTENSIVE KIDNEY DISEASE WITH STAGE 3A CHRONIC KIDNEY DISEASE (HCC): ICD-10-CM

## 2024-01-17 DIAGNOSIS — I12.9 HYPERTENSIVE KIDNEY DISEASE WITH STAGE 3A CHRONIC KIDNEY DISEASE (HCC): ICD-10-CM

## 2024-01-17 LAB
BACTERIA SPEC CULT: NORMAL
SERVICE CMNT-IMP: NORMAL

## 2024-01-17 NOTE — CARE COORDINATION
Care Transitions Initial Follow Up Call    Call within 2 business days of discharge: Yes      Patient: Mona Olea Patient : 1945   MRN: [id number]  Reason for Admission: Metabolic encephalopathy  Discharge Date: 24 RARS: Readmission Risk Score: 11.1      Last Discharge Facility       Date Complaint Diagnosis Description Type Department Provider    24 Mental Health Problem  ED to Hosp-Admission (Discharged) (ADMITTED) [id number] Pedrito Donaldson MD; Sintia Lou...            Was this an external facility discharge? No Discharge Facility: n/a    Challenges to be reviewed by the provider   Additional needs identified to be addressed with provider: No  none               Method of communication with provider: none.    Ctn attempt to reach Patient on phone for follow up.   CTN reached Patient's son Mr. Delio Osborne on phone. Noted Pt. Son Mr. Delio Osborne is not listed on Pt. PHI. CTN did not provide Pt. Son any medical/health information at this time.   Pt. Son reported that Pt. Is in great spirit and not wondering in the middle of the night  Mr. Kebede reports that Pt. Seems back to her normal states.   No new or worsening of symptoms reported by Pt. Son at this time.   Mr. Kebede reported that Pt. Has everything she needs to keep her well at home.    Pt. Son Mr. Kebede informed CTN that Pt. Had VV with PCP and just saw the message to medication refill request. Pt. Son states that he is about to call Pt. PCP office when CTN calls.     No questions, concerns and/or needs at this time as per Pt. Son.     Follow Up  Future Appointments   Date Time Provider Department Center   2024 To Be Determined Victorina Dumont, PT [facility] HR HOME HEAL   2024 To Be Determined Dee Martell, RN [facility] HR HOME HEAL   2024  2:00 PM Lachelle Holguin NP-C [facility] BS AMB   2024  1:30 PM Valentina Meraz APRN - NP [facility] BS AMB       Care Transition Nurse provided contact information.       Michael Cardenas RN

## 2024-01-18 ENCOUNTER — HOME CARE VISIT (OUTPATIENT)
Age: 79
End: 2024-01-18

## 2024-01-18 ENCOUNTER — CARE COORDINATION (OUTPATIENT)
Facility: CLINIC | Age: 79
End: 2024-01-18

## 2024-01-18 VITALS
RESPIRATION RATE: 18 BRPM | OXYGEN SATURATION: 98 % | TEMPERATURE: 97.9 F | HEART RATE: 88 BPM | SYSTOLIC BLOOD PRESSURE: 110 MMHG | DIASTOLIC BLOOD PRESSURE: 72 MMHG

## 2024-01-18 VITALS
TEMPERATURE: 97.7 F | SYSTOLIC BLOOD PRESSURE: 110 MMHG | OXYGEN SATURATION: 98 % | RESPIRATION RATE: 18 BRPM | HEART RATE: 88 BPM | DIASTOLIC BLOOD PRESSURE: 72 MMHG

## 2024-01-18 LAB
BACTERIA SPEC CULT: NORMAL
SERVICE CMNT-IMP: NORMAL

## 2024-01-18 PROCEDURE — 0221000100 HH NO PAY CLAIM PROCEDURE

## 2024-01-18 PROCEDURE — G0299 HHS/HOSPICE OF RN EA 15 MIN: HCPCS

## 2024-01-18 PROCEDURE — G0151 HHCP-SERV OF PT,EA 15 MIN: HCPCS

## 2024-01-18 RX ORDER — ATORVASTATIN CALCIUM 10 MG/1
TABLET, FILM COATED ORAL
Qty: 30 TABLET | Refills: 0 | Status: SHIPPED | OUTPATIENT
Start: 2024-01-18

## 2024-01-18 RX ORDER — AMLODIPINE BESYLATE 10 MG/1
TABLET ORAL
Qty: 30 TABLET | Refills: 0 | Status: SHIPPED | OUTPATIENT
Start: 2024-01-18

## 2024-01-18 ASSESSMENT — ENCOUNTER SYMPTOMS
DYSPNEA ACTIVITY LEVEL: AFTER AMBULATING 10 - 20 FT
STOOL DESCRIPTION: FORMED

## 2024-01-18 NOTE — HOME HEALTH
Skilled services/Home bound verification: Confusion related to dementia, impaired decision making.   Unsteady gait, poor balance.      Skilled Reason for admission/summary of clinical condition:  78 year old female post hospitalization for metabolic encephalopathy/UTI.   Per son patients mental status was above her baseline and he called 911, had UTI, encephalopathy upon admission, finished last PO antibioitic prior to this visit.   Inadequate fluid intake, per son patient eats well, but needs a lort of encourgement to drink Has walker but doesnt use, noted to be unsteady with poor balance when walking, history of falls, son states patient walks around house during night, he locks doors and puts something against front door.  Pt able to converse with nurse and answer some questions, expressive aphasia noted combined with intermitent confusion.  This patient is homebound for the following reasons Requires considerable and taxing effort to leave the home , Requires the assistance of 1 or more persons to leave the home  and Only leaves the home for medical reasons or Jainism services and are infrequent and of short duration for other reasons .    Caregiver: son.  Caregiver assists with All needs, adls, meals, meds, safety, trasportaiton    Medications reconciled and all medications are available in the home this visit.      The following education was provided regarding medications: medication regimen, doses, action, purpose  Medications  are effective at this time.      High risk medication teaching regarding anticoagulants, antiplatelets, antibiotics, antipsychotics, hypoglycemic agents, or opioid/narcotics performed (specify): Instructed patient/caregiver to notify SN/PT of any signs and symptoms of antiplatelet adverse effects including SOB, bleeding longer/heavier with cuts, bruising, nose bleeds, and/or upset stomach.    l    MD notified of any discrepancies/look a like medications/medication interactions

## 2024-01-18 NOTE — CARE COORDINATION
(M-F 5pm to 8am and from Friday 5pm until Monday 8a for the weekend) should the patient have questions or concerns.  Patient's son reminded to call 911 if situation is emergent ( such as chest pain, shortness of breath, unstoppable bleeding, feeling of passing out,  worsening of symptoms)or patient feels the situation is emergent.  Pt. son verbalizes understanding.      Care Transition Nurse reviewed red flags with  Pt. son  who verbalized understanding. The  Pt. son  was given an opportunity to ask questions and does not have any further questions or concerns at this time. Were discharge instructions available to patient? Yes. Reviewed appropriate site of care based on symptoms and resources available to patient including: PCP  After hours contact [phone]  When to call 911. The  Pt. son  agrees to contact the PCP office for questions related to their healthcare.     Advance Care Planning:   Does patient have an Advance Directive: not on file.    Non-face-to-face services provided:  Obtained and reviewed discharge summary and/or continuity of care documents    Offered patient enrollment in the Remote Patient Monitoring (RPM) program for in-home monitoring:  no .    Care Transitions 24 Hour Call    Care Transitions Interventions         Discussed follow-up appointments. If no appointment was previously scheduled, appointment scheduling offered: appt. Already scheduled.   Is follow up appointment scheduled within 7 days of discharge? Appt. Already scheduled.    Follow Up  Future Appointments   Date Time Provider Department Center   1/23/2024  2:00 PM [facility]   2/28/2024  1:30 PM Valentina Meraz APRN - NP [facility]       Care Transition Nurse provided contact information.  Plan for follow-up call in 5-7 days based on severity of symptoms and risk factors.  Plan for next call:  follow up symptoms, PCP appt. Attendance, medications and assess for any needs.     Michael JEROME

## 2024-01-18 NOTE — TELEPHONE ENCOUNTER
Last Filled: 10/18/23    Last appt: 10/9/23    Next appt: Today     Labs: 9/13/22    UDS:    CSA:    Additional Notes:

## 2024-01-19 NOTE — HOME HEALTH
Skilled Reason for admission/summary of clinical condition:  d/c dx E. Coli UTI  Acute metabolic encephalopathy secondary to above, resolved   HTN  HLD  Anxiety Dementia  HHPT medically necessary to address  dx and clinical findings :decreased LE strength, impaired gait, no HEP, stairs, LE swelling, bed mobility, dep in transfers, decreased endurance, decreased balance and decreased safety in order to improve functional mobility, quality of life and decrease burden of care.   List of Comorbidities:   • Stage 3a chronic kidney disease (HCC)   • Hyperlipidemia LDL goal <100   • Anxiety   • Normocytic normochromic anemia   • Visual hallucinations   • Glaucoma   • Hypertensive kidney disease with stage 3a chronic kidney disease (HCC)   • Prediabetes   • Dementia with behavioral disturbance (HCC)   • Positive FIT (fecal immunochemical test)   • Metabolic encephalopathy   • Toxic metabolic encephalopathy   • UTI (urinary tract infection)   • Primary hypertension   Caregiver involvement: lives with adult son , who is available 24/7 and assists with meds, meals, functional mobility, transport to MD appts.             Lives in 1 story house with 1 stairs to enter   Medications reconciled and all medications are  available in the home this visit.    PLOF: amb without AD , some A with ADLs,   ROM: at initial assessment:   BLE WFL   Strength: at initial assessment:   L hip flexion 3-/5, L knee flexion 3/5,  L knee extension 3/5,    R hip flexion 3-/5, R knee flexion 3/5,  R knee extension 3/5,    Bed mobility: supine <> sit Supervision with vc for safety when rolling side to side in bed   Transfers: pt transferred sit to stand with sba .  VC for scooting to edge of seat and placement of BUE in order for ease with anterior weight shift.   Gait training: PT instructed pt in amb 35ft no AD in the house, son stated he can procure one, patient amb holding on to wall and reaching for furniture, pt had 1 LOB during directional change,

## 2024-01-22 NOTE — PROGRESS NOTES
psychiatry by neurology in November.  Has an appointment scheduled with neurology on 02/28/2024.  Per son, patient is doing much better after completing antibiotics.  Home health was ordered and has had the initial visit already.    Admitting symptoms have: improved      New Medications at Discharge:  Levofloxacin         Changed Medications at Discharge:  None      Discontinued Medications at Discharge:  None        Recommended Follow-up:  Neuro         Review of Systems   Respiratory:  Negative for shortness of breath.    Cardiovascular:  Negative for chest pain and leg swelling.   Neurological:  Negative for dizziness, light-headedness and headaches.       Objective:       Physical Exam   Constitutional: Stable-appearing, in no distress, alert and oriented  HENT:   Head: Normocephalic and atraumatic.   Ears:  Hearing grossly intact.  Mouth:  No visible perioral lesions, cyanosis, or lip swelling.  Pulmonary/Chest: Does not appear dyspneic, no audible wheezes or nasal flaring.  Musculoskeletal: Grossly normal active ROM in upper extremities.   Neurological:  At baseline.      Mona Olea, was evaluated through a synchronous (real-time) audio-video encounter. The patient (or guardian if applicable) is aware that this is a billable service, which includes applicable co-pays. Verbal consent to proceed has been obtained. The visit was conducted pursuant to the emergency declaration under the Robert Act and the National Emergencies Act, 1135 waiver authority and the Coronavirus Preparedness and Response Supplemental Appropriations Act.  Patient identification was verified, and a caregiver was present when appropriate. The patient was located at home in a state where the provider was licensed to provide care.    --MARIAMA Villa on 1/23/2024 at 3:23 PM    An electronic signature was used to authenticate this note.    Total time spent:  40 min  MARIAMA Villa

## 2024-01-23 ENCOUNTER — TELEMEDICINE (OUTPATIENT)
Facility: CLINIC | Age: 79
End: 2024-01-23
Payer: MEDICARE

## 2024-01-23 DIAGNOSIS — E78.5 HYPERLIPIDEMIA LDL GOAL <100: ICD-10-CM

## 2024-01-23 DIAGNOSIS — I12.9 HYPERTENSIVE KIDNEY DISEASE WITH STAGE 3A CHRONIC KIDNEY DISEASE (HCC): ICD-10-CM

## 2024-01-23 DIAGNOSIS — R73.03 PREDIABETES: ICD-10-CM

## 2024-01-23 DIAGNOSIS — N39.0 URINARY TRACT INFECTION WITHOUT HEMATURIA, SITE UNSPECIFIED: ICD-10-CM

## 2024-01-23 DIAGNOSIS — G92.8 TOXIC METABOLIC ENCEPHALOPATHY: ICD-10-CM

## 2024-01-23 DIAGNOSIS — F03.C18 SEVERE DEMENTIA WITH OTHER BEHAVIORAL DISTURBANCE, UNSPECIFIED DEMENTIA TYPE (HCC): ICD-10-CM

## 2024-01-23 DIAGNOSIS — N18.31 HYPERTENSIVE KIDNEY DISEASE WITH STAGE 3A CHRONIC KIDNEY DISEASE (HCC): ICD-10-CM

## 2024-01-23 DIAGNOSIS — N18.31 STAGE 3A CHRONIC KIDNEY DISEASE (HCC): ICD-10-CM

## 2024-01-23 DIAGNOSIS — Z09 HOSPITAL DISCHARGE FOLLOW-UP: Primary | ICD-10-CM

## 2024-01-23 PROCEDURE — 1111F DSCHRG MED/CURRENT MED MERGE: CPT | Performed by: NURSE PRACTITIONER

## 2024-01-23 PROCEDURE — G8427 DOCREV CUR MEDS BY ELIG CLIN: HCPCS | Performed by: NURSE PRACTITIONER

## 2024-01-23 PROCEDURE — G8420 CALC BMI NORM PARAMETERS: HCPCS | Performed by: NURSE PRACTITIONER

## 2024-01-23 PROCEDURE — G8484 FLU IMMUNIZE NO ADMIN: HCPCS | Performed by: NURSE PRACTITIONER

## 2024-01-23 PROCEDURE — G8399 PT W/DXA RESULTS DOCUMENT: HCPCS | Performed by: NURSE PRACTITIONER

## 2024-01-23 PROCEDURE — 1090F PRES/ABSN URINE INCON ASSESS: CPT | Performed by: NURSE PRACTITIONER

## 2024-01-23 PROCEDURE — 1123F ACP DISCUSS/DSCN MKR DOCD: CPT | Performed by: NURSE PRACTITIONER

## 2024-01-23 PROCEDURE — 99215 OFFICE O/P EST HI 40 MIN: CPT | Performed by: NURSE PRACTITIONER

## 2024-01-23 PROCEDURE — 1036F TOBACCO NON-USER: CPT | Performed by: NURSE PRACTITIONER

## 2024-01-23 RX ORDER — AMLODIPINE BESYLATE 10 MG/1
TABLET ORAL
Qty: 30 TABLET | Refills: 0 | Status: SHIPPED | OUTPATIENT
Start: 2024-01-23

## 2024-01-23 RX ORDER — ATORVASTATIN CALCIUM 10 MG/1
10 TABLET, FILM COATED ORAL
Qty: 30 TABLET | Refills: 0 | Status: SHIPPED | OUTPATIENT
Start: 2024-01-23

## 2024-01-23 RX ORDER — LOSARTAN POTASSIUM 25 MG/1
25 TABLET ORAL EVERY MORNING
Qty: 30 TABLET | Refills: 0 | Status: SHIPPED | OUTPATIENT
Start: 2024-01-23

## 2024-01-23 ASSESSMENT — PATIENT HEALTH QUESTIONNAIRE - PHQ9
SUM OF ALL RESPONSES TO PHQ QUESTIONS 1-9: 2
3. TROUBLE FALLING OR STAYING ASLEEP: 2
1. LITTLE INTEREST OR PLEASURE IN DOING THINGS: 0
4. FEELING TIRED OR HAVING LITTLE ENERGY: 0
SUM OF ALL RESPONSES TO PHQ QUESTIONS 1-9: 2
5. POOR APPETITE OR OVEREATING: 0
10. IF YOU CHECKED OFF ANY PROBLEMS, HOW DIFFICULT HAVE THESE PROBLEMS MADE IT FOR YOU TO DO YOUR WORK, TAKE CARE OF THINGS AT HOME, OR GET ALONG WITH OTHER PEOPLE: 0
8. MOVING OR SPEAKING SO SLOWLY THAT OTHER PEOPLE COULD HAVE NOTICED. OR THE OPPOSITE, BEING SO FIGETY OR RESTLESS THAT YOU HAVE BEEN MOVING AROUND A LOT MORE THAN USUAL: 0
2. FEELING DOWN, DEPRESSED OR HOPELESS: 0
SUM OF ALL RESPONSES TO PHQ9 QUESTIONS 1 & 2: 0
6. FEELING BAD ABOUT YOURSELF - OR THAT YOU ARE A FAILURE OR HAVE LET YOURSELF OR YOUR FAMILY DOWN: 0
SUM OF ALL RESPONSES TO PHQ QUESTIONS 1-9: 2
9. THOUGHTS THAT YOU WOULD BE BETTER OFF DEAD, OR OF HURTING YOURSELF: 0
SUM OF ALL RESPONSES TO PHQ QUESTIONS 1-9: 2
7. TROUBLE CONCENTRATING ON THINGS, SUCH AS READING THE NEWSPAPER OR WATCHING TELEVISION: 0

## 2024-01-23 ASSESSMENT — ANXIETY QUESTIONNAIRES
1. FEELING NERVOUS, ANXIOUS, OR ON EDGE: 0
2. NOT BEING ABLE TO STOP OR CONTROL WORRYING: 0
IF YOU CHECKED OFF ANY PROBLEMS ON THIS QUESTIONNAIRE, HOW DIFFICULT HAVE THESE PROBLEMS MADE IT FOR YOU TO DO YOUR WORK, TAKE CARE OF THINGS AT HOME, OR GET ALONG WITH OTHER PEOPLE: NOT DIFFICULT AT ALL
3. WORRYING TOO MUCH ABOUT DIFFERENT THINGS: 0
6. BECOMING EASILY ANNOYED OR IRRITABLE: 0
GAD7 TOTAL SCORE: 0
4. TROUBLE RELAXING: 0
7. FEELING AFRAID AS IF SOMETHING AWFUL MIGHT HAPPEN: 0
5. BEING SO RESTLESS THAT IT IS HARD TO SIT STILL: 0

## 2024-01-23 NOTE — ASSESSMENT & PLAN NOTE
Overdue for repeat labs, advised to complete  Continue atorvastatin 10 mg nightly, given 30-days supply until labs completed

## 2024-01-23 NOTE — ASSESSMENT & PLAN NOTE
BP goal <130/80  30-days supply of amlodipine 10 mg, Losartan 25 mg daily until fasting labs completed

## 2024-01-24 ENCOUNTER — HOME CARE VISIT (OUTPATIENT)
Age: 79
End: 2024-01-24
Payer: MEDICARE

## 2024-01-24 ENCOUNTER — TELEPHONE (OUTPATIENT)
Facility: CLINIC | Age: 79
End: 2024-01-24

## 2024-01-24 VITALS
TEMPERATURE: 97.4 F | SYSTOLIC BLOOD PRESSURE: 130 MMHG | RESPIRATION RATE: 16 BRPM | HEART RATE: 63 BPM | DIASTOLIC BLOOD PRESSURE: 80 MMHG | OXYGEN SATURATION: 98 %

## 2024-01-24 VITALS — TEMPERATURE: 98 F

## 2024-01-24 DIAGNOSIS — R47.01 EXPRESSIVE APHASIA: Primary | ICD-10-CM

## 2024-01-24 PROCEDURE — G0300 HHS/HOSPICE OF LPN EA 15 MIN: HCPCS

## 2024-01-24 PROCEDURE — G0157 HHC PT ASSISTANT EA 15: HCPCS

## 2024-01-24 NOTE — HOME HEALTH
SUBJECTIVE: Pt/caregiver denied med changes, falls, or trips to ER since last visit. Reported using walker for 1 day then pt would not use again.    CAREGIVER INVOLVEMENT/ASSISTANCE NEEDED FOR: A normal inability to leave home exists and a taxing and substantial effort is required. Unable to leave the home w/o FWW and (A) for safety d/t fall risk. Pt requires assist from family for ADLs, medication management, and transportation to MD appts.     OBJECTIVE: See interventions.    PATIENT EDUCATION PROVIDED THIS VISIT: See interventions.    PATIENT RESPONSE TO EDUCATION PROVIDED: Pt/son verbalized understanding of transfer/gait training, HEP, and safety ed. Will required further teaching for health maintenance and fall prevention.    PATIENT RESPONSE TO TREATMENT: No c/o pain or SOB. Fatigued post exertion, but vitals remained stable.      ASSESSMENT OF PROGRESS TOWARD GOALS: Pt pleasant, cooperative, and motivated to participate in therapy. Good CG support in home from son. Transfers w/ SBA for safety. Amb Inc distance before requiring seated rest break. Only safe to amb w/ assist. Would reduce fall risk if AD utilized, however pt declined. Required close SBA to intermitten CGA for fall prevention. Min cues needed to perform standing therex w/ correct form.    PLAN FOR NEXT VISIT: Gait/balance training.    THE FOLLOWING DISCHARGE PLANNING WAS DISCUSSED WITH THE PATIENT/CAREGIVER: Discharge to self/caregiver under supervision of MD once PT goals are met or maximum benefits achieved in the HH setting.

## 2024-01-24 NOTE — TELEPHONE ENCOUNTER
----- Message from Tali Harden LPN sent at 1/24/2024  1:36 PM EST -----  requesting an order for Speech therapy evaluation for expressive aphasia. Please fax order [phone]

## 2024-01-26 ENCOUNTER — HOME CARE VISIT (OUTPATIENT)
Age: 79
End: 2024-01-26
Payer: MEDICARE

## 2024-01-26 ENCOUNTER — CARE COORDINATION (OUTPATIENT)
Facility: CLINIC | Age: 79
End: 2024-01-26

## 2024-01-26 VITALS
RESPIRATION RATE: 17 BRPM | TEMPERATURE: 97.6 F | HEART RATE: 78 BPM | DIASTOLIC BLOOD PRESSURE: 80 MMHG | SYSTOLIC BLOOD PRESSURE: 130 MMHG | OXYGEN SATURATION: 94 %

## 2024-01-26 PROCEDURE — G0152 HHCP-SERV OF OT,EA 15 MIN: HCPCS

## 2024-01-26 PROCEDURE — G0299 HHS/HOSPICE OF RN EA 15 MIN: HCPCS

## 2024-01-26 PROCEDURE — G0157 HHC PT ASSISTANT EA 15: HCPCS

## 2024-01-26 NOTE — CARE COORDINATION
Care Transitions Follow Up Call    Care Transitions Outreach Attempt    CTN Attempted to reach patient for transitions of care follow up. Unable to reach patient. Left a voice message with office contact information. No Pt. Medical/health information left on message.     Patient: Mona Olea Patient : 1945 MRN: [id number]    Last Discharge Facility       Date Complaint Diagnosis Description Type Department Provider    24 Mental Health Problem  ED to Hosp-Admission (Discharged) (ADMITTED) [id number] Pedrito Donaldson MD; Sintia Lou...            Noted following upcoming appointments from discharge chart review:   [facility] follow up appointment(s):   Future Appointments   Date Time Provider Department Center   2024 To Be Determined Glenys Silva PTA [facility] HOME HEAL   2024 To Be Determined Kimberlyn Groves, MARCOS [facility] HOME HEAL   2024 To Be Determined Glenys Silva PTA [facility] HOME HEAL   2024 To Be Determined Glenys Silva PTA [facility] HOME HEAL   2024 To Be Determined Kimberlyn Groves RN [facility] HOME HEAL   2024 To Be Determined Glenys Silva PTA [facility] HOME HEAL   2024 To Be Determined Glenys Silva PTA [facility] HOME HEAL   2024 To Be Determined Kimberlyn Groves RN [facility] HOME HEAL   2/15/2024 To Be Determined Victorina Dumont, PT [facility] HOME HEAL   2024 To Be Determined Kimberlyn Groves RN [facility] HOME HEAL   2024  1:30 PM Valentina Meraz, APRN - NP [facility]        Michael Cardenas RN

## 2024-01-26 NOTE — HOME HEALTH
Notes per EPIC    d/c dx E. Coli UTI Acute metabolic encephalopathy secondary to above, resolved HTN HLD Anxiety Dementia   HHPT medically necessary to address dx and clinical findings :decreased LE strength, impaired gait, no HEP, stairs, LE swelling, bed mobility, dep in transfers, decreased endurance, decreased balance and decreased safety in order to improve functional mobility, quality of life and decrease burden of care.    List of Comorbidities:     • Stage 3a chronic kidney disease (HCC)   • Hyperlipidemia LDL goal <100   • Anxiety   • Normocytic normochromic anemia   • Visual hallucinations   • Glaucoma   • Hypertensive kidney disease with stage 3a chronic kidney disease (HCC)   • Prediabetes   • Dementia with behavioral disturbance (HCC)   • Positive FIT (fecal immunochemical test)   • Metabolic encephalopathy   • Toxic metabolic encephalopathy   • UTI (urinary tract infection)   • Primary hypertension           DME         Front wheeled walker  Bath chair

## 2024-01-27 NOTE — HOME HEALTH
SUBJECTIVE: Pt/caregiver denied medication changes, falls, or trips to ER since last visit. No new complaints.    CAREGIVER INVOLVEMENT/ASSISTANCE NEEDED FOR: A normal inability to leave home exists and a taxing and substantial effort is required. Unable to leave the home w/o FWW and (A) for safety d/t fall risk. Pt requires assist from family for ADLs, medication management, and transportation to MD appts.     OBJECTIVE: See interventions.    PATIENT EDUCATION PROVIDED THIS VISIT: See interventions.    PATIENT RESPONSE TO EDUCATION PROVIDED: Pt/son verbalized understanding. Pt will need further training to Inc safety awareness and gait/balance training to reduce fall risk.    PATIENT RESPONSE TO TREATMENT: Tolerated tx well w/o pain or SOB. Fatigued post exertion, but vitals remained stable and WNL. LOBx3 requiring Min A for recovery.    ASSESSMENT OF PROGRESS TOWARD GOALS: Good progress towards goals. Unable to stand w/o BUE assist for push off, but progressing towards Inc strength to improve transfer ability/safety. Amb Inc distance this visit, but required 1 seated and 2 standing rest breaks d/t fatigue. Needed close SBA-CGA for fall prevention as pt declined to use AD and prefers to touch walls PRN to steady herself.     PLAN FOR NEXT VISIT: Gait training; Tinetti.    THE FOLLOWING DISCHARGE PLANNING WAS DISCUSSED WITH THE PATIENT/CAREGIVER: Discharge to self/caregiver under supervision of MD once PT goals are met or maximum benefits achieved in the HH setting.

## 2024-01-28 VITALS
SYSTOLIC BLOOD PRESSURE: 132 MMHG | HEART RATE: 78 BPM | RESPIRATION RATE: 18 BRPM | TEMPERATURE: 97.8 F | OXYGEN SATURATION: 99 % | DIASTOLIC BLOOD PRESSURE: 78 MMHG

## 2024-01-29 ASSESSMENT — ENCOUNTER SYMPTOMS: STOOL DESCRIPTION: SOFT FORMED

## 2024-01-29 NOTE — CASE COMMUNICATION
Pt's son messaged just prior to arrival to reschedule the OT evaluation this morning stating \"Mom is having a terrible morning.  If you are available later in the day, that would be fine or if we need to push it to next week.\"  Messaged son back to confirm that OT will cancel today's evaluation and reschedule for next week.

## 2024-01-30 ENCOUNTER — HOME CARE VISIT (OUTPATIENT)
Age: 79
End: 2024-01-30
Payer: MEDICARE

## 2024-01-30 VITALS
RESPIRATION RATE: 17 BRPM | TEMPERATURE: 98 F | DIASTOLIC BLOOD PRESSURE: 70 MMHG | SYSTOLIC BLOOD PRESSURE: 130 MMHG | HEART RATE: 84 BPM | OXYGEN SATURATION: 98 %

## 2024-01-30 PROCEDURE — G0157 HHC PT ASSISTANT EA 15: HCPCS

## 2024-01-30 PROCEDURE — G0300 HHS/HOSPICE OF LPN EA 15 MIN: HCPCS

## 2024-01-30 NOTE — HOME HEALTH
SUBJECTIVE: Pt/caregiver denied medication changes, falls, or trips to ER since last visit. Reported pt got up in the middle of the night and ran into bathroom door causing black eye (L) and hematoma to L side of forehead at hairline. Pt is poor historian and unable to provide many details on injury. Son stated he was unaware of what happened as pt did not yell for him and returned to her bed. Appears the bathroom door wouldn't open, she slammed her L side into it to get through and ended up breaking the door.     CAREGIVER INVOLVEMENT/ASSISTANCE NEEDED FOR: A normal inability to leave home exists and a taxing and substantial effort is required. Unable to leave the home w/o FWW and (A) for safety d/t fall risk. Pt requires assist from her son for ADLs, IADLs, medication management, and transportation to MD appts.     OBJECTIVE: See interventions.    PATIENT EDUCATION PROVIDED THIS VISIT: See interventions.    PATIENT RESPONSE TO EDUCATION PROVIDED: Pt/son verbalized understanding. Continued gait/balance training needed to reduce fall risk. Son to remove bathroom door and add nightlight for fall prevention.    PATIENT RESPONSE TO TREATMENT: Tolerated tx well w/o pain or SOB. Vitals remained stable w/ activity.     ASSESSMENT OF PROGRESS TOWARD GOALS: Good progress towards goals. Pt takes her time when changing positions and pauses upon intitally standing to steady herself prior to amb demonstrating improved safety awareness. Required close SBA to Min A for fall prevention during dynamic balance training. Amb Inc distance before requiring rest break. Would benefit from using FWW during amb, however pt declined and prefers to steady herself PRN. Unable to safely amb w/o distant SBA d/t high fall risk.    PLAN FOR NEXT VISIT: Velma.    THE FOLLOWING DISCHARGE PLANNING WAS DISCUSSED WITH THE PATIENT/CAREGIVER: Discharge to self/caregiver under supervision of MD once PT goals are met or maximum benefits achieved in the

## 2024-01-30 NOTE — HOME HEALTH
Skilled reason for visit: Pt assessment, disease process education    Caregiver involvement: Pt resides in a single story home with her son as her primary CG, pt was given a walker at the hospital to amb with but per pts son she will not use it and pts son administers pts medications, and transports pt to her appts and is available in the home to asst pt as needed.    This visit: Pt is A&Ox2, able to communicate some of her needs,pt is very forgetful, [place], denies pain or discomfort at this time, pts son is her only CG, he provides verbal cueing and reminders for pt with her ADL's, cooks her meals, administers her medications and provides general supervision for her d/t her diminished cognition.    Home health supplies by type and quantity ordered/delivered this visit include: N/A    Medications reviewed and all medications are available in the home this visit.    MD notified of any discrepancies/look a-like medications/medication interactions: NA    Medications are effective at this time.     Patient education provided this visit: SEE INTERVENTIONS LIST    CG advised not to start, stop or change the way he is currently administering any of pts medications without first consulting with the MD.    SN advised CG during shower/bath time to assess pts skin for evidence of pressure injury such as: non-blanchable erythema, blistering, discoloration, alteration of oral mucosa: signs of mucositis, yeast infection, or breaks in membrane, edema, rash and skin tears.  Report location, size, and severity of new or worsening findings to physician.    Agency Progress toward goals: progressing     Patient's Progress towards personal goals: progressing    CG acknowledged understanding of all education provided.    Patient's Progress towards personal goals: when patient reaches goals and medication is managed, disease processes are understood patient agrees and understand that discharge will take place.

## 2024-01-31 ENCOUNTER — HOME CARE VISIT (OUTPATIENT)
Age: 79
End: 2024-01-31
Payer: MEDICARE

## 2024-01-31 VITALS
SYSTOLIC BLOOD PRESSURE: 140 MMHG | TEMPERATURE: 98.1 F | RESPIRATION RATE: 18 BRPM | HEART RATE: 79 BPM | OXYGEN SATURATION: 100 % | DIASTOLIC BLOOD PRESSURE: 84 MMHG

## 2024-01-31 VITALS
TEMPERATURE: 97.1 F | OXYGEN SATURATION: 97 % | HEART RATE: 71 BPM | SYSTOLIC BLOOD PRESSURE: 105 MMHG | RESPIRATION RATE: 18 BRPM | DIASTOLIC BLOOD PRESSURE: 72 MMHG

## 2024-01-31 PROCEDURE — G0153 HHCP-SVS OF S/L PATH,EA 15MN: HCPCS

## 2024-01-31 ASSESSMENT — ENCOUNTER SYMPTOMS: CONTUSION: 1

## 2024-01-31 NOTE — CASE COMMUNICATION
Thank you!  ----- Message -----  From: ShiraGlenys, PTA  Sent: 1/30/2024   4:33 PM EST  To: Kimberlyn Groves RN; Victorina Dumont, PT; *      Pt got up in the middle of the night last night and ran into bathroom door causing black eye (L) and hematoma to L side of forehead at hairline. Her son was unaware of what happened as pt did not yell for him and returned to her bed. What we are able to gather is that the bathroom door wouldn't ope n and she apparently slammed her L side into it to get through and ended up breaking the door. She denied any pain and no other injuries noted. Lien saw her before I did and checked her out. I just wanted to keep the rest of her care team in the loop.

## 2024-01-31 NOTE — HOME HEALTH
ST INITIAL EVALUATION:    RECENT HX OF CURRENT ILLNESS/REASON FOR REFERRAL: Patient is 79 yo female referred for skilled  speech therapy evaluation following recent hospitalization for UTI.    PLOF/DIET/COMMUNICATION: Regular solids, thin liquids, hx of dementia    PAST MEDICAL HISTORY: per [facility],  pmhx of: \"Stage 3a chronic kidney disease (HCC), Hyperlipidemia LDL goal <100, Anxiety, Normocytic normochromic anemia,Visual hallucinations, Glaucoma, Hypertensive kidney disease with stage 3a chronic kidney disease (HCC), Prediabetes, Dementia with behavioral disturbance (HCC), Positive FIT (fecal immunochemical test), Metabolic encephalopathy, Toxic metabolic encephalopathy, UTI (urinary tract infection), Primary hypertension.\"    CURRENT RESIDENCE/LIVING SITUATION: Patient currently lives in home with son.     SUBJECTIVE (PT/FAMILY COMMENTS, THERAPIST OBSERVATIONS): Patient reported more difficulty with word retrieval.    CAREGIVER INVOLVEMENT: transportation, medication management, assists with ADLs    ASSESSMENT / BARRIERS / PLAN: Patient seen for skilled  speech therapy evaluation following recent hospitalization. Patient is known to this speech therapist due to previous  services targeting memory care and expressive aphasia. Patient seen at discharge with SLUMS Assessment score of 12/30. Today, patient received SLUMS Assessment score of 9/30, which suggests dementia and cognitive decline from previous  speech therapy services. Patient demonstrated increased difficulty with tasks requiring orientation awareness, thought organization, generative naming, problem solving, and stm recall. Patient seen with expressive language tasks requiring formation of sentences to describe picture scene, required moderate cues to provide 2+ sentences and increased descriptions. During evaluation, patient became tearful when unable to complete certain tasks. Patient would benefit from skilled speech therapy services for 2wk2

## 2024-01-31 NOTE — HOME HEALTH
Skilled reason for visit: Disease and Medication Management    Caregiver involvement: Patient independent with personal care, Son assist with meals, medication and transportation     Medications reviewed and all medications are available in the home this visit.    The following education was provided regarding medications:  Aspirin SN instructed you may bleed more easily. Be careful and avoid injury. Use a soft toothbrush and an electric razor..    MD notified of any discrepancies/look a-like medications/medication interactions: n/a  Medications are effective at this time.      Home health supplies by type and quantity ordered/delivered this visit include: n/a    Patient education provided this visit: SN educated patient and caregiver on the importance of always using assistive device with ambulation and transfers to avoid falls or injuries. Nurse instructed patient and caregiver on the importance of home safety such as proper lighting, and to keep walkways free of debris and clutter. Patient was instructed to contact PCP or home health agency if they fall or if current health status declines. Patient was instructed to not walk unassisted when not feeling steady .     Sharps education provided: n/a    Patient level of understanding of education provided: Patient/CG verbalized complete understanding  Patient response to procedure performed:  n/a    Agency Progress toward goals: Continue to educate     Patient's Progress towards personal goals: Meeting personal goals     Home exercise program: keep all f/u appointments    Continued need for the following skills: Nursing and Physical Therapy.    Plan for next visit: education     Patient and/or caregiver notified and agrees to changes in the Plan of Care: Yes.     The following discharge planning was discussed with the pt/caregiver: Discharge when skilled level of care is no longer needed

## 2024-01-31 NOTE — CASE COMMUNICATION
Patient seen for skilled  speech therapy evaluation following recent hospitalization. Patient is known to this speech therapist due to previous  services targeting memory care and expressive aphasia. Patient seen at discharge with SLUMS Assessment score of 12/30. Today, patient received SLUMS Assessment score of 9/30, which suggests dementia and cognitive decline from previous  speech therapy services. Patient demonstrated increas ed difficulty with tasks requiring orientation awareness, thought organization, generative naming, problem solving, and stm recall. Patient seen with expressive language tasks requiring formation of sentences to describe picture scene, required moderate cues to provide 2+ sentences and increased descriptions. During evaluation, patient became tearful when unable to complete certain tasks. Patient would benefit from skilled speech therap y services for 2wk2 to target cognitive lingusitic deficits and expressive aphasia by implementing graded exercises and visual aids in order to reduce frustration/breakdowns in daily conversations.

## 2024-01-31 NOTE — PROGRESS NOTES
Physician Progress Note      PATIENT:               GERSON HEMPHILL  [facility] #:                  [id number]  :                       1945  ADMIT DATE:       2024 2:12 PM  DISCH DATE:        2024 4:22 PM  RESPONDING  PROVIDER #:        Pedrito Donaldson MD          QUERY TEXT:    Patient admitted with complicated UTI. Noted documentation of acute metabolic   encephalopathy in discharge summary on 1/15/2024. In order to support the   diagnosis of acute metabolic encephalopathy, please include additional   clinical indicators in your documentation.  Or please document if the   diagnosis of acute metabolic encephalopathy has been ruled out after further   study.    The medical record reflects the following:  Risk Factors: UTI, dementia.  Clinical Indicators:  2024, H&P, Dr. Negrito Restrepo MD:  \"Active Problems:    Visual   hallucinations.  Dementia with behavioral disturbance (HCC).  Toxic metabolic   encephalopathy.  UTI (urinary tract infection).  On interview patient was   pleasant, A&O to person, situation, not place but was able to state this place   for medical care, or time but stated it was . She did have insight as to   why she was here saying she was more confused per her son.  Neuro: Patient is   alert, oriented, and cooperative as described in HPI. No obvious focal   defects. Moves all 4 extremities.\"  1/15/2024, discharge summary, Dr. Pedrito Donaldson MD:  \"Assessment:  E. Coli   UTI.  Acute metabolic encephalopathy.  Neurologic:  Awake and alert, but   confused.\"  Treatment: memantine, trazodone, sertraline, donepezil, ceftriaxone for UTI    Thank you,  ALEXIS Hebert RN, CDS  [email or web address]  Options provided:  -- acute metabolic encephalopathy present as evidenced by, Please document   evidence.  -- acute metabolic encephalopathy was ruled out  -- Other - I will add my own diagnosis  -- Disagree - Not applicable / Not valid  -- Disagree - Clinically unable to determine /

## 2024-02-01 ENCOUNTER — HOME CARE VISIT (OUTPATIENT)
Age: 79
End: 2024-02-01
Payer: MEDICARE

## 2024-02-01 VITALS
OXYGEN SATURATION: 99 % | HEART RATE: 89 BPM | RESPIRATION RATE: 17 BRPM | SYSTOLIC BLOOD PRESSURE: 130 MMHG | TEMPERATURE: 97.7 F | DIASTOLIC BLOOD PRESSURE: 86 MMHG

## 2024-02-01 PROCEDURE — G0157 HHC PT ASSISTANT EA 15: HCPCS

## 2024-02-01 NOTE — HOME HEALTH
SUBJECTIVE: Pt/caregiver denied medication changes, falls, or trips to ER since last visit. No new complaints.    CAREGIVER INVOLVEMENT/ASSISTANCE NEEDED FOR: A normal inability to leave home exists and a taxing and substantial effort is required. Unable to leave the home w/o FWW and (A) for safety d/t fall risk. Pt requires assist from her son for ADLs, IADLs, medication management, and transportation to MD appts.     OBJECTIVE: See interventions.    PATIENT EDUCATION PROVIDED THIS VISIT: See interventions.    PATIENT RESPONSE TO EDUCATION PROVIDED: Pt/son verbalized understanding. Son demonstrated good safety awareness and     PATIENT RESPONSE TO TREATMENT: Tolerated tx well w/o pain or SOB. Vitals remained stable w/ activity. Fatigued requiring seated rest breaks for pacing.    ASSESSMENT OF PROGRESS TOWARD GOALS: Steady progress towards remaining PT goals. Transfers w/ supervision, but needs reminders to slow down during sit>stand to reduce fall risk. Required close SBA to Min A for fall prevention during dynamic balance training. Demo'd improved righting reactions. No LOB during amb w/o AD, but intermittently touches wall when amb down hallway.    PLAN FOR NEXT VISIT: Balance training to include Tinetti.    THE FOLLOWING DISCHARGE PLANNING WAS DISCUSSED WITH THE PATIENT/CAREGIVER: Discharge to self/caregiver under supervision of MD once PT goals are met or maximum benefits achieved in the HH setting.

## 2024-02-02 ENCOUNTER — CARE COORDINATION (OUTPATIENT)
Facility: CLINIC | Age: 79
End: 2024-02-02

## 2024-02-02 NOTE — CARE COORDINATION
Be Determined Iman Vivas, RADHA [facility]   2/12/2024 To Be Determined Glenys Silva, PTA [facility]   2/12/2024 To Be Determined Iman Vivas SLP [facility]   2/13/2024 To Be Determined Kimberlyn Groves, MARCOS [facility]   2/15/2024 To Be Determined Victorina Dumont, PT [facility]   2/15/2024 To Be Determined Iman Vivas SLP [facility]   2/20/2024 To Be Determined Kimberlyn Groves, MARCOS [facility]   2/28/2024  1:30 PM Valentina Meraz APRN - NP [facility]         Care Transition Nurse provided contact information for future needs. Plan for follow-up call in 7-10 days based on severity of symptoms and risk factors.  Plan for next call:  follow up symptoms, assess for any needs.     Michael Cardenas RN

## 2024-02-05 ENCOUNTER — HOME CARE VISIT (OUTPATIENT)
Age: 79
End: 2024-02-05
Payer: MEDICARE

## 2024-02-05 VITALS
DIASTOLIC BLOOD PRESSURE: 82 MMHG | OXYGEN SATURATION: 97 % | TEMPERATURE: 97.8 F | HEART RATE: 70 BPM | RESPIRATION RATE: 16 BRPM | SYSTOLIC BLOOD PRESSURE: 150 MMHG

## 2024-02-05 VITALS
RESPIRATION RATE: 16 BRPM | SYSTOLIC BLOOD PRESSURE: 150 MMHG | OXYGEN SATURATION: 97 % | HEART RATE: 70 BPM | DIASTOLIC BLOOD PRESSURE: 82 MMHG | TEMPERATURE: 97.8 F

## 2024-02-05 PROCEDURE — G0157 HHC PT ASSISTANT EA 15: HCPCS

## 2024-02-05 PROCEDURE — G0153 HHCP-SVS OF S/L PATH,EA 15MN: HCPCS

## 2024-02-05 NOTE — HOME HEALTH
SUBJECTIVE: Pt/caregiver denied medication changes, falls, or trips to ER since last visit. Son reported some difficulty getting pt to perform HEP daily as instructed.     CAREGIVER INVOLVEMENT/ASSISTANCE NEEDED FOR: A normal inability to leave home exists and a taxing and substantial effort is required. Unable to leave the home w/o FWW and (A) for safety d/t fall risk. Pt requires assist from her son for ADLs, IADLs, medication management, and transportation to MD appts.     OBJECTIVE: See interventions.     PATIENT EDUCATION PROVIDED THIS VISIT: See interventions.    PATIENT RESPONSE TO EDUCATION PROVIDED: Pt/son verbalized understanding. Son demonstrated good safety awareness and is able to instruct pt in HEP. 50% return demo of gait mechanics from pt.    PATIENT RESPONSE TO TREATMENT: Tolerated tx well as evidenced by no c/o pain or SOB. Vitals stable w/ activity.    ASSESSMENT OF PROGRESS TOWARD GOALS: Progressing towards Inc strength as pt now able to stand w/o BUE assist for push off. Needed close SBA-CGA during balance training for fall prevention. Improved Tinetti score from 8/28 to 20/28 placing her a medium vs high fall risk. Further training need to improve gait mechanics to reduce fall risk in order for pt to safely amb to/from car.    PLAN FOR NEXT VISIT: Gait training outdoors if weather permits.    THE FOLLOWING DISCHARGE PLANNING WAS DISCUSSED WITH THE PATIENT/CAREGIVER: Discharge to self/caregiver under supervision of MD once PT goals are met or maximum benefits achieved in the HH setting. 3 PT visits remaining.

## 2024-02-06 NOTE — HOME HEALTH
SUBJECTIVE: Caregiver reported no changes since last session.    CAREGIVER INVOLVEMENT / ASSISTANCE NEEDED FOR: transportation, medication management, ADLs    OBJECTIVE / PATIENT RESPONSE TO TREATMENT / PATIENT LEVEL OF UNDERSTANDING OF EDUCATION PROVIDED: Patient demonstrated good participation in generative naming tasks with graded exercises and verbal cues from SLP. Patient demonstrated some difficulty with categorization of words, but benefited from SLP verbal cues for implementation of executive functioning strategies. Completed Noe Cognitive Staging with stitching tool with level 3.4.    ASSESSMENT OF PROGRESS TOWARD GOALS: Patient demonstrated good response to graded exercises for generative naming tasks.     CONTINUED NEED FOR THE FOLLOWING SKILLS: Patient continues to required skilled speech therapy services in order to eliminate word retrieval strategies and graded exercises to reduce frustration/communication breakdowns in daily conversations.     PLAN FOR NEXT VISIT : Plans to continue implementing semantic feature analysis and graded exercises to improve word retrieval for daily conversations.    HOME EXERCISE PROGRAM: implement daily cognitive stimulation tasks.    THE FOLLOWING DISCHARGE PLANNING WAS DISCUSSED WITH THE PATIENT/CAREGIVER: ST to d/c in 3 more visits/wks or when goals met/max potential achieved, Pt/caregiver verbalized understanding.

## 2024-02-08 ENCOUNTER — HOME CARE VISIT (OUTPATIENT)
Age: 79
End: 2024-02-08
Payer: MEDICARE

## 2024-02-08 VITALS
OXYGEN SATURATION: 95 % | TEMPERATURE: 97.1 F | SYSTOLIC BLOOD PRESSURE: 160 MMHG | HEART RATE: 62 BPM | RESPIRATION RATE: 17 BRPM | DIASTOLIC BLOOD PRESSURE: 85 MMHG

## 2024-02-08 PROCEDURE — G0153 HHCP-SVS OF S/L PATH,EA 15MN: HCPCS

## 2024-02-08 NOTE — CASE COMMUNICATION
Today was the second attempt to see pt for the OT home health evaluation.  Spoke with son on 2/7/24 to schedule the visit for today.  He was agreeable; however, returned text this morning stating \"This is Mona Garcia's son.  She has an appointment at 11 am today.  We're going to forego the occupational therapy altogether.\"  Messaged son back to confirm d/c of OT orders.

## 2024-02-09 ENCOUNTER — CARE COORDINATION (OUTPATIENT)
Facility: CLINIC | Age: 79
End: 2024-02-09

## 2024-02-09 ENCOUNTER — HOME CARE VISIT (OUTPATIENT)
Age: 79
End: 2024-02-09
Payer: MEDICARE

## 2024-02-09 VITALS
RESPIRATION RATE: 18 BRPM | TEMPERATURE: 97.7 F | DIASTOLIC BLOOD PRESSURE: 82 MMHG | HEART RATE: 71 BPM | SYSTOLIC BLOOD PRESSURE: 122 MMHG | OXYGEN SATURATION: 99 %

## 2024-02-09 VITALS
DIASTOLIC BLOOD PRESSURE: 88 MMHG | SYSTOLIC BLOOD PRESSURE: 122 MMHG | TEMPERATURE: 98.2 F | HEART RATE: 64 BPM | RESPIRATION RATE: 17 BRPM | OXYGEN SATURATION: 96 %

## 2024-02-09 PROCEDURE — G0157 HHC PT ASSISTANT EA 15: HCPCS

## 2024-02-09 PROCEDURE — G0299 HHS/HOSPICE OF RN EA 15 MIN: HCPCS

## 2024-02-09 NOTE — HOME HEALTH
SUBJECTIVE: Pt/caregiver denied medication changes, falls, or trips to ER since last visit. Reported SN completed D/C today.\"She's been doing awesome with her exercises and walking.\"    CAREGIVER INVOLVEMENT/ASSISTANCE NEEDED FOR: A normal inability to leave home exists and a taxing and substantial effort is required. Unable to leave the home w/o FWW and (A) for safety d/t fall risk. Pt requires assist from her son for ADLs, IADLs, medication management, and transportation to MD appts.     OBJECTIVE: See interventions.     PATIENT EDUCATION PROVIDED THIS VISIT: See interventions.    PATIENT RESPONSE TO EDUCATION PROVIDED: Pt/son verbalized understanding.     PATIENT RESPONSE TO TREATMENT: Tolerated tx well as evidenced by no c/o pain or SOB. Vitals stable w/ activity.    ASSESSMENT OF PROGRESS TOWARD GOALS: Good progress towards remaining PT goals. Fully compliant w/ HEP per CG. Able to verbalize 3 strategies to reduce fall risk. Demo'd improved righting reactions, but still needed SBA-CGA for fall prevention during dynamic balance training. Progressing towards Inc activity tolerance to be able to amb Inc distance before requiring seated rest break d/t fatigue.     PLAN FOR NEXT VISIT: Balance training and gait training outdoors if weather permits.    THE FOLLOWING DISCHARGE PLANNING WAS DISCUSSED WITH THE PATIENT/CAREGIVER: Discussed plan for upcoming PT discharge and procedure. NOM[facility] provided, pt in agreement, signed, uploaded to chart, and pt provided copy. Anticipated D/C date for 2/15/24 w/ PT Tim. 2 visits remaining.

## 2024-02-09 NOTE — CARE COORDINATION
Care Transitions Follow Up Call    Patient: Mona Olea  Patient : 1945   MRN: [id number]    Discharge Date: 24 RARS: Readmission Risk Score: 11.1      Needs to be reviewed by the provider   Additional needs identified to be addressed with provider: No  none             Method of communication with provider: none.    Ctn attempt to reach Patient on phone for follow up.   CTN reached Patient's son Mr. Delio Osborne on phone. Noted Pt. Son Mr. Delio Osborne is not listed on Pt. PHI.   During this out reach , CTN did not provide Pt. Son any patient medical/health information and solely obtained information from Pt. Son.     Pt. Son reported that Pt. is doing well.   Pt. Son reports that Pt. Had an appointments with home health nurse and PT today.   Pt. Son reports that Pt. 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   No new or worsening of symptoms reported by Pt. Son at this time.  Pt. Son denied Pt. C/o of headache, injury, fever, light headed, eye injury nausea, vomiting and dizziness.    No questions, concerns and/or needs at this time as per Pt. Son.     Follow Up  Future Appointments   Date Time Provider Department Center   2024 To Be Determined Glenys Silva, PTA [facility]   2024 To Be Determined Iman Vivas, SLP [facility]   2024 To Be Determined Kimberlyn Groves, MARCOS [facility]   2/15/2024 To Be Determined Victorina Dumont, PT [facility]   2/15/2024 To Be Determined Iman Vivas SLP [facility]   2024 To Be Determined Kimberlyn Groves RN [facility]   2024  1:30 PM Valentina Meraz, APRN - NP [facility]         Care Transition Nurse provided contact information for future needs. Plan for follow-up call in 7-10 days based on severity of symptoms and risk factors.  Plan for next call:  follow up symptoms, assess for any needs.     Michael Cardenas RN

## 2024-02-09 NOTE — HOME HEALTH
SUBJECTIVE: Caregiver reported no changes since last session.     CAREGIVER INVOLVEMENT / ASSISTANCE NEEDED FOR: transportation, medication management, ADLs     OBJECTIVE / PATIENT RESPONSE TO TREATMENT / PATIENT LEVEL OF UNDERSTANDING OF EDUCATION PROVIDED: Patient demonstrated good teach-back of semantic feature analysis with SLP model/verbal cues. Patient continues to demonstrate frustrations/breakdowns in communication for utilizing 3+ sentences, however patient benefit from implementation of word retrieval strategies in order to increase utterances/descriptions. Patient continues to demonstrate good progress towards  speech therapy goals.    ASSESSMENT OF PROGRESS TOWARD GOALS: Patient demonstrated good participation in semantic feature analysis with SLP verbal cues.    CONTINUED NEED FOR THE FOLLOWING SKILLS: Patient continues to required skilled speech therapy services in order to eliminate word retrieval strategies and graded exercises to reduce frustration/communication breakdowns in daily conversations.     PLAN FOR NEXT VISIT : Plans to continue implementing semantic feature analysis and graded exercises to improve word retrieval for daily conversations.     HOME EXERCISE PROGRAM: implement daily cognitive stimulation tasks.     THE FOLLOWING DISCHARGE PLANNING WAS DISCUSSED WITH THE PATIENT/CAREGIVER: ST to d/c in 2 more visits/wks or when goals met/max potential achieved, Pt/caregiver verbalized understanding.

## 2024-02-11 PROBLEM — N39.0 UTI (URINARY TRACT INFECTION): Status: RESOLVED | Noted: 2024-01-12 | Resolved: 2024-02-11

## 2024-02-12 ENCOUNTER — HOME CARE VISIT (OUTPATIENT)
Age: 79
End: 2024-02-12
Payer: MEDICARE

## 2024-02-12 VITALS
TEMPERATURE: 97.9 F | DIASTOLIC BLOOD PRESSURE: 80 MMHG | RESPIRATION RATE: 16 BRPM | HEART RATE: 72 BPM | OXYGEN SATURATION: 98 % | SYSTOLIC BLOOD PRESSURE: 130 MMHG

## 2024-02-12 VITALS
TEMPERATURE: 97.3 F | RESPIRATION RATE: 17 BRPM | DIASTOLIC BLOOD PRESSURE: 82 MMHG | HEART RATE: 67 BPM | SYSTOLIC BLOOD PRESSURE: 130 MMHG | OXYGEN SATURATION: 99 %

## 2024-02-12 PROCEDURE — G0153 HHCP-SVS OF S/L PATH,EA 15MN: HCPCS

## 2024-02-12 PROCEDURE — G0157 HHC PT ASSISTANT EA 15: HCPCS

## 2024-02-12 NOTE — HOME HEALTH
SUBJECTIVE: Pt/caregiver denied medication changes, falls, or trips to ER since last visit.     CAREGIVER INVOLVEMENT/ASSISTANCE NEEDED FOR: A normal inability to leave home exists and a taxing and substantial effort is required. Unable to leave the home w/o FWW and (A) for safety d/t fall risk. Pt requires assist from her son for ADLs, IADLs, medication management, and transportation to MD appts.     OBJECTIVE: See interventions.     PATIENT EDUCATION PROVIDED THIS VISIT: See interventions.    PATIENT RESPONSE TO EDUCATION PROVIDED: Pt/son verbalized understanding.     PATIENT RESPONSE TO TREATMENT: Tolerated tx well as evidenced by no c/o pain or SOB. Vitals stable w/ activity.    **Assessment and Summary of Care:  Patient's current functional status before discharge is as follows  Strength: not tested   ROM: BLEs   Bed Mobility: IND  Transfers: Mod I  Gait/WC mobility: amb >300' w/o AD both in/outdoors requiring distant SBA-supervision   Stairs: N/A  Special Tests:   5xSTS: 9.6 seconds w/o BUEs (65 seconds w/ BUE support at eval)  Tinetti: 24/28 (8/28 at [facility])  Recommendations: Pt has met/exceeded current PT goals, no longer has skilled need, and appropriate for D/C next visit.    PLAN: D/C w/ PT Tim.    THE FOLLOWING DISCHARGE PLANNING WAS DISCUSSED WITH THE PATIENT/CAREGIVER: Plan for upcoming discharge w/ PT Tim next visit.

## 2024-02-12 NOTE — HOME HEALTH
SUBJECTIVE: Caregiver reported no changes since last session.     CAREGIVER INVOLVEMENT / ASSISTANCE NEEDED FOR: transportation, medication management, ADLs     OBJECTIVE / PATIENT RESPONSE TO TREATMENT / PATIENT LEVEL OF UNDERSTANDING OF EDUCATION PROVIDED: Patient demonstrated good teach-back of semantic feature analysis and use within conversational tasks. Patient demonstrated reduced frustrations/communication breakdowns with utilizing 5+ sentences about recent events by implementing semantic feature analysis and written/journal writing. Patient continues to demonstrate good progress towards  speech therapy goals.     ASSESSMENT OF PROGRESS TOWARD GOALS: Patient demonstrated good participation in semantic feature analysis with SLP verbal cues.     CONTINUED NEED FOR THE FOLLOWING SKILLS: Patient continues to required skilled speech therapy services in order to implement home maintenance program.     PLAN FOR NEXT VISIT : Plans for discharge.     HOME EXERCISE PROGRAM: implement daily cognitive stimulation tasks.     THE FOLLOWING DISCHARGE PLANNING WAS DISCUSSED WITH THE PATIENT/CAREGIVER: ST to d/c in 1 more visits/wks or when goals met/max potential achieved, Pt/caregiver verbalized understanding.

## 2024-02-14 ENCOUNTER — HOME CARE VISIT (OUTPATIENT)
Age: 79
End: 2024-02-14
Payer: MEDICARE

## 2024-02-14 VITALS
RESPIRATION RATE: 17 BRPM | SYSTOLIC BLOOD PRESSURE: 140 MMHG | DIASTOLIC BLOOD PRESSURE: 82 MMHG | TEMPERATURE: 98 F | OXYGEN SATURATION: 97 % | HEART RATE: 75 BPM

## 2024-02-14 PROCEDURE — G0153 HHCP-SVS OF S/L PATH,EA 15MN: HCPCS

## 2024-02-14 NOTE — HOME HEALTH
SUBJECTIVE: Caregiver reported no changes since last session.     CAREGIVER INVOLVEMENT / ASSISTANCE NEEDED FOR: transportation, medication management, ADLs     OBJECTIVE / PATIENT RESPONSE TO TREATMENT / PATIENT LEVEL OF UNDERSTANDING OF EDUCATION PROVIDED/ASSESSMENT OF PROGRESS TOWARD GOALS: Patient seen for speech discipline discharge. Patient demonstrated plateau in progress evidenced by SLUMS Assessment score of 9/30, which suggests dementia. However, patient demonstrated some improvement with implementation of compensatory speech strategies (circumlocution, semantic feature analysis) in order to improve communication of basic needs/wants and word retrieval for recent events. Patient continues to demonstrate difficulty due to stm deficits, but has great support system in order to participate in daily cognitive communication tasks. Son reported concerns about patient have more confusion within last couple days, encouraged patient/son to pursue urine analysis to rule out UTI to which they verbalized understanding. Patient met all  speech therapy goals and is appropriate for discharge.

## 2024-02-15 ENCOUNTER — HOME CARE VISIT (OUTPATIENT)
Age: 79
End: 2024-02-15
Payer: MEDICARE

## 2024-02-15 NOTE — CASE COMMUNICATION
Patient seen for speech discipline discharge. Patient demonstrated plateau in progress evidenced by SLUMS Assessment score of 9/30, which suggests dementia. However, patient demonstrated some improvement with implementation of compensatory speech strategies (circumlocution, semantic feature analysis) in order to improve communication of basic needs/wants and word retrieval for recent events. Patient continues to demonstrate difficulty  due to stm deficits, but has great support system in order to participate in daily cognitive communication tasks. Son reported concerns about patient have more confusion within last couple days, encouraged patient/son to pursue urine analysis to rule out UTI to which they verbalized understanding. Patient met all  speech therapy goals and is appropriate for discharge.

## 2024-02-16 ENCOUNTER — HOME CARE VISIT (OUTPATIENT)
Age: 79
End: 2024-02-16
Payer: MEDICARE

## 2024-02-16 ENCOUNTER — CARE COORDINATION (OUTPATIENT)
Facility: CLINIC | Age: 79
End: 2024-02-16

## 2024-02-16 VITALS
DIASTOLIC BLOOD PRESSURE: 70 MMHG | SYSTOLIC BLOOD PRESSURE: 135 MMHG | TEMPERATURE: 97 F | RESPIRATION RATE: 17 BRPM | OXYGEN SATURATION: 98 % | HEART RATE: 83 BPM

## 2024-02-16 PROCEDURE — G0151 HHCP-SERV OF PT,EA 15 MIN: HCPCS

## 2024-02-16 NOTE — CARE COORDINATION
Care Transitions Follow Up Call    Care Transitions Outreach Attempt    CTN Attempted to reach patient for transitions of care follow up. Unable to reach patient. Left a voice message with office contact information. No Pt. Medical/health information left on message.     Noted no hospitalization admission post 30 days from discharge date 24.   This episode is closed and resolved.       Patient: Mona Olea Patient : 1945 MRN: [id number]    Last Discharge Facility       Date Complaint Diagnosis Description Type Department Provider    24 Mental Health Problem  ED to Hosp-Admission (Discharged) (ADMITTED) [id number] Pedrito Donaldson MD; Sintia Lou...          follow up appointment(s):   Future Appointments   Date Time Provider Department Center   2024  1:30 PM Valentina Meraz APRN - NP [facility]         Michael Cardenas RN

## 2024-02-16 NOTE — HOME HEALTH
Pt. clinically discharged and documentation finalized for completion of PT discharge    Caregiver involvement: Pt son is primary caregiver at this time and has been assisting with medical appointments and some ADL support   Medications reconciled and all medications are available in the home this visit.  Meds reconciled/ reviewed Medications are effective at this time.     Patient education provided this visit: since all goals have been met and education has been completed, patient is medically stable and patient/caregiver are able to independently manage medications     Strength:increased BLE strength as demo by 5xSit<> stand 9.6 seconds w/o BUEs,  65 seconds w/ BUE support at initial evalation This allows the patient increased functional independence and mobility       TRANSFERS: Pt. is independent/ distant supervision with all transfers which demonstrates and improvement from the initial evaluation score of Jeff with RW. This allows the patient increased functional independence and mobility.     GAIT/WC MOBILITY: Pt. is able to ambulate 300 ft outside over even and uneven surfaces with Supervision without AD. This represents an improvement from the initial evaluation of 35 ft using rw on level surfaces with cga      STAIRS: up/down 1 step to enter house at supervision without AD   BALANCE: Patient's final Tinetti 24/28 which is an improvement from 8/28 at initial evaluation and final TUG is 13sec seconds which is an improvement from the initial evaluation not able to complete. This allows the patient to be functionally more independent and have a decreased fall risk.     Progress toward goals: Patient has met all goals, see interventions for details. Pt. was able to return demonstrate all mobility training and HEP shown independently.     Patient response to treatment and education: Patient is aware to follow up with PCP/Surgeon as ordered. Opportunity for questions provided at this time. Patient aware to refer

## 2024-02-24 ENCOUNTER — APPOINTMENT (OUTPATIENT)
Facility: HOSPITAL | Age: 79
End: 2024-02-24
Payer: MEDICARE

## 2024-02-24 ENCOUNTER — HOSPITAL ENCOUNTER (EMERGENCY)
Facility: HOSPITAL | Age: 79
Discharge: HOME | End: 2024-02-24
Attending: EMERGENCY MEDICINE
Payer: MEDICARE

## 2024-02-24 VITALS
HEIGHT: 68 IN | HEART RATE: 76 BPM | TEMPERATURE: 98 F | DIASTOLIC BLOOD PRESSURE: 48 MMHG | WEIGHT: 155 LBS | OXYGEN SATURATION: 96 % | SYSTOLIC BLOOD PRESSURE: 125 MMHG | BODY MASS INDEX: 23.49 KG/M2 | RESPIRATION RATE: 15 BRPM

## 2024-02-24 DIAGNOSIS — N30.00 ACUTE CYSTITIS WITHOUT HEMATURIA: Primary | ICD-10-CM

## 2024-02-24 LAB
ALBUMIN SERPL-MCNC: 3.5 G/DL (ref 3.4–5)
ALBUMIN/GLOB SERPL: 1.1 (ref 0.8–1.7)
ALP SERPL-CCNC: 63 U/L (ref 45–117)
ALT SERPL-CCNC: 29 U/L (ref 13–56)
AMPHET UR QL SCN: NEGATIVE
ANION GAP SERPL CALC-SCNC: 3 MMOL/L (ref 3–18)
APPEARANCE UR: CLEAR
AST SERPL-CCNC: 19 U/L (ref 10–38)
BACTERIA URNS QL MICRO: NEGATIVE /HPF
BARBITURATES UR QL SCN: NEGATIVE
BENZODIAZ UR QL: NEGATIVE
BILIRUB SERPL-MCNC: 0.6 MG/DL (ref 0.2–1)
BILIRUB UR QL: NEGATIVE
BUN SERPL-MCNC: 27 MG/DL (ref 7–18)
BUN/CREAT SERPL: 27 (ref 12–20)
CALCIUM SERPL-MCNC: 9.8 MG/DL (ref 8.5–10.1)
CANNABINOIDS UR QL SCN: NEGATIVE
CHLORIDE SERPL-SCNC: 102 MMOL/L (ref 100–111)
CO2 SERPL-SCNC: 29 MMOL/L (ref 21–32)
COCAINE UR QL SCN: NEGATIVE
COLOR UR: YELLOW
CREAT SERPL-MCNC: 1.01 MG/DL (ref 0.6–1.3)
EKG ATRIAL RATE: 72 BPM
EKG DIAGNOSIS: NORMAL
EKG P AXIS: 50 DEGREES
EKG P-R INTERVAL: 116 MS
EKG Q-T INTERVAL: 364 MS
EKG QRS DURATION: 100 MS
EKG QTC CALCULATION (BAZETT): 398 MS
EKG R AXIS: -10 DEGREES
EKG T AXIS: 88 DEGREES
EKG VENTRICULAR RATE: 72 BPM
EPITH CASTS URNS QL MICRO: NORMAL /LPF (ref 0–5)
ERYTHROCYTE [DISTWIDTH] IN BLOOD BY AUTOMATED COUNT: 13.5 % (ref 11.6–14.5)
ETHANOL SERPL-MCNC: <3 MG/DL (ref 0–3)
GLOBULIN SER CALC-MCNC: 3.2 G/DL (ref 2–4)
GLUCOSE SERPL-MCNC: 112 MG/DL (ref 74–99)
GLUCOSE UR STRIP.AUTO-MCNC: NEGATIVE MG/DL
HCT VFR BLD AUTO: 37.6 % (ref 35–45)
HGB BLD-MCNC: 12.3 G/DL (ref 12–16)
HGB UR QL STRIP: NEGATIVE
KETONES UR QL STRIP.AUTO: NEGATIVE MG/DL
LEUKOCYTE ESTERASE UR QL STRIP.AUTO: ABNORMAL
Lab: NORMAL
MCH RBC QN AUTO: 31 PG (ref 24–34)
MCHC RBC AUTO-ENTMCNC: 32.7 G/DL (ref 31–37)
MCV RBC AUTO: 94.7 FL (ref 78–100)
METHADONE UR QL: NEGATIVE
NITRITE UR QL STRIP.AUTO: NEGATIVE
NRBC # BLD: 0 K/UL (ref 0–0.01)
NRBC BLD-RTO: 0 PER 100 WBC
OPIATES UR QL: NEGATIVE
PCP UR QL: NEGATIVE
PH UR STRIP: 6 (ref 5–8)
PLATELET # BLD AUTO: 251 K/UL (ref 135–420)
PMV BLD AUTO: 9.7 FL (ref 9.2–11.8)
POTASSIUM SERPL-SCNC: 4.5 MMOL/L (ref 3.5–5.5)
PROT SERPL-MCNC: 6.7 G/DL (ref 6.4–8.2)
PROT UR STRIP-MCNC: NEGATIVE MG/DL
RBC # BLD AUTO: 3.97 M/UL (ref 4.2–5.3)
RBC #/AREA URNS HPF: NEGATIVE /HPF (ref 0–5)
SODIUM SERPL-SCNC: 134 MMOL/L (ref 136–145)
SP GR UR REFRACTOMETRY: 1.02 (ref 1–1.03)
UROBILINOGEN UR QL STRIP.AUTO: 0.2 EU/DL (ref 0.2–1)
WBC # BLD AUTO: 9.6 K/UL (ref 4.6–13.2)
WBC URNS QL MICRO: NORMAL /HPF (ref 0–4)

## 2024-02-24 PROCEDURE — 6360000002 HC RX W HCPCS: Performed by: STUDENT IN AN ORGANIZED HEALTH CARE EDUCATION/TRAINING PROGRAM

## 2024-02-24 PROCEDURE — 93005 ELECTROCARDIOGRAM TRACING: CPT | Performed by: EMERGENCY MEDICINE

## 2024-02-24 PROCEDURE — 81001 URINALYSIS AUTO W/SCOPE: CPT

## 2024-02-24 PROCEDURE — 94761 N-INVAS EAR/PLS OXIMETRY MLT: CPT

## 2024-02-24 PROCEDURE — 99284 EMERGENCY DEPT VISIT MOD MDM: CPT

## 2024-02-24 PROCEDURE — 2580000003 HC RX 258: Performed by: STUDENT IN AN ORGANIZED HEALTH CARE EDUCATION/TRAINING PROGRAM

## 2024-02-24 PROCEDURE — 70450 CT HEAD/BRAIN W/O DYE: CPT

## 2024-02-24 PROCEDURE — 82077 ASSAY SPEC XCP UR&BREATH IA: CPT

## 2024-02-24 PROCEDURE — 80053 COMPREHEN METABOLIC PANEL: CPT

## 2024-02-24 PROCEDURE — 85027 COMPLETE CBC AUTOMATED: CPT

## 2024-02-24 PROCEDURE — 87086 URINE CULTURE/COLONY COUNT: CPT

## 2024-02-24 PROCEDURE — 96374 THER/PROPH/DIAG INJ IV PUSH: CPT

## 2024-02-24 PROCEDURE — 80307 DRUG TEST PRSMV CHEM ANLYZR: CPT

## 2024-02-24 PROCEDURE — 93010 ELECTROCARDIOGRAM REPORT: CPT | Performed by: INTERNAL MEDICINE

## 2024-02-24 RX ORDER — CEPHALEXIN 500 MG/1
500 CAPSULE ORAL 2 TIMES DAILY
Qty: 14 CAPSULE | Refills: 0 | Status: SHIPPED | OUTPATIENT
Start: 2024-02-24 | End: 2024-03-02

## 2024-02-24 RX ADMIN — WATER 1000 MG: 1 INJECTION INTRAMUSCULAR; INTRAVENOUS; SUBCUTANEOUS at 16:05

## 2024-02-24 ASSESSMENT — PAIN - FUNCTIONAL ASSESSMENT: PAIN_FUNCTIONAL_ASSESSMENT: NONE - DENIES PAIN

## 2024-02-24 NOTE — ED PROVIDER NOTES
[facility] EMERGENCY DEPT  EMERGENCY DEPARTMENT ENCOUNTER       Pt Name: Mona Olea  MRN: [id number]  Birthdate 1945  Date of evaluation: 2/24/2024  Provider: Nemesio Geiger DO   PCP: Lachelle Holguin NP-C  Note Started: 2:33 PM EST 2/24/24     CHIEF COMPLAINT       Chief Complaint   Patient presents with    Altered Mental Status        HISTORY OF PRESENT ILLNESS: 1 or more elements      History From: patient and chart, History limited by: baseline mentation     Mona Olea is a 78 y.o. female past medical history of dementia presents via EMS for reported increased confusion past 2 days.  Son called EMS reporting patient has been having more confusion and aggressive behaviors.  She had a similar episode approximate month ago when she was found to have a UTI.  Her symptoms improved after having her UTI treated.  Her son believes that possibly she has another UTI.    Patient denies any complaints today.  She reports that she is well.  She denies any chest pain, shortness of breath, nausea, vomiting, dysuria.       Please See MDM for Additional Details of the HPI/PMH  Nursing Notes were all reviewed and agreed with or any disagreements were addressed in the HPI.     REVIEW OF SYSTEMS        Positives and Pertinent negatives as per HPI.    PAST HISTORY     Past Medical History:  Past Medical History:   Diagnosis Date    Anxiety     Arrhythmia 2011    Negative Stress test    Arthritis     Carotid artery stenosis 2009    <50%    Gestational diabetes     Glaucoma     Hypercholesterolemia     Hypertension        Past Surgical History:  Past Surgical History:   Procedure Laterality Date    CHOLECYSTECTOMY  1990    gallstones    HYSTERECTOMY (CERVIX STATUS UNKNOWN)  1990    total hysterectomy       Family History:  Family History   Problem Relation Age of Onset    Osteoarthritis Mother     Dementia Mother     Hypertension Mother     Coronary Art Dis Mother        Social History:  Social History     Tobacco Use     (Results Pending)        PROCEDURES   Unless otherwise noted below, none  Procedures     CRITICAL CARE TIME       EMERGENCY DEPARTMENT COURSE and DIFFERENTIAL DIAGNOSIS/MDM   Vitals:    Vitals:    02/24/24 1224   BP: (!) 141/51   Pulse: 72   Resp: 12   Temp: 98 °F (36.7 °C)   TempSrc: Oral   SpO2: 97%   Weight: 70.3 kg (155 lb)   Height: 1.727 m (5' 8\")        Patient was given the following medications:  Medications - No data to display    Medical Decision Making  78yoF with PMH dementia presents with reported increasing confusion per son.  Unable to get through to personally speak with the son however we will try to call back again.  Similar episode recently with UTI.  Will obtain labs to assess for any metabolic or infectious etiology.    Labs largely reassuring.  Patient does have some small amount of leuk esterase in her urine however the urine is not nearly as bad as her urine a month ago.  She has no leukocytosis or fever.  Lower suspicion for sepsis.    EKG with sinus rhythm and some PACs.  Rate is 72.  Nonischemic.    CT scan of the head: reassuring. No acute intracranial findings.    Patient will be empirically treated for a UTI given history of UTIs and leuk esterase in urine.  One-time ceftriaxone dose and then will prescribe a 7-day supply of Keflex.  Last urine culture had E. coli grow that was susceptible to Keflex.    Spoke with her son.  He voiced understanding appreciation for the call and update.  Son will come  patient in approximately 1 hour.  He will help his mother take her medications as prescribed.  He was given return precautions.  I also recommended following up with primary care doctor and trying to get the psychiatry referral that neurology recommended.    Amount and/or Complexity of Data Reviewed  Labs: ordered.  Radiology: ordered.  ECG/medicine tests: ordered.    Risk  Prescription drug management.                 FINAL IMPRESSION   No diagnosis found.      DISPOSITION/PLAN

## 2024-02-24 NOTE — ED NOTES
D/c paperwork reviewed with Chad Kebede, Chad kebede verbalized understanding. Pt left ED ambulatory and in stable condition.

## 2024-02-24 NOTE — ED TRIAGE NOTES
Patient arrived from home via EMS. Per EMS, patient son checks on patient and states that she has become more confused over the last 36 hours. Son reported to EMS that patient becomes confused when she has a UTI. Patient has a hx of Dementia.

## 2024-02-25 LAB
BACTERIA SPEC CULT: NORMAL
CC UR VC: NORMAL
SERVICE CMNT-IMP: NORMAL

## 2024-03-20 ENCOUNTER — PATIENT MESSAGE (OUTPATIENT)
Facility: CLINIC | Age: 79
End: 2024-03-20

## 2024-03-20 DIAGNOSIS — N18.31 HYPERTENSIVE KIDNEY DISEASE WITH STAGE 3A CHRONIC KIDNEY DISEASE (HCC): ICD-10-CM

## 2024-03-20 DIAGNOSIS — I12.9 HYPERTENSIVE KIDNEY DISEASE WITH STAGE 3A CHRONIC KIDNEY DISEASE (HCC): ICD-10-CM

## 2024-03-20 RX ORDER — AMLODIPINE BESYLATE 10 MG/1
TABLET ORAL
Qty: 30 TABLET | Refills: 0 | Status: SHIPPED | OUTPATIENT
Start: 2024-03-20

## 2024-03-20 RX ORDER — LOSARTAN POTASSIUM 25 MG/1
25 TABLET ORAL EVERY MORNING
Qty: 30 TABLET | Refills: 0 | Status: SHIPPED | OUTPATIENT
Start: 2024-03-20

## 2024-04-16 DIAGNOSIS — I12.9 HYPERTENSIVE KIDNEY DISEASE WITH STAGE 3A CHRONIC KIDNEY DISEASE (HCC): ICD-10-CM

## 2024-04-16 DIAGNOSIS — N18.31 HYPERTENSIVE KIDNEY DISEASE WITH STAGE 3A CHRONIC KIDNEY DISEASE (HCC): ICD-10-CM

## 2024-04-17 RX ORDER — LOSARTAN POTASSIUM 25 MG/1
25 TABLET ORAL EVERY MORNING
Qty: 30 TABLET | Refills: 0 | Status: SHIPPED | OUTPATIENT
Start: 2024-04-17

## 2024-04-17 NOTE — TELEPHONE ENCOUNTER
Last seen 1/23/2024   Last labs 02/24/24  Last filled  03/20/24  Next appointment Visit date not found      please  call patient to schedule she was due back for :     Return in about 4 weeks (around 2/20/2024) for MEDICARE WELLNESS VISIT, dementia, blood pressure, ckd, cholesterol, prediabetes, labs, (virtually).    Last Assessment & Plan Note   Written:Lachelle Holguin, [place]-C1/23/2024 3:22 PM    BP goal <130/80  30-days supply of amlodipine 10 mg, Losartan 25 mg daily until fasting labs completed     Lab Results   Component Value Date     (L) 02/24/2024    K 4.5 02/24/2024     02/24/2024    CO2 29 02/24/2024    BUN 27 (H) 02/24/2024    CREATININE 1.01 02/24/2024    GLUCOSE 112 (H) 02/24/2024    CALCIUM 9.8 02/24/2024    PROT 6.7 02/24/2024    BILITOT 0.6 02/24/2024    ALKPHOS 63 02/24/2024    AST 19 02/24/2024    ALT 29 02/24/2024    LABGLOM 58 (L) 02/24/2024    GFRAA 56 (L) 09/13/2022    AGRATIO 1.1 02/24/2024    GLOB 3.2 02/24/2024

## 2024-04-21 PROBLEM — G93.41 METABOLIC ENCEPHALOPATHY: Status: RESOLVED | Noted: 2024-01-11 | Resolved: 2024-04-21

## 2024-04-21 PROBLEM — G92.8 TOXIC METABOLIC ENCEPHALOPATHY: Status: RESOLVED | Noted: 2024-01-12 | Resolved: 2024-04-21

## 2024-04-25 ENCOUNTER — APPOINTMENT (OUTPATIENT)
Facility: HOSPITAL | Age: 79
DRG: 535 | End: 2024-04-25
Payer: MEDICARE

## 2024-04-25 ENCOUNTER — HOSPITAL ENCOUNTER (INPATIENT)
Facility: HOSPITAL | Age: 79
LOS: 6 days | Discharge: NURSING, REHAB OR LONG-TERM CARE | DRG: 535 | End: 2024-05-01
Attending: EMERGENCY MEDICINE | Admitting: FAMILY MEDICINE
Payer: MEDICARE

## 2024-04-25 DIAGNOSIS — N18.31 HYPERTENSIVE KIDNEY DISEASE WITH STAGE 3A CHRONIC KIDNEY DISEASE (HCC): ICD-10-CM

## 2024-04-25 DIAGNOSIS — S32.9XXA CLOSED NONDISPLACED FRACTURE OF PELVIS, UNSPECIFIED PART OF PELVIS, INITIAL ENCOUNTER (HCC): ICD-10-CM
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
## 2024-04-25 DIAGNOSIS — I12.9 HYPERTENSIVE KIDNEY DISEASE WITH STAGE 3A CHRONIC KIDNEY DISEASE (HCC): ICD-10-CM

## 2024-04-25 DIAGNOSIS — N17.9 AKI (ACUTE KIDNEY INJURY) (HCC): ICD-10-CM

## 2024-04-25 DIAGNOSIS — F03.C18 SEVERE DEMENTIA WITH OTHER BEHAVIORAL DISTURBANCE, UNSPECIFIED DEMENTIA TYPE (HCC): ICD-10-CM

## 2024-04-25 DIAGNOSIS — S32.10XA CLOSED FRACTURE OF SACRUM, UNSPECIFIED PORTION OF SACRUM, INITIAL ENCOUNTER (HCC): ICD-10-CM

## 2024-04-25 PROBLEM — E78.5 HYPERLIPIDEMIA: Status: ACTIVE | Noted: 2021-12-16

## 2024-04-25 PROBLEM — S32.592A PUBIC RAMUS FRACTURE, LEFT, CLOSED, INITIAL ENCOUNTER (HCC): Status: ACTIVE | Noted: 2024-04-25

## 2024-04-25 LAB
ANION GAP SERPL CALC-SCNC: 6 MMOL/L (ref 3–18)
APPEARANCE UR: CLEAR
BASOPHILS # BLD: 0.1 K/UL (ref 0–0.1)
BASOPHILS NFR BLD: 0 % (ref 0–2)
BILIRUB UR QL: NEGATIVE
BUN SERPL-MCNC: 31 MG/DL (ref 7–18)
BUN/CREAT SERPL: 21 (ref 12–20)
CALCIUM SERPL-MCNC: 9 MG/DL (ref 8.5–10.1)
CHLORIDE SERPL-SCNC: 105 MMOL/L (ref 100–111)
CO2 SERPL-SCNC: 28 MMOL/L (ref 21–32)
COLOR UR: YELLOW
CREAT SERPL-MCNC: 1.48 MG/DL (ref 0.6–1.3)
DIFFERENTIAL METHOD BLD: ABNORMAL
EKG ATRIAL RATE: 79 BPM
EKG DIAGNOSIS: NORMAL
EKG P AXIS: 67 DEGREES
EKG P-R INTERVAL: 134 MS
EKG Q-T INTERVAL: 388 MS
EKG QRS DURATION: 116 MS
EKG QTC CALCULATION (BAZETT): 444 MS
EKG R AXIS: -1 DEGREES
EKG T AXIS: 75 DEGREES
EKG VENTRICULAR RATE: 79 BPM
EOSINOPHIL # BLD: 0 K/UL (ref 0–0.4)
EOSINOPHIL NFR BLD: 0 % (ref 0–5)
ERYTHROCYTE [DISTWIDTH] IN BLOOD BY AUTOMATED COUNT: 13.5 % (ref 11.6–14.5)
GLUCOSE SERPL-MCNC: 119 MG/DL (ref 74–99)
GLUCOSE UR STRIP.AUTO-MCNC: NEGATIVE MG/DL
HCT VFR BLD AUTO: 28.7 % (ref 35–45)
HCT VFR BLD AUTO: 30.9 % (ref 35–45)
HGB BLD-MCNC: 10.5 G/DL (ref 12–16)
HGB BLD-MCNC: 9.5 G/DL (ref 12–16)
HGB UR QL STRIP: NEGATIVE
IMM GRANULOCYTES # BLD AUTO: 0.1 K/UL (ref 0–0.04)
IMM GRANULOCYTES NFR BLD AUTO: 0 % (ref 0–0.5)
INR PPP: 1.1 (ref 0.9–1.1)
KETONES UR QL STRIP.AUTO: NEGATIVE MG/DL
LEUKOCYTE ESTERASE UR QL STRIP.AUTO: NEGATIVE
LYMPHOCYTES # BLD: 1.2 K/UL (ref 0.9–3.6)
LYMPHOCYTES NFR BLD: 10 % (ref 21–52)
MAGNESIUM SERPL-MCNC: 2.1 MG/DL (ref 1.6–2.6)
MCH RBC QN AUTO: 31 PG (ref 24–34)
MCHC RBC AUTO-ENTMCNC: 34 G/DL (ref 31–37)
MCV RBC AUTO: 91.2 FL (ref 78–100)
MONOCYTES # BLD: 0.9 K/UL (ref 0.05–1.2)
MONOCYTES NFR BLD: 8 % (ref 3–10)
NEUTS SEG # BLD: 9.4 K/UL (ref 1.8–8)
NEUTS SEG NFR BLD: 81 % (ref 40–73)
NITRITE UR QL STRIP.AUTO: NEGATIVE
NRBC # BLD: 0 K/UL (ref 0–0.01)
NRBC BLD-RTO: 0 PER 100 WBC
PH UR STRIP: 5.5 (ref 5–8)
PLATELET # BLD AUTO: 170 K/UL (ref 135–420)
PMV BLD AUTO: 10.3 FL (ref 9.2–11.8)
POTASSIUM SERPL-SCNC: 3.5 MMOL/L (ref 3.5–5.5)
PROT UR STRIP-MCNC: NEGATIVE MG/DL
PROTHROMBIN TIME: 14.3 SEC (ref 11.9–14.7)
RBC # BLD AUTO: 3.39 M/UL (ref 4.2–5.3)
SODIUM SERPL-SCNC: 139 MMOL/L (ref 136–145)
SP GR UR REFRACTOMETRY: 1.01 (ref 1–1.03)
UROBILINOGEN UR QL STRIP.AUTO: 1 EU/DL (ref 0.2–1)
WBC # BLD AUTO: 11.7 K/UL (ref 4.6–13.2)

## 2024-04-25 PROCEDURE — 93005 ELECTROCARDIOGRAM TRACING: CPT | Performed by: STUDENT IN AN ORGANIZED HEALTH CARE EDUCATION/TRAINING PROGRAM

## 2024-04-25 PROCEDURE — 2580000003 HC RX 258: Performed by: EMERGENCY MEDICINE

## 2024-04-25 PROCEDURE — 94761 N-INVAS EAR/PLS OXIMETRY MLT: CPT

## 2024-04-25 PROCEDURE — 6370000000 HC RX 637 (ALT 250 FOR IP)

## 2024-04-25 PROCEDURE — 85025 COMPLETE CBC W/AUTO DIFF WBC: CPT

## 2024-04-25 PROCEDURE — 93010 ELECTROCARDIOGRAM REPORT: CPT | Performed by: INTERNAL MEDICINE

## 2024-04-25 PROCEDURE — 2580000003 HC RX 258

## 2024-04-25 PROCEDURE — 6370000000 HC RX 637 (ALT 250 FOR IP): Performed by: STUDENT IN AN ORGANIZED HEALTH CARE EDUCATION/TRAINING PROGRAM

## 2024-04-25 PROCEDURE — 72192 CT PELVIS W/O DYE: CPT

## 2024-04-25 PROCEDURE — 85610 PROTHROMBIN TIME: CPT

## 2024-04-25 PROCEDURE — 85018 HEMOGLOBIN: CPT

## 2024-04-25 PROCEDURE — 1100000000 HC RM PRIVATE

## 2024-04-25 PROCEDURE — 85014 HEMATOCRIT: CPT

## 2024-04-25 PROCEDURE — 36415 COLL VENOUS BLD VENIPUNCTURE: CPT

## 2024-04-25 PROCEDURE — 99223 1ST HOSP IP/OBS HIGH 75: CPT

## 2024-04-25 PROCEDURE — 80048 BASIC METABOLIC PNL TOTAL CA: CPT

## 2024-04-25 PROCEDURE — 99285 EMERGENCY DEPT VISIT HI MDM: CPT

## 2024-04-25 PROCEDURE — 99222 1ST HOSP IP/OBS MODERATE 55: CPT

## 2024-04-25 PROCEDURE — 72125 CT NECK SPINE W/O DYE: CPT

## 2024-04-25 PROCEDURE — 73502 X-RAY EXAM HIP UNI 2-3 VIEWS: CPT

## 2024-04-25 PROCEDURE — 83735 ASSAY OF MAGNESIUM: CPT

## 2024-04-25 PROCEDURE — 81003 URINALYSIS AUTO W/O SCOPE: CPT

## 2024-04-25 PROCEDURE — 70450 CT HEAD/BRAIN W/O DYE: CPT

## 2024-04-25 RX ORDER — FAMOTIDINE 20 MG/1
20 TABLET, FILM COATED ORAL 2 TIMES DAILY
Status: DISCONTINUED | OUTPATIENT
Start: 2024-04-25 | End: 2024-04-28

## 2024-04-25 RX ORDER — SODIUM CHLORIDE 9 MG/ML
INJECTION, SOLUTION INTRAVENOUS CONTINUOUS
Status: DISPENSED | OUTPATIENT
Start: 2024-04-25 | End: 2024-04-26

## 2024-04-25 RX ORDER — LIDOCAINE 4 G/G
1 PATCH TOPICAL DAILY
Status: DISCONTINUED | OUTPATIENT
Start: 2024-04-25 | End: 2024-05-01 | Stop reason: HOSPADM

## 2024-04-25 RX ORDER — DONEPEZIL HYDROCHLORIDE 5 MG/1
10 TABLET, FILM COATED ORAL NIGHTLY
Status: DISCONTINUED | OUTPATIENT
Start: 2024-04-25 | End: 2024-05-01 | Stop reason: HOSPADM

## 2024-04-25 RX ORDER — SODIUM CHLORIDE, SODIUM LACTATE, POTASSIUM CHLORIDE, AND CALCIUM CHLORIDE .6; .31; .03; .02 G/100ML; G/100ML; G/100ML; G/100ML
1000 INJECTION, SOLUTION INTRAVENOUS ONCE
Status: COMPLETED | OUTPATIENT
Start: 2024-04-25 | End: 2024-04-25

## 2024-04-25 RX ORDER — ASPIRIN 81 MG/1
81 TABLET ORAL DAILY
Status: DISCONTINUED | OUTPATIENT
Start: 2024-04-26 | End: 2024-05-01 | Stop reason: HOSPADM

## 2024-04-25 RX ORDER — OXYCODONE HYDROCHLORIDE AND ACETAMINOPHEN 5; 325 MG/1; MG/1
1 TABLET ORAL EVERY 6 HOURS PRN
Status: DISCONTINUED | OUTPATIENT
Start: 2024-04-25 | End: 2024-05-01 | Stop reason: HOSPADM

## 2024-04-25 RX ORDER — MEMANTINE HYDROCHLORIDE 5 MG/1
5 TABLET ORAL 2 TIMES DAILY
Status: DISCONTINUED | OUTPATIENT
Start: 2024-04-25 | End: 2024-05-01 | Stop reason: HOSPADM

## 2024-04-25 RX ORDER — ONDANSETRON 2 MG/ML
4 INJECTION INTRAMUSCULAR; INTRAVENOUS EVERY 6 HOURS PRN
Status: DISCONTINUED | OUTPATIENT
Start: 2024-04-25 | End: 2024-05-01 | Stop reason: HOSPADM

## 2024-04-25 RX ORDER — TRAMADOL HYDROCHLORIDE 50 MG/1
50 TABLET ORAL EVERY 6 HOURS PRN
Status: DISCONTINUED | OUTPATIENT
Start: 2024-04-25 | End: 2024-05-01 | Stop reason: HOSPADM

## 2024-04-25 RX ORDER — LANOLIN ALCOHOL/MO/W.PET/CERES
3 CREAM (GRAM) TOPICAL NIGHTLY PRN
Status: DISCONTINUED | OUTPATIENT
Start: 2024-04-25 | End: 2024-05-01 | Stop reason: HOSPADM

## 2024-04-25 RX ORDER — OXYCODONE HYDROCHLORIDE AND ACETAMINOPHEN 5; 325 MG/1; MG/1
1 TABLET ORAL EVERY 6 HOURS PRN
Status: DISCONTINUED | OUTPATIENT
Start: 2024-04-25 | End: 2024-04-25

## 2024-04-25 RX ORDER — POTASSIUM CHLORIDE 7.45 MG/ML
10 INJECTION INTRAVENOUS PRN
Status: DISCONTINUED | OUTPATIENT
Start: 2024-04-25 | End: 2024-05-01 | Stop reason: HOSPADM

## 2024-04-25 RX ORDER — POTASSIUM CHLORIDE 20 MEQ/1
40 TABLET, EXTENDED RELEASE ORAL PRN
Status: DISCONTINUED | OUTPATIENT
Start: 2024-04-25 | End: 2024-05-01 | Stop reason: HOSPADM

## 2024-04-25 RX ORDER — SODIUM CHLORIDE 0.9 % (FLUSH) 0.9 %
5-40 SYRINGE (ML) INJECTION PRN
Status: DISCONTINUED | OUTPATIENT
Start: 2024-04-25 | End: 2024-05-01 | Stop reason: HOSPADM

## 2024-04-25 RX ORDER — ATORVASTATIN CALCIUM 10 MG/1
10 TABLET, FILM COATED ORAL
Status: DISCONTINUED | OUTPATIENT
Start: 2024-04-25 | End: 2024-05-01 | Stop reason: HOSPADM

## 2024-04-25 RX ORDER — POLYETHYLENE GLYCOL 3350 17 G/17G
17 POWDER, FOR SOLUTION ORAL DAILY PRN
Status: DISCONTINUED | OUTPATIENT
Start: 2024-04-25 | End: 2024-04-29

## 2024-04-25 RX ORDER — ACETAMINOPHEN 325 MG/1
650 TABLET ORAL EVERY 6 HOURS PRN
Status: DISCONTINUED | OUTPATIENT
Start: 2024-04-25 | End: 2024-05-01 | Stop reason: HOSPADM

## 2024-04-25 RX ORDER — MAGNESIUM SULFATE IN WATER 40 MG/ML
2000 INJECTION, SOLUTION INTRAVENOUS PRN
Status: DISCONTINUED | OUTPATIENT
Start: 2024-04-25 | End: 2024-05-01 | Stop reason: HOSPADM

## 2024-04-25 RX ORDER — SODIUM CHLORIDE 0.9 % (FLUSH) 0.9 %
5-40 SYRINGE (ML) INJECTION EVERY 12 HOURS SCHEDULED
Status: DISCONTINUED | OUTPATIENT
Start: 2024-04-25 | End: 2024-05-01 | Stop reason: HOSPADM

## 2024-04-25 RX ORDER — ACETAMINOPHEN 650 MG/1
650 SUPPOSITORY RECTAL EVERY 6 HOURS PRN
Status: DISCONTINUED | OUTPATIENT
Start: 2024-04-25 | End: 2024-05-01 | Stop reason: HOSPADM

## 2024-04-25 RX ORDER — AMLODIPINE BESYLATE 10 MG/1
10 TABLET ORAL DAILY
Status: DISCONTINUED | OUTPATIENT
Start: 2024-04-25 | End: 2024-05-01 | Stop reason: HOSPADM

## 2024-04-25 RX ORDER — ONDANSETRON 4 MG/1
4 TABLET, ORALLY DISINTEGRATING ORAL EVERY 8 HOURS PRN
Status: DISCONTINUED | OUTPATIENT
Start: 2024-04-25 | End: 2024-05-01 | Stop reason: HOSPADM

## 2024-04-25 RX ADMIN — ATORVASTATIN CALCIUM 10 MG: 10 TABLET, FILM COATED ORAL at 19:42

## 2024-04-25 RX ADMIN — SERTRALINE HYDROCHLORIDE 75 MG: 50 TABLET ORAL at 15:30

## 2024-04-25 RX ADMIN — DONEPEZIL HYDROCHLORIDE 10 MG: 5 TABLET, FILM COATED ORAL at 19:42

## 2024-04-25 RX ADMIN — SODIUM CHLORIDE: 9 INJECTION, SOLUTION INTRAVENOUS at 15:32

## 2024-04-25 RX ADMIN — SODIUM CHLORIDE, PRESERVATIVE FREE 10 ML: 5 INJECTION INTRAVENOUS at 19:43

## 2024-04-25 RX ADMIN — Medication 3 MG: at 21:44

## 2024-04-25 RX ADMIN — SODIUM CHLORIDE, POTASSIUM CHLORIDE, SODIUM LACTATE AND CALCIUM CHLORIDE 1000 ML: 600; 310; 30; 20 INJECTION, SOLUTION INTRAVENOUS at 13:48

## 2024-04-25 RX ADMIN — MEMANTINE HYDROCHLORIDE 5 MG: 5 TABLET ORAL at 19:42

## 2024-04-25 RX ADMIN — FAMOTIDINE 20 MG: 20 TABLET ORAL at 19:42

## 2024-04-25 RX ADMIN — AMLODIPINE BESYLATE 10 MG: 10 TABLET ORAL at 15:34

## 2024-04-25 ASSESSMENT — PAIN DESCRIPTION - LOCATION: LOCATION: HIP

## 2024-04-25 ASSESSMENT — PAIN SCALES - GENERAL
PAINLEVEL_OUTOF10: 10
PAINLEVEL_OUTOF10: 0
PAINLEVEL_OUTOF10: 0

## 2024-04-25 ASSESSMENT — PAIN DESCRIPTION - PAIN TYPE: TYPE: ACUTE PAIN

## 2024-04-25 ASSESSMENT — PAIN - FUNCTIONAL ASSESSMENT: PAIN_FUNCTIONAL_ASSESSMENT: 0-10

## 2024-04-25 ASSESSMENT — PAIN DESCRIPTION - ORIENTATION: ORIENTATION: LEFT

## 2024-04-25 NOTE — CONSULTS
[unfilled]   Consult    Patient: Mona Olea MRN: [id number]  SSN: [id number]    YOB: 1945  Age: 79 y.o.  Sex: female        Chief Complaint:   Chief Complaint   Patient presents with    Fall    Left Hip Pain       Subjective:      Mona Olea is a 79 y.o. female who is being seen for left hip pain after arriving via EMS following a ground level fall yesterday. She has a significant medical history of severe dementia, HTN, and CKD. She does not remember the fall and is not able to provide much information due to her significant dementia. She is not on any blood thinners.     Past Medical History:   Diagnosis Date    Anxiety     Arrhythmia 2011    Negative Stress test    Arthritis     Carotid artery stenosis 2009    <50%    Gestational diabetes     Glaucoma     Hypercholesterolemia     Hypertension      Past Surgical History:   Procedure Laterality Date    CHOLECYSTECTOMY  1990    gallstones    HYSTERECTOMY (CERVIX STATUS UNKNOWN)  1990    total hysterectomy      Family History   Problem Relation Age of Onset    Osteoarthritis Mother     Dementia Mother     Hypertension Mother     Coronary Art Dis Mother      Social History     Tobacco Use    Smoking status: Never    Smokeless tobacco: Never   Substance Use Topics    Alcohol use: Yes      Current Facility-Administered Medications   Medication Dose Route Frequency Provider Last Rate Last Admin    sodium chloride flush 0.9 % injection 5-40 mL  5-40 mL IntraVENous 2 times per day Monge, Anais, APRN - NP        sodium chloride flush 0.9 % injection 5-40 mL  5-40 mL IntraVENous PRN Monge, Anais, APRN - NP        potassium chloride (KLOR-CON M) extended release tablet 40 mEq  40 mEq Oral PRN Monge, Kushangi, APRN - NP        Or    potassium bicarb-citric acid (EFFER-K) effervescent tablet 40 mEq  40 mEq Oral PRN Monge, Kushangi, APRN - NP        Or    potassium chloride 10 mEq/100 mL IVPB (Peripheral Line)  10 mEq IntraVENous PRN Monge,

## 2024-04-25 NOTE — ED TRIAGE NOTES
Pt came via EMS stretcher from home, c/o Left hip pain secondary to fall. Per EMS, pt fell twice yesterday and has been falling a lot lately.     Hx of dementia and HTN. Not taking blood thinners.    Pt A&Ox1 at this time.

## 2024-04-25 NOTE — ED PROVIDER NOTES
[facility] EMERGENCY DEPT  EMERGENCY DEPARTMENT ENCOUNTER       Pt Name: Mona Olea  MRN: [id number]  Birthdate 1945  Date of evaluation: 4/25/2024  Provider: Lillian Ruiz MD   PCP: Lachelle Holguin NP-C  Note Started: 11:10 AM 4/25/24     CHIEF COMPLAINT       Chief Complaint   Patient presents with    Fall    Left Hip Pain        HISTORY OF PRESENT ILLNESS: 1 or more elements      History From: EMS  Dementia     Mona Olea is a 79 y.o. female history of dementia, hypertension, hyperlipidemia, CKD who presents to the emergency department via EMS after a fall with complaints of left hip pain.  Patient does state that she hit her head.  Patient does have a history of significant dementia and provides very limited additional information.     Nursing Notes were all reviewed and agreed with or any disagreements were addressed in the HPI.     REVIEW OF SYSTEMS      Review of Systems   Unable to perform ROS: Dementia        Positives and Pertinent negatives as per HPI.    PAST HISTORY     Past Medical History:  Past Medical History:   Diagnosis Date    Anxiety     Arrhythmia 2011    Negative Stress test    Arthritis     Carotid artery stenosis 2009    <50%    Gestational diabetes     Glaucoma     Hypercholesterolemia     Hypertension          Past Surgical History:  Past Surgical History:   Procedure Laterality Date    CHOLECYSTECTOMY  1990    gallstones    HYSTERECTOMY (CERVIX STATUS UNKNOWN)  1990    total hysterectomy       Family History:  Family History   Problem Relation Age of Onset    Osteoarthritis Mother     Dementia Mother     Hypertension Mother     Coronary Art Dis Mother        Social History:  Social History     Tobacco Use    Smoking status: Never    Smokeless tobacco: Never   Vaping Use    Vaping Use: Never used   Substance Use Topics    Alcohol use: Yes    Drug use: Never       Allergies:  Allergies   Allergen Reactions    Codeine Nausea Only       CURRENT MEDICATIONS      Previous

## 2024-04-25 NOTE — ED NOTES
TRANSFER - OUT REPORT:    Verbal report given to MARCOS Lambert on Mona Olea  being transferred to  for routine progression of patient care       Report consisted of patient's Situation, Background, Assessment and   Recommendations(SBAR).     Information from the following report(s) ED Encounter Summary, ED SBAR, Intake/Output, MAR, and Recent Results was reviewed with the receiving nurse.    [facility] Fall Assessment:    Presents to emergency department  because of falls (Syncope, seizure, or loss of consciousness): No  Age > 70: Yes  Altered Mental Status, Intoxication with alcohol or substance confusion (Disorientation, impaired judgment, poor safety awaremess, or inability to follow instructions): No  Impaired Mobility: Ambulates or transfers with assistive devices or assistance; Unable to ambulate or transer.: Yes  Nursing Judgement: Yes          Lines:   Peripheral IV 04/25/24 Right Antecubital (Active)        Opportunity for questions and clarification was provided.      Patient with IV infusion pump.

## 2024-04-25 NOTE — ED NOTES
Changed into hospital gown, provided with gripper socks. Belongings placed in a clear bag (1bag).    Applied purewick and provided with diaper. Covered in warm blankets.    Side rails raised x2. Call bell and beside table within reach.

## 2024-04-25 NOTE — ED NOTES
I assumed care of the patient primarily from Dr. Ruiz at the end of her shift.      Recent Results (from the past 24 hour(s))   CBC with Auto Differential    Collection Time: 04/25/24 10:25 AM   Result Value Ref Range    WBC 11.7 4.6 - 13.2 K/uL    RBC 3.39 (L) 4.20 - 5.30 M/uL    Hemoglobin 10.5 (L) 12.0 - 16.0 g/dL    Hematocrit 30.9 (L) 35.0 - 45.0 %    MCV 91.2 78.0 - 100.0 FL    MCH 31.0 24.0 - 34.0 PG    MCHC 34.0 31.0 - 37.0 g/dL    RDW 13.5 11.6 - 14.5 %    Platelets 170 135 - 420 K/uL    MPV 10.3 9.2 - 11.8 FL    Nucleated RBCs 0.0 0  WBC    nRBC 0.00 0.00 - 0.01 K/uL    Neutrophils % 81 (H) 40 - 73 %    Lymphocytes % 10 (L) 21 - 52 %    Monocytes % 8 3 - 10 %    Eosinophils % 0 0 - 5 %    Basophils % 0 0 - 2 %    Immature Granulocytes % 0 0.0 - 0.5 %    Neutrophils Absolute 9.4 (H) 1.8 - 8.0 K/UL    Lymphocytes Absolute 1.2 0.9 - 3.6 K/UL    Monocytes Absolute 0.9 0.05 - 1.2 K/UL    Eosinophils Absolute 0.0 0.0 - 0.4 K/UL    Basophils Absolute 0.1 0.0 - 0.1 K/UL    Immature Granulocytes Absolute 0.1 (H) 0.00 - 0.04 K/UL    Differential Type AUTOMATED     BMP    Collection Time: 04/25/24 10:25 AM   Result Value Ref Range    Sodium 139 136 - 145 mmol/L    Potassium 3.5 3.5 - 5.5 mmol/L    Chloride 105 100 - 111 mmol/L    CO2 28 21 - 32 mmol/L    Anion Gap 6 3.0 - 18 mmol/L    Glucose 119 (H) 74 - 99 mg/dL    BUN 31 (H) 7.0 - 18 MG/DL    Creatinine 1.48 (H) 0.6 - 1.3 MG/DL    Bun/Cre Ratio 21 (H) 12 - 20      Est, Glom Filt Rate 36 (L) >60 ml/min/1.73m2    Calcium 9.0 8.5 - 10.1 MG/DL   Magnesium    Collection Time: 04/25/24 10:25 AM   Result Value Ref Range    Magnesium 2.1 1.6 - 2.6 mg/dL   Protime-INR    Collection Time: 04/25/24 10:25 AM   Result Value Ref Range    Protime 14.3 11.9 - 14.7 sec    INR 1.1 0.9 - 1.1     EKG 12 Lead (Syncope)    Collection Time: 04/25/24 10:35 AM   Result Value Ref Range    Ventricular Rate 79 BPM    Atrial Rate 79 BPM    P-R Interval 134 ms    QRS Duration 116 ms

## 2024-04-26 LAB
ANION GAP SERPL CALC-SCNC: 4 MMOL/L (ref 3–18)
BASOPHILS # BLD: 0 K/UL (ref 0–0.1)
BASOPHILS NFR BLD: 0 % (ref 0–2)
BUN SERPL-MCNC: 25 MG/DL (ref 7–18)
BUN/CREAT SERPL: 27 (ref 12–20)
CALCIUM SERPL-MCNC: 8.6 MG/DL (ref 8.5–10.1)
CHLORIDE SERPL-SCNC: 108 MMOL/L (ref 100–111)
CO2 SERPL-SCNC: 28 MMOL/L (ref 21–32)
CREAT SERPL-MCNC: 0.93 MG/DL (ref 0.6–1.3)
DIFFERENTIAL METHOD BLD: ABNORMAL
EOSINOPHIL # BLD: 0.2 K/UL (ref 0–0.4)
EOSINOPHIL NFR BLD: 2 % (ref 0–5)
ERYTHROCYTE [DISTWIDTH] IN BLOOD BY AUTOMATED COUNT: 14 % (ref 11.6–14.5)
FOLATE SERPL-MCNC: 6.7 NG/ML (ref 3.1–17.5)
GLUCOSE SERPL-MCNC: 90 MG/DL (ref 74–99)
HCT VFR BLD AUTO: 26.9 % (ref 35–45)
HCT VFR BLD AUTO: 28.6 % (ref 35–45)
HGB BLD-MCNC: 8.9 G/DL (ref 12–16)
HGB BLD-MCNC: 9.5 G/DL (ref 12–16)
IMM GRANULOCYTES # BLD AUTO: 0 K/UL (ref 0–0.04)
IMM GRANULOCYTES NFR BLD AUTO: 0 % (ref 0–0.5)
LYMPHOCYTES # BLD: 1.7 K/UL (ref 0.9–3.6)
LYMPHOCYTES NFR BLD: 19 % (ref 21–52)
MAGNESIUM SERPL-MCNC: 1.9 MG/DL (ref 1.6–2.6)
MCH RBC QN AUTO: 30.9 PG (ref 24–34)
MCHC RBC AUTO-ENTMCNC: 33.1 G/DL (ref 31–37)
MCV RBC AUTO: 93.4 FL (ref 78–100)
MONOCYTES # BLD: 0.9 K/UL (ref 0.05–1.2)
MONOCYTES NFR BLD: 10 % (ref 3–10)
NEUTS SEG # BLD: 6.3 K/UL (ref 1.8–8)
NEUTS SEG NFR BLD: 69 % (ref 40–73)
NRBC # BLD: 0 K/UL (ref 0–0.01)
NRBC BLD-RTO: 0 PER 100 WBC
PLATELET # BLD AUTO: 176 K/UL (ref 135–420)
PMV BLD AUTO: 10.7 FL (ref 9.2–11.8)
POTASSIUM SERPL-SCNC: 3.3 MMOL/L (ref 3.5–5.5)
RBC # BLD AUTO: 2.88 M/UL (ref 4.2–5.3)
SODIUM SERPL-SCNC: 140 MMOL/L (ref 136–145)
VIT B12 SERPL-MCNC: 602 PG/ML (ref 211–911)
WBC # BLD AUTO: 9.1 K/UL (ref 4.6–13.2)

## 2024-04-26 PROCEDURE — 11721 DEBRIDE NAIL 6 OR MORE: CPT

## 2024-04-26 PROCEDURE — 1100000000 HC RM PRIVATE

## 2024-04-26 PROCEDURE — 83735 ASSAY OF MAGNESIUM: CPT

## 2024-04-26 PROCEDURE — 6360000002 HC RX W HCPCS: Performed by: HOSPITALIST

## 2024-04-26 PROCEDURE — 85025 COMPLETE CBC W/AUTO DIFF WBC: CPT

## 2024-04-26 PROCEDURE — 82746 ASSAY OF FOLIC ACID SERUM: CPT

## 2024-04-26 PROCEDURE — 85014 HEMATOCRIT: CPT

## 2024-04-26 PROCEDURE — 85018 HEMOGLOBIN: CPT

## 2024-04-26 PROCEDURE — 97166 OT EVAL MOD COMPLEX 45 MIN: CPT

## 2024-04-26 PROCEDURE — 6370000000 HC RX 637 (ALT 250 FOR IP): Performed by: HOSPITALIST

## 2024-04-26 PROCEDURE — 6370000000 HC RX 637 (ALT 250 FOR IP)

## 2024-04-26 PROCEDURE — 97535 SELF CARE MNGMENT TRAINING: CPT

## 2024-04-26 PROCEDURE — 97162 PT EVAL MOD COMPLEX 30 MIN: CPT

## 2024-04-26 PROCEDURE — 99232 SBSQ HOSP IP/OBS MODERATE 35: CPT | Performed by: HOSPITALIST

## 2024-04-26 PROCEDURE — 82607 VITAMIN B-12: CPT

## 2024-04-26 PROCEDURE — 36415 COLL VENOUS BLD VENIPUNCTURE: CPT

## 2024-04-26 PROCEDURE — 0HBRXZZ EXCISION OF TOE NAIL, EXTERNAL APPROACH: ICD-10-PCS | Performed by: HOSPITALIST

## 2024-04-26 PROCEDURE — 94761 N-INVAS EAR/PLS OXIMETRY MLT: CPT

## 2024-04-26 PROCEDURE — 97530 THERAPEUTIC ACTIVITIES: CPT

## 2024-04-26 PROCEDURE — 80048 BASIC METABOLIC PNL TOTAL CA: CPT

## 2024-04-26 RX ORDER — POTASSIUM CHLORIDE 20 MEQ/1
20 TABLET, EXTENDED RELEASE ORAL ONCE
Status: COMPLETED | OUTPATIENT
Start: 2024-04-26 | End: 2024-04-26

## 2024-04-26 RX ORDER — ENOXAPARIN SODIUM 100 MG/ML
40 INJECTION SUBCUTANEOUS DAILY
Status: DISCONTINUED | OUTPATIENT
Start: 2024-04-26 | End: 2024-05-01 | Stop reason: HOSPADM

## 2024-04-26 RX ORDER — LANOLIN ALCOHOL/MO/W.PET/CERES
400 CREAM (GRAM) TOPICAL
Status: COMPLETED | OUTPATIENT
Start: 2024-04-26 | End: 2024-04-26

## 2024-04-26 RX ORDER — SENNA AND DOCUSATE SODIUM 50; 8.6 MG/1; MG/1
2 TABLET, FILM COATED ORAL DAILY
Status: DISCONTINUED | OUTPATIENT
Start: 2024-04-26 | End: 2024-05-01 | Stop reason: HOSPADM

## 2024-04-26 RX ADMIN — POTASSIUM CHLORIDE 20 MEQ: 1500 TABLET, EXTENDED RELEASE ORAL at 17:46

## 2024-04-26 RX ADMIN — ENOXAPARIN SODIUM 40 MG: 100 INJECTION SUBCUTANEOUS at 17:46

## 2024-04-26 RX ADMIN — MEMANTINE HYDROCHLORIDE 5 MG: 5 TABLET ORAL at 09:43

## 2024-04-26 RX ADMIN — AMLODIPINE BESYLATE 10 MG: 10 TABLET ORAL at 09:42

## 2024-04-26 RX ADMIN — Medication 400 MG: at 17:46

## 2024-04-26 RX ADMIN — FAMOTIDINE 20 MG: 20 TABLET ORAL at 09:43

## 2024-04-26 RX ADMIN — MEMANTINE HYDROCHLORIDE 5 MG: 5 TABLET ORAL at 20:13

## 2024-04-26 RX ADMIN — FAMOTIDINE 20 MG: 20 TABLET ORAL at 20:13

## 2024-04-26 RX ADMIN — DONEPEZIL HYDROCHLORIDE 10 MG: 5 TABLET, FILM COATED ORAL at 20:13

## 2024-04-26 RX ADMIN — ATORVASTATIN CALCIUM 10 MG: 10 TABLET, FILM COATED ORAL at 20:13

## 2024-04-26 RX ADMIN — SERTRALINE HYDROCHLORIDE 75 MG: 50 TABLET ORAL at 09:42

## 2024-04-26 RX ADMIN — OXYCODONE AND ACETAMINOPHEN 1 TABLET: 5; 325 TABLET ORAL at 20:14

## 2024-04-26 RX ADMIN — ASPIRIN 81 MG: 81 TABLET, COATED ORAL at 09:42

## 2024-04-26 RX ADMIN — SENNOSIDES AND DOCUSATE SODIUM 2 TABLET: 50; 8.6 TABLET ORAL at 17:46

## 2024-04-26 ASSESSMENT — PAIN DESCRIPTION - ONSET: ONSET: ON-GOING

## 2024-04-26 ASSESSMENT — PAIN SCALES - WONG BAKER: WONGBAKER_NUMERICALRESPONSE: NO HURT

## 2024-04-26 ASSESSMENT — PAIN DESCRIPTION - DESCRIPTORS: DESCRIPTORS: ACHING

## 2024-04-26 ASSESSMENT — PAIN SCALES - GENERAL
PAINLEVEL_OUTOF10: 0
PAINLEVEL_OUTOF10: 9

## 2024-04-26 ASSESSMENT — PAIN DESCRIPTION - PAIN TYPE: TYPE: ACUTE PAIN

## 2024-04-26 ASSESSMENT — PAIN DESCRIPTION - ORIENTATION: ORIENTATION: RIGHT;LEFT

## 2024-04-26 ASSESSMENT — PAIN DESCRIPTION - FREQUENCY: FREQUENCY: INTERMITTENT

## 2024-04-26 ASSESSMENT — PAIN - FUNCTIONAL ASSESSMENT: PAIN_FUNCTIONAL_ASSESSMENT: ACTIVITIES ARE NOT PREVENTED

## 2024-04-26 ASSESSMENT — PAIN DESCRIPTION - DIRECTION: RADIATING_TOWARDS: BACK

## 2024-04-26 ASSESSMENT — PAIN DESCRIPTION - LOCATION: LOCATION: LEG;KNEE

## 2024-04-26 NOTE — CONSULTS
Room 516    Right 5th toenail was barely hanging on when I arrived to do nail care for patient.  She states it does not hurt. Rest was debrided so that it does not snag on her socks.  Cleansed & applied silicone dressing for protection.   Physician order for toenails debridement received from Dr. Hirsch.   Introduced myself & services.   Explained procedure: assessment, hygiene, education, & nail service and no questions asked.   Informed of possible complications: nip/cut skin, infection, & nail splits or crumbles, and no questions asked.   Pt verbalized understanding of procedure and associated risks and agrees to have services provided at this time & was appreciative of services. Pt states her fingernails are fine & do not need to be trimmed.   Foot & Nail Care Services    1. History:   Chief Complaint   Patient presents with    Fall    Left Hip Pain   ,   Active Ambulatory Problems     Diagnosis Date Noted    Stage 3a chronic kidney disease (HCC) 12/16/2021    Hyperlipidemia 12/16/2021    Anxiety     Normocytic normochromic anemia 12/17/2021    Visual hallucinations 12/16/2021    Glaucoma     Hypertensive kidney disease with stage 3a chronic kidney disease (HCC) 10/05/2021    Prediabetes 03/13/2017    Severe dementia with other behavioral disturbance, unspecified dementia type ([facility])     Positive FIT (fecal immunochemical test) 01/22/2023    Primary hypertension 01/12/2024     Resolved Ambulatory Problems     Diagnosis Date Noted    Metabolic encephalopathy 01/11/2024    Toxic metabolic encephalopathy 01/12/2024    UTI (urinary tract infection) 01/12/2024     Past Medical History:   Diagnosis Date    Arrhythmia 2011    Arthritis     Carotid artery stenosis 2009    Gestational diabetes     Hypercholesterolemia     Hypertension        2. Assessment     Skin           Dryness (+  ) minimal both feet.           Maceration (-  )              Discoloration (-  )           Varicosities (+  ) both lower legs.
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
## 2024-04-27 PROBLEM — R41.0 DELIRIUM: Status: ACTIVE | Noted: 2024-04-27

## 2024-04-27 LAB
25(OH)D3 SERPL-MCNC: 39.9 NG/ML (ref 30–100)
AMMONIA PLAS-SCNC: <10 UMOL/L (ref 11–32)
ANION GAP SERPL CALC-SCNC: 2 MMOL/L (ref 3–18)
BASOPHILS # BLD: 0.1 K/UL (ref 0–0.1)
BASOPHILS NFR BLD: 1 % (ref 0–2)
BUN SERPL-MCNC: 23 MG/DL (ref 7–18)
BUN/CREAT SERPL: 28 (ref 12–20)
CALCIUM SERPL-MCNC: 8.5 MG/DL (ref 8.5–10.1)
CHLORIDE SERPL-SCNC: 110 MMOL/L (ref 100–111)
CO2 SERPL-SCNC: 29 MMOL/L (ref 21–32)
CREAT SERPL-MCNC: 0.83 MG/DL (ref 0.6–1.3)
DIFFERENTIAL METHOD BLD: ABNORMAL
EOSINOPHIL # BLD: 0.2 K/UL (ref 0–0.4)
EOSINOPHIL NFR BLD: 3 % (ref 0–5)
ERYTHROCYTE [DISTWIDTH] IN BLOOD BY AUTOMATED COUNT: 13.9 % (ref 11.6–14.5)
FOLATE SERPL-MCNC: 6.3 NG/ML (ref 3.1–17.5)
GLUCOSE SERPL-MCNC: 87 MG/DL (ref 74–99)
HCT VFR BLD AUTO: 26.9 % (ref 35–45)
HGB BLD-MCNC: 8.8 G/DL (ref 12–16)
IMM GRANULOCYTES # BLD AUTO: 0 K/UL (ref 0–0.04)
IMM GRANULOCYTES NFR BLD AUTO: 0 % (ref 0–0.5)
LYMPHOCYTES # BLD: 2.6 K/UL (ref 0.9–3.6)
LYMPHOCYTES NFR BLD: 32 % (ref 21–52)
MAGNESIUM SERPL-MCNC: 1.8 MG/DL (ref 1.6–2.6)
MCH RBC QN AUTO: 31.2 PG (ref 24–34)
MCHC RBC AUTO-ENTMCNC: 32.7 G/DL (ref 31–37)
MCV RBC AUTO: 95.4 FL (ref 78–100)
MONOCYTES # BLD: 0.8 K/UL (ref 0.05–1.2)
MONOCYTES NFR BLD: 10 % (ref 3–10)
NEUTS SEG # BLD: 4.4 K/UL (ref 1.8–8)
NEUTS SEG NFR BLD: 55 % (ref 40–73)
NRBC # BLD: 0 K/UL (ref 0–0.01)
NRBC BLD-RTO: 0 PER 100 WBC
PHOSPHATE SERPL-MCNC: 2.9 MG/DL (ref 2.5–4.9)
PLATELET # BLD AUTO: 185 K/UL (ref 135–420)
PMV BLD AUTO: 10.5 FL (ref 9.2–11.8)
POTASSIUM SERPL-SCNC: 3.6 MMOL/L (ref 3.5–5.5)
RBC # BLD AUTO: 2.82 M/UL (ref 4.2–5.3)
SODIUM SERPL-SCNC: 141 MMOL/L (ref 136–145)
T4 FREE SERPL-MCNC: 1.1 NG/DL (ref 0.7–1.5)
TSH SERPL DL<=0.05 MIU/L-ACNC: 1.36 UIU/ML (ref 0.36–3.74)
VIT B12 SERPL-MCNC: 627 PG/ML (ref 211–911)
WBC # BLD AUTO: 8.1 K/UL (ref 4.6–13.2)

## 2024-04-27 PROCEDURE — 36415 COLL VENOUS BLD VENIPUNCTURE: CPT

## 2024-04-27 PROCEDURE — 99232 SBSQ HOSP IP/OBS MODERATE 35: CPT | Performed by: HOSPITALIST

## 2024-04-27 PROCEDURE — 82140 ASSAY OF AMMONIA: CPT

## 2024-04-27 PROCEDURE — 6360000002 HC RX W HCPCS: Performed by: HOSPITALIST

## 2024-04-27 PROCEDURE — 6370000000 HC RX 637 (ALT 250 FOR IP)

## 2024-04-27 PROCEDURE — 85025 COMPLETE CBC W/AUTO DIFF WBC: CPT

## 2024-04-27 PROCEDURE — 82607 VITAMIN B-12: CPT

## 2024-04-27 PROCEDURE — 82306 VITAMIN D 25 HYDROXY: CPT

## 2024-04-27 PROCEDURE — 80048 BASIC METABOLIC PNL TOTAL CA: CPT

## 2024-04-27 PROCEDURE — 82746 ASSAY OF FOLIC ACID SERUM: CPT

## 2024-04-27 PROCEDURE — 84439 ASSAY OF FREE THYROXINE: CPT

## 2024-04-27 PROCEDURE — 84100 ASSAY OF PHOSPHORUS: CPT

## 2024-04-27 PROCEDURE — 84443 ASSAY THYROID STIM HORMONE: CPT

## 2024-04-27 PROCEDURE — 1100000000 HC RM PRIVATE

## 2024-04-27 PROCEDURE — 83735 ASSAY OF MAGNESIUM: CPT

## 2024-04-27 PROCEDURE — 6370000000 HC RX 637 (ALT 250 FOR IP): Performed by: HOSPITALIST

## 2024-04-27 PROCEDURE — 99231 SBSQ HOSP IP/OBS SF/LOW 25: CPT | Performed by: ORTHOPAEDIC SURGERY

## 2024-04-27 RX ORDER — FOLIC ACID 1 MG/1
1 TABLET ORAL DAILY
Status: DISCONTINUED | OUTPATIENT
Start: 2024-04-27 | End: 2024-05-01 | Stop reason: HOSPADM

## 2024-04-27 RX ADMIN — FAMOTIDINE 20 MG: 20 TABLET ORAL at 08:57

## 2024-04-27 RX ADMIN — OXYCODONE AND ACETAMINOPHEN 1 TABLET: 5; 325 TABLET ORAL at 07:13

## 2024-04-27 RX ADMIN — MEMANTINE HYDROCHLORIDE 5 MG: 5 TABLET ORAL at 08:58

## 2024-04-27 RX ADMIN — ENOXAPARIN SODIUM 40 MG: 100 INJECTION SUBCUTANEOUS at 08:58

## 2024-04-27 RX ADMIN — MEMANTINE HYDROCHLORIDE 5 MG: 5 TABLET ORAL at 20:23

## 2024-04-27 RX ADMIN — SERTRALINE HYDROCHLORIDE 75 MG: 50 TABLET ORAL at 08:57

## 2024-04-27 RX ADMIN — ASPIRIN 81 MG: 81 TABLET, COATED ORAL at 08:57

## 2024-04-27 RX ADMIN — FAMOTIDINE 20 MG: 20 TABLET ORAL at 20:23

## 2024-04-27 RX ADMIN — OXYCODONE AND ACETAMINOPHEN 1 TABLET: 5; 325 TABLET ORAL at 19:46

## 2024-04-27 RX ADMIN — SENNOSIDES AND DOCUSATE SODIUM 2 TABLET: 50; 8.6 TABLET ORAL at 08:57

## 2024-04-27 RX ADMIN — AMLODIPINE BESYLATE 10 MG: 10 TABLET ORAL at 08:58

## 2024-04-27 RX ADMIN — FOLIC ACID 1 MG: 1 TABLET ORAL at 15:35

## 2024-04-27 RX ADMIN — ATORVASTATIN CALCIUM 10 MG: 10 TABLET, FILM COATED ORAL at 20:23

## 2024-04-27 RX ADMIN — DONEPEZIL HYDROCHLORIDE 10 MG: 5 TABLET, FILM COATED ORAL at 20:23

## 2024-04-27 ASSESSMENT — PAIN - FUNCTIONAL ASSESSMENT
PAIN_FUNCTIONAL_ASSESSMENT: ACTIVITIES ARE NOT PREVENTED
PAIN_FUNCTIONAL_ASSESSMENT: ACTIVITIES ARE NOT PREVENTED

## 2024-04-27 ASSESSMENT — PAIN DESCRIPTION - DESCRIPTORS
DESCRIPTORS: ACHING
DESCRIPTORS: ACHING

## 2024-04-27 ASSESSMENT — PAIN DESCRIPTION - LOCATION
LOCATION: HIP;LEG
LOCATION: LEG;KNEE

## 2024-04-27 ASSESSMENT — PAIN DESCRIPTION - DIRECTION: RADIATING_TOWARDS: BACK

## 2024-04-27 ASSESSMENT — PAIN SCALES - GENERAL
PAINLEVEL_OUTOF10: 0
PAINLEVEL_OUTOF10: 8
PAINLEVEL_OUTOF10: 8
PAINLEVEL_OUTOF10: 0
PAINLEVEL_OUTOF10: 0

## 2024-04-27 ASSESSMENT — PAIN DESCRIPTION - ORIENTATION
ORIENTATION: RIGHT;LEFT
ORIENTATION: RIGHT;LEFT

## 2024-04-27 ASSESSMENT — PAIN DESCRIPTION - ONSET: ONSET: GRADUAL

## 2024-04-27 ASSESSMENT — PAIN DESCRIPTION - FREQUENCY: FREQUENCY: INTERMITTENT

## 2024-04-27 ASSESSMENT — PAIN SCALES - WONG BAKER: WONGBAKER_NUMERICALRESPONSE: NO HURT

## 2024-04-27 ASSESSMENT — PAIN DESCRIPTION - PAIN TYPE: TYPE: ACUTE PAIN

## 2024-04-28 LAB
ANION GAP SERPL CALC-SCNC: 2 MMOL/L (ref 3–18)
BASOPHILS # BLD: 0.1 K/UL (ref 0–0.1)
BASOPHILS NFR BLD: 1 % (ref 0–2)
BUN SERPL-MCNC: 25 MG/DL (ref 7–18)
BUN/CREAT SERPL: 28 (ref 12–20)
CALCIUM SERPL-MCNC: 8.4 MG/DL (ref 8.5–10.1)
CHLORIDE SERPL-SCNC: 109 MMOL/L (ref 100–111)
CO2 SERPL-SCNC: 31 MMOL/L (ref 21–32)
CREAT SERPL-MCNC: 0.89 MG/DL (ref 0.6–1.3)
DIFFERENTIAL METHOD BLD: ABNORMAL
EOSINOPHIL # BLD: 0.4 K/UL (ref 0–0.4)
EOSINOPHIL NFR BLD: 5 % (ref 0–5)
ERYTHROCYTE [DISTWIDTH] IN BLOOD BY AUTOMATED COUNT: 13.8 % (ref 11.6–14.5)
FERRITIN SERPL-MCNC: 153 NG/ML (ref 8–388)
GLUCOSE SERPL-MCNC: 87 MG/DL (ref 74–99)
HCT VFR BLD AUTO: 26.4 % (ref 35–45)
HGB BLD-MCNC: 8.6 G/DL (ref 12–16)
IMM GRANULOCYTES # BLD AUTO: 0 K/UL (ref 0–0.04)
IMM GRANULOCYTES NFR BLD AUTO: 0 % (ref 0–0.5)
IRON SATN MFR SERPL: 16 % (ref 20–50)
IRON SERPL-MCNC: 31 UG/DL (ref 50–175)
LYMPHOCYTES # BLD: 2.4 K/UL (ref 0.9–3.6)
LYMPHOCYTES NFR BLD: 31 % (ref 21–52)
MCH RBC QN AUTO: 31.4 PG (ref 24–34)
MCHC RBC AUTO-ENTMCNC: 32.6 G/DL (ref 31–37)
MCV RBC AUTO: 96.4 FL (ref 78–100)
MONOCYTES # BLD: 0.8 K/UL (ref 0.05–1.2)
MONOCYTES NFR BLD: 11 % (ref 3–10)
NEUTS SEG # BLD: 4.1 K/UL (ref 1.8–8)
NEUTS SEG NFR BLD: 52 % (ref 40–73)
NRBC # BLD: 0 K/UL (ref 0–0.01)
NRBC BLD-RTO: 0 PER 100 WBC
PLATELET # BLD AUTO: 198 K/UL (ref 135–420)
PMV BLD AUTO: 10 FL (ref 9.2–11.8)
POTASSIUM SERPL-SCNC: 3.9 MMOL/L (ref 3.5–5.5)
RBC # BLD AUTO: 2.74 M/UL (ref 4.2–5.3)
SODIUM SERPL-SCNC: 142 MMOL/L (ref 136–145)
TIBC SERPL-MCNC: 190 UG/DL (ref 250–450)
WBC # BLD AUTO: 7.8 K/UL (ref 4.6–13.2)

## 2024-04-28 PROCEDURE — 6370000000 HC RX 637 (ALT 250 FOR IP): Performed by: HOSPITALIST

## 2024-04-28 PROCEDURE — 85025 COMPLETE CBC W/AUTO DIFF WBC: CPT

## 2024-04-28 PROCEDURE — 83550 IRON BINDING TEST: CPT

## 2024-04-28 PROCEDURE — 82728 ASSAY OF FERRITIN: CPT

## 2024-04-28 PROCEDURE — 83540 ASSAY OF IRON: CPT

## 2024-04-28 PROCEDURE — 1100000000 HC RM PRIVATE

## 2024-04-28 PROCEDURE — 36415 COLL VENOUS BLD VENIPUNCTURE: CPT

## 2024-04-28 PROCEDURE — 80048 BASIC METABOLIC PNL TOTAL CA: CPT

## 2024-04-28 PROCEDURE — 6370000000 HC RX 637 (ALT 250 FOR IP)

## 2024-04-28 PROCEDURE — 99232 SBSQ HOSP IP/OBS MODERATE 35: CPT | Performed by: HOSPITALIST

## 2024-04-28 PROCEDURE — 97530 THERAPEUTIC ACTIVITIES: CPT

## 2024-04-28 PROCEDURE — 6360000002 HC RX W HCPCS: Performed by: HOSPITALIST

## 2024-04-28 PROCEDURE — 97535 SELF CARE MNGMENT TRAINING: CPT

## 2024-04-28 RX ORDER — FERROUS SULFATE 325(65) MG
325 TABLET ORAL
Status: DISCONTINUED | OUTPATIENT
Start: 2024-04-29 | End: 2024-05-01 | Stop reason: HOSPADM

## 2024-04-28 RX ORDER — FAMOTIDINE 20 MG/1
20 TABLET, FILM COATED ORAL DAILY
Status: DISCONTINUED | OUTPATIENT
Start: 2024-04-29 | End: 2024-05-01 | Stop reason: HOSPADM

## 2024-04-28 RX ADMIN — MEMANTINE HYDROCHLORIDE 5 MG: 5 TABLET ORAL at 21:09

## 2024-04-28 RX ADMIN — DONEPEZIL HYDROCHLORIDE 10 MG: 5 TABLET, FILM COATED ORAL at 21:09

## 2024-04-28 RX ADMIN — AMLODIPINE BESYLATE 10 MG: 10 TABLET ORAL at 10:56

## 2024-04-28 RX ADMIN — SENNOSIDES AND DOCUSATE SODIUM 2 TABLET: 50; 8.6 TABLET ORAL at 10:56

## 2024-04-28 RX ADMIN — FAMOTIDINE 20 MG: 20 TABLET ORAL at 10:56

## 2024-04-28 RX ADMIN — MEMANTINE HYDROCHLORIDE 5 MG: 5 TABLET ORAL at 10:56

## 2024-04-28 RX ADMIN — SERTRALINE HYDROCHLORIDE 75 MG: 50 TABLET ORAL at 10:56

## 2024-04-28 RX ADMIN — FOLIC ACID 1 MG: 1 TABLET ORAL at 10:56

## 2024-04-28 RX ADMIN — ENOXAPARIN SODIUM 40 MG: 100 INJECTION SUBCUTANEOUS at 10:55

## 2024-04-28 RX ADMIN — OXYCODONE AND ACETAMINOPHEN 1 TABLET: 5; 325 TABLET ORAL at 21:09

## 2024-04-28 RX ADMIN — ATORVASTATIN CALCIUM 10 MG: 10 TABLET, FILM COATED ORAL at 21:09

## 2024-04-28 RX ADMIN — OXYCODONE AND ACETAMINOPHEN 1 TABLET: 5; 325 TABLET ORAL at 05:22

## 2024-04-28 RX ADMIN — ASPIRIN 81 MG: 81 TABLET, COATED ORAL at 10:56

## 2024-04-28 ASSESSMENT — PAIN DESCRIPTION - DIRECTION
RADIATING_TOWARDS: BACK
RADIATING_TOWARDS: BACK

## 2024-04-28 ASSESSMENT — PAIN SCALES - GENERAL
PAINLEVEL_OUTOF10: 0
PAINLEVEL_OUTOF10: 0
PAINLEVEL_OUTOF10: 9
PAINLEVEL_OUTOF10: 0
PAINLEVEL_OUTOF10: 9
PAINLEVEL_OUTOF10: 0
PAINLEVEL_OUTOF10: 0

## 2024-04-28 ASSESSMENT — PAIN DESCRIPTION - ORIENTATION
ORIENTATION: LEFT;RIGHT
ORIENTATION: RIGHT;LEFT

## 2024-04-28 ASSESSMENT — PAIN DESCRIPTION - LOCATION
LOCATION: LEG;HIP
LOCATION: HIP;KNEE;LEG

## 2024-04-28 ASSESSMENT — PAIN DESCRIPTION - FREQUENCY
FREQUENCY: INTERMITTENT
FREQUENCY: INTERMITTENT

## 2024-04-28 ASSESSMENT — PAIN DESCRIPTION - ONSET
ONSET: ON-GOING
ONSET: ON-GOING

## 2024-04-28 ASSESSMENT — PAIN DESCRIPTION - DESCRIPTORS
DESCRIPTORS: ACHING
DESCRIPTORS: ACHING

## 2024-04-28 ASSESSMENT — PAIN DESCRIPTION - PAIN TYPE
TYPE: ACUTE PAIN
TYPE: ACUTE PAIN

## 2024-04-28 ASSESSMENT — PAIN SCALES - WONG BAKER
WONGBAKER_NUMERICALRESPONSE: NO HURT
WONGBAKER_NUMERICALRESPONSE: NO HURT

## 2024-04-29 PROBLEM — K59.00 CONSTIPATION: Status: ACTIVE | Noted: 2024-04-29

## 2024-04-29 LAB
ANION GAP SERPL CALC-SCNC: 4 MMOL/L (ref 3–18)
BASOPHILS # BLD: 0 K/UL (ref 0–0.1)
BASOPHILS NFR BLD: 0 % (ref 0–2)
BUN SERPL-MCNC: 20 MG/DL (ref 7–18)
BUN/CREAT SERPL: 26 (ref 12–20)
CALCIUM SERPL-MCNC: 8.6 MG/DL (ref 8.5–10.1)
CHLORIDE SERPL-SCNC: 108 MMOL/L (ref 100–111)
CO2 SERPL-SCNC: 28 MMOL/L (ref 21–32)
CREAT SERPL-MCNC: 0.78 MG/DL (ref 0.6–1.3)
DIFFERENTIAL METHOD BLD: ABNORMAL
EOSINOPHIL # BLD: 0.3 K/UL (ref 0–0.4)
EOSINOPHIL NFR BLD: 3 % (ref 0–5)
ERYTHROCYTE [DISTWIDTH] IN BLOOD BY AUTOMATED COUNT: 13.5 % (ref 11.6–14.5)
GLUCOSE SERPL-MCNC: 82 MG/DL (ref 74–99)
HCT VFR BLD AUTO: 27.4 % (ref 35–45)
HGB BLD-MCNC: 8.9 G/DL (ref 12–16)
IMM GRANULOCYTES # BLD AUTO: 0 K/UL (ref 0–0.04)
IMM GRANULOCYTES NFR BLD AUTO: 0 % (ref 0–0.5)
LYMPHOCYTES # BLD: 2.1 K/UL (ref 0.9–3.6)
LYMPHOCYTES NFR BLD: 24 % (ref 21–52)
MAGNESIUM SERPL-MCNC: 1.7 MG/DL (ref 1.6–2.6)
MCH RBC QN AUTO: 30.9 PG (ref 24–34)
MCHC RBC AUTO-ENTMCNC: 32.5 G/DL (ref 31–37)
MCV RBC AUTO: 95.1 FL (ref 78–100)
MONOCYTES # BLD: 1 K/UL (ref 0.05–1.2)
MONOCYTES NFR BLD: 11 % (ref 3–10)
NEUTS SEG # BLD: 5.6 K/UL (ref 1.8–8)
NEUTS SEG NFR BLD: 62 % (ref 40–73)
NRBC # BLD: 0 K/UL (ref 0–0.01)
NRBC BLD-RTO: 0 PER 100 WBC
PLATELET # BLD AUTO: 220 K/UL (ref 135–420)
PMV BLD AUTO: 9.6 FL (ref 9.2–11.8)
POTASSIUM SERPL-SCNC: 3.4 MMOL/L (ref 3.5–5.5)
RBC # BLD AUTO: 2.88 M/UL (ref 4.2–5.3)
SODIUM SERPL-SCNC: 140 MMOL/L (ref 136–145)
WBC # BLD AUTO: 9.1 K/UL (ref 4.6–13.2)

## 2024-04-29 PROCEDURE — 6370000000 HC RX 637 (ALT 250 FOR IP)

## 2024-04-29 PROCEDURE — 85025 COMPLETE CBC W/AUTO DIFF WBC: CPT

## 2024-04-29 PROCEDURE — 83735 ASSAY OF MAGNESIUM: CPT

## 2024-04-29 PROCEDURE — 36415 COLL VENOUS BLD VENIPUNCTURE: CPT

## 2024-04-29 PROCEDURE — 6370000000 HC RX 637 (ALT 250 FOR IP): Performed by: HOSPITALIST

## 2024-04-29 PROCEDURE — 1100000000 HC RM PRIVATE

## 2024-04-29 PROCEDURE — 80048 BASIC METABOLIC PNL TOTAL CA: CPT

## 2024-04-29 PROCEDURE — 99223 1ST HOSP IP/OBS HIGH 75: CPT | Performed by: INTERNAL MEDICINE

## 2024-04-29 PROCEDURE — 99232 SBSQ HOSP IP/OBS MODERATE 35: CPT | Performed by: HOSPITALIST

## 2024-04-29 PROCEDURE — 6360000002 HC RX W HCPCS: Performed by: HOSPITALIST

## 2024-04-29 RX ORDER — LANOLIN ALCOHOL/MO/W.PET/CERES
400 CREAM (GRAM) TOPICAL DAILY
Status: DISCONTINUED | OUTPATIENT
Start: 2024-04-29 | End: 2024-05-01 | Stop reason: HOSPADM

## 2024-04-29 RX ORDER — POLYETHYLENE GLYCOL 3350 17 G/17G
17 POWDER, FOR SOLUTION ORAL DAILY
Status: DISCONTINUED | OUTPATIENT
Start: 2024-04-29 | End: 2024-05-01 | Stop reason: HOSPADM

## 2024-04-29 RX ORDER — POTASSIUM CHLORIDE 20 MEQ/1
40 TABLET, EXTENDED RELEASE ORAL ONCE
Status: COMPLETED | OUTPATIENT
Start: 2024-04-29 | End: 2024-04-29

## 2024-04-29 RX ADMIN — FOLIC ACID 1 MG: 1 TABLET ORAL at 08:27

## 2024-04-29 RX ADMIN — FAMOTIDINE 20 MG: 20 TABLET ORAL at 08:27

## 2024-04-29 RX ADMIN — MEMANTINE HYDROCHLORIDE 5 MG: 5 TABLET ORAL at 20:10

## 2024-04-29 RX ADMIN — ATORVASTATIN CALCIUM 10 MG: 10 TABLET, FILM COATED ORAL at 20:10

## 2024-04-29 RX ADMIN — POTASSIUM CHLORIDE 40 MEQ: 1500 TABLET, EXTENDED RELEASE ORAL at 10:39

## 2024-04-29 RX ADMIN — MEMANTINE HYDROCHLORIDE 5 MG: 5 TABLET ORAL at 08:28

## 2024-04-29 RX ADMIN — FERROUS SULFATE TAB 325 MG (65 MG ELEMENTAL FE) 325 MG: 325 (65 FE) TAB at 08:28

## 2024-04-29 RX ADMIN — POLYETHYLENE GLYCOL 3350 17 G: 17 POWDER, FOR SOLUTION ORAL at 20:11

## 2024-04-29 RX ADMIN — SENNOSIDES AND DOCUSATE SODIUM 2 TABLET: 50; 8.6 TABLET ORAL at 08:28

## 2024-04-29 RX ADMIN — ASPIRIN 81 MG: 81 TABLET, COATED ORAL at 08:27

## 2024-04-29 RX ADMIN — OXYCODONE AND ACETAMINOPHEN 1 TABLET: 5; 325 TABLET ORAL at 06:24

## 2024-04-29 RX ADMIN — AMLODIPINE BESYLATE 10 MG: 10 TABLET ORAL at 08:28

## 2024-04-29 RX ADMIN — ACETAMINOPHEN 650 MG: 325 TABLET ORAL at 20:10

## 2024-04-29 RX ADMIN — Medication 400 MG: at 10:39

## 2024-04-29 RX ADMIN — ENOXAPARIN SODIUM 40 MG: 100 INJECTION SUBCUTANEOUS at 08:27

## 2024-04-29 RX ADMIN — OXYCODONE AND ACETAMINOPHEN 1 TABLET: 5; 325 TABLET ORAL at 14:30

## 2024-04-29 RX ADMIN — SERTRALINE HYDROCHLORIDE 75 MG: 50 TABLET ORAL at 08:27

## 2024-04-29 RX ADMIN — DONEPEZIL HYDROCHLORIDE 10 MG: 5 TABLET, FILM COATED ORAL at 20:10

## 2024-04-29 ASSESSMENT — PAIN DESCRIPTION - ORIENTATION
ORIENTATION: RIGHT;LEFT;ANTERIOR;POSTERIOR
ORIENTATION: LEFT
ORIENTATION: LEFT

## 2024-04-29 ASSESSMENT — PAIN - FUNCTIONAL ASSESSMENT
PAIN_FUNCTIONAL_ASSESSMENT: ACTIVITIES ARE NOT PREVENTED

## 2024-04-29 ASSESSMENT — PAIN DESCRIPTION - LOCATION
LOCATION: HIP

## 2024-04-29 ASSESSMENT — PAIN DESCRIPTION - DESCRIPTORS
DESCRIPTORS: SORE;TENDER
DESCRIPTORS: ACHING
DESCRIPTORS: ACHING

## 2024-04-29 ASSESSMENT — PAIN DESCRIPTION - ONSET: ONSET: SUDDEN

## 2024-04-29 ASSESSMENT — PAIN SCALES - GENERAL
PAINLEVEL_OUTOF10: 8
PAINLEVEL_OUTOF10: 8
PAINLEVEL_OUTOF10: 0
PAINLEVEL_OUTOF10: 2

## 2024-04-29 ASSESSMENT — PAIN SCALES - WONG BAKER: WONGBAKER_NUMERICALRESPONSE: NO HURT

## 2024-04-29 ASSESSMENT — PAIN DESCRIPTION - FREQUENCY: FREQUENCY: INTERMITTENT

## 2024-04-29 ASSESSMENT — PAIN DESCRIPTION - PAIN TYPE
TYPE: ACUTE PAIN
TYPE: ACUTE PAIN

## 2024-04-29 NOTE — ACP (ADVANCE CARE PLANNING)
Advance Care Planning     Palliative Team Advance Care Planning (ACP) Conversation    Date of Conversation: 04/29/24    Individuals present for the conversation: Patient     ACP documents on file prior to discussion:  -None    Previously completed document/s not on file: Patient / participant reports that there are no previously executed ACP documents.    Healthcare Decision Maker:    Primary Decision Maker: Delio Osborne - Child - [phone]    Secondary Decision Maker: Danilo Osborne - Child - [phone]     Conversation Summary:  Mona Olea is a 79 y.o. with a past history of dementia, hyperlipidemia, hypertension, anxiety, carotid artery stenosis, arthritis, dyslipidemia and glaucoma. Patient was admitted via the ED after a fall in the home and c/o pain with decreased function noted by son, Delio.  Patient was seen at bedside. Patient is frail and has difficulty with word finding and speech. Patient was limited in the medical history she was able to provide.   She c/o pain in legs with some tingling only when she walked. She confirmed she was eating OK.  Patient misidentified her son, Delio as he  during our consult visit.     Humberto placed to son, Delio Osborne. The role of palliative was introduced as extra support. He stated he cares for his mother and his brother, Danilo care for their father who is under hospice care for dementia.  Delio stated that his mother does wander at nighttime but he has some safety measures in place. He confirmed after some questions that she does not always know he as her son.   Team discussed goals of care with Mr Osborne He shared that he would want all efforts to resuscitate his mother when asked \"With all due respect, f*#k yeah\".  Discussed the benefits and burdens of intubation and CPR in the event of respiratory decline or cardiopulmonary arrest in a patient with progressive dementia. He shared that his mother cared for her mother in their home and that is what he plans on

## 2024-04-30 PROBLEM — E43 SEVERE PROTEIN-CALORIE MALNUTRITION (HCC): Status: ACTIVE | Noted: 2024-04-30

## 2024-04-30 LAB
ANION GAP SERPL CALC-SCNC: 5 MMOL/L (ref 3–18)
BASOPHILS # BLD: 0.1 K/UL (ref 0–0.1)
BASOPHILS NFR BLD: 1 % (ref 0–2)
BUN SERPL-MCNC: 20 MG/DL (ref 7–18)
BUN/CREAT SERPL: 23 (ref 12–20)
CALCIUM SERPL-MCNC: 8.9 MG/DL (ref 8.5–10.1)
CHLORIDE SERPL-SCNC: 109 MMOL/L (ref 100–111)
CO2 SERPL-SCNC: 27 MMOL/L (ref 21–32)
CREAT SERPL-MCNC: 0.87 MG/DL (ref 0.6–1.3)
DIFFERENTIAL METHOD BLD: ABNORMAL
EOSINOPHIL # BLD: 0.4 K/UL (ref 0–0.4)
EOSINOPHIL NFR BLD: 4 % (ref 0–5)
ERYTHROCYTE [DISTWIDTH] IN BLOOD BY AUTOMATED COUNT: 13.6 % (ref 11.6–14.5)
GLUCOSE SERPL-MCNC: 92 MG/DL (ref 74–99)
HCT VFR BLD AUTO: 28.9 % (ref 35–45)
HGB BLD-MCNC: 9.5 G/DL (ref 12–16)
IMM GRANULOCYTES # BLD AUTO: 0 K/UL (ref 0–0.04)
IMM GRANULOCYTES NFR BLD AUTO: 0 % (ref 0–0.5)
LYMPHOCYTES # BLD: 2.7 K/UL (ref 0.9–3.6)
LYMPHOCYTES NFR BLD: 31 % (ref 21–52)
MAGNESIUM SERPL-MCNC: 1.8 MG/DL (ref 1.6–2.6)
MCH RBC QN AUTO: 30.9 PG (ref 24–34)
MCHC RBC AUTO-ENTMCNC: 32.9 G/DL (ref 31–37)
MCV RBC AUTO: 94.1 FL (ref 78–100)
MONOCYTES # BLD: 0.9 K/UL (ref 0.05–1.2)
MONOCYTES NFR BLD: 10 % (ref 3–10)
NEUTS SEG # BLD: 4.8 K/UL (ref 1.8–8)
NEUTS SEG NFR BLD: 54 % (ref 40–73)
NRBC # BLD: 0 K/UL (ref 0–0.01)
NRBC BLD-RTO: 0 PER 100 WBC
PLATELET # BLD AUTO: 255 K/UL (ref 135–420)
PMV BLD AUTO: 9.5 FL (ref 9.2–11.8)
POTASSIUM SERPL-SCNC: 4 MMOL/L (ref 3.5–5.5)
RBC # BLD AUTO: 3.07 M/UL (ref 4.2–5.3)
SARS-COV-2 RDRP RESP QL NAA+PROBE: NOT DETECTED
SODIUM SERPL-SCNC: 141 MMOL/L (ref 136–145)
SOURCE: NORMAL
WBC # BLD AUTO: 8.9 K/UL (ref 4.6–13.2)

## 2024-04-30 PROCEDURE — 80048 BASIC METABOLIC PNL TOTAL CA: CPT

## 2024-04-30 PROCEDURE — 97110 THERAPEUTIC EXERCISES: CPT

## 2024-04-30 PROCEDURE — 6370000000 HC RX 637 (ALT 250 FOR IP): Performed by: HOSPITALIST

## 2024-04-30 PROCEDURE — 87635 SARS-COV-2 COVID-19 AMP PRB: CPT

## 2024-04-30 PROCEDURE — 6370000000 HC RX 637 (ALT 250 FOR IP)

## 2024-04-30 PROCEDURE — 36415 COLL VENOUS BLD VENIPUNCTURE: CPT

## 2024-04-30 PROCEDURE — 6370000000 HC RX 637 (ALT 250 FOR IP): Performed by: STUDENT IN AN ORGANIZED HEALTH CARE EDUCATION/TRAINING PROGRAM

## 2024-04-30 PROCEDURE — 94761 N-INVAS EAR/PLS OXIMETRY MLT: CPT

## 2024-04-30 PROCEDURE — 99232 SBSQ HOSP IP/OBS MODERATE 35: CPT | Performed by: STUDENT IN AN ORGANIZED HEALTH CARE EDUCATION/TRAINING PROGRAM

## 2024-04-30 PROCEDURE — 97116 GAIT TRAINING THERAPY: CPT

## 2024-04-30 PROCEDURE — 97535 SELF CARE MNGMENT TRAINING: CPT

## 2024-04-30 PROCEDURE — 85025 COMPLETE CBC W/AUTO DIFF WBC: CPT

## 2024-04-30 PROCEDURE — 1100000000 HC RM PRIVATE

## 2024-04-30 PROCEDURE — 97530 THERAPEUTIC ACTIVITIES: CPT

## 2024-04-30 PROCEDURE — 6360000002 HC RX W HCPCS: Performed by: HOSPITALIST

## 2024-04-30 PROCEDURE — 83735 ASSAY OF MAGNESIUM: CPT

## 2024-04-30 RX ORDER — MULTIVITAMIN WITH IRON
1 TABLET ORAL DAILY
Status: DISCONTINUED | OUTPATIENT
Start: 2024-04-30 | End: 2024-05-01 | Stop reason: HOSPADM

## 2024-04-30 RX ORDER — GAUZE BANDAGE 2" X 2"
100 BANDAGE TOPICAL DAILY
Status: DISCONTINUED | OUTPATIENT
Start: 2024-04-30 | End: 2024-05-01 | Stop reason: HOSPADM

## 2024-04-30 RX ADMIN — FAMOTIDINE 20 MG: 20 TABLET ORAL at 08:07

## 2024-04-30 RX ADMIN — MEMANTINE HYDROCHLORIDE 5 MG: 5 TABLET ORAL at 08:07

## 2024-04-30 RX ADMIN — POLYETHYLENE GLYCOL 3350 17 G: 17 POWDER, FOR SOLUTION ORAL at 08:07

## 2024-04-30 RX ADMIN — FOLIC ACID 1 MG: 1 TABLET ORAL at 08:07

## 2024-04-30 RX ADMIN — Medication 100 MG: at 12:30

## 2024-04-30 RX ADMIN — AMLODIPINE BESYLATE 10 MG: 10 TABLET ORAL at 08:07

## 2024-04-30 RX ADMIN — Medication 400 MG: at 08:07

## 2024-04-30 RX ADMIN — SERTRALINE HYDROCHLORIDE 75 MG: 50 TABLET ORAL at 08:07

## 2024-04-30 RX ADMIN — ATORVASTATIN CALCIUM 10 MG: 10 TABLET, FILM COATED ORAL at 21:18

## 2024-04-30 RX ADMIN — SENNOSIDES AND DOCUSATE SODIUM 2 TABLET: 50; 8.6 TABLET ORAL at 08:07

## 2024-04-30 RX ADMIN — TRAMADOL HYDROCHLORIDE 50 MG: 50 TABLET ORAL at 21:19

## 2024-04-30 RX ADMIN — THERA TABS 1 TABLET: TAB at 12:30

## 2024-04-30 RX ADMIN — ASPIRIN 81 MG: 81 TABLET, COATED ORAL at 08:07

## 2024-04-30 RX ADMIN — OXYCODONE AND ACETAMINOPHEN 1 TABLET: 5; 325 TABLET ORAL at 17:45

## 2024-04-30 RX ADMIN — ENOXAPARIN SODIUM 40 MG: 100 INJECTION SUBCUTANEOUS at 08:07

## 2024-04-30 RX ADMIN — Medication 3 MG: at 21:18

## 2024-04-30 RX ADMIN — MEMANTINE HYDROCHLORIDE 5 MG: 5 TABLET ORAL at 21:19

## 2024-04-30 RX ADMIN — FERROUS SULFATE TAB 325 MG (65 MG ELEMENTAL FE) 325 MG: 325 (65 FE) TAB at 08:07

## 2024-04-30 RX ADMIN — DONEPEZIL HYDROCHLORIDE 10 MG: 5 TABLET, FILM COATED ORAL at 21:18

## 2024-04-30 ASSESSMENT — PAIN DESCRIPTION - PAIN TYPE
TYPE: ACUTE PAIN

## 2024-04-30 ASSESSMENT — PAIN DESCRIPTION - FREQUENCY
FREQUENCY: INTERMITTENT

## 2024-04-30 ASSESSMENT — PAIN SCALES - GENERAL
PAINLEVEL_OUTOF10: 1
PAINLEVEL_OUTOF10: 7
PAINLEVEL_OUTOF10: 6
PAINLEVEL_OUTOF10: 3

## 2024-04-30 ASSESSMENT — PAIN DESCRIPTION - ORIENTATION
ORIENTATION: LEFT

## 2024-04-30 ASSESSMENT — PAIN DESCRIPTION - ONSET
ONSET: SUDDEN

## 2024-04-30 ASSESSMENT — PAIN - FUNCTIONAL ASSESSMENT
PAIN_FUNCTIONAL_ASSESSMENT: ACTIVITIES ARE NOT PREVENTED

## 2024-04-30 ASSESSMENT — PAIN DESCRIPTION - DESCRIPTORS
DESCRIPTORS: ACHING;DISCOMFORT
DESCRIPTORS: ACHING;DISCOMFORT
DESCRIPTORS: DISCOMFORT
DESCRIPTORS: THROBBING;SPASM

## 2024-04-30 ASSESSMENT — PAIN DESCRIPTION - LOCATION
LOCATION: HIP

## 2024-04-30 NOTE — DISCHARGE INSTR - DIET
Good nutrition is important when healing from an illness, injury, or surgery.  Follow any nutrition recommendations given to you during your hospital stay.   If you were given an oral nutrition supplement while in the hospital, continue to take this supplement at home.  You can take it with meals, in-between meals, and/or before bedtime. These supplements can be purchased at most local grocery stores, pharmacies, and chain [employer]-stores.   If you have any questions about your diet or nutrition, call the hospital and ask for the dietitian.  Recommend Continuing Oral Nutrition Supplement (ONS) at Discharge.   Ensure Complete or Ensure Plus or a comparable/similar product Three times daily for 30 days unless otherwise directed by your Primary Care Physician.     Nutrition Supplements  If you are not able to eat enough food or if you have extra calorie requirements, oral supplements can provide you with additional calories, protein, vitamins and minerals.     Where do you find oral Nutrition Supplements?  These supplements are available at most pharmacies, grocery retailers like [employer], and many others.    How do I pay for these oral supplements?  1. Use coupons  2. Go online to register for coupons and health tips!   www.ensure.com - click “register”  www.boost/com - click “coupons and videos”  wwwTodaytickets - click “breakfast club”  3. Investigate your eligibility for prescription assistance: www.pparx.org/en/prescription_assistance_programs/discount_cards  4. Get co-pay card to get a discount of at least $10 (with your 30 day prescription), on some insurance plans with card will cover the ensure fee of oral supplements (making it free!)    Piedad Strickland RD

## 2024-05-01 VITALS
HEART RATE: 79 BPM | TEMPERATURE: 97.5 F | HEIGHT: 68 IN | SYSTOLIC BLOOD PRESSURE: 118 MMHG | RESPIRATION RATE: 16 BRPM | OXYGEN SATURATION: 95 % | DIASTOLIC BLOOD PRESSURE: 72 MMHG | BODY MASS INDEX: 17.91 KG/M2 | WEIGHT: 118.17 LBS

## 2024-05-01 PROCEDURE — 6370000000 HC RX 637 (ALT 250 FOR IP)

## 2024-05-01 PROCEDURE — 99239 HOSP IP/OBS DSCHRG MGMT >30: CPT | Performed by: INTERNAL MEDICINE

## 2024-05-01 PROCEDURE — 6360000002 HC RX W HCPCS: Performed by: HOSPITALIST

## 2024-05-01 PROCEDURE — 97530 THERAPEUTIC ACTIVITIES: CPT

## 2024-05-01 PROCEDURE — 6370000000 HC RX 637 (ALT 250 FOR IP): Performed by: HOSPITALIST

## 2024-05-01 PROCEDURE — 97535 SELF CARE MNGMENT TRAINING: CPT

## 2024-05-01 PROCEDURE — 6370000000 HC RX 637 (ALT 250 FOR IP): Performed by: STUDENT IN AN ORGANIZED HEALTH CARE EDUCATION/TRAINING PROGRAM

## 2024-05-01 RX ORDER — MEMANTINE HYDROCHLORIDE 5 MG/1
TABLET ORAL
Qty: 180 TABLET | Refills: 1 | Status: SHIPPED | OUTPATIENT
Start: 2024-05-01

## 2024-05-01 RX ORDER — AMLODIPINE BESYLATE 10 MG/1
TABLET ORAL
Qty: 30 TABLET | Refills: 0 | Status: SHIPPED | OUTPATIENT
Start: 2024-05-01

## 2024-05-01 RX ORDER — IBUPROFEN 400 MG/1
600 TABLET ORAL EVERY 8 HOURS PRN
Qty: 20 TABLET | Refills: 0 | Status: SHIPPED | OUTPATIENT
Start: 2024-05-01 | End: 2024-05-09

## 2024-05-01 RX ORDER — FAMOTIDINE 20 MG/1
20 TABLET, FILM COATED ORAL DAILY
Qty: 60 TABLET | Refills: 3 | Status: SHIPPED | OUTPATIENT
Start: 2024-05-02

## 2024-05-01 RX ORDER — DONEPEZIL HYDROCHLORIDE 10 MG/1
TABLET, FILM COATED ORAL
Qty: 30 TABLET | Refills: 5 | Status: SHIPPED | OUTPATIENT
Start: 2024-05-01

## 2024-05-01 RX ORDER — SENNA AND DOCUSATE SODIUM 50; 8.6 MG/1; MG/1
2 TABLET, FILM COATED ORAL DAILY
Qty: 30 TABLET | Refills: 0 | Status: SHIPPED | OUTPATIENT
Start: 2024-05-02

## 2024-05-01 RX ADMIN — FOLIC ACID 1 MG: 1 TABLET ORAL at 08:41

## 2024-05-01 RX ADMIN — SERTRALINE HYDROCHLORIDE 75 MG: 50 TABLET ORAL at 08:41

## 2024-05-01 RX ADMIN — ENOXAPARIN SODIUM 40 MG: 100 INJECTION SUBCUTANEOUS at 08:41

## 2024-05-01 RX ADMIN — POLYETHYLENE GLYCOL 3350 17 G: 17 POWDER, FOR SOLUTION ORAL at 08:41

## 2024-05-01 RX ADMIN — AMLODIPINE BESYLATE 10 MG: 10 TABLET ORAL at 08:41

## 2024-05-01 RX ADMIN — FAMOTIDINE 20 MG: 20 TABLET ORAL at 08:41

## 2024-05-01 RX ADMIN — Medication 400 MG: at 08:41

## 2024-05-01 RX ADMIN — SENNOSIDES AND DOCUSATE SODIUM 2 TABLET: 50; 8.6 TABLET ORAL at 08:41

## 2024-05-01 RX ADMIN — THERA TABS 1 TABLET: TAB at 08:41

## 2024-05-01 RX ADMIN — FERROUS SULFATE TAB 325 MG (65 MG ELEMENTAL FE) 325 MG: 325 (65 FE) TAB at 08:41

## 2024-05-01 RX ADMIN — MEMANTINE HYDROCHLORIDE 5 MG: 5 TABLET ORAL at 08:41

## 2024-05-01 RX ADMIN — Medication 100 MG: at 08:41

## 2024-05-01 RX ADMIN — TRAMADOL HYDROCHLORIDE 50 MG: 50 TABLET ORAL at 07:08

## 2024-05-01 RX ADMIN — ASPIRIN 81 MG: 81 TABLET, COATED ORAL at 08:41

## 2024-05-01 ASSESSMENT — PAIN DESCRIPTION - ORIENTATION
ORIENTATION: RIGHT
ORIENTATION: LEFT

## 2024-05-01 ASSESSMENT — PAIN DESCRIPTION - LOCATION
LOCATION: HIP
LOCATION: LEG

## 2024-05-01 ASSESSMENT — PAIN DESCRIPTION - ONSET
ONSET: GRADUAL
ONSET: GRADUAL

## 2024-05-01 ASSESSMENT — PAIN SCALES - GENERAL
PAINLEVEL_OUTOF10: 6
PAINLEVEL_OUTOF10: 1
PAINLEVEL_OUTOF10: 3

## 2024-05-01 ASSESSMENT — PAIN DESCRIPTION - PAIN TYPE
TYPE: ACUTE PAIN
TYPE: ACUTE PAIN

## 2024-05-01 ASSESSMENT — PAIN DESCRIPTION - FREQUENCY
FREQUENCY: INTERMITTENT
FREQUENCY: INTERMITTENT

## 2024-05-01 ASSESSMENT — PAIN DESCRIPTION - DESCRIPTORS
DESCRIPTORS: ACHING;DISCOMFORT
DESCRIPTORS: ACHING;DISCOMFORT

## 2024-05-01 NOTE — DISCHARGE SUMMARY
fractures. This could be further characterized with CT or MRI exam.       Procedures: none     Discharge Medications:        Medication List        START taking these medications      famotidine 20 MG tablet  Commonly known as: PEPCID  Take 1 tablet by mouth daily  Start taking on: May 2, 2024     ibuprofen 400 MG tablet  Commonly known as: ADVIL;MOTRIN  Take 1.5 tablets by mouth every 8 hours as needed for Pain     sennosides-docusate sodium 8.6-50 MG tablet  Commonly known as: SENOKOT-S  Take 2 tablets by mouth daily  Start taking on: May 2, 2024            CHANGE how you take these medications      donepezil 10 MG tablet  Commonly known as: ARICEPT  Take one Tab po every night  What changed: additional instructions     memantine 5 MG tablet  Commonly known as: NAMENDA  Take 1 tab by mouth nightly x 1 week then 1 tab twice daily thereafter.  What changed: when to take this            CONTINUE taking these medications      amLODIPine 10 MG tablet  Commonly known as: NORVASC  TAKE 1 TABLET BY MOUTH ONCE DAILY . APPOINTMENT REQUIRED FOR FUTURE REFILLS     aspirin 81 MG EC tablet     atorvastatin 10 MG tablet  Commonly known as: LIPITOR  Take 1 tablet by mouth nightly     losartan 25 MG tablet  Commonly known as: COZAAR  TAKE 1 TABLET BY MOUTH ONCE DAILY IN THE MORNING     sertraline 25 MG tablet  Commonly known as: ZOLOFT  Take 3 tabs by mouth daily.     traZODone 50 MG tablet  Commonly known as: DESYREL  Take a half tab by mouth nightly.     vitamin D3 125 MCG (5000 UT) Caps               Where to Get Your Medications        Information about where to get these medications is not yet available    Ask your nurse or doctor about these medications  amLODIPine 10 MG tablet  donepezil 10 MG tablet  famotidine 20 MG tablet  ibuprofen 400 MG tablet  memantine 5 MG tablet  sennosides-docusate sodium 8.6-50 MG tablet         Activity: activity as tolerated- Per PT OT // WBAT with walker and progressive ambulation with PT.

## 2024-05-01 NOTE — CARE COORDINATION
05/01/24 0830   IMM Letter   IMM Letter given to Patient/Family/Significant other/Guardian/POA/by: Kitty Kirk RN CM, Original to CM Office, Copy to son, Delio Osborne via email, Copy on Hard Chart.   IMM Letter date given: 05/01/24   IMM Letter time given: 0830       
   05/01/24 0857   /Social Work Whiteboard Notes   /Social Work Whiteboard GREEN 01 May 2024 Patient is medically ready.  [facility] @ 1pm - RAPID COVID TEST is negative, patient had a BM. No further CM needs identified. CM will remain avaliable.       
  Call to son Delio Osborne at [phone], patient hipaa verified.     Advised patient is medically ready for discharge.[facility]po to [facility] @ 1pm - RAPID COVID TEST is negative, patient had a BM.     IMM explained to Mr. Osborne, he agrees and would like a copy emailed to him.    CM Emails copy.  
Call to son Delio Osborne at [phone], patient hipaa verified.     He hasn't quite decided on a SNF, he would like a little more time. Advised Brittney is covering this unit tomorrow and she was cc:d on the email sent to him earlier today, advised he can either email her back his choices or call tomorrow the [phone] which is the nurses station who will track her down.     He is appreciative. Call concluded.                
Patient and family inquiring if patient might meet inpatient acute rehab criteria for [facility].    SW sent referral in [facility] and notified ARU that patient's son,  would like to speak about ARU.    Patient accepted to [facility] in [facility] if ARU is unable to accept. [facility] is listed as a higher rated facility than surrounding facilities in patient's locality. Patient's son in agreement.    Brittney Tamayo, LATRICE   Case Management    
[facility] can accept tomorrow, only if patient has had Bowel Movement in last 48hrs and a rapid COVID test. (Shalini in admissions [phone]).    PT/OT updates pending.    SW has requested Rapid Covid test.    D/C to [facility] - 5/1 @ 1pm, pending rapid COVID test.  [address] [place] [address] [phone]    [facility]  Telephone: [phone]  Trip ID#: [id number]    Nursing staff notified. [phone]  MD notified via perfect serve.  Patient's son in agreement with d/c plans (Delio Osborne/[phone])    Brittney Tamayo LMSW   Case Management            
Representative agree with the Discharge Plan? Yes   Freedom of Choice list was provided with basic dialogue that supports the patient's individualized plan of care/goals, treatment preferences, and shares the quality data associated with the providers?  Yes  (Star rated SNF choice list and letter emailed to [email or web address])

## 2024-05-01 NOTE — PLAN OF CARE
Problem: Discharge Planning  Goal: Discharge to home or other facility with appropriate resources  4/27/2024 2155 by Dimitris Lewis RN  Outcome: Progressing  4/27/2024 1512 by Miesha Delgadillo RN  Outcome: Progressing     Problem: Pain  Goal: Verbalizes/displays adequate comfort level or baseline comfort level  4/27/2024 2155 by Dimitris Lewis RN  Outcome: Progressing  4/27/2024 1512 by Miesha Delgadillo RN  Outcome: Progressing     Problem: Safety - Adult  Goal: Free from fall injury  4/27/2024 2155 by Dimitris Lewis RN  Outcome: Progressing  4/27/2024 1512 by Miesha Delgadillo RN  Outcome: Progressing     Problem: Skin/Tissue Integrity  Goal: Absence of new skin breakdown  Description: 1.  Monitor for areas of redness and/or skin breakdown  2.  Assess vascular access sites hourly  3.  Every 4-6 hours minimum:  Change oxygen saturation probe site  4.  Every 4-6 hours:  If on nasal continuous positive airway pressure, respiratory therapy assess nares and determine need for appliance change or resting period.  4/27/2024 2155 by Dimitris Lewis RN  Outcome: Progressing  4/27/2024 1512 by Miesha Delgadillo RN  Outcome: Progressing     Problem: Chronic Conditions and Co-morbidities  Goal: Patient's chronic conditions and co-morbidity symptoms are monitored and maintained or improved  4/27/2024 2155 by Dimitris Lewis RN  Outcome: Progressing  4/27/2024 1512 by Miesha Delgadillo RN  Outcome: Progressing     
  Problem: Discharge Planning  Goal: Discharge to home or other facility with appropriate resources  4/28/2024 1110 by Miesha Delgadillo RN  Outcome: Progressing  4/28/2024 1110 by Miesha Delgadillo RN  Outcome: Progressing  4/27/2024 2155 by Dimitris Lewis RN  Outcome: Progressing     Problem: Pain  Goal: Verbalizes/displays adequate comfort level or baseline comfort level  4/28/2024 1110 by Miesha Delgadillo RN  Outcome: Progressing  4/28/2024 1110 by Miesha Delgadillo RN  Outcome: Progressing  4/27/2024 2155 by Dimitris Lewis RN  Outcome: Progressing     Problem: Safety - Adult  Goal: Free from fall injury  4/28/2024 1110 by Miesha Delgadillo RN  Outcome: Progressing  4/28/2024 1110 by Miesha Delgadillo RN  Outcome: Progressing  4/27/2024 2155 by Dimitris Lewis RN  Outcome: Progressing     Problem: Skin/Tissue Integrity  Goal: Absence of new skin breakdown  Description: 1.  Monitor for areas of redness and/or skin breakdown  2.  Assess vascular access sites hourly  3.  Every 4-6 hours minimum:  Change oxygen saturation probe site  4.  Every 4-6 hours:  If on nasal continuous positive airway pressure, respiratory therapy assess nares and determine need for appliance change or resting period.  4/28/2024 1110 by Miesha Delgadillo RN  Outcome: Progressing  4/28/2024 1110 by Miesha Delgadillo RN  Outcome: Progressing  4/27/2024 2155 by Dimitris Lewis RN  Outcome: Progressing     
  Problem: Discharge Planning  Goal: Discharge to home or other facility with appropriate resources  4/29/2024 0030 by Dimitris Lewis RN  Outcome: Progressing  4/28/2024 1110 by Miesha Delgadillo RN  Outcome: Progressing  4/28/2024 1110 by Miesha Delgadillo RN  Outcome: Progressing     Problem: Pain  Goal: Verbalizes/displays adequate comfort level or baseline comfort level  4/29/2024 0030 by Dimitris Lewis RN  Outcome: Progressing  4/28/2024 1110 by Miesha Delgadillo RN  Outcome: Progressing  4/28/2024 1110 by Miesha Delgadillo RN  Outcome: Progressing     Problem: Safety - Adult  Goal: Free from fall injury  4/29/2024 0030 by Dimitris Lewis RN  Outcome: Progressing  4/28/2024 1110 by Miesha Delgadillo RN  Outcome: Progressing  4/28/2024 1110 by Miesha Delgadillo RN  Outcome: Progressing     Problem: Skin/Tissue Integrity  Goal: Absence of new skin breakdown  Description: 1.  Monitor for areas of redness and/or skin breakdown  2.  Assess vascular access sites hourly  3.  Every 4-6 hours minimum:  Change oxygen saturation probe site  4.  Every 4-6 hours:  If on nasal continuous positive airway pressure, respiratory therapy assess nares and determine need for appliance change or resting period.  4/29/2024 0030 by Dimitris Lewis RN  Outcome: Progressing  4/28/2024 1110 by Miesha Delgadillo RN  Outcome: Progressing  4/28/2024 1110 by Miesha Delgadillo RN  Outcome: Progressing     Problem: Chronic Conditions and Co-morbidities  Goal: Patient's chronic conditions and co-morbidity symptoms are monitored and maintained or improved  4/29/2024 0030 by Dimitris Lewis RN  Outcome: Progressing  4/28/2024 1110 by Miesha Delgadillo RN  Outcome: Progressing  4/28/2024 1110 by Miesha Delgadillo RN  Outcome: Progressing     Problem: Occupational Therapy - Adult  Goal: By Discharge: Performs self-care activities at highest level of function for planned discharge setting.  See evaluation for individualized goals.  Description: 
  Problem: Discharge Planning  Goal: Discharge to home or other facility with appropriate resources  4/29/2024 1115 by Maribeth Denis RN  Outcome: Progressing  4/29/2024 0030 by Dimitris Lewis RN  Outcome: Progressing     Problem: Pain  Goal: Verbalizes/displays adequate comfort level or baseline comfort level  4/29/2024 1115 by Maribeth Denis RN  Outcome: Progressing  4/29/2024 0030 by Dimitris Lewis RN  Outcome: Progressing     Problem: Safety - Adult  Goal: Free from fall injury  4/29/2024 1115 by Maribeth Denis RN  Outcome: Progressing  4/29/2024 0030 by Dimitris Lewis RN  Outcome: Progressing     Problem: Skin/Tissue Integrity  Goal: Absence of new skin breakdown  Description: 1.  Monitor for areas of redness and/or skin breakdown  2.  Assess vascular access sites hourly  3.  Every 4-6 hours minimum:  Change oxygen saturation probe site  4.  Every 4-6 hours:  If on nasal continuous positive airway pressure, respiratory therapy assess nares and determine need for appliance change or resting period.  4/29/2024 1115 by Maribeth Denis RN  Outcome: Progressing  4/29/2024 0030 by Dimitris Lewis RN  Outcome: Progressing     Problem: Chronic Conditions and Co-morbidities  Goal: Patient's chronic conditions and co-morbidity symptoms are monitored and maintained or improved  4/29/2024 1115 by Maribeth Denis RN  Outcome: Progressing  4/29/2024 0030 by Dimitris Lewis RN  Outcome: Progressing     
  Problem: Discharge Planning  Goal: Discharge to home or other facility with appropriate resources  4/29/2024 1408 by Maribeth Denis RN  Outcome: Progressing  4/29/2024 1115 by Maribeth Denis RN  Outcome: Progressing  4/29/2024 0030 by Dimitris Lewis RN  Outcome: Progressing     Problem: Pain  Goal: Verbalizes/displays adequate comfort level or baseline comfort level  4/29/2024 1408 by Mairbeth Denis RN  Outcome: Progressing  4/29/2024 1115 by Maribeth Denis RN  Outcome: Progressing  4/29/2024 0030 by Dimitris Lewis RN  Outcome: Progressing     Problem: Safety - Adult  Goal: Free from fall injury  4/29/2024 1408 by Maribeth Denis RN  Outcome: Progressing  4/29/2024 1115 by Maribeth Denis RN  Outcome: Progressing  4/29/2024 0030 by Dimitris Lewis RN  Outcome: Progressing     Problem: Skin/Tissue Integrity  Goal: Absence of new skin breakdown  Description: 1.  Monitor for areas of redness and/or skin breakdown  2.  Assess vascular access sites hourly  3.  Every 4-6 hours minimum:  Change oxygen saturation probe site  4.  Every 4-6 hours:  If on nasal continuous positive airway pressure, respiratory therapy assess nares and determine need for appliance change or resting period.  4/29/2024 1408 by Maribeth Denis RN  Outcome: Progressing  4/29/2024 1115 by Maribeth Denis RN  Outcome: Progressing  4/29/2024 0030 by Dimitris Lewis RN  Outcome: Progressing     Problem: Chronic Conditions and Co-morbidities  Goal: Patient's chronic conditions and co-morbidity symptoms are monitored and maintained or improved  4/29/2024 1408 by Maribeth Denis RN  Outcome: Progressing  4/29/2024 1115 by Maribeth Denis RN  Outcome: Progressing  4/29/2024 0030 by Dimitris Lewis RN  Outcome: Progressing     
  Problem: Discharge Planning  Goal: Discharge to home or other facility with appropriate resources  4/29/2024 2239 by Dimitris Lewis RN  Outcome: Progressing  4/29/2024 1408 by Maribeth Denis RN  Outcome: Progressing  4/29/2024 1115 by Maribeth Denis RN  Outcome: Progressing     Problem: Pain  Goal: Verbalizes/displays adequate comfort level or baseline comfort level  4/29/2024 2239 by Dimitris Lewis RN  Outcome: Progressing  4/29/2024 1408 by Maribeth Denis RN  Outcome: Progressing  4/29/2024 1115 by Maribeth Denis RN  Outcome: Progressing     Problem: Safety - Adult  Goal: Free from fall injury  4/29/2024 2239 by Dimitris Lewis RN  Outcome: Progressing  4/29/2024 1408 by Maribeth Denis RN  Outcome: Progressing  4/29/2024 1115 by Maribeth Denis RN  Outcome: Progressing     Problem: Skin/Tissue Integrity  Goal: Absence of new skin breakdown  Description: 1.  Monitor for areas of redness and/or skin breakdown  2.  Assess vascular access sites hourly  3.  Every 4-6 hours minimum:  Change oxygen saturation probe site  4.  Every 4-6 hours:  If on nasal continuous positive airway pressure, respiratory therapy assess nares and determine need for appliance change or resting period.  4/29/2024 2239 by Dimitris Lewis RN  Outcome: Progressing  4/29/2024 1408 by Maribeth Denis RN  Outcome: Progressing  4/29/2024 1115 by Maribeth Denis RN  Outcome: Progressing     Problem: Chronic Conditions and Co-morbidities  Goal: Patient's chronic conditions and co-morbidity symptoms are monitored and maintained or improved  4/29/2024 2239 by Dimitris Lewis RN  Outcome: Progressing  4/29/2024 1408 by Maribeth Denis RN  Outcome: Progressing  4/29/2024 1115 by Maribeth Denis RN  Outcome: Progressing     
  Problem: Discharge Planning  Goal: Discharge to home or other facility with appropriate resources  4/30/2024 0911 by Maribeth Denis RN  Outcome: Progressing  4/29/2024 2239 by Dimitris Lewis RN  Outcome: Progressing     Problem: Pain  Goal: Verbalizes/displays adequate comfort level or baseline comfort level  4/30/2024 0911 by Maribeth Denis RN  Outcome: Progressing  4/29/2024 2239 by Dimitris Lewis RN  Outcome: Progressing     Problem: Safety - Adult  Goal: Free from fall injury  4/30/2024 0911 by Maribeth Denis RN  Outcome: Progressing  4/29/2024 2239 by Dimitris Lewis RN  Outcome: Progressing     Problem: Skin/Tissue Integrity  Goal: Absence of new skin breakdown  Description: 1.  Monitor for areas of redness and/or skin breakdown  2.  Assess vascular access sites hourly  3.  Every 4-6 hours minimum:  Change oxygen saturation probe site  4.  Every 4-6 hours:  If on nasal continuous positive airway pressure, respiratory therapy assess nares and determine need for appliance change or resting period.  4/30/2024 0911 by Maribeth Denis RN  Outcome: Progressing  4/29/2024 2239 by Dimitris Lewis RN  Outcome: Progressing     Problem: Chronic Conditions and Co-morbidities  Goal: Patient's chronic conditions and co-morbidity symptoms are monitored and maintained or improved  4/30/2024 0911 by Maribeth Denis RN  Outcome: Progressing  4/29/2024 2239 by Dimitris Lewis RN  Outcome: Progressing     
  Problem: Discharge Planning  Goal: Discharge to home or other facility with appropriate resources  5/1/2024 1035 by Maribeth Denis RN  Outcome: Progressing  5/1/2024 0038 by Jessica Guallpa RN  Outcome: Progressing     Problem: Pain  Goal: Verbalizes/displays adequate comfort level or baseline comfort level  5/1/2024 1035 by Maribeth Denis RN  Outcome: Progressing  5/1/2024 0038 by Jessica Guallpa RN  Outcome: Progressing     Problem: Safety - Adult  Goal: Free from fall injury  5/1/2024 1035 by Maribeth Denis RN  Outcome: Progressing  5/1/2024 0038 by Jessica Guallpa RN  Outcome: Progressing     Problem: Skin/Tissue Integrity  Goal: Absence of new skin breakdown  Description: 1.  Monitor for areas of redness and/or skin breakdown  2.  Assess vascular access sites hourly  3.  Every 4-6 hours minimum:  Change oxygen saturation probe site  4.  Every 4-6 hours:  If on nasal continuous positive airway pressure, respiratory therapy assess nares and determine need for appliance change or resting period.  5/1/2024 1035 by Maribeth Denis RN  Outcome: Progressing  5/1/2024 0038 by Jessica Guallpa RN  Outcome: Progressing     Problem: Chronic Conditions and Co-morbidities  Goal: Patient's chronic conditions and co-morbidity symptoms are monitored and maintained or improved  5/1/2024 1035 by Maribeth Denis RN  Outcome: Progressing  5/1/2024 0038 by Jessica Guallpa RN  Outcome: Progressing     Problem: ABCDS Injury Assessment  Goal: Absence of physical injury  5/1/2024 1035 by Maribeth Denis RN  Outcome: Progressing  5/1/2024 0038 by Jessica Guallpa RN  Outcome: Progressing     
  Problem: Discharge Planning  Goal: Discharge to home or other facility with appropriate resources  Outcome: Progressing     Problem: Pain  Goal: Verbalizes/displays adequate comfort level or baseline comfort level  Outcome: Progressing     Problem: Safety - Adult  Goal: Free from fall injury  Outcome: Progressing     
  Problem: Discharge Planning  Goal: Discharge to home or other facility with appropriate resources  Outcome: Progressing     Problem: Pain  Goal: Verbalizes/displays adequate comfort level or baseline comfort level  Outcome: Progressing     Problem: Safety - Adult  Goal: Free from fall injury  Outcome: Progressing     Problem: Skin/Tissue Integrity  Goal: Absence of new skin breakdown  Description: 1.  Monitor for areas of redness and/or skin breakdown  2.  Assess vascular access sites hourly  3.  Every 4-6 hours minimum:  Change oxygen saturation probe site  4.  Every 4-6 hours:  If on nasal continuous positive airway pressure, respiratory therapy assess nares and determine need for appliance change or resting period.  Outcome: Progressing     Problem: Chronic Conditions and Co-morbidities  Goal: Patient's chronic conditions and co-morbidity symptoms are monitored and maintained or improved  Outcome: Progressing     
  Problem: Occupational Therapy - Adult  Goal: By Discharge: Performs self-care activities at highest level of function for planned discharge setting.  See evaluation for individualized goals.  Description: Occupational Therapy Goals:  Initiated 4/26/2024 to be met within 7-10 days.    1.  Patient will perform lower body dressing with modified independence.   2.  Patient will perform toilet transfers with supervision/set-up.  3.  Patient will perform all aspects of toileting with modified independence.  4.  Patient will participate in upper extremity therapeutic exercise/activities with supervision/set-up for 8-10 minutes to increase strength/endurance for ADLs.    5.  Patient will utilize energy conservation techniques during functional activities with verbal cues.    PLOF: Pt is a poor historian, but per chart it appears pt lives w/ her son. Pt reports being indep w/ ADLs and mobility.     Outcome: Progressing   OCCUPATIONAL THERAPY TREATMENT    Patient: Mnoa Olea (79 y.o. female)  Date: 4/28/2024  Diagnosis: TAMIKA (acute kidney injury) ([facility]) [N17.9]  Fall, initial encounter [W19.XXXA]  Pubic ramus fracture, left, closed, initial encounter ([facility]) [S32.592A]  Closed nondisplaced fracture of pelvis, unspecified part of pelvis, initial encounter ([facility]) [S32.9XXA]  Closed fracture of sacrum, unspecified portion of sacrum, initial encounter ([facility]) [S32.10XA] Pubic ramus fracture, left, closed, initial encounter ([facility])      Precautions: Fall Risk, Weight Bearing, Right Lower Extremity Weight Bearing: Weight Bearing As Tolerated, Left Lower Extremity Weight Bearing: Weight Bearing As Tolerated,  ,  ,  ,  ,      Chart, occupational therapy assessment, plan of care, and goals were reviewed.  ASSESSMENT:  Pt presented supine in bed upon entry requesting to sit up in chair. She was able to transition to EOB SBA in prep for functional tasks. Pt denies any dizziness or lightheadedness at this time. Once sitting, she was found 
  Problem: Physical Therapy - Adult  Goal: By Discharge: Performs mobility at highest level of function for planned discharge setting.  See evaluation for individualized goals.  Description: Physical Therapy Goals:  Initiated 4/26/2024 to be met within 7-10 days.    1.  Patient will move from supine to sit and sit to supine  in bed with independence.    2.  Patient will transfer from bed to chair and chair to bed with contact guard assist using the least restrictive device.  3.  Patient will perform sit to stand with contact guard assist.  4.  Patient will ambulate with minimal assistance/contact guard assist for 20 feet with the least restrictive device.   5.  Patient will ascend/descend 3 stairs with 2 handrail(s) with moderate assistance .    PLOF: pt reports being independent with functional mobility however is a poor historian so unsure if this is accurate     Outcome: Progressing   PHYSICAL THERAPY TREATMENT    Patient: Mona Olea (79 y.o. female)  Date: 4/30/2024  Diagnosis: TAMIKA (acute kidney injury) ([facility]) [N17.9]  Fall, initial encounter [W19.XXXA]  Pubic ramus fracture, left, closed, initial encounter ([facility]) [S32.592A]  Closed nondisplaced fracture of pelvis, unspecified part of pelvis, initial encounter ([facility]) [S32.9XXA]  Closed fracture of sacrum, unspecified portion of sacrum, initial encounter ([facility]) [S32.10XA] Pubic ramus fracture, left, closed, initial encounter ([facility])      Precautions: Fall Risk, Weight Bearing, Right Lower Extremity Weight Bearing: Weight Bearing As Tolerated, Left Lower Extremity Weight Bearing: Weight Bearing As Tolerated,  ,  ,  ,  ,      ASSESSMENT:  Pt received in recliner in [facility] and agreeable to PT session. Pt denies any pain at rest and motivated to participate today. Pt completes seated TE without incident. Sit to stand initially min A with difficulty with UE, requiring intermittent cues. Initial sit to stands pt is only able to tolerate standing for 20-30 seconds with NBOS 
BP checked. BP in supine 130/59. Dizziness resolves. Pt returned to sitting w/ SBA, and BP in sitting is 90/52. Sit>stand min A. Dizziness returns and unable to get standing BP; taken once pt returns to sitting EOB and is 89/43. Pt returned to semi supine SBA. RN made aware of BP reading. Pt left w/ all needs in reach and lines in tact.     Patient will benefit from skilled intervention to address the above impairments.  Patient's rehabilitation potential/ .  Factors which may influence rehabilitation potential include:   []             None noted  [x]             Mental ability/status  [x]             Medical condition  []             Home/family situation and support systems  []             Safety awareness  []             Pain tolerance/management  []             Other:      PLAN :  Recommendations and Planned Interventions:   [x]               Self Care Training                  [x]      Therapeutic Activities  [x]               Functional Mobility Training   []      Cognitive Retraining  [x]               Therapeutic Exercises           [x]      Endurance Activities  [x]               Balance Training                    [x]      Neuromuscular Re-Education  []               Visual/Perceptual Training     [x]      Home Safety Training  [x]               Patient Education                   [x]      Family Training/Education  []               Other (comment):    Frequency/Duration: Patient will be followed by occupational therapy to address goals, 1-2 times per day/3-5 days per week to address goals.    Further Equipment Recommendations for Discharge: TBD at next site of care    [facility]: AM-PAC Inpatient Daily Activity Raw Score: 16    Current research shows that an AM-PAC score of 17 or less is not associated with a discharge to the patient's home setting.  Based on an AM-PAC score and their current ADL deficits; it is recommended that the patient have 3-5 sessions per week of Occupational Therapy at d/c to increase 
activities at highest level of function for planned discharge setting.  See evaluation for individualized goals.  Description: Occupational Therapy Goals:  Initiated 4/26/2024 to be met within 7-10 days.    1.  Patient will perform lower body dressing with modified independence.   2.  Patient will perform toilet transfers with supervision/set-up.  3.  Patient will perform all aspects of toileting with modified independence.  4.  Patient will participate in upper extremity therapeutic exercise/activities with supervision/set-up for 8-10 minutes to increase strength/endurance for ADLs.    5.  Patient will utilize energy conservation techniques during functional activities with verbal cues.    PLOF: Pt is a poor historian, but per chart it appears pt lives w/ her son. Pt reports being indep w/ ADLs and mobility.     4/26/2024 1423 by Chris Humphreys OT  Outcome: Progressing     
activities at highest level of function for planned discharge setting.  See evaluation for individualized goals.  Description: Occupational Therapy Goals:  Initiated 4/26/2024 to be met within 7-10 days.    1.  Patient will perform lower body dressing with modified independence.   2.  Patient will perform toilet transfers with supervision/set-up.  3.  Patient will perform all aspects of toileting with modified independence.  4.  Patient will participate in upper extremity therapeutic exercise/activities with supervision/set-up for 8-10 minutes to increase strength/endurance for ADLs.    5.  Patient will utilize energy conservation techniques during functional activities with verbal cues.    PLOF: Pt is a poor historian, but per chart it appears pt lives w/ her son. Pt reports being indep w/ ADLs and mobility.     4/30/2024 1058 by Michelle Rios OTA  Outcome: Progressing     Problem: ABCDS Injury Assessment  Goal: Absence of physical injury  Outcome: Progressing     
(occasional)  Standing: Impaired  Standing - Static: Fair  Standing - Dynamic: Fair    ADL Intervention:     UE Dressing: Stand by assistance     Toileting: Minimal assistance     Pain:  Intensity Pre-treatment: 0/10   Intensity Post-treatment: 0/10    Activity Tolerance:    Activity Tolerance: Patient tolerated treatment well  Please refer to the flowsheet for vital signs taken during this treatment.  After treatment:   [x]  Patient left in no apparent distress sitting up in chair  []  Patient left in no apparent distress in bed  [x]  Call bell left within reach  [x]  Nursing notified  []  Caregiver present  [x]  Chair alarm activated    COMMUNICATION/EDUCATION:   Patient Education  Education Given To: Patient  Education Provided: Role of Therapy;Plan of Care;ADL Adaptive Strategies;Energy Conservation;Fall Prevention Strategies;Transfer Training  Education Method: Verbal;Teach Back  Barriers to Learning: Cognition  Education Outcome: Continued education needed      Thank you for this referral.  NAKUL Harris  Minutes: 23  
Fair    ADL Intervention:     LE Dressing: Stand by assistance          Pain:  Intensity Pre-treatment: 0/10   Intensity Post-treatment: 0/10      Activity Tolerance:    Activity Tolerance: Patient tolerated treatment well  Please refer to the flowsheet for vital signs taken during this treatment.  After treatment:   [x]  Patient left in no apparent distress sitting up in chair  []  Patient left in no apparent distress in bed  [x]  Call bell left within reach  [x]  Nursing notified  []  Caregiver present  [x]  Chair alarm activated    COMMUNICATION/EDUCATION:   Patient Education  Education Given To: Patient  Education Provided: Role of Therapy;Plan of Care;ADL Adaptive Strategies;Energy Conservation;Fall Prevention Strategies;Transfer Training  Education Method: Verbal;Teach Back  Barriers to Learning: Cognition  Education Outcome: Continued education needed      Thank you for this referral.  NAKUL Harris  Minutes: 23  
and their current functional mobility deficits, it is recommended that the patient have 3-5 sessions per week of Physical Therapy at d/c to increase the patient's independence.     [facility] should be considered in conjunction with interdisciplinary team recommendations to determine the most appropriate discharge setting. Patient's social support, diagnosis, medical stability, and prior level of function should also be taken into consideration.     SUBJECTIVE:   Patient stated “Hi there.”    OBJECTIVE DATA SUMMARY:     Past Medical History:   Diagnosis Date    Anxiety     Arrhythmia 2011    Negative Stress test    Arthritis     Carotid artery stenosis 2009    <50%    Gestational diabetes     Glaucoma     Hypercholesterolemia     Hypertension      Past Surgical History:   Procedure Laterality Date    CHOLECYSTECTOMY  1990    gallstones    HYSTERECTOMY (CERVIX STATUS UNKNOWN)  1990    total hysterectomy       Home Situation:  Social/Functional History  Lives With: Family (Per chart, it appears pt lives w/ her son, but pt states she lives w/ a friend and then states w/ her ex )  Type of Home: House  Home Layout: One level  Bathroom Shower/Tub: Walk-in shower  Critical Behavior:  Pleasant and cooperative despite some confusion  Strength:    Strength: Generally decreased, functional    Tone & Sensation:   Tone: Normal     Range Of Motion:  AROM: Generally decreased, functional  PROM: Generally decreased, functional    Functional Mobility:  Bed Mobility:  Bed Mobility Training  Bed Mobility Training: Yes  Supine to Sit: Stand-by assistance;Additional time  Sit to Supine: Stand-by assistance;Additional time  Scooting: Modified independent  Transfers:  Transfer Training  Transfer Training: Yes  Sit to Stand: Minimum assistance  Stand to Sit: Minimum assistance  Balance:   Balance  Sitting - Static: Good (unsupported)  Standing: Impaired  Standing - Static: Poor (plus, with RW for support)     Therapeutic

## 2024-05-01 NOTE — PROGRESS NOTES
Physician Progress Note      PATIENT:               GERSON HEMPHILL  CSN #:                  [id number]  :                       1945  ADMIT DATE:       2024 9:37 AM  DISCH DATE:  RESPONDING  PROVIDER #:        Karen Monroy MD          QUERY TEXT:    Pt admitted with Left inferior pubic rami fractures and Left sacral ala   fracture, noted to have malnutrition documented in  Palliative consult   note with  RD assessment with documentation of \"severe malnutrition.\"   Please further specify type of malnutrition with documentation in the medical   record.    The medical record reflects the following:  Risk Factors: advanced age, dementia  Clinical Indicators:  RD: Malnutrition Status:  Severe malnutrition   (24 1008)  Context:  Chronic Illness  Findings of the 6 clinical characteristics of malnutrition:  Energy Intake:  Mild decrease in energy intake  Weight Loss:  Greater than 10% over 6 months  Body Fat Loss:  Severe body fat loss Buccal region, Triceps, Orbital  Muscle Mass Loss:  Severe muscle mass loss Temples (temporalis), Clavicles   (pectoralis & deltoids)  Treatment: RD assessment, ONS with breakfast, lunch, and dinner, monitoring    ASPEN Criteria:    https://aspenjournals.onlinelibrary.carlos.com/doi/full/10.1177/867991540998789  5    Thank you,    Juana Padron RN  CDI  Options provided:  -- Severe Malnutrition  -- Other - I will add my own diagnosis  -- Disagree - Not applicable / Not valid  -- Disagree - Clinically unable to determine / Unknown  -- Refer to Clinical Documentation Reviewer    PROVIDER RESPONSE TEXT:    This patient has severe malnutrition.    Query created by: Juana Padron on 2024 12:26 PM      Electronically signed by:  Karen Monroy MD 2024 2:46 PM          
  [facility]  Progress Note    Patient: Mona Olea Age: 79 y.o. : 1945 MR#: [id number] SSN: [id number]  Date/Time: 2024    Subjective:     Patient seen and examined at bedside, she reports she feels better today.  Pain improved.  She wants to sit up and eat lunch, OT is getting ready to work with her.  No family at bedside.    Assessment/Plan:     1. Left inferior pubic rami fractures associated with subadjacent hematoma/hemorrhage. Per ortho, Non-operative treatment: WBAT with walker and progressive ambulation with PT. Follow up in clinic in 2 weeks for updated x-ray.   2. Left sacral ala fracture   3. Possible left pubic body nondisplaced occult fracture   4. TAMIKA resolving.  ARB held.  5. Hypertension, blood pressure somewhat labile on home med Norvasc.  Continue to hold ARB.  6. Dyslipidemia on statin.  7. Dementia  8.  Hypokalemia replete as needed  9. Fall at home. UA at admission negative. B12, vit D wnl  10. Acute metabolic encephalopathy with visual hallucinations, delirium suspected, in the background of dementia.. no reversible causes found. Monitor for constipation.  Frequent reorientation.  11. Anemia, acute blood loss. supplement iron.   12. Dvt prophylaxis  13. Full code. Palli care consult.     Delio Osborne (Child)  [phone] (Mobile) .       Additional Notes:      Case discussed with:  [x]patient  []family  [x]nursing  []case management   [] discussed on IDT.   DVT Prophylaxis:  [x]Lovenox  []Hep SQ  []SCDs  []Coumadin   []On Heparin gtt   [] noac    Objective:   VS: /72   Pulse 87   Temp 98.1 °F (36.7 °C) (Oral)   Resp 18   Ht 1.727 m (5' 8\")   Wt 52.2 kg (115 lb)   SpO2 98%   BMI 17.49 kg/m²    Tmax/24hrs: Temp (24hrs), Av.3 °F (36.8 °C), Min:98 °F (36.7 °C), Max:98.5 °F (36.9 °C)    Input/Output: No intake or output data in the 24 hours ending 24 4995      General: Awake and alert, oriented x 2 - 3.  Sitting up in 
  Ty [facility] Hospitalist Group  Progress Note    Patient: Mona Olea Age: 79 y.o. : 1945 MR#: [id number] SSN: [id number]  Date/Time: 2024    Subjective:     Hb has fallen to 8.8 from 10.5 since admission.     Patient seen and examined at bedside, she is mildly upset but able to be reassured.  She declines lunch at this time, states she ate breakfast late.  Wants to take a nap.  Denies pain anywhere.  No family at bedside.    Assessment/Plan:     1. Left inferior pubic rami fractures associated with subadjacent hematoma/hemorrhage. Per ortho, Non-operative treatment: WBAT with walker and progressive ambulation with PT. Follow up in clinic in 2 weeks for updated x-ray.   2. Left sacral ala fracture   3. Possible left pubic body nondisplaced occult fracture   4. TAMIKA resolving.   5. Hypertension  6. Dyslipidemia  7. Dementia  8.  Hypokalemia replete as needed  9. Fall at home. UA at admission negative. B12, vit D wnl  10. Acute metabolic encephalopathy with visual hallucinations, delirium suspected, in the background of dementia.. no reversible causes found. Monitor for constipation.   11. Anemia, acute blood loss. Check iron.   12. Dvt prophylaxis  13. Full code. Palli care consult.     Delio Osborne (Child)  [phone] (Mobile) .       Additional Notes:      Case discussed with:  [x]patient  []family  [x]nursing  []case management   [] discussed on IDT.   DVT Prophylaxis:  [x]Lovenox  []Hep SQ  []SCDs  []Coumadin   []On Heparin gtt   [] noac    Objective:   VS: /67   Pulse 86   Temp 97.7 °F (36.5 °C) (Oral)   Resp 18   Ht 1.727 m (5' 8\")   Wt 68 kg (150 lb)   SpO2 98%   BMI 22.81 kg/m²    Tmax/24hrs: Temp (24hrs), Av.1 °F (36.7 °C), Min:97.7 °F (36.5 °C), Max:98.9 °F (37.2 °C)    Input/Output:   Intake/Output Summary (Last 24 hours) at 2024 0958  Last data filed at 2024 1800  Gross per 24 hour   Intake 720 ml   Output 201 ml   Net 519 ml 
  [facility] Hospitalist Group  Progress Note    Patient: Mona Olea Age: 79 y.o. : 1945 MR#: [id number] SSN: [id number]  Date/Time: 2024    Subjective:     No bowel movement recorded since admission.    Patient s/e at bedside, she reports she's in pain since she fell. Denies chest pain, abd pain, shortness of breath. States she's constipated. Lives at home \"with my Mom.\" No family at bedside.     Per son, last BM was 2 days ago.     Patient had a bowel movement since arrival to the unit.    In the afternoon got a call from nursing patient is more confused per son at bedside.  Discussed with son over the phone 3:45 PM, he reports patient is having visual hallucinations, which she does sometimes at home but today she is talking to people who were not in the room.  He expresses concern she may have a urinary tract infection, as she has had these in the past.    Assessment/Plan:     1. Left inferior pubic rami fractures associated with subadjacent hematoma/hemorrhage. Per ortho, Non-operative treatment: WBAT with walker and progressive ambulation with PT. Follow up in clinic in 2 weeks for updated x-ray.   2. Left sacral ala fracture   3. Possible left pubic body nondisplaced occult fracture   4. TAMIKA resolving.   5. Hypertension  6. Dyslipidemia  7. Dementia  8.  Hypokalemia replete as needed  9. Fall at home. UA at admission negative. Check B12, vit D.  10. Acute metabolic encephalopathy with visual hallucinations, delirium suspected, in the background of dementia.. Check for reversible causes.     Delio Osborne (Child)  [phone] (Mobile) updated 10:30 am, extensive discussion held, all questions answered.     3:45 pm discussed with son over the phone, all questions answered to the best my ability.      Additional Notes:      Case discussed with:  [x]patient  [x]family  [x]nursing  [x]case management   [x] discussed on IDT.   DVT Prophylaxis:  [x]Lovenox  []Hep SQ  []SCDs  
  Ty [facility] Hospitalist Group  Progress Note    Patient: Mona Olea Age: 79 y.o. : 1945 MR#: [id number] SSN: [id number]  Date/Time: 2024    Subjective:     No acute events overnight, no new concerns or complaints, vitals stable.    Assessment/Plan:     1. Left inferior pubic rami fractures associated with subadjacent hematoma/hemorrhage. Per ortho, Non-operative treatment: WBAT with walker and progressive ambulation with PT. Follow up in clinic in 2 weeks for updated x-ray.   2. Left sacral ala fracture   3. Possible left pubic body nondisplaced occult fracture   4. TAMIKA resolving.  ARB held.  5. Hypertension, blood pressure somewhat labile on home med Norvasc.  Continue to hold ARB.  6. Dyslipidemia on statin.  7. Dementia  8.  Hypokalemia replete as needed  9. Fall at home. UA at admission negative. B12, vit D wnl  10. Acute metabolic encephalopathy with visual hallucinations, delirium suspected, in the background of dementia.. no reversible causes found. Frequent reorientation.  11. Anemia, acute blood loss. supplement iron.   12. Constipation. Bowel regimen.      Delio Osborne (Child)  [phone] (Mobile) .       Additional Notes:      Case discussed with:  [x]patient  []family  [x]nursing  [x]case management   [x] discussed on IDT.   DVT Prophylaxis:  [x]Lovenox  []Hep SQ  []SCDs  []Coumadin   []On Heparin gtt   [] noac    Objective:   VS: /70   Pulse 90   Temp 98.5 °F (36.9 °C) (Oral)   Resp 18   Ht 1.727 m (5' 8\")   Wt 53.6 kg (118 lb 2.7 oz)   SpO2 94%   BMI 17.97 kg/m²    Tmax/24hrs: Temp (24hrs), Av.4 °F (36.9 °C), Min:97.8 °F (36.6 °C), Max:98.9 °F (37.2 °C)    Input/Output:   Intake/Output Summary (Last 24 hours) at 2024 1433  Last data filed at 2024 0600  Gross per 24 hour   Intake 540 ml   Output --   Net 540 ml           General: Awake and alert, oriented x 2 - 3.  Sitting up in bed, speaking in full sentences on room air.  More 
 conducted an initial consultation and Spiritual Assessment for Mona Olea, who is a 79 y.o.,female. Patient’s Primary Language is: English.   According to the patient’s EMR Baptism Affiliation is: Lutheran.     The reason the Patient came to the hospital is:   Patient Active Problem List    Diagnosis Date Noted    Primary hypertension 01/12/2024    Positive FIT (fecal immunochemical test) 01/22/2023    Severe dementia with other behavioral disturbance, unspecified dementia type (HCC)     Normocytic normochromic anemia 12/17/2021    Stage 3a chronic kidney disease (HCC) 12/16/2021    Hyperlipidemia LDL goal <100 12/16/2021    Visual hallucinations 12/16/2021    Hypertensive kidney disease with stage 3a chronic kidney disease (HCC) 10/05/2021    Prediabetes 03/13/2017    Anxiety     Glaucoma         The  provided the following Interventions:  Initiated a relationship of care and support at the ER.  Explored issues of dia, belief, spirituality and Gnosticism/ritual needs while hospitalized.  Listened empathically.  Provided information about Spiritual Care Services.  Offered prayer and assurance of continued prayers on patient's behalf.   Chart reviewed.    The following outcomes where achieved:  Patient is not able to give accurate information both of her medical narrative and spiritual journey/beliefs.   confirmed Patient's Lutheran  Baptism Affiliation.  Patient is not able to process feeling about current hospitalization.  Because patient is still under observation and due to pending alb results, patient is unable to receive holy communion.  Act of Spiritual Communion Prayer and prayer card was provided instead.   Patient expressed gratitude for 's visit.    Assessment:  Patient does not have any Gnosticism/cultural needs that will affect patient’s preferences in health care.  There are no spiritual or Gnosticism issues which require intervention at this time. 
ARU/IPR REFERRAL CONTACT NOTE  [facility]      Thank you for the opportunity to review this patient's case for admission to [facility].    Based on our pre-admission screening:     [ x] This patient does not meet criteria for admission to [facility] due to:    [ x] Too low level, per documentation, patient has not demonstrated tolerance for acute rehabilitation level of intensity.      Again, Thank you for this referral. Should you have any questions please do not hesitate to call.     Sincerely,  Angelina Olivera    Clinical Liaison  [facility]  [phone]         
Call placed to [facility]. Report given to nurse Julia. Answered all questions to nurse satisfaction. Discharge instructions/summary given to medical transport. All patient's belongings are within patient's possession. Patient escorted off unit via stretcher in stable condition. Call placed to son about patient's departure.   
Comprehensive Nutrition Assessment    Type and Reason for Visit:  Initial, Positive Nutrition Screen    Nutrition Recommendations/Plan:   Continue current diet, encourage PO intake.   Plan to add oral nutrition supplement to optimize nutrition intake opportunity: Ensure Plus High Protein (each provides 350 kcal, 20g protein) TID  Plan to order MVI and thiamine supplementation r/t malnutrition.   Monitor PO intake/tolerance of meals/supplements, weight, labs, and POC while admitted.      Malnutrition Assessment:  Malnutrition Status:  Severe malnutrition (04/30/24 1008)    Context:  Chronic Illness     Findings of the 6 clinical characteristics of malnutrition:  Energy Intake:  Mild decrease in energy intake (Comment)  Weight Loss:  Greater than 10% over 6 months     Body Fat Loss:  Severe body fat loss Buccal region, Triceps, Orbital   Muscle Mass Loss:  Severe muscle mass loss Temples (temporalis), Clavicles (pectoralis & deltoids)  Fluid Accumulation:  No significant fluid accumulation     Strength:  Not Performed    Nutrition History and Allergies:   PMHx: dementia, HLD, HTN, anxiety. Wt hx: c wt- 118.2 lb (bed scale),   155 lb (2/24/24, -23.7%), 131 lb (11/15/23, -9.8%), 137 lb (9/15/23, -13.7%), 142 lb (5/26/23, -16.7%), significant wt loss x 60 days, 4 months, 7 months, per EMR review. NKFA.     Nutrition Assessment:    Admitted for management of pubic ramus fracture s/p fall; surgery not required. Low BMI (18) noted. Per flow sheets, meal intake 1-100%, with 5/6 entries <50%. Visited pt- reported eating \"normally\" PTA, 2-3 meals per day, but noted that many others would probably think it was \"not enough,\" and could easily skip a meal if she forgets, but she tries not to and is trying to increase intake. Unsure of usual wt or wt loss, significant per EMR review. Unable to obtain bed scale, pt sitting in bedside chair. Limited visual NFPE (pt under covers) showed significant wasting, noted above. No food 
Patient is alert and oriented times 2, able to communicate needs. No changes during this shift, patient is in room on bed resting with eyes closed, no discomfort.  
Patient is alert and oriented times 2, able to communicate needs. Patient is in room on bed awake, no changes on this shift.  
Patient is alert and oriented to self, able to communicate needs. No changes during this shift.  
Patient is alert and oriented to self, able to communicate needs. Patient is encouraged to use call bell any time help is needed.  
Patient is in a stable condition, still oriented to self. Nil fresh complaint.  
Received patient from ED, confused, Aox1, VSS, RR unlabored, on RA,, 4 eyes skin assessment done with Donna POTTER, skin intact with bruising on the Left hip.    /67   Pulse 89   Temp 97.5 °F (36.4 °C) (Oral)   Resp 20   Ht 1.727 m (5' 8\")   Wt 68 kg (150 lb)   SpO2 97%   BMI 22.81 kg/m²       Oriented to room, bed controls, call light. Safety measures in place, bed in low position, bed alarm on, call light within reach. 5Ps addressed.    Patient unable to give any history due to disorientation, no family in room, attempt to call the number in chart unsuccessful. Will attempt later.  
S:   Pt has severe dementia at baseline but was able to remember our previous encounter last night and tell me which hip was bothering her.   No events  Has not ambulated  No chest pain, shortness of breath, nausea,vomiting, fever, or chills  Denies eating or having an appetite. No breakfast in room at encounter.      O:   Patient Vitals for the past 8 hrs:   Temp Pulse Resp BP SpO2   04/26/24 0745 97.9 °F (36.6 °C) 72 18 137/83 97 %   04/26/24 0600 98 °F (36.7 °C) 62 16 (!) 103/57 95 %          Alert, patient oriented at encounter, NAD, non labored  Operative extremity: left hip  Dressing clean, dry, intact  Extremity 2+, no swelling, sensation and motor intact throughout. No calf pain.  Quad firing, pain with hip flexion, extension, and abduction    A/P:   79 y.o. female status post closed sacral and pubic rami fractures, left, doing well. Treatment plan discussed with patient.    - Non-operative treatment: WBAT with walker and progressive ambulation with PT.     - DVT prophy: TEDs, SCDs, lovenox per medicine  - Pain control: Tylenol, Tramadol, Oxycodone; no NSAIDs due to TAMIKA  - Ice, elevation of injured extremity  - Labs: Acute blood loss anemia, Hgb 9.5, improved from the 8.9 from early this morning.  - PT/OT  - Dispo: D/C when clears physical therapy, pain controlled, case management      Farhana Lopez PA-C  4/26/2024  1:24 PM              
S:   Pt has severe dementia at baseline speaking in light voice but full words, but tangential.   No events  Has not ambulated  No chest pain, shortness of breath, nausea,vomiting, fever, or chills  Denies eating or having an appetite.   Reports of hallucinations and was orthostatic hypotension with OT      O:   Patient Vitals for the past 8 hrs:   Temp Pulse Resp BP SpO2   04/27/24 0713 -- -- 18 -- --   04/27/24 0418 97.7 °F (36.5 °C) 61 15 (!) 144/71 97 %   04/27/24 0042 98.2 °F (36.8 °C) 87 16 (!) 144/73 98 %            Alert, patient oriented at encounter, NAD, non labored  Operative extremity: left hip  Dressing clean, dry, intact  Extremity 2+, no swelling, sensation and motor intact throughout. No calf pain.  Quad firing, pain with hip flexion, extension, and abduction. No TTP at pubic symphasis or at sacrum, mild bruising posterolateral.     A/P:   79 y.o. female status post closed sacral and pubic rami fractures, left, doing well. Treatment plan discussed with patient.    - Non-operative treatment: WBAT with walker and progressive ambulation with PT.     - DVT prophy: TEDs, SCDs, lovenox per medicine  - Pain control: Tylenol, Tramadol, Oxycodone; no NSAIDs due to TAMIKA  - Ice, elevation of injured extremity  - Labs: Acute blood loss anemia, Hgb 8.8,  from 8.9 yesterday  - PT/OT  - Dispo: CM for placement. F/u my office 2 weeks.       Ferny Larsen DO  4/27/2024  7:31 AM              
(H) 12 - 20      Est, Glom Filt Rate 78 >60 ml/min/1.73m2    Calcium 8.6 8.5 - 10.1 MG/DL   Magnesium    Collection Time: 04/29/24  1:20 AM   Result Value Ref Range    Magnesium 1.7 1.6 - 2.6 mg/dL     Additional Data Reviewed:      Signed By: Kaycee Hirsch MD     April 29, 2024 7:15 PM

## 2024-05-03 ENCOUNTER — CARE COORDINATION (OUTPATIENT)
Facility: CLINIC | Age: 79
End: 2024-05-03

## 2024-05-03 NOTE — CARE COORDINATION
Care Transitions Initial Follow Up Call    Call within 2 business days of discharge: No , Pt. Discharge to Skilled Nursing Facility.     Transition of care outreach postponed for 14 days due to patient's discharge to   Skilled Nursing Facility.     Michael Cardenas RN

## 2024-05-11 NOTE — TELEPHONE ENCOUNTER
----- Message from Ovi Nicolas NP sent at 10/11/2021  7:48 AM EDT -----  Please notify patient that her urine culture confirmed bacteria and the medication she was given is effective against the strain. However, if she continues to have UTI symptoms, we can send another round of antibiotics to her pharmacy; if not, follow-up as scheduled. Thank you. Don

## 2024-05-12 DIAGNOSIS — I12.9 HYPERTENSIVE KIDNEY DISEASE WITH STAGE 3A CHRONIC KIDNEY DISEASE (HCC): ICD-10-CM

## 2024-05-12 DIAGNOSIS — N18.31 HYPERTENSIVE KIDNEY DISEASE WITH STAGE 3A CHRONIC KIDNEY DISEASE (HCC): ICD-10-CM

## 2024-05-13 RX ORDER — LOSARTAN POTASSIUM 25 MG/1
25 TABLET ORAL EVERY MORNING
Qty: 30 TABLET | Refills: 0 | OUTPATIENT
Start: 2024-05-13

## 2024-05-13 NOTE — TELEPHONE ENCOUNTER
Spoke w/ Delio (son). Patient currently in rehab center and Delio stated she does not need refill at this time. He will call back to schedule follow up that is overdue when she is released from rehab

## 2024-05-13 NOTE — TELEPHONE ENCOUNTER
Last seen 1/23/2024   Last labs 4/30/24 ordered by Dr. Kaycee Hirsch (Lab orders placed 5/26/23 will be expiring soon.  Will you be placing lab orders again?)  Last filled 4/17/24  Next appointment Visit date not found     5. Hypertensive kidney disease with stage 3a chronic kidney disease (HCC)  Assessment & Plan:  BP goal <130/80  30-days supply of amlodipine 10 mg, Losartan 25 mg daily until fasting labs completed  Orders:  -     losartan (COZAAR) 25 MG tablet; Take 1 tablet by mouth every morning, Disp-30 tablet, R-0Normal  -     amLODIPine (NORVASC) 10 MG tablet; TAKE 1 TABLET BY MOUTH ONCE DAILY (APPOINTMENT  FOR  FUTURE  REFILLS), Disp-30 tablet, R-0Normal        Lab Results   Component Value Date     04/30/2024    K 4.0 04/30/2024     04/30/2024    CO2 27 04/30/2024    BUN 20 (H) 04/30/2024    CREATININE 0.87 04/30/2024    GLUCOSE 92 04/30/2024    CALCIUM 8.9 04/30/2024    BILITOT 0.6 02/24/2024    ALKPHOS 63 02/24/2024    AST 19 02/24/2024    ALT 29 02/24/2024    LABGLOM 69 04/30/2024    GFRAA 56 (L) 09/13/2022    AGRATIO 1.2 09/13/2022    GLOB 3.2 02/24/2024

## 2024-05-17 ENCOUNTER — CARE COORDINATION (OUTPATIENT)
Facility: CLINIC | Age: 79
End: 2024-05-17

## 2024-05-17 NOTE — CARE COORDINATION
CTN spoke with Ms. Lacey of [facility] and she informed CTN that Patient is still admitted . No discharge date available at this time as per Ms. Lacey.

## 2024-05-24 ENCOUNTER — CARE COORDINATION (OUTPATIENT)
Facility: CLINIC | Age: 79
End: 2024-05-24

## 2024-05-24 NOTE — CARE COORDINATION
Care Transition Follow Up    Call place to [facility]. Patient is still currently admitted. Spoke with Shalini. There is no discharge date at this time.

## 2024-06-04 ENCOUNTER — TELEPHONE (OUTPATIENT)
Facility: CLINIC | Age: 79
End: 2024-06-04

## 2024-06-04 NOTE — TELEPHONE ENCOUNTER
Spoke w/ Delio (son) and he stated patient was discharged from nursing home last Friday 5/31/24. He was made aware to have all medication bottles available as well to upload nursing home documents to my chart.

## 2024-06-04 NOTE — TELEPHONE ENCOUNTER
Please call patient's son to clarify if she is in a skilled nursing facility (nursing home), which was the discharge disposition per discharge summary.  If so, there is no need for this HFU until she is discharged from [facility].  Once discharged from there, they are to bring all records from the nursing home as well as medication bottles.  Thank you.

## 2024-06-06 ENCOUNTER — HOME HEALTH ADMISSION (OUTPATIENT)
Age: 79
End: 2024-06-06
Payer: MEDICARE

## 2024-06-06 ENCOUNTER — TELEMEDICINE (OUTPATIENT)
Facility: CLINIC | Age: 79
End: 2024-06-06
Payer: MEDICARE

## 2024-06-06 DIAGNOSIS — R41.0 DELIRIUM: ICD-10-CM

## 2024-06-06 DIAGNOSIS — N18.31 HYPERTENSIVE KIDNEY DISEASE WITH STAGE 3A CHRONIC KIDNEY DISEASE (HCC): ICD-10-CM

## 2024-06-06 DIAGNOSIS — R73.03 PREDIABETES: ICD-10-CM

## 2024-06-06 DIAGNOSIS — S32.10XD CLOSED FRACTURE OF SACRUM WITH ROUTINE HEALING, UNSPECIFIED FRACTURE MORPHOLOGY, SUBSEQUENT ENCOUNTER: ICD-10-CM

## 2024-06-06 DIAGNOSIS — N17.9 AKI (ACUTE KIDNEY INJURY) (HCC): ICD-10-CM

## 2024-06-06 DIAGNOSIS — Z09 HOSPITAL DISCHARGE FOLLOW-UP: Primary | ICD-10-CM

## 2024-06-06 DIAGNOSIS — S32.592A PUBIC RAMUS FRACTURE, LEFT, CLOSED, INITIAL ENCOUNTER (HCC): ICD-10-CM

## 2024-06-06 DIAGNOSIS — D64.9 NORMOCYTIC NORMOCHROMIC ANEMIA: ICD-10-CM

## 2024-06-06 DIAGNOSIS — I12.9 HYPERTENSIVE KIDNEY DISEASE WITH STAGE 3A CHRONIC KIDNEY DISEASE (HCC): ICD-10-CM

## 2024-06-06 DIAGNOSIS — F03.C18 SEVERE DEMENTIA WITH OTHER BEHAVIORAL DISTURBANCE, UNSPECIFIED DEMENTIA TYPE (HCC): ICD-10-CM

## 2024-06-06 PROBLEM — R44.1 VISUAL HALLUCINATIONS: Status: RESOLVED | Noted: 2021-12-16 | Resolved: 2024-06-06

## 2024-06-06 PROCEDURE — G8399 PT W/DXA RESULTS DOCUMENT: HCPCS | Performed by: NURSE PRACTITIONER

## 2024-06-06 PROCEDURE — 1123F ACP DISCUSS/DSCN MKR DOCD: CPT | Performed by: NURSE PRACTITIONER

## 2024-06-06 PROCEDURE — 1111F DSCHRG MED/CURRENT MED MERGE: CPT | Performed by: NURSE PRACTITIONER

## 2024-06-06 PROCEDURE — 1036F TOBACCO NON-USER: CPT | Performed by: NURSE PRACTITIONER

## 2024-06-06 PROCEDURE — 99215 OFFICE O/P EST HI 40 MIN: CPT | Performed by: NURSE PRACTITIONER

## 2024-06-06 PROCEDURE — G8419 CALC BMI OUT NRM PARAM NOF/U: HCPCS | Performed by: NURSE PRACTITIONER

## 2024-06-06 PROCEDURE — 1090F PRES/ABSN URINE INCON ASSESS: CPT | Performed by: NURSE PRACTITIONER

## 2024-06-06 PROCEDURE — G8427 DOCREV CUR MEDS BY ELIG CLIN: HCPCS | Performed by: NURSE PRACTITIONER

## 2024-06-06 SDOH — ECONOMIC STABILITY: FOOD INSECURITY: WITHIN THE PAST 12 MONTHS, YOU WORRIED THAT YOUR FOOD WOULD RUN OUT BEFORE YOU GOT MONEY TO BUY MORE.: NEVER TRUE

## 2024-06-06 SDOH — ECONOMIC STABILITY: FOOD INSECURITY: WITHIN THE PAST 12 MONTHS, THE FOOD YOU BOUGHT JUST DIDN'T LAST AND YOU DIDN'T HAVE MONEY TO GET MORE.: NEVER TRUE

## 2024-06-06 SDOH — ECONOMIC STABILITY: INCOME INSECURITY: HOW HARD IS IT FOR YOU TO PAY FOR THE VERY BASICS LIKE FOOD, HOUSING, MEDICAL CARE, AND HEATING?: NOT HARD AT ALL

## 2024-06-06 ASSESSMENT — SOCIAL DETERMINANTS OF HEALTH (SDOH)

## 2024-06-06 ASSESSMENT — PATIENT HEALTH QUESTIONNAIRE - PHQ9
SUM OF ALL RESPONSES TO PHQ QUESTIONS 1-9: 0
SUM OF ALL RESPONSES TO PHQ9 QUESTIONS 1 & 2: 0
SUM OF ALL RESPONSES TO PHQ QUESTIONS 1-9: 0
2. FEELING DOWN, DEPRESSED OR HOPELESS: NOT AT ALL
SUM OF ALL RESPONSES TO PHQ QUESTIONS 1-9: 0
1. LITTLE INTEREST OR PLEASURE IN DOING THINGS: NOT AT ALL
SUM OF ALL RESPONSES TO PHQ QUESTIONS 1-9: 0

## 2024-06-06 ASSESSMENT — ENCOUNTER SYMPTOMS: SHORTNESS OF BREATH: 0

## 2024-06-06 NOTE — PROGRESS NOTES
Monarojas Olea presents today for   Chief Complaint   Patient presents with    Follow-Up from Hospital     Son Delio states that patient is getting up and moving around. He states that she is not an any pain but still confusion which is worsening.     Other     Patient would urinate in bathroom, however, patient refuse to use bathroom for bowel movement. Patient would get in the bed without cleaning herself, and he also found poop on a paper plate inside her closet.        Is someone accompanying this pt? Yes Delio (Son)     Is the patient using any DME equipment during OV? No     Depression Screenin/6/2024    10:15 AM 2024     1:54 PM 10/9/2023     1:57 PM 2023     2:10 PM 2022     4:18 PM 2022    10:18 AM 2022    10:08 AM   PHQ-9 Questionaire   Little interest or pleasure in doing things 0 0 0 0 0 0 0   Feeling down, depressed, or hopeless 0 0 0 0 0 0 0   Trouble falling or staying asleep, or sleeping too much  2        Feeling tired or having little energy  0        Poor appetite or overeating  0        Feeling bad about yourself - or that you are a failure or have let yourself or your family down  0        Trouble concentrating on things, such as reading the newspaper or watching television  0        Moving or speaking so slowly that other people could have noticed. Or the opposite - being so fidgety or restless that you have been moving around a lot more than usual  0        Thoughts that you would be better off dead, or of hurting yourself in some way  0        PHQ-9 Total Score 0 2 0 0 0 0 0   If you checked off any problems, how difficult have these problems made it for you to do your work, take care of things at home, or get along with other people?  0             STEPHENIE 7-Anxiety       2024     2:01 PM   STEPHENIE-7 SCREENING   Feeling nervous, anxious, or on edge Not at all   Not being able to stop or control worrying Not at all   Worrying too much about different things Not at 
pressure 137/113, 97% on room air.  Creatinine 1.48 otherwise BMP without abnormality.  Hemoglobin 10.5, hematocrit 30.9.  PT/INR within normal limit.  UA negative.  CT head negative.  CT cervical spine negative.  Pelvic CT with acute left inferior pubic rami fractures associated with hematoma/hemorrhage.  Left sacral fracture.  Left hip x-ray done.  EKG with normal sinus rhythm.  Orthopedics consulted by the ED.  Received 1 L LR bolus in the ED.    Interval history/Current status: Patient presents today for hospital follow-up, accompanied by Delio Osborne.  He states that neurology has pushed appointment back three times.  Per son, patient does need home health orders.  Has an appointment with ortho on 06/11/2024.    Admitting symptoms have: improved      New Medications at Discharge:  famotidine 20 MG tablet  Commonly known as: PEPCID  Take 1 tablet by mouth daily  Start taking on: May 2, 2024      ibuprofen 400 MG tablet  Commonly known as: ADVIL;MOTRIN  Take 1.5 tablets by mouth every 8 hours as needed for Pain      sennosides-docusate sodium 8.6-50 MG tablet  Commonly known as: SENOKOT-S  Take 2 tablets by mouth daily  Start taking on: May 2, 2024         Changed Medications at Discharge:  donepezil 10 MG tablet  Commonly known as: ARICEPT  Take one Tab po every night  What changed: additional instructions      memantine 5 MG tablet  Commonly known as: NAMENDA  Take 1 tab by mouth nightly x 1 week then 1 tab twice daily thereafter.  What changed: when to take this         Discontinued Medications at Discharge:  None        Recommended Follow-up:  Ortho - scheduled on 06/11/2024        Review of Systems   Respiratory:  Negative for shortness of breath.    Cardiovascular:  Negative for chest pain and leg swelling.   Neurological:  Negative for dizziness, light-headedness and headaches.       Objective:   There were no vitals taken for this visit.    Physical Exam   Constitutional: Stable-appearing, in no distress,

## 2024-06-07 ENCOUNTER — HOME CARE VISIT (OUTPATIENT)
Age: 79
End: 2024-06-07

## 2024-06-11 ENCOUNTER — OFFICE VISIT (OUTPATIENT)
Age: 79
End: 2024-06-11
Payer: MEDICARE

## 2024-06-11 DIAGNOSIS — S32.592A PUBIC RAMUS FRACTURE, LEFT, CLOSED, INITIAL ENCOUNTER (HCC): ICD-10-CM

## 2024-06-11 DIAGNOSIS — Z51.89: ICD-10-CM

## 2024-06-11 DIAGNOSIS — S32.10XA CLOSED FRACTURE OF SACRUM, UNSPECIFIED FRACTURE MORPHOLOGY, INITIAL ENCOUNTER (HCC): Primary | ICD-10-CM

## 2024-06-11 PROCEDURE — 99214 OFFICE O/P EST MOD 30 MIN: CPT

## 2024-06-11 PROCEDURE — 1090F PRES/ABSN URINE INCON ASSESS: CPT

## 2024-06-11 PROCEDURE — G8399 PT W/DXA RESULTS DOCUMENT: HCPCS

## 2024-06-11 PROCEDURE — 73502 X-RAY EXAM HIP UNI 2-3 VIEWS: CPT

## 2024-06-11 PROCEDURE — G8428 CUR MEDS NOT DOCUMENT: HCPCS

## 2024-06-11 PROCEDURE — 1036F TOBACCO NON-USER: CPT

## 2024-06-11 PROCEDURE — G8419 CALC BMI OUT NRM PARAM NOF/U: HCPCS

## 2024-06-11 PROCEDURE — 1123F ACP DISCUSS/DSCN MKR DOCD: CPT

## 2024-06-11 NOTE — PROGRESS NOTES
Patient: Mona Olea                MRN: [id number]       SSN: [id number]  YOB: 1945        AGE: 79 y.o.        SEX: female  BMI: There is no height or weight on file to calculate BMI.    PCP: Lachelle Holguin NP-C  06/11/24    Chief Complaint: Follow-up (Non-operative closed sacral and pubic rami fractures, left)      1. Closed fracture of sacrum, unspecified fracture morphology, initial encounter ([facility])  -     AMB POC X-RAY RADEX HIP UNI WITH PELVIS 2-3 VIEWS  2. Pubic ramus fracture, left, closed, initial encounter ([facility])  -     AMB POC X-RAY RADEX HIP UNI WITH PELVIS 2-3 VIEWS  3. Fracture treatment convalescence or palliative care        HPI:  Mona Olea is a 79 y.o. female with chief complaint of   Chief Complaint   Patient presents with    Follow-up     Non-operative closed sacral and pubic rami fractures, left     status post closed sacral and pubic rami fractures, left. She was treated non-surgically while Dr. Larsen was on call. She up to this point has been WBAT with walker and progressive ambulation with PT. She is now home and has home health coming out. She does not currently have a walker at home but her caregiver states that PT will be bringing one tomorrow for her.    She arrived to the ED on April 25, 2024 via EMS following a ground level fall yesterday. She has a significant medical history of severe dementia, HTN, and CKD. She does not remember the fall and is not able to provide much information due to her significant dementia. She is not on any blood thinners.       IMAGING:  Imaging read by myself and interpreted as follows:    June 11, 2024:  3 view x-ray of the left hip including AP pelvis, AP, and lateral demonstrates healing superior and inferior pubic rami fractures with good callus formation and no evidence of displacement.     CT PELVIS W/OUT CONTRAST:  Demonstrates acute comminuted fractures of the left superior pubic ramus fracture and in 2 locations on

## 2024-06-12 ENCOUNTER — HOME CARE VISIT (OUTPATIENT)
Age: 79
End: 2024-06-12

## 2024-06-12 VITALS
SYSTOLIC BLOOD PRESSURE: 100 MMHG | HEART RATE: 88 BPM | DIASTOLIC BLOOD PRESSURE: 60 MMHG | OXYGEN SATURATION: 95 % | TEMPERATURE: 98 F | RESPIRATION RATE: 16 BRPM

## 2024-06-12 PROCEDURE — G0299 HHS/HOSPICE OF RN EA 15 MIN: HCPCS

## 2024-06-12 PROCEDURE — 0221000100 HH NO PAY CLAIM PROCEDURE

## 2024-06-12 ASSESSMENT — ENCOUNTER SYMPTOMS: STOOL DESCRIPTION: FORMED

## 2024-06-12 NOTE — HOME HEALTH
medications/medication interactions:   Sertraline & ibuprofen-increase risk for GI bleed (recommended taking tylenol for pain)  Sertraline & Trazodone-increase risk for serotonin syndrome.     Home health supplies by type and quantity ordered/delivered this visit include: None    Patient education provided this visit to include:  SN instructed that the dementia symptoms vary depending on the cause, but common signs and symptoms include: Cognitive changes: Memory loss, difficulty communicating or finding words, difficulty with complex tasks, difficulty with planning and organizing, difficulty with coordination and motor functions, problems with disorientation, such as getting lost. Psychological changes like personality changes, inability to reason, inappropriate behavior, paranoia, agitation, hallucinations. Memantine (Namenda) works by regulating the activity of glutamate. Glutamate is another chemical messenger involved in brain functions, such as learning and memory. A common side effect of memantine is dizziness.    Patient was instructed on strategies that can significantly help decrease the risk of a fall such as: Skid-proof mats or strips in the shower and bathtub, Removal of furniture that can slip away if grabbed accidentally for support, supportive non-slip footwear and not walking in stocking feet.   Instructed that High blood pressure (hypertension) is a leading cause of kidney disease and kidney failure (end-stage renal disease). Hypertension can cause damage to the blood vessels and filters in the kidney, making removal of waste from the body difficult. SN instructed patient about some measures aimed to managing & controlling hypertension, such as: eating low sodium diet , increase more fruits to increase your potassium, walk daily for 30 minutes, and have regular check-ups, as directed by Physician.        Patient level of understanding of education provided: Patient and caregiver verbalized

## 2024-06-13 ENCOUNTER — HOME CARE VISIT (OUTPATIENT)
Age: 79
End: 2024-06-13

## 2024-06-13 VITALS
RESPIRATION RATE: 17 BRPM | TEMPERATURE: 97.8 F | SYSTOLIC BLOOD PRESSURE: 110 MMHG | HEART RATE: 75 BPM | DIASTOLIC BLOOD PRESSURE: 58 MMHG | OXYGEN SATURATION: 96 %

## 2024-06-13 PROCEDURE — G0151 HHCP-SERV OF PT,EA 15 MIN: HCPCS

## 2024-06-13 NOTE — HOME HEALTH
establish and upgrade HEP, gait training with the least restrictive A DEV on flat and uneven surfaces, transfer training, stair training, dynamic standing balance re -education. Reinforece general safety precautions.      DISCHARGE PLANNING DISCUSSED: Discharge to self and family under MD supervision once all goals have been met or patient has reached max potential.

## 2024-06-14 ENCOUNTER — HOME CARE VISIT (OUTPATIENT)
Age: 79
End: 2024-06-14

## 2024-06-18 ENCOUNTER — HOME CARE VISIT (OUTPATIENT)
Age: 79
End: 2024-06-18
Payer: MEDICARE

## 2024-06-18 VITALS
SYSTOLIC BLOOD PRESSURE: 100 MMHG | HEART RATE: 86 BPM | TEMPERATURE: 97.4 F | OXYGEN SATURATION: 98 % | DIASTOLIC BLOOD PRESSURE: 60 MMHG | RESPIRATION RATE: 16 BRPM

## 2024-06-18 PROCEDURE — G0157 HHC PT ASSISTANT EA 15: HCPCS

## 2024-06-18 ASSESSMENT — ENCOUNTER SYMPTOMS: DYSPNEA ACTIVITY LEVEL: AFTER AMBULATING 10 - 20 FT

## 2024-06-18 NOTE — HOME HEALTH
SUBJECTIVE: Pt/CG denied medication changes, falls, or trips to ER since last visit. Has ortho f/u on 6/25/24. No new complaints.     CAREGIVER INVOLVEMENT/ASSISTANCE NEEDED FOR: A normal inability to leave home exists and a taxing and substantial effort is required. Unable to leave the home w/o assist for safety d/t fall risk. Pt requires assist from son for some ADLs, IADLs, medication management, and transportation to MD appts.     OBJECTIVE: See interventions.    PATIENT EDUCATION PROVIDED THIS VISIT: See interventions.    PATIENT RESPONSE TO EDUCATION PROVIDED: Pt/CG verbalized understanding of transfer training, HEP, and safety ed. Will require further teaching for health maintenance and fall prevention.    PATIENT RESPONSE TO TREATMENT: No significant Inc pain or SOB. Fatigued w/ activity but vitals remained stable. Required seated rest breaks d/t L hip soreness and fatigue. Frequent redirection needed to stay on task d/t dementia.     ASSESSMENT OF PROGRESS TOWARD GOALS: Pt pleasant, cooperative, and motiviated to participate in therapy. Good CG support in the home. Progressing towards goals. Verbal/visual cues needed to perform therex w/ correct tech to maximize strengthening in order to improve functional transfers. Unable to stand w/o BUE assist d/t weakness and requires CGA for safety. Amb Inc distance but only safe to do so using gait belt w/ CGA for fall prevention d/t impaired gait/balance.     PLAN FOR NEXT VISIT: Standing therex and gait training.     THE FOLLOWING DISCHARGE PLANNING WAS DISCUSSED WITH THE PATIENT/CAREGIVER: Discharge to self/caregiver under supervision of MD once PT goals are met or maximum benefits achieved in the HH setting.

## 2024-06-19 ENCOUNTER — HOME CARE VISIT (OUTPATIENT)
Age: 79
End: 2024-06-19
Payer: MEDICARE

## 2024-06-19 PROCEDURE — G0153 HHCP-SVS OF S/L PATH,EA 15MN: HCPCS

## 2024-06-20 ENCOUNTER — HOME CARE VISIT (OUTPATIENT)
Age: 79
End: 2024-06-20
Payer: MEDICARE

## 2024-06-20 VITALS
SYSTOLIC BLOOD PRESSURE: 98 MMHG | DIASTOLIC BLOOD PRESSURE: 68 MMHG | HEART RATE: 66 BPM | OXYGEN SATURATION: 98 % | TEMPERATURE: 98.9 F

## 2024-06-20 VITALS
DIASTOLIC BLOOD PRESSURE: 60 MMHG | OXYGEN SATURATION: 98 % | SYSTOLIC BLOOD PRESSURE: 100 MMHG | HEART RATE: 78 BPM | TEMPERATURE: 97.8 F | RESPIRATION RATE: 17 BRPM

## 2024-06-20 PROCEDURE — G0157 HHC PT ASSISTANT EA 15: HCPCS

## 2024-06-20 NOTE — HOME HEALTH
SUBJECTIVE: Pt/CG denied medication changes, falls, or trips to ER since last visit.     CAREGIVER INVOLVEMENT/ASSISTANCE NEEDED FOR: A normal inability to leave home exists and a taxing and substantial effort is required. Unable to leave the home w/o assist for safety d/t fall risk. Pt requires assist from son for some ADLs, IADLs, medication management, and transportation to MD appts.     OBJECTIVE: See interventions.    PATIENT EDUCATION PROVIDED THIS VISIT: See interventions.    PATIENT RESPONSE TO EDUCATION PROVIDED: Pt/CG verbalized understanding. Reinforcement for safe measures needed d/t cognitive deficit however pt's son verbalized/demonstrated good understanding.    PATIENT RESPONSE TO TREATMENT: Tolerated tx well w/o c/o pain or SOB. Fatigued w/ activity requiring seated rest breaks. Vitals remained stable.     ASSESSMENT OF PROGRESS TOWARD GOALS: Progressing. Unable to stand w/o CGA. Requires CGA w/ gait belt for safe amb. Amb Inc distance before needing to sit. Mod cues needed to perform standing therex w/ correct tech in order to maximize strengthen to improve functional transfers/mobility. Pt remains high fall risk d/t impaired gait/balance as well as dementia and requires constant supervision.    PLAN FOR NEXT VISIT: Gait/balance training.     THE FOLLOWING DISCHARGE PLANNING WAS DISCUSSED WITH THE PATIENT/CAREGIVER: Discharge to self/caregiver under supervision of MD once PT goals are met or maximum benefits achieved in the HH setting.

## 2024-06-20 NOTE — HOME HEALTH
assistance, 2. To be able to have instances of wandering and having her follow directions and be able to recall important information.    HOME EXERCISE PROGRAM:  Defer to next visit    DISCHARGE PLANNING - (ANTICIPATED DC DATE, DC PLAN): DC to self and family under MD supervision once all goals have been met or patient has reached max potential.

## 2024-06-25 ENCOUNTER — HOME CARE VISIT (OUTPATIENT)
Age: 79
End: 2024-06-25
Payer: MEDICARE

## 2024-06-25 PROCEDURE — G0153 HHCP-SVS OF S/L PATH,EA 15MN: HCPCS

## 2024-06-27 ENCOUNTER — CARE COORDINATION (OUTPATIENT)
Facility: CLINIC | Age: 79
End: 2024-06-27

## 2024-06-27 ENCOUNTER — HOME CARE VISIT (OUTPATIENT)
Age: 79
End: 2024-06-27
Payer: MEDICARE

## 2024-06-27 ENCOUNTER — HOSPITAL ENCOUNTER (EMERGENCY)
Facility: HOSPITAL | Age: 79
Discharge: HOME | End: 2024-06-27
Attending: STUDENT IN AN ORGANIZED HEALTH CARE EDUCATION/TRAINING PROGRAM
Payer: MEDICARE

## 2024-06-27 ENCOUNTER — APPOINTMENT (OUTPATIENT)
Facility: HOSPITAL | Age: 79
End: 2024-06-27
Payer: MEDICARE

## 2024-06-27 VITALS
WEIGHT: 126 LBS | OXYGEN SATURATION: 98 % | HEIGHT: 68 IN | BODY MASS INDEX: 19.1 KG/M2 | SYSTOLIC BLOOD PRESSURE: 116 MMHG | TEMPERATURE: 97.7 F | DIASTOLIC BLOOD PRESSURE: 85 MMHG | RESPIRATION RATE: 19 BRPM | HEART RATE: 65 BPM

## 2024-06-27 DIAGNOSIS — N30.00 ACUTE CYSTITIS WITHOUT HEMATURIA: ICD-10-CM

## 2024-06-27 DIAGNOSIS — R41.0 DISORIENTATION: Primary | ICD-10-CM

## 2024-06-27 LAB
ALBUMIN SERPL-MCNC: 3.5 G/DL (ref 3.4–5)
ALBUMIN/GLOB SERPL: 1.1 (ref 0.8–1.7)
ALP SERPL-CCNC: 120 U/L (ref 45–117)
ALT SERPL-CCNC: 46 U/L (ref 13–56)
AMMONIA PLAS-SCNC: <10 UMOL/L (ref 11–32)
AMPHET UR QL SCN: NEGATIVE
ANION GAP SERPL CALC-SCNC: 9 MMOL/L (ref 3–18)
APPEARANCE UR: ABNORMAL
AST SERPL-CCNC: 36 U/L (ref 10–38)
BACTERIA URNS QL MICRO: ABNORMAL /HPF
BARBITURATES UR QL SCN: NEGATIVE
BENZODIAZ UR QL: NEGATIVE
BILIRUB SERPL-MCNC: 0.6 MG/DL (ref 0.2–1)
BILIRUB UR QL: NEGATIVE
BUN SERPL-MCNC: 25 MG/DL (ref 7–18)
BUN/CREAT SERPL: 23 (ref 12–20)
CALCIUM SERPL-MCNC: 9.6 MG/DL (ref 8.5–10.1)
CANNABINOIDS UR QL SCN: NEGATIVE
CHLORIDE SERPL-SCNC: 112 MMOL/L (ref 100–111)
CO2 SERPL-SCNC: 22 MMOL/L (ref 21–32)
COCAINE UR QL SCN: NEGATIVE
COLOR UR: YELLOW
CREAT SERPL-MCNC: 1.11 MG/DL (ref 0.6–1.3)
EKG ATRIAL RATE: 86 BPM
EKG DIAGNOSIS: NORMAL
EKG P AXIS: 58 DEGREES
EKG P-R INTERVAL: 134 MS
EKG Q-T INTERVAL: 374 MS
EKG QRS DURATION: 108 MS
EKG QTC CALCULATION (BAZETT): 447 MS
EKG R AXIS: -31 DEGREES
EKG T AXIS: 115 DEGREES
EKG VENTRICULAR RATE: 86 BPM
EPITH CASTS URNS QL MICRO: ABNORMAL /LPF (ref 0–5)
ERYTHROCYTE [DISTWIDTH] IN BLOOD BY AUTOMATED COUNT: 13.4 % (ref 11.6–14.5)
ETHANOL SERPL-MCNC: <3 MG/DL (ref 0–3)
GLOBULIN SER CALC-MCNC: 3.3 G/DL (ref 2–4)
GLUCOSE SERPL-MCNC: 99 MG/DL (ref 74–99)
GLUCOSE UR STRIP.AUTO-MCNC: NEGATIVE MG/DL
HCT VFR BLD AUTO: 38 % (ref 35–45)
HGB BLD-MCNC: 12.4 G/DL (ref 12–16)
HGB UR QL STRIP: NEGATIVE
KETONES UR QL STRIP.AUTO: ABNORMAL MG/DL
LEUKOCYTE ESTERASE UR QL STRIP.AUTO: ABNORMAL
Lab: NORMAL
MCH RBC QN AUTO: 30.8 PG (ref 24–34)
MCHC RBC AUTO-ENTMCNC: 32.6 G/DL (ref 31–37)
MCV RBC AUTO: 94.5 FL (ref 78–100)
METHADONE UR QL: NEGATIVE
NITRITE UR QL STRIP.AUTO: NEGATIVE
NRBC # BLD: 0 K/UL (ref 0–0.01)
NRBC BLD-RTO: 0 PER 100 WBC
OPIATES UR QL: NEGATIVE
PCP UR QL: NEGATIVE
PH UR STRIP: >8.5 [PH] (ref 5–8)
PLATELET # BLD AUTO: 183 K/UL (ref 135–420)
PMV BLD AUTO: 10.8 FL (ref 9.2–11.8)
POTASSIUM SERPL-SCNC: 4.1 MMOL/L (ref 3.5–5.5)
PROT SERPL-MCNC: 6.8 G/DL (ref 6.4–8.2)
PROT UR STRIP-MCNC: ABNORMAL MG/DL
RBC # BLD AUTO: 4.02 M/UL (ref 4.2–5.3)
RBC #/AREA URNS HPF: ABNORMAL /HPF (ref 0–5)
SODIUM SERPL-SCNC: 143 MMOL/L (ref 136–145)
SP GR UR REFRACTOMETRY: 1.02 (ref 1–1.03)
TSH SERPL DL<=0.05 MIU/L-ACNC: 1.22 UIU/ML (ref 0.36–3.74)
UROBILINOGEN UR QL STRIP.AUTO: 1 EU/DL (ref 0.2–1)
WBC # BLD AUTO: 7.7 K/UL (ref 4.6–13.2)
WBC URNS QL MICRO: ABNORMAL /HPF (ref 0–4)

## 2024-06-27 PROCEDURE — 96374 THER/PROPH/DIAG INJ IV PUSH: CPT

## 2024-06-27 PROCEDURE — 94761 N-INVAS EAR/PLS OXIMETRY MLT: CPT

## 2024-06-27 PROCEDURE — 81001 URINALYSIS AUTO W/SCOPE: CPT

## 2024-06-27 PROCEDURE — 80053 COMPREHEN METABOLIC PANEL: CPT

## 2024-06-27 PROCEDURE — 93005 ELECTROCARDIOGRAM TRACING: CPT | Performed by: STUDENT IN AN ORGANIZED HEALTH CARE EDUCATION/TRAINING PROGRAM

## 2024-06-27 PROCEDURE — 82140 ASSAY OF AMMONIA: CPT

## 2024-06-27 PROCEDURE — 99284 EMERGENCY DEPT VISIT MOD MDM: CPT

## 2024-06-27 PROCEDURE — 84443 ASSAY THYROID STIM HORMONE: CPT

## 2024-06-27 PROCEDURE — 93010 ELECTROCARDIOGRAM REPORT: CPT | Performed by: INTERNAL MEDICINE

## 2024-06-27 PROCEDURE — 70450 CT HEAD/BRAIN W/O DYE: CPT

## 2024-06-27 PROCEDURE — 85027 COMPLETE CBC AUTOMATED: CPT

## 2024-06-27 PROCEDURE — 80307 DRUG TEST PRSMV CHEM ANLYZR: CPT

## 2024-06-27 PROCEDURE — 6360000002 HC RX W HCPCS: Performed by: STUDENT IN AN ORGANIZED HEALTH CARE EDUCATION/TRAINING PROGRAM

## 2024-06-27 PROCEDURE — 2580000003 HC RX 258: Performed by: STUDENT IN AN ORGANIZED HEALTH CARE EDUCATION/TRAINING PROGRAM

## 2024-06-27 PROCEDURE — 82077 ASSAY SPEC XCP UR&BREATH IA: CPT

## 2024-06-27 RX ORDER — 0.9 % SODIUM CHLORIDE 0.9 %
500 INTRAVENOUS SOLUTION INTRAVENOUS ONCE
Status: COMPLETED | OUTPATIENT
Start: 2024-06-27 | End: 2024-06-27

## 2024-06-27 RX ORDER — CEFPODOXIME PROXETIL 200 MG/1
200 TABLET, FILM COATED ORAL 2 TIMES DAILY
Qty: 14 TABLET | Refills: 0 | Status: SHIPPED | OUTPATIENT
Start: 2024-06-27 | End: 2024-07-04

## 2024-06-27 RX ADMIN — WATER 1000 MG: 1 INJECTION INTRAMUSCULAR; INTRAVENOUS; SUBCUTANEOUS at 16:22

## 2024-06-27 RX ADMIN — SODIUM CHLORIDE 500 ML: 9 INJECTION, SOLUTION INTRAVENOUS at 13:17

## 2024-06-27 ASSESSMENT — PAIN - FUNCTIONAL ASSESSMENT: PAIN_FUNCTIONAL_ASSESSMENT: NONE - DENIES PAIN

## 2024-06-27 NOTE — ED TRIAGE NOTES
Pt AO to self only, Aox2 at baseline (oriented to self and year)  Pt arrived from home via EMS, per son, pt with AMS and decreased appetite (has not eaten today, only \"nibbling\" yesterday)

## 2024-06-27 NOTE — ED PROVIDER NOTES
EMERGENCY DEPARTMENT ENCOUNTER        Pt Name: Mona Olea  MRN: [id number]  Birthdate 1945  Date of evaluation: 6/27/2024  Provider: Mello Buckley DO   PCP: Lachelle Holguin NP-C  Note Started: 11:02 AM 6/27/24    History of Presenting Illness     Chief Complaint   Patient presents with   • Altered Mental Status       History From: Patient, EMS, and Patient's Son  Dementia     Mona Olea is a 79 y.o. female who presents for reported mental status change and decreased oral intake.  Usually alert to self and year at baseline.  Stated to nursing staff that the year is 2023 and she lives with her mother.        Review of Systems     Review of Systems   Unable to perform ROS: Dementia        Positives and Pertinent negatives as per HPI.    Past History     Past Medical History:   Past Medical History:   Diagnosis Date   • Anxiety    • Arrhythmia 2011    Negative Stress test   • Arthritis    • Carotid artery stenosis 2009    <50%   • Gestational diabetes    • Glaucoma    • Hypercholesterolemia    • Hypertension        Past Surgical History:  Past Surgical History:   Procedure Laterality Date   • CHOLECYSTECTOMY  1990    gallstones   • HYSTERECTOMY (CERVIX STATUS UNKNOWN)  1990    total hysterectomy       Family History:  Family History   Problem Relation Age of Onset   • Osteoarthritis Mother    • Dementia Mother    • Hypertension Mother    • Coronary Art Dis Mother        Social History:   Social History     Tobacco Use   • Smoking status: Never   • Smokeless tobacco: Never   Vaping Use   • Vaping Use: Never used   Substance Use Topics   • Alcohol use: Yes   • Drug use: Never       Allergies:  Allergies   Allergen Reactions   • Codeine Nausea Only       Medications:  Previous Medications    AMLODIPINE (NORVASC) 10 MG TABLET    TAKE 1 TABLET BY MOUTH ONCE DAILY . APPOINTMENT REQUIRED FOR FUTURE REFILLS    ASPIRIN 81 MG EC TABLET    Take 1 tablet by mouth daily    ATORVASTATIN (LIPITOR) 10 MG TABLET    Take    1225 Labs unrevealing, including TSH and alcohol.  No etiology of altered mentation. [AG]      ED Course User Index  [AG] Mello Buckley DO           Procedures:  Procedures      Rhythm interpretation from monitor: Sinus rhythm  Cardiac monitor interpreted by me. The cardiac monitor was ordered secondary to the patient’s history of altered mental status and to monitor the patient for dysrhythmia.      Social Determinants of Health: none       Supplemental Historians include: Patient's son       Documentation/Prior Results Review:  Old medical records.  Previous electrocardiograms.  Nursing notes.  Previous radiology studies.      Critical Care Time:  The services I provided to this patient were to treat and/or prevent clinically significant deterioration that could result in the failure of one or more body systems and/or organ systems due to {CRITICAL CARE:53970}    Services included the following:  -reviewing nursing notes and old charts  -vital sign assessments  -direct patient care  -medication orders and management  -interpreting and reviewing diagnostic studies/labs  -re-evaluations  -documentation time    Aggregate critical care time was *** minutes, which includes only time during which I was engaged in work directly related to the patient's care as described above, whether I was at bedside or elsewhere in the Emergency Department. It did not include time spent performing other reported procedures or the services of residents, students, or nurses.        Diagnosis and Disposition     CLINICAL IMPRESSION:  No diagnosis found.     Medication List        ASK your doctor about these medications      amLODIPine 10 MG tablet  Commonly known as: NORVASC  TAKE 1 TABLET BY MOUTH ONCE DAILY . APPOINTMENT REQUIRED FOR FUTURE REFILLS     aspirin 81 MG EC tablet     atorvastatin 10 MG tablet  Commonly known as: LIPITOR  Take 1 tablet by mouth nightly     donepezil 10 MG tablet  Commonly known as: ARICEPT  Take one Tab po

## 2024-06-27 NOTE — ED NOTES
Pt and son, Delio, provided AVS and d/c instructions. Pt v/u and had no further questions. Pt provided wheelchair to lobby and is d/c home with son in no distress.

## 2024-06-27 NOTE — ED PROVIDER NOTES
EMERGENCY DEPARTMENT ENCOUNTER        Pt Name: Mona Olea  MRN: [id number]  Birthdate 1945  Date of evaluation: 6/27/2024  Provider: Mello Buckley DO   PCP: Lachelle Holguin NP-C  Note Started: 10:20 AM 6/27/24    History of Presenting Illness     Chief Complaint   Patient presents with    Altered Mental Status       History From: Patient, EMS, and Patient's Son  Dementia     Mona Olea is a 79 y.o. female who presents for reported mental status change and decreased oral intake.  Usually alert to person and place at baseline. Per son, oriented to only person this morning. Also acting agitated which is unusual for her. Denies any recent illnesses or medication changes.        Review of Systems     Review of Systems   Unable to perform ROS: Dementia        Positives and Pertinent negatives as per HPI.    Past History     Past Medical History:   Past Medical History:   Diagnosis Date    Anxiety     Arrhythmia 2011    Negative Stress test    Arthritis     Carotid artery stenosis 2009    <50%    Gestational diabetes     Glaucoma     Hypercholesterolemia     Hypertension        Past Surgical History:  Past Surgical History:   Procedure Laterality Date    CHOLECYSTECTOMY  1990    gallstones    HYSTERECTOMY (CERVIX STATUS UNKNOWN)  1990    total hysterectomy       Family History:  Family History   Problem Relation Age of Onset    Osteoarthritis Mother     Dementia Mother     Hypertension Mother     Coronary Art Dis Mother        Social History:   Social History     Tobacco Use    Smoking status: Never    Smokeless tobacco: Never   Vaping Use    Vaping Use: Never used   Substance Use Topics    Alcohol use: Yes    Drug use: Never       Allergies:  Allergies   Allergen Reactions    Codeine Nausea Only       Medications:  Discharge Medication List as of 6/27/2024  5:05 PM        CONTINUE these medications which have NOT CHANGED    Details   esomeprazole Magnesium (NEXIUM) 20 MG PACK Take 20 mg by mouth  subscore is 4. GCS verbal subscore is 4. GCS motor subscore is 6.      Cranial Nerves: No cranial nerve deficit, dysarthria or facial asymmetry.      Comments: Moving all four extremities equally and spontaneously.   Psychiatric:         Mood and Affect: Mood normal.         Diagnostic Study Results     Labs:  No results found for this or any previous visit (from the past 12 hour(s)).   Labs Reviewed   CBC   COMPREHENSIVE METABOLIC PANEL   ETHANOL   URINALYSIS   URINE DRUG SCREEN   AMMONIA   TSH   URINE DRUG SCREEN   POCT GLUCOSE       EKG:   EKG was interpreted by me in the absence of a cardiologist.    Rate: 86  EKG Interpretation: EKG Interpretation: sinus rhythm, no evidence of arrhythmia  ST Segments: Normal ST segments - NO STEMI  When compared to prior EKGs, Q waves present in V1 and V3.  These were previously present in February of this year but not in April.     RADIOLOGY:  Non-plain film images such as CT, Ultrasound and MRI are read by the radiologist. Plain radiographic images are visualized and preliminarily interpreted by the ED Provider with the below findings:     ***     Interpretation per the Radiologist below, if available at the time of this note:     CT HEAD WO CONTRAST    (Results Pending)       Medical Decision Making   I am the first provider for this patient.    I reviewed the vital signs, available nursing notes, past medical history, past surgical history, family history, and social history.      MDM:   DDX includes but is not limited to: Electrolyte derangement, metabolic abnormality, intracranial hemorrhage, sepsis, progression of disease, polypharmacy, others.      ED Course:   ED Course as of 06/27/24 1116   Thu Jun 27, 2024   1114 Afebrile and hemodynamically stable.  No infectious symptoms reported to suggest sepsis at this time.  Will give IV fluids given reported decreased p.o. intake. [AG]      ED Course User Index  [AG] Mello Buckley DO

## 2024-06-27 NOTE — ED NOTES
Pt is Aox2-3 on arrival (oriented to self, place and year \"2023\"). Denies pain.   Reports she lives with her mother.

## 2024-06-28 ENCOUNTER — CARE COORDINATION (OUTPATIENT)
Facility: CLINIC | Age: 79
End: 2024-06-28

## 2024-06-28 ENCOUNTER — HOME CARE VISIT (OUTPATIENT)
Age: 79
End: 2024-06-28
Payer: MEDICARE

## 2024-06-28 NOTE — CARE COORDINATION
Care Transitions Note      Patient Current Location:  Home: [address]  [facility] [address]    Patient: Mona Olea    Patient : 1945   MRN: [id number]      Discharge Date: 24  RURS: Readmission Risk Score: 13.5      Last Discharge Facility       Date Complaint Diagnosis Description Type Department Provider    24 Altered Mental Status Disorientation ... ED (DISCHARGE) [facility] Mello Buckley, DO            Was this an external facility discharge? No    Additional needs identified to be addressed with provider   Standard priority:             Method of communication with provider: none.    Care Summary Note:     Ctn attempt to reach Patient on phone for follow up.   CTN reached Patient's son Mr. Delio Osborne on phone. Noted Pt. Son Mr. Delio Osborne is not listed on Pt. PHI.   During this out reach , CTN did not provide Pt. Son any patient medical/health information and solely obtained information from Pt. Son.      Pt. Son reported that Pt. is doing well.   No new or worsening of symptoms as per Pt. Son.  Pt. Son reports that Pt. Had an appointments with home health.   Pt. Son reported that Pt. Went to ED for UTI.   Pt. Son states that Pt. Is taking her antibiotic. CTN reminded for Pt. Son for Pt. To Continue to take antibiotic as prescribed.  Home health Pt and ST are still following as Per Pt. Son   Pt. Son states that they spoke to home health intake about Walker- home health aware as per Pt. Son.     No questions, concerns and/or needs at this time as per Pt. Son Mr. Kebede  CTN strongly advised Pt. Son for Pt. To schedule PCP appt. Pt. Son states that Pt. Has PCP appt. Coming up.       Patient reminded that there is a physician on call 24 hours a day / 7 days a week (M-F 5pm to 8am and from Friday 5pm until Monday 8a for the weekend) should the patient have questions or concerns.  Patient reminded to call 911 if situation is emergent ( such as chest pain, shortness of breath, unstoppable

## 2024-07-02 ENCOUNTER — HOME CARE VISIT (OUTPATIENT)
Age: 79
End: 2024-07-02
Payer: MEDICARE

## 2024-07-02 VITALS
TEMPERATURE: 97.8 F | SYSTOLIC BLOOD PRESSURE: 112 MMHG | RESPIRATION RATE: 20 BRPM | DIASTOLIC BLOOD PRESSURE: 62 MMHG | HEART RATE: 67 BPM | OXYGEN SATURATION: 97 %

## 2024-07-02 VITALS
RESPIRATION RATE: 17 BRPM | OXYGEN SATURATION: 96 % | DIASTOLIC BLOOD PRESSURE: 58 MMHG | HEART RATE: 84 BPM | SYSTOLIC BLOOD PRESSURE: 100 MMHG | TEMPERATURE: 97.7 F

## 2024-07-02 PROCEDURE — G0153 HHCP-SVS OF S/L PATH,EA 15MN: HCPCS

## 2024-07-02 PROCEDURE — G0151 HHCP-SERV OF PT,EA 15 MIN: HCPCS

## 2024-07-02 NOTE — HOME HEALTH
SUBJECTIVE: \"I feel tired\"  CAREGIVER INVOLVEMENT/ ASSISTANCE NEEDED FOR: All care, med management, meal prep  MEDICATIONS REVIEWED AND UPDATED: New abx added to med list by SLP    WOUNDS: No wounds .  Wounds at eval: No wounds  ROM: B JUVE's grossly WFL.  ROM at eval: B STUARTs grossly WFL  STRENGTH: 5 STS = 19.5 sec.  Strength at eval: B hip flexion/abd 3+/5; knee flexion/extension 4/5; dorsiflexion 4/5  BED MOBILITY: I supine<>sit and rolling L/R.  Bed Mobility at eval: I supine<>sit and rolling L/R  TRANSFERS: CG sit<>stand from varied surfaces.  Transfers at eval: MIN A sit<>stand from low sofa, EOB, kitchen chair.  Pt's son reports she needs MIN A for tub transfer at baseline  GAIT: Pt amb in the house with CG using a gait belt and outside for a short distance with MIN A.  Gait deficits noted: generally unsteady gait, occasionally using walls/furniture to steady herself.  Gait at eval: Pt amb in the house with MIN A.  Her son was amb with her by holding her hands and walking backward in front of her.  PT showed him how to use a gait belt and suggested that he get one.  Gait characterized by short steps, decreased foot clearance  STAIRS: MIN A up/down one step .  Stairs at eval: MIN A up/down one step   BALANCE:  Tinetti = 12/28 .  Balance at eval: Pt scored 8/28 on Tinetti Balance Assessment placing her at high risk for falls.     HEP consisting of: Seated: hip flexion, LAQ, ankle pumps    PATIENT/CAREGIVER EDUCATION PROVIDED THIS VISIT: safety, mobility   PATIENT/CAREGIVER RESPONSE TO EDUCATION PROVIDED: Pt/CG verbalizes understanding of all information and demo's back as appropriate   PATIENT RESPONSE TO TREATMENT: Pt demo'd positive response to PT shown by full participation w/o c/o pain and with stable vitals    .  ASSESSMENT AND PROGRESS TOWARD GOALS: Pt has made progress toward goals but cont to have unsteady gait and high fall risk.  She needed frequent rest breaks this visit due to report of fatigue with

## 2024-07-03 NOTE — HOME HEALTH
SUBJECTIVE: Pt alert, noted to have difficulty in communication.  Telegraphic speech.  Son present  CAREGIVER INVOLVEMENT / ASSISTANCE NEEDED FOR: son provides supervision   OBJECTIVE / PATIENT RESPONSE TO TREATMENT / PATIENT LEVEL OF UNDERSTANDING OF EDUCATION PROVIDED:  Pt is meeting goals in immediate auditory memory and in simple problem solving/reasoning. Advised Pts son in allowing Pt ample response time and encouraging use of description when encountering word finding difficulty.  He verbalized understanding.    ASSESSMENT OF PROGRESS TOWARD GOALS: steady progress  CONTINUED NEED FOR THE FOLLOWING SKILLS: ST to improve cognitive communication skills for safety and effective communication  PLAN FOR NEXT VISIT: ST d/c  HOME EXERCISE PROGRAM: use of above communication tips   THE FOLLOWING DISCHARGE PLANNING WAS DISCUSSED WITH THE PATIENT/CAREGIVER: ST to d/c in 1 more visit, Pt and son informed

## 2024-07-05 ENCOUNTER — HOME CARE VISIT (OUTPATIENT)
Age: 79
End: 2024-07-05
Payer: MEDICARE

## 2024-07-05 ENCOUNTER — TELEPHONE (OUTPATIENT)
Facility: CLINIC | Age: 79
End: 2024-07-05

## 2024-07-05 ENCOUNTER — CARE COORDINATION (OUTPATIENT)
Facility: CLINIC | Age: 79
End: 2024-07-05

## 2024-07-05 VITALS
RESPIRATION RATE: 17 BRPM | TEMPERATURE: 97.4 F | OXYGEN SATURATION: 97 % | HEART RATE: 80 BPM | DIASTOLIC BLOOD PRESSURE: 52 MMHG | SYSTOLIC BLOOD PRESSURE: 90 MMHG

## 2024-07-05 VITALS
SYSTOLIC BLOOD PRESSURE: 110 MMHG | OXYGEN SATURATION: 95 % | RESPIRATION RATE: 17 BRPM | HEART RATE: 95 BPM | DIASTOLIC BLOOD PRESSURE: 58 MMHG | TEMPERATURE: 97.5 F

## 2024-07-05 PROCEDURE — G0151 HHCP-SERV OF PT,EA 15 MIN: HCPCS

## 2024-07-05 PROCEDURE — G0153 HHCP-SVS OF S/L PATH,EA 15MN: HCPCS

## 2024-07-05 NOTE — CARE COORDINATION
Care Transitions Note    Follow Up Call     CTN Attempted to reach patient for transitions of care follow up and to follow up on Pt. BP and walker .  Unable to reach patient/family.  Left a voice message with office contact information. No Pt. Medical/health information left on message.     Outreach Attempts:   HIPAA compliant voicemail left for patient.     Care Summary Note:     Follow Up Appointment:   Future Appointments         Provider Specialty Dept Phone    7/9/2024 Glenys Zendejas, [facility]     7/9/2024 2:40 PM Farhana Lopez PA-C Orthopedic Surgery [phone]    7/11/2024 TBGlenys Pratt, [facility]     7/16/2024 TBGlenys Pratt, [facility]     7/18/2024 TBGlenys Pratt, [facility]     7/23/2024 TBGlenys Pratt, [facility]     7/25/2024 TBEthan Marcos, PT Home Health Care             Plan for follow-up call in 6-10 days based on severity of symptoms and risk factors. Plan for next call:  follow up BP and walker.     Michael Cardenas RN

## 2024-07-05 NOTE — CASE COMMUNICATION
Patient seen for speech therapy discipline discharge. Patient completed SLUMS Assessment with score of 8/30, which suggests dementia. Patient demonstrated difficulties for tasks requiring orientation awareness, problem solving, stm recall, generative naming, and visospatial skills. With speech therapy services, patient has demonstrated improvement in problem solving and immediate recall with graded exercises and cueing. Patient has also  demonstrated improvement in safety awareness to reduce wandering. Caregiver is able to teach-back recommendations and offer supervision for daily cognitive communication tasks. Patient met all  speech therapy goals and is appropriate for discharge.

## 2024-07-05 NOTE — TELEPHONE ENCOUNTER
Spoke with Ethan and she had patient perform some exercises, patient reported some dizziness while standing. Ethan had patient lie down and rested. Patient felt better and BP went back to normal and advised patient to drink fluids and rested. Patient was feeling better after Ethan's assessment.

## 2024-07-05 NOTE — TELEPHONE ENCOUNTER
Ethan Castellon(Physical Therapist) called in to inform provider that patients BP was low at in home PT session. She stated that she started off at 88/58, while laying down it went to 118/62 and while sitting it was 105/60.

## 2024-07-05 NOTE — HOME HEALTH
SUBJECTIVE: \"I feel pretty good\"  CAREGIVER INVOLVEMENT/ASSISTANCE NEEDED FOR: All care and supervision for safety  .  OBJECTIVE:  See interventions.  PATIENT EDUCATION PROVIDED THIS VISIT: HEP, safety, move slowly between positions, drink fluids  PATIENT RESPONSE TO EDUCATION PROVIDED: Pt/CG verbalizes understanding of all information and demo's back as appropriate   PATIENT RESPONSE TO TREATMENT: Pt participation limited by decreased BP, dizziness after standing ther ex.   .  ASSESSMENT OF PROGRESS TOWARD GOALS: Pt had an episode of hypotension following standing ther ex.  BP dropped to 85/55.  She did not pass out and she remained alert and responsive but reporting dizzines and feeling \"weak\".  BP baldo to 118/62 after pt laid down with her LE's elevated for a few minutes.  When she returned to sitting BP was 108/60 and she said she was no longer dizzy.  PT called Lachelle Holguin, NP's office and spoke to Lion to pass on message to nurse/NP regarding BP drop. PT educated pt and her son to drink fluids and move slowly when changing positions.  Pt should sit immediately if she feels dizzy or light headed.  They verbalized understanding.   .  PLAN FOR NEXT VISIT: Cont mobility training.  PT extension added to orders 2W3.   THE FOLLOWING DISCHARGE PLANNING WAS DISCUSSED WITH THE PATIENT/CAREGIVER: DC to self and family under MD supervision when all goals are met or maximum potential is reached. Pt/Caregiver did verbalize understanding of DC planning.

## 2024-07-05 NOTE — HOME HEALTH
SUBJECTIVE: Son reported no changes since last session.    CAREGIVER INVOLVEMENT / ASSISTANCE NEEDED FOR: Son provides supervision     OBJECTIVE / PATIENT RESPONSE TO TREATMENT / PATIENT LEVEL OF UNDERSTANDING OF EDUCATION PROVIDED/ASSESSMENT OF PROGRESS TOWARD GOALS: Patient seen for speech therapy discipline discharge. Patient completed SLUMS Assessment with score of 8/30, which suggests dementia. Patient demonstrated difficulties for tasks requiring orientation awareness, problem solving, stm recall, generative naming, and visospatial skills. With speech therapy services, patient has demonstrated improvement in problem solving and immediate recall with graded exercises and cueing. Patient has also demonstrated improvement in safety awareness to reduce wandering. Caregiver is able to teach-back recommendations and offer supervision for daily cognitive communication tasks. Patient met all  speech therapy goals and is appropriate for discharge.

## 2024-07-08 NOTE — TELEPHONE ENCOUNTER
Spoke with Delio (son) and he stated patient is feeling well. Her BP this morning was 102/79. Patient has not complained of any dizziness.

## 2024-07-09 ENCOUNTER — OFFICE VISIT (OUTPATIENT)
Age: 79
End: 2024-07-09
Payer: MEDICARE

## 2024-07-09 ENCOUNTER — CARE COORDINATION (OUTPATIENT)
Facility: CLINIC | Age: 79
End: 2024-07-09

## 2024-07-09 ENCOUNTER — HOME CARE VISIT (OUTPATIENT)
Age: 79
End: 2024-07-09
Payer: MEDICARE

## 2024-07-09 VITALS
TEMPERATURE: 97.8 F | SYSTOLIC BLOOD PRESSURE: 90 MMHG | RESPIRATION RATE: 16 BRPM | HEART RATE: 75 BPM | DIASTOLIC BLOOD PRESSURE: 60 MMHG | OXYGEN SATURATION: 97 %

## 2024-07-09 VITALS — TEMPERATURE: 97 F | WEIGHT: 125 LBS | HEIGHT: 68 IN | BODY MASS INDEX: 18.94 KG/M2

## 2024-07-09 DIAGNOSIS — S32.592A PUBIC RAMUS FRACTURE, LEFT, CLOSED, INITIAL ENCOUNTER (HCC): ICD-10-CM

## 2024-07-09 DIAGNOSIS — Z51.89: ICD-10-CM

## 2024-07-09 DIAGNOSIS — S32.10XA CLOSED FRACTURE OF SACRUM, UNSPECIFIED FRACTURE MORPHOLOGY, INITIAL ENCOUNTER (HCC): Primary | ICD-10-CM

## 2024-07-09 PROCEDURE — G8399 PT W/DXA RESULTS DOCUMENT: HCPCS

## 2024-07-09 PROCEDURE — G8420 CALC BMI NORM PARAMETERS: HCPCS

## 2024-07-09 PROCEDURE — 1090F PRES/ABSN URINE INCON ASSESS: CPT

## 2024-07-09 PROCEDURE — G8428 CUR MEDS NOT DOCUMENT: HCPCS

## 2024-07-09 PROCEDURE — 1036F TOBACCO NON-USER: CPT

## 2024-07-09 PROCEDURE — G0157 HHC PT ASSISTANT EA 15: HCPCS

## 2024-07-09 PROCEDURE — 99213 OFFICE O/P EST LOW 20 MIN: CPT

## 2024-07-09 PROCEDURE — 73502 X-RAY EXAM HIP UNI 2-3 VIEWS: CPT

## 2024-07-09 PROCEDURE — 1123F ACP DISCUSS/DSCN MKR DOCD: CPT

## 2024-07-09 NOTE — HOME HEALTH
SUBJECTIVE: Pt/CG denied medication changes, falls, or trips to ER since last visit. No new complaints.    CAREGIVER INVOLVEMENT/ASSISTANCE NEEDED FOR: A normal inability to leave home exists and a taxing and substantial effort is required. Unable to leave the home w/o assist for safety d/t fall risk. Pt requires assist from son for ADLs, IADLs, medication management, and transportation to MD appts.     OBJECTIVE: See interventions.     PATIENT EDUCATION PROVIDED THIS VISIT: See interventions. Educated in adequate nutrition and water intake to prevent dehydration, UTI, and hypotension.     PATIENT RESPONSE TO EDUCATION PROVIDED: Pt/CG verbalized understanding. Reminders needed to only amb w/o assist from CG to prevent falls.     PATIENT RESPONSE TO TREATMENT: Tolerated tx well w/o c/o pain or SOB. Fatigued w/ activity requiring frequent seated rest breaks for recovery, however vitals remained stable.     ASSESSMENT OF PROGRESS TOWARD GOALS: Pleasant, cooperative, and fully participated in therapy. Progressing towards remaining PT goals. VCs needed for pacing and controlled movement when performing therex to maximize strengthening. Stood from couch w/ close SBA (previously CGA) but still needs BUEs for push off d/t weakness. Amb Inc distance before requiring seated rest break however only safe to amb using gait belt w/ CGA and will need FWW for fall prevention. Improved Tinetti to 15/28 however indicates pt is still at high risk of falling.    PLAN FOR NEXT VISIT: Cont per POC for gait/balance training.     THE FOLLOWING DISCHARGE PLANNING WAS DISCUSSED WITH THE PATIENT/CAREGIVER: Discharge to self/caregiver under supervision of MD once PT goals are met or maximum benefits achieved in the HH setting.

## 2024-07-09 NOTE — CARE COORDINATION
Care Transitions Note    Final Call     Patient Current Location:  Home: [address]  [place] [id number]    Patient graduated from the Care Transitions program on 7/9/24.  Patient/family has the ability to self manage at this time..      Handoff:   Patient was not referred to the ACM team due to no additional needs identified.       Care Summary Note:     Pt. Son reported that Pt. Is doing well and Orthopedic Appt. Went well.  Pt. Son states that he will reach out to CTN if he needs additional help.   CTN Reminded Pt. To son to call Dept of  to follow up on Pt. Medicaid application and to also inquire for additional community resources.   CTN also mentioned about [facility] for additional resources. Pt. Son thanked us.   CTN also advised Pt. Son to contact Pt. Insurance and inquire for benefits. Pt. Son states that he will do so.     No other questions, concerns and/or needs at this time as per Pt. Son .      Upcoming Appointments:    Future Appointments         Provider Specialty Dept Phone    7/11/2024 1:00 PM Glenys Silva, \[facility]"" [facility]     7/16/2024 Glenys Zendejas, \[facility]\"" Home Health Care     7/18/2024 TBGlenys Pratt, \[facility]\"" Home Health Care     7/23/2024 TBGlenys Pratt, \[facility]\"" [facility]     7/23/2024 11:30 AM Lachelle Holguin NP-C Family Medicine [phone]    7/23/2024 2:20 PM Farhana Lopez PA-C Orthopedic Surgery [phone]    7/25/2024 TBEthan Marcos, PT Home Health Care             Patient has agreed to contact primary care provider and/or specialist for any further questions, concerns, or needs.    Michael Cardenas RN

## 2024-07-09 NOTE — PROGRESS NOTES
Patient: Mona Olea                MRN: [id number]       SSN: [id number]  YOB: 1945        AGE: 79 y.o.        SEX: female  BMI: Body mass index is 19.01 kg/m².    PCP: Lachelle Holguin NP-C  07/09/24    Chief Complaint: Hip Pain (Left hip )      1. Closed fracture of sacrum, unspecified fracture morphology, initial encounter ([facility])  -     [13848] Hip Uni with Pelvis 2-3 Views  -     DME Order for (Specify) as OP  2. Pubic ramus fracture, left, closed, initial encounter ([facility])  -     DME Order for (Specify) as OP  3. Fracture treatment convalescence or palliative care  -     DME Order for (Specify) as OP        HPI:  Mona Olea is a 79 y.o. female with chief complaint of   Chief Complaint   Patient presents with    Hip Pain     Left hip      status post closed sacral and pubic rami fractures, left. She was treated non-surgically while Dr. Larsen was on call. She up to this point has been WBAT with walker and progressive ambulation with PT. She is now home and has home health coming out. She does not currently have a walker at home but her caregiver states that PT will be bringing one tomorrow for her.    She arrived to the ED on April 25, 2024 via EMS following a ground level fall yesterday. She has a significant medical history of severe dementia, HTN, and CKD. She does not remember the fall and is not able to provide much information due to her significant dementia. She is not on any blood thinners.       IMAGING:  Imaging read by myself and interpreted as follows:    July 9, 2024:  3 view x-ray of the left hip including AP pelvis, AP, and lateral demonstrates continued healing of the pubic rami fractures with callus formation and no evidence of displacement.     June 11, 2024:  3 view x-ray of the left hip including AP pelvis, AP, and lateral demonstrates healing superior and inferior pubic rami fractures with good callus formation and no evidence of displacement.     CT PELVIS

## 2024-07-11 ENCOUNTER — HOME CARE VISIT (OUTPATIENT)
Age: 79
End: 2024-07-11
Payer: MEDICARE

## 2024-07-11 VITALS
RESPIRATION RATE: 16 BRPM | SYSTOLIC BLOOD PRESSURE: 96 MMHG | TEMPERATURE: 98 F | DIASTOLIC BLOOD PRESSURE: 64 MMHG | HEART RATE: 74 BPM | OXYGEN SATURATION: 98 %

## 2024-07-11 PROCEDURE — G0157 HHC PT ASSISTANT EA 15: HCPCS

## 2024-07-11 NOTE — HOME HEALTH
SUBJECTIVE: Pt/CG denied medication changes or trips to ER since last visit. CG reported pt almost fell today as she was amb down hallway w/o assist and in socks, lost her balance fwd but son saw her and was able to catch her to prevent from falling. FWW is scheduled to be delivered tomorrow.    CAREGIVER INVOLVEMENT/ASSISTANCE NEEDED FOR: A normal inability to leave home exists and a taxing and substantial effort is required. Unable to leave the home w/o assist for safety d/t fall risk. Pt requires assist from son for ADLs, IADLs, medication management, and transportation to MD appts.     OBJECTIVE: See interventions.     PATIENT EDUCATION PROVIDED THIS VISIT: See interventions.     PATIENT RESPONSE TO EDUCATION PROVIDED: Pt/CG verbalized understanding. Needs reinforcement to ask for assist and wait for help prior to amb.    PATIENT RESPONSE TO TREATMENT: Tolerated tx well w/o c/o pain or SOB. Fatigued and mild SPARKS post exertion. Needs frequent seated rest breaks. Vitals remained stable w/ activity.     ASSESSMENT OF PROGRESS TOWARD GOALS: Progressing. Required CGA-Min A for fall prevention during dynamic balance training. Unable to safely amb w/o at least CGA for fall prevention d/t impaired gait/balance and poor safety awareness. May continue to benefit from skilled HHPT services to improve functional mobility, Inc activity tolerance, Inc IND w/ ADLs, and dec CG burden.    PLAN FOR NEXT VISIT: Gait/balance training; 5xSTS.    THE FOLLOWING DISCHARGE PLANNING WAS DISCUSSED WITH THE PATIENT/CAREGIVER: Discharge to self/caregiver under supervision of MD once PT goals are met or maximum benefits achieved in the HH setting.

## 2024-07-16 ENCOUNTER — HOME CARE VISIT (OUTPATIENT)
Age: 79
End: 2024-07-16
Payer: MEDICARE

## 2024-07-16 VITALS
RESPIRATION RATE: 16 BRPM | HEART RATE: 71 BPM | SYSTOLIC BLOOD PRESSURE: 110 MMHG | OXYGEN SATURATION: 97 % | DIASTOLIC BLOOD PRESSURE: 60 MMHG | TEMPERATURE: 97.6 F

## 2024-07-16 PROCEDURE — G0157 HHC PT ASSISTANT EA 15: HCPCS

## 2024-07-16 NOTE — HOME HEALTH
SUBJECTIVE: Pt/CG denied medication changes, falls, or trips to ER since last visit. Son reported pt has started sleep walking x3 days and didn't get much sleep last night. He plans to discuss w/ MD and get her tested for UTI.     CAREGIVER INVOLVEMENT/ASSISTANCE NEEDED FOR: A normal inability to leave home exists and a taxing and substantial effort is required. Unable to leave the home w/o assist for safety d/t fall risk. Pt requires assist from son for ADLs, IADLs, medication management, and transportation to MD appts.     OBJECTIVE: See interventions.     PATIENT EDUCATION PROVIDED THIS VISIT: See interventions.     PATIENT RESPONSE TO EDUCATION PROVIDED: Pt/CG verbalized understanding. VCs needed to reach back to sit for safety vs holding onto FWW.    PATIENT RESPONSE TO TREATMENT: Tolerated tx well w/o pain or SOB. Required seated rest breaks between amb laps d/t fatigue but vitals remained stable and WNL.    ASSESSMENT OF PROGRESS TOWARD GOALS: Progressing towards remaining PT goals. Was able to stand from couch w/o BUE assist but still needs SBA for safety d/t fall risk. Completed 5xSTS in 14.5\" (previously unable). Improved Tinetti to 17/28 (previously 15/28) however still indicates pt is high fall risk. Amb Inc distance this visit but only safe to do so using FWW w/ SBA for safety and needs cueing to implement tech to improve gait mechanics in order to reduce fall risk.     PLAN FOR NEXT VISIT: Gait/balance/stair training.     THE FOLLOWING DISCHARGE PLANNING WAS DISCUSSED WITH THE PATIENT/CAREGIVER: Discharge to self/caregiver under supervision of MD once PT goals are met or maximum benefits achieved in the HH setting. 3 visits remain.

## 2024-07-18 ENCOUNTER — HOME CARE VISIT (OUTPATIENT)
Age: 79
End: 2024-07-18
Payer: MEDICARE

## 2024-07-18 VITALS
TEMPERATURE: 97.8 F | OXYGEN SATURATION: 98 % | DIASTOLIC BLOOD PRESSURE: 70 MMHG | HEART RATE: 74 BPM | SYSTOLIC BLOOD PRESSURE: 120 MMHG | RESPIRATION RATE: 17 BRPM

## 2024-07-18 PROCEDURE — G0157 HHC PT ASSISTANT EA 15: HCPCS

## 2024-07-18 NOTE — HOME HEALTH
SUBJECTIVE: Pt/CG denied falls or trips to ER since last visit. Reported new rx for antibiotic for UTI. See interventions for details.    CAREGIVER INVOLVEMENT/ASSISTANCE NEEDED FOR: A normal inability to leave home exists and a taxing and substantial effort is required. Unable to leave the home w/o assist for safety d/t fall risk. Pt requires assist from son for ADLs, IADLs, medication management, and transportation to MD appts.     OBJECTIVE: See interventions.     PATIENT EDUCATION PROVIDED THIS VISIT: See interventions.     PATIENT RESPONSE TO EDUCATION PROVIDED: Pt/CG verbalized understanding. Verbal/visual cues w/ frequent reminders needed for carryover of safe gait mechanics to reduce fall risk. Will need continued reinforcement d/t cognitive deficit.    PATIENT RESPONSE TO TREATMENT: Tolerated tx well w/o pain or SOB. Required seated rest breaks d/t fatigue.     ASSESSMENT OF PROGRESS TOWARD GOALS: Steady progress towards goals. Amb Inc distance this visit but required FWW w/ SBA for safety d/t high fall risk. Needed CGA-Min A for fall prevention during dynamic balance activities. Progressing towards improved right reactions. Required FWW w/ CGA to safely negotiate 1 porch step to egress home w/o HR.     PLAN FOR NEXT VISIT: Cont per POC for gait/balance training.     THE FOLLOWING DISCHARGE PLANNING WAS DISCUSSED WITH THE PATIENT/CAREGIVER: Discussed plan for upcoming PT discharge and procedure. NOMNC provided, pt/CG in agreement, signed, uploaded to chart, and pt provided copy.

## 2024-07-22 DIAGNOSIS — I12.9 HYPERTENSIVE KIDNEY DISEASE WITH STAGE 3A CHRONIC KIDNEY DISEASE (HCC): ICD-10-CM

## 2024-07-22 DIAGNOSIS — N18.31 HYPERTENSIVE KIDNEY DISEASE WITH STAGE 3A CHRONIC KIDNEY DISEASE (HCC): ICD-10-CM

## 2024-07-22 RX ORDER — AMLODIPINE BESYLATE 10 MG/1
TABLET ORAL
Qty: 90 TABLET | Refills: 1 | Status: SHIPPED | OUTPATIENT
Start: 2024-07-22

## 2024-07-22 NOTE — TELEPHONE ENCOUNTER
Last seen 6/6/2024   Last labs 6/27/24  Last filled 5/1/24  Next appointment 7/23/2024     Last Assessment & Plan NoteWritten:Lachelle Holguin, [place]-C6/6/2024 12:11 PM  BP goal <130/80  Continue amlodipine 10 mg, Losartan 25 mg for now    Lab Results   Component Value Date     06/27/2024    K 4.1 06/27/2024     (H) 06/27/2024    CO2 22 06/27/2024    BUN 25 (H) 06/27/2024    CREATININE 1.11 06/27/2024    GLUCOSE 99 06/27/2024    CALCIUM 9.6 06/27/2024    BILITOT 0.6 06/27/2024    ALKPHOS 120 (H) 06/27/2024    AST 36 06/27/2024    ALT 46 06/27/2024    LABGLOM 51 (L) 06/27/2024    GFRAA 56 (L) 09/13/2022    AGRATIO 1.2 09/13/2022    GLOB 3.3 06/27/2024

## 2024-07-23 ENCOUNTER — OFFICE VISIT (OUTPATIENT)
Age: 79
End: 2024-07-23
Payer: MEDICARE

## 2024-07-23 DIAGNOSIS — F03.C18 SEVERE DEMENTIA WITH OTHER BEHAVIORAL DISTURBANCE, UNSPECIFIED DEMENTIA TYPE (HCC): ICD-10-CM

## 2024-07-23 DIAGNOSIS — Z51.89: ICD-10-CM

## 2024-07-23 DIAGNOSIS — S32.592A PUBIC RAMUS FRACTURE, LEFT, CLOSED, INITIAL ENCOUNTER (HCC): ICD-10-CM

## 2024-07-23 DIAGNOSIS — S32.10XA CLOSED FRACTURE OF SACRUM, UNSPECIFIED FRACTURE MORPHOLOGY, INITIAL ENCOUNTER (HCC): Primary | ICD-10-CM

## 2024-07-23 PROCEDURE — 1036F TOBACCO NON-USER: CPT

## 2024-07-23 PROCEDURE — G8420 CALC BMI NORM PARAMETERS: HCPCS

## 2024-07-23 PROCEDURE — G8428 CUR MEDS NOT DOCUMENT: HCPCS

## 2024-07-23 PROCEDURE — 1123F ACP DISCUSS/DSCN MKR DOCD: CPT

## 2024-07-23 PROCEDURE — G8399 PT W/DXA RESULTS DOCUMENT: HCPCS

## 2024-07-23 PROCEDURE — 99213 OFFICE O/P EST LOW 20 MIN: CPT

## 2024-07-23 PROCEDURE — 1090F PRES/ABSN URINE INCON ASSESS: CPT

## 2024-07-23 NOTE — PROGRESS NOTES
Patient: Mona Olea                MRN: [id number]       SSN: [id number]  YOB: 1945        AGE: 79 y.o.        SEX: female  BMI: There is no height or weight on file to calculate BMI.    PCP: Lachelle Holguin NP-C  07/23/24    Chief Complaint: Hip Pain (left)      1. Closed fracture of sacrum, unspecified fracture morphology, initial encounter ([facility])  -     [facility]  2. Pubic ramus fracture, left, closed, initial encounter ([facility])  -     [facility]  3. Severe dementia with other behavioral disturbance, unspecified dementia type ([facility])  -     [facility]  4. Fracture treatment convalescence or palliative care        HPI:  Mona Olea is a 79 y.o. female with chief complaint of   Chief Complaint   Patient presents with    Hip Pain     left     status post closed sacral and pubic rami fractures, left. She was treated non-surgically while Dr. Larsen was on call. She up to this point has been WBAT with walker and progressive ambulation with PT. She is now home and has home health coming out. She does not currently have a walker at home but her caregiver states that PT will be bringing one tomorrow for her.    She arrived to the ED on April 25, 2024 via EMS following a ground level fall yesterday. She has a significant medical history of severe dementia, HTN, and CKD. She does not remember the fall and is not able to provide much information due to her significant dementia. She is not on any blood thinners.       IMAGING:  Imaging read by myself and interpreted as follows:    July 9, 2024:  3 view x-ray of the left hip including AP pelvis, AP, and lateral demonstrates continued healing of the pubic rami fractures with callus formation and no evidence of displacement.     June 11, 2024:  3 view x-ray of the left hip including AP pelvis, AP, and lateral demonstrates healing

## 2024-07-24 ENCOUNTER — HOME CARE VISIT (OUTPATIENT)
Age: 79
End: 2024-07-24
Payer: MEDICARE

## 2024-07-24 VITALS
DIASTOLIC BLOOD PRESSURE: 70 MMHG | TEMPERATURE: 97.7 F | OXYGEN SATURATION: 98 % | SYSTOLIC BLOOD PRESSURE: 130 MMHG | HEART RATE: 68 BPM | RESPIRATION RATE: 16 BRPM

## 2024-07-24 PROCEDURE — G0157 HHC PT ASSISTANT EA 15: HCPCS

## 2024-07-24 NOTE — HOME HEALTH
SUBJECTIVE: Pt/CG denied falls or trips to ER since last visit. No new complaints. Saw ortho yesterday but x-ray machine was down and will have to go back for that.    CAREGIVER INVOLVEMENT/ASSISTANCE NEEDED FOR: A normal inability to leave home exists and a taxing and substantial effort is required. Unable to leave the home w/o assist for safety d/t fall risk. Pt requires assist from son for ADLs, IADLs, medication management, and transportation to MD appts.     OBJECTIVE: See interventions.     PATIENT EDUCATION PROVIDED THIS VISIT: See interventions.     PATIENT RESPONSE TO EDUCATION PROVIDED: Pt/CG verbalized understanding. CG able to teach-back.    PATIENT RESPONSE TO TREATMENT: Tolerated tx well w/o pain or SOB. Required seated rest breaks d/t fatigue but vitals remained stable.    **Assessment and Summary of Care:  Patient's current functional status before discharge is as follows  Strength: not tested  ROM: BLEs WFL  Bed Mobility: IND  Transfers: supervision needed for safety  Gait/WC mobility: x150' indoors using FWW w/ supervision; ~150' outdoors w/ FWW requiring CGA  Stairs: 1 porch step using FWW w/ CGA  Special Tests:   5xSTS: 11.8 seconds using BUEs (unable at eval)  Tinetti: 19/28 (8/28 at eval)  Recommendations: Pt has met current goals, no longer has skilled need and therefore appropriate for D/C next visit.    PLAN FOR NEXT VISIT: Final D/C w/ PT [place].    THE FOLLOWING DISCHARGE PLANNING WAS DISCUSSED WITH THE PATIENT/CAREGIVER: Discharge to self/family under MD supervision next visit.

## 2024-07-26 ENCOUNTER — HOME CARE VISIT (OUTPATIENT)
Age: 79
End: 2024-07-26
Payer: MEDICARE

## 2024-07-26 VITALS
RESPIRATION RATE: 17 BRPM | HEART RATE: 67 BPM | DIASTOLIC BLOOD PRESSURE: 68 MMHG | TEMPERATURE: 97.5 F | SYSTOLIC BLOOD PRESSURE: 110 MMHG | OXYGEN SATURATION: 96 %

## 2024-07-26 PROCEDURE — G0151 HHCP-SERV OF PT,EA 15 MIN: HCPCS

## 2024-07-26 NOTE — HOME HEALTH
back as appropriate           Patient is s/p sacral/pubic ramus fx and has been treated for strengthening, gait training, stair training, HEP training, safety training, and balance training.  Pt has made progress and met PT goals.  Pt has dementia and needs S/assist with all mobility, ADL's, cognition.  Her son is her CG and is I assisting her.  Pt now has a rolling walker and gait safety is much improved with use of the walker.  Balance assessment demonstrated moderate fall risk, improved from high risk.  Pt also had speech therapy and was discharged with goals met.     ROM: B LE's grossly WFL.  ROM at eval: B LE's grossly WFL  STRENGTH: 5 STS = 10.5 sec.  Strength at eval: B hip flexion/abd 3+/5; knee flexion/extension 4/5; dorsiflexion 4/5  WOUNDS:No wounds.  Wound status at eval: No wounds  BED MOBILITY:I supine<>sit and rolling L/R.  Bed mobility at eval: I supine<>sit and rolling L/R  TRANSFERS: S sit<>stand from sofa, chair, toilet.  Pt's son reports CG car and tub/shower transfers.  Transfers at eval: MIN A sit<>stand from low sofa, EOB, kitchen chair.  Pt's son reports she needs MIN A for tub transfer at baseline  GAIT: Pt amb in the house with RW and close S.  Per PTA report she needs CG to amb outside with RW.  Gait characterized by downward gaze and flexed trunk.  Gait at eval: Pt amb in the house with MIN A.  Her son was amb with her by holding her hands and walking backward in front of her.  PT showed him how to use a gait belt and suggested that he get one.  Gait characterized by short steps, decreased foot clearance  STAIRS:CG up/down one step with assist to manage the RW.  Stairs at eval MIN A up/down one step   BALANCE: Pt scored 19/28 on Tinetti Balance Assessment placing her at moderate risk for falls.  Balance at eval: Pt scored 8/28 on Tinetti Balance Assessment placing her at high risk for falls    Discharge plan: Discharge from Home Health services as all goals have been met.  Dr Roman's

## 2024-07-27 PROBLEM — N17.9 AKI (ACUTE KIDNEY INJURY) (HCC): Status: RESOLVED | Noted: 2024-04-25 | Resolved: 2024-07-27
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
## 2024-07-27 PROBLEM — S32.592D: Status: ACTIVE | Noted: 2024-04-25
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
## 2024-07-27 PROBLEM — R41.0 DELIRIUM: Status: RESOLVED | Noted: 2024-04-27 | Resolved: 2024-07-27

## 2024-07-27 PROBLEM — K59.00 CONSTIPATION: Status: RESOLVED | Noted: 2024-04-29 | Resolved: 2024-07-27

## 2024-08-02 ENCOUNTER — TELEPHONE (OUTPATIENT)
Facility: CLINIC | Age: 79
End: 2024-08-02

## 2024-08-02 DIAGNOSIS — R39.9 UTI SYMPTOMS: Primary | ICD-10-CM

## 2024-08-02 RX ORDER — AMOXICILLIN 875 MG/1
875 TABLET, COATED ORAL 2 TIMES DAILY
Qty: 14 TABLET | Refills: 0 | Status: SHIPPED | OUTPATIENT
Start: 2024-08-02 | End: 2024-08-09

## 2024-08-02 NOTE — TELEPHONE ENCOUNTER
Patient son is overwhelmed with mother condition worsening. He states that her urine is cloudy, foul odor, dark yellow. Her son states that she is drinking little water. He states that he is getting her to eat sugar free pops, and other drinks but refuse to drink water. Patient is more aggressive, and angry. He states that he noticed her symptoms about 3-4 days ago.

## 2024-08-02 NOTE — TELEPHONE ENCOUNTER
Patients son called in stating that he is almost certain that his mom has another UTI due to her  lack of water consumption. He is requesting an antibiotic for the UTI.      [facility]

## 2024-08-02 NOTE — TELEPHONE ENCOUNTER
Amoxicillin 875 mg BID x7 days sent to pharmacy, take with food.  Patient should have in-office follow up if no improvement in 1 week.  Thank you.

## 2024-08-09 ENCOUNTER — TELEPHONE (OUTPATIENT)
Facility: CLINIC | Age: 79
End: 2024-08-09

## 2024-08-09 NOTE — TELEPHONE ENCOUNTER
Please call patient's son and advise him that I would recommend that upcoming visit be in-person since we have had several virtual appointments in a row.  Thank you.

## 2024-08-30 ENCOUNTER — HOSPITAL ENCOUNTER (OUTPATIENT)
Facility: HOSPITAL | Age: 79
End: 2024-08-30
Payer: MEDICARE

## 2024-08-30 DIAGNOSIS — N17.9 AKI (ACUTE KIDNEY INJURY) (HCC): ICD-10-CM

## 2024-08-30 DIAGNOSIS — D64.9 NORMOCYTIC NORMOCHROMIC ANEMIA: ICD-10-CM

## 2024-08-30 DIAGNOSIS — N18.31 HYPERTENSIVE KIDNEY DISEASE WITH STAGE 3A CHRONIC KIDNEY DISEASE (HCC): ICD-10-CM

## 2024-08-30 DIAGNOSIS — F03.C18 SEVERE DEMENTIA WITH OTHER BEHAVIORAL DISTURBANCE, UNSPECIFIED DEMENTIA TYPE (HCC): ICD-10-CM

## 2024-08-30 DIAGNOSIS — I12.9 HYPERTENSIVE KIDNEY DISEASE WITH STAGE 3A CHRONIC KIDNEY DISEASE (HCC): ICD-10-CM

## 2024-08-30 DIAGNOSIS — R41.0 DELIRIUM: ICD-10-CM

## 2024-08-30 DIAGNOSIS — R73.03 PREDIABETES: ICD-10-CM

## 2024-08-30 LAB
ALBUMIN SERPL-MCNC: 3.6 G/DL (ref 3.4–5)
ALBUMIN/GLOB SERPL: 1.2 (ref 0.8–1.7)
ALP SERPL-CCNC: 77 U/L (ref 45–117)
ALT SERPL-CCNC: 26 U/L (ref 13–56)
ANION GAP SERPL CALC-SCNC: 7 MMOL/L (ref 3–18)
AST SERPL-CCNC: 19 U/L (ref 10–38)
BASOPHILS # BLD: 0.1 K/UL (ref 0–0.1)
BASOPHILS NFR BLD: 1 % (ref 0–2)
BILIRUB SERPL-MCNC: 0.5 MG/DL (ref 0.2–1)
BUN SERPL-MCNC: 24 MG/DL (ref 7–18)
BUN/CREAT SERPL: 25 (ref 12–20)
CALCIUM SERPL-MCNC: 9.2 MG/DL (ref 8.5–10.1)
CHLORIDE SERPL-SCNC: 103 MMOL/L (ref 100–111)
CHOLEST SERPL-MCNC: 174 MG/DL
CO2 SERPL-SCNC: 28 MMOL/L (ref 21–32)
CREAT SERPL-MCNC: 0.96 MG/DL (ref 0.6–1.3)
DIFFERENTIAL METHOD BLD: ABNORMAL
EOSINOPHIL # BLD: 0.1 K/UL (ref 0–0.4)
EOSINOPHIL NFR BLD: 1 % (ref 0–5)
ERYTHROCYTE [DISTWIDTH] IN BLOOD BY AUTOMATED COUNT: 14.4 % (ref 11.6–14.5)
EST. AVERAGE GLUCOSE BLD GHB EST-MCNC: 123 MG/DL
GLOBULIN SER CALC-MCNC: 2.9 G/DL (ref 2–4)
GLUCOSE SERPL-MCNC: 99 MG/DL (ref 74–99)
HBA1C MFR BLD: 5.9 % (ref 4.2–5.6)
HCT VFR BLD AUTO: 37.5 % (ref 35–45)
HDLC SERPL-MCNC: 83 MG/DL (ref 40–60)
HDLC SERPL: 2.1 (ref 0–5)
HGB BLD-MCNC: 12.4 G/DL (ref 12–16)
IMM GRANULOCYTES # BLD AUTO: 0 K/UL (ref 0–0.04)
IMM GRANULOCYTES NFR BLD AUTO: 0 % (ref 0–0.5)
LDLC SERPL CALC-MCNC: 75.8 MG/DL (ref 0–100)
LIPID PANEL: ABNORMAL
LYMPHOCYTES # BLD: 2.9 K/UL (ref 0.9–3.6)
LYMPHOCYTES NFR BLD: 23 % (ref 21–52)
MCH RBC QN AUTO: 30.8 PG (ref 24–34)
MCHC RBC AUTO-ENTMCNC: 33.1 G/DL (ref 31–37)
MCV RBC AUTO: 93.3 FL (ref 78–100)
MONOCYTES # BLD: 0.8 K/UL (ref 0.05–1.2)
MONOCYTES NFR BLD: 6 % (ref 3–10)
NEUTS SEG # BLD: 8.8 K/UL (ref 1.8–8)
NEUTS SEG NFR BLD: 70 % (ref 40–73)
NRBC # BLD: 0 K/UL (ref 0–0.01)
NRBC BLD-RTO: 0 PER 100 WBC
PLATELET # BLD AUTO: 260 K/UL (ref 135–420)
PMV BLD AUTO: 10.3 FL (ref 9.2–11.8)
POTASSIUM SERPL-SCNC: 3.8 MMOL/L (ref 3.5–5.5)
PROT SERPL-MCNC: 6.5 G/DL (ref 6.4–8.2)
RBC # BLD AUTO: 4.02 M/UL (ref 4.2–5.3)
SODIUM SERPL-SCNC: 138 MMOL/L (ref 136–145)
TRIGL SERPL-MCNC: 76 MG/DL
VLDLC SERPL CALC-MCNC: 15.2 MG/DL
WBC # BLD AUTO: 12.6 K/UL (ref 4.6–13.2)

## 2024-08-30 PROCEDURE — 83036 HEMOGLOBIN GLYCOSYLATED A1C: CPT

## 2024-08-30 PROCEDURE — 36415 COLL VENOUS BLD VENIPUNCTURE: CPT

## 2024-08-30 PROCEDURE — 80053 COMPREHEN METABOLIC PANEL: CPT

## 2024-08-30 PROCEDURE — 80061 LIPID PANEL: CPT

## 2024-08-30 PROCEDURE — 85025 COMPLETE CBC W/AUTO DIFF WBC: CPT

## 2024-09-02 PROBLEM — N18.2 STAGE 2 CHRONIC KIDNEY DISEASE: Status: ACTIVE | Noted: 2021-12-16

## 2024-09-03 ENCOUNTER — OFFICE VISIT (OUTPATIENT)
Age: 79
End: 2024-09-03
Payer: MEDICARE

## 2024-09-03 VITALS — BODY MASS INDEX: 18.64 KG/M2 | TEMPERATURE: 97.7 F | HEIGHT: 68 IN | WEIGHT: 123 LBS

## 2024-09-03 DIAGNOSIS — S32.10XA CLOSED FRACTURE OF SACRUM, UNSPECIFIED FRACTURE MORPHOLOGY, INITIAL ENCOUNTER (HCC): Primary | ICD-10-CM

## 2024-09-03 PROBLEM — N18.2 HYPERTENSIVE KIDNEY DISEASE WITH STAGE 2 CHRONIC KIDNEY DISEASE: Status: ACTIVE | Noted: 2021-10-05

## 2024-09-03 PROBLEM — I12.9 HYPERTENSIVE KIDNEY DISEASE WITH STAGE 2 CHRONIC KIDNEY DISEASE: Status: ACTIVE | Noted: 2021-10-05

## 2024-09-03 PROCEDURE — 1123F ACP DISCUSS/DSCN MKR DOCD: CPT

## 2024-09-03 PROCEDURE — G8420 CALC BMI NORM PARAMETERS: HCPCS

## 2024-09-03 PROCEDURE — 73502 X-RAY EXAM HIP UNI 2-3 VIEWS: CPT

## 2024-09-03 PROCEDURE — G8399 PT W/DXA RESULTS DOCUMENT: HCPCS

## 2024-09-03 PROCEDURE — 1036F TOBACCO NON-USER: CPT

## 2024-09-03 PROCEDURE — G8428 CUR MEDS NOT DOCUMENT: HCPCS

## 2024-09-03 PROCEDURE — 1090F PRES/ABSN URINE INCON ASSESS: CPT

## 2024-09-03 PROCEDURE — 99213 OFFICE O/P EST LOW 20 MIN: CPT

## 2024-09-03 NOTE — PROGRESS NOTES
Patient: Mona Olea                MRN: [id number]       SSN: [id number]  YOB: 1945        AGE: 79 y.o.        SEX: female  BMI: Body mass index is 18.7 kg/m².    PCP: Lachelle Holguin NP-C  09/03/24    Chief Complaint: Hip Pain (LEFT HIP PAIN)      1. Closed fracture of sacrum, unspecified fracture morphology, initial encounter ([facility])  -     [99244] Hip Uni with Pelvis 2-3 Views        HPI:  Mona Olea is a 79 y.o. female with chief complaint of   Chief Complaint   Patient presents with    Hip Pain     LEFT HIP PAIN     status post closed sacral and pubic rami fractures, left. She was treated non-surgically while Dr. Larsen was on call. She up to this point has been WBAT with walker and progressive ambulation with PT. She is now home and has home health coming out. She does not currently have a walker at home but her caregiver states that PT will be bringing one tomorrow for her.    She arrived to the ED on April 25, 2024 via EMS following a ground level fall yesterday. She has a significant medical history of severe dementia, HTN, and CKD. She does not remember the fall and is not able to provide much information due to her significant dementia. She is not on any blood thinners.       IMAGING:  Imaging read by myself and interpreted as follows:    September 3, 2024:  3 view x-ray of the left hip including AP pelvis, AP, and lateral demonstrates healed pubic rami fractures. Fracture lines are no longer visible.    July 9, 2024:  3 view x-ray of the left hip including AP pelvis, AP, and lateral demonstrates continued healing of the pubic rami fractures with callus formation and no evidence of displacement.     June 11, 2024:  3 view x-ray of the left hip including AP pelvis, AP, and lateral demonstrates healing superior and inferior pubic rami fractures with good callus formation and no evidence of displacement.     CT PELVIS W/OUT CONTRAST:  Demonstrates acute comminuted fractures

## 2024-09-11 ENCOUNTER — TELEPHONE (OUTPATIENT)
Facility: CLINIC | Age: 79
End: 2024-09-11

## 2024-09-23 ENCOUNTER — TELEPHONE (OUTPATIENT)
Facility: CLINIC | Age: 79
End: 2024-09-23

## 2024-09-23 DIAGNOSIS — E78.5 HYPERLIPIDEMIA LDL GOAL <100: ICD-10-CM

## 2024-09-23 RX ORDER — ATORVASTATIN CALCIUM 10 MG/1
10 TABLET, FILM COATED ORAL
Qty: 90 TABLET | Refills: 1 | OUTPATIENT
Start: 2024-09-23

## 2024-09-24 RX ORDER — ATORVASTATIN CALCIUM 10 MG/1
10 TABLET, FILM COATED ORAL
Qty: 30 TABLET | Refills: 0 | Status: SHIPPED | OUTPATIENT
Start: 2024-09-24

## 2024-10-01 DIAGNOSIS — N18.31 HYPERTENSIVE KIDNEY DISEASE WITH STAGE 3A CHRONIC KIDNEY DISEASE (HCC): ICD-10-CM

## 2024-10-01 DIAGNOSIS — I12.9 HYPERTENSIVE KIDNEY DISEASE WITH STAGE 3A CHRONIC KIDNEY DISEASE (HCC): ICD-10-CM

## 2024-10-01 RX ORDER — LOSARTAN POTASSIUM 25 MG/1
25 TABLET ORAL EVERY MORNING
Qty: 90 TABLET | Refills: 1 | Status: SHIPPED | OUTPATIENT
Start: 2024-10-01

## 2024-10-01 NOTE — TELEPHONE ENCOUNTER
Labs:   Lab Results   Component Value Date     08/30/2024    K 3.8 08/30/2024     08/30/2024    CO2 28 08/30/2024    BUN 24 (H) 08/30/2024    CREATININE 0.96 08/30/2024    GLUCOSE 99 08/30/2024    CALCIUM 9.2 08/30/2024    BILITOT 0.5 08/30/2024    ALKPHOS 77 08/30/2024    AST 19 08/30/2024    ALT 26 08/30/2024    LABGLOM 61 08/30/2024    GFRAA 56 (L) 09/13/2022    AGRATIO 1.2 09/13/2022    GLOB 2.9 08/30/2024    TSH 1.22 06/27/2024          Additional Notes:

## 2024-11-05 DIAGNOSIS — E78.5 HYPERLIPIDEMIA LDL GOAL <100: ICD-10-CM

## 2024-11-05 RX ORDER — ATORVASTATIN CALCIUM 10 MG/1
10 TABLET, FILM COATED ORAL
Qty: 30 TABLET | Refills: 0 | OUTPATIENT
Start: 2024-11-05

## 2024-11-05 NOTE — TELEPHONE ENCOUNTER
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 Good effect with IV Dilaudid, decreases pain level to 4/10. Lengthy discussion re: transition to PO for longer efficacy.    Plan discussed with primary team:  Do not agree with IV Dilaudid 1 mg every 3 hours in pt tolerating PO meds, this is also a rapid escalation in opioid doses  Start Dilaudid solution 4 mg every 4 hours PRN severe pain  Change IV Dilaudid to 0.5 mg every 6 hours PRN severe breakthrough pain (must be taking PO without effect)  Start Tizanidine 2 mg QHS (home regimen)  Consider Lidoderm  Warm/cool packs for comfort  Bowel Regimen  Incentive Spirometer  PT per primary team  Monitor for sedation, respiratory depression  Follow up with  KEITH Morgan for continued pain management after discharge  Narcan Rescue Kit on discharge for pt with child in the home (Naloxone 4 mg/0.1 ml nasal spray - 1 spray q 2-3 minutes alternating between nostrils)    Time spent on encounter:   35     Minutes    Chronic Pain Service  [phone] Last seen 6/6/2024 Virtual  Last labs   Last filled  9/24/24   Next appointment Visit date- No follow up appt scheduled      Lab Results   Component Value Date     08/30/2024    K 3.8 08/30/2024     08/30/2024    CO2 28 08/30/2024    BUN 24 (H) 08/30/2024    CREATININE 0.96 08/30/2024    GLUCOSE 99 08/30/2024    CALCIUM 9.2 08/30/2024    BILITOT 0.5 08/30/2024    ALKPHOS 77 08/30/2024    AST 19 08/30/2024    ALT 26 08/30/2024    LABGLOM 61 08/30/2024    GFRAA 56 (L) 09/13/2022    AGRATIO 1.2 09/13/2022    GLOB 2.9 08/30/2024      Patient need appointment.    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 Good effect with IV Dilaudid, decreases pain level to 4/10. Lengthy discussion re: transition to PO for longer efficacy.    Plan discussed with primary team:  Do not agree with IV Dilaudid 1 mg every 3 hours in pt tolerating PO meds, this is also a rapid escalation in opioid doses  Start Dilaudid solution 4 mg every 4 hours PRN severe pain  Change IV Dilaudid to 0.5 mg every 6 hours PRN severe breakthrough pain (must be taking PO without effect)  Plan to discharge with Dilaudid solution 4 mg every 6 hours PRN severe pain x 2-3 days  Start Tizanidine 2 mg QHS (home regimen)  Continue Cymbalta  Consider Lidoderm  Warm/cool packs for comfort  Bowel Regimen  Incentive Spirometer  PT per primary team  Monitor for sedation, respiratory depression  Follow up with  KEITH Morgan for continued pain management after discharge  Narcan Rescue Kit on discharge for pt with child in the home (Naloxone 4 mg/0.1 ml nasal spray - 1 spray q 2-3 minutes alternating between nostrils)    Time spent on encounter:   35     Minutes    Chronic Pain Service  [phone]

## 2024-11-07 ENCOUNTER — TELEPHONE (OUTPATIENT)
Age: 79
End: 2024-11-07

## 2024-11-07 NOTE — TELEPHONE ENCOUNTER
Pt. Son Delio called stating he did a urine test on his mother and believe she has a UTI and would like a prescription for antibiotics.

## 2024-11-12 DIAGNOSIS — F03.C18 SEVERE DEMENTIA WITH OTHER BEHAVIORAL DISTURBANCE, UNSPECIFIED DEMENTIA TYPE (HCC): ICD-10-CM

## 2024-11-12 DIAGNOSIS — F41.9 ANXIETY DISORDER, UNSPECIFIED TYPE: ICD-10-CM

## 2024-11-13 RX ORDER — SERTRALINE HYDROCHLORIDE 25 MG/1
TABLET, FILM COATED ORAL
Qty: 90 TABLET | Refills: 0 | Status: SHIPPED | OUTPATIENT
Start: 2024-11-13

## 2024-12-21 DIAGNOSIS — F03.C18 SEVERE DEMENTIA WITH OTHER BEHAVIORAL DISTURBANCE, UNSPECIFIED DEMENTIA TYPE (HCC): ICD-10-CM

## 2024-12-21 DIAGNOSIS — F41.9 ANXIETY DISORDER, UNSPECIFIED TYPE: ICD-10-CM

## 2024-12-23 ENCOUNTER — TELEPHONE (OUTPATIENT)
Age: 79
End: 2024-12-23

## 2024-12-23 DIAGNOSIS — F03.C18 SEVERE DEMENTIA WITH OTHER BEHAVIORAL DISTURBANCE, UNSPECIFIED DEMENTIA TYPE (HCC): ICD-10-CM

## 2024-12-23 DIAGNOSIS — F41.9 ANXIETY DISORDER, UNSPECIFIED TYPE: ICD-10-CM

## 2024-12-23 RX ORDER — SERTRALINE HYDROCHLORIDE 25 MG/1
TABLET, FILM COATED ORAL
Qty: 90 TABLET | Refills: 2 | Status: SHIPPED | OUTPATIENT
Start: 2024-12-23

## 2024-12-23 RX ORDER — SERTRALINE HYDROCHLORIDE 25 MG/1
TABLET, FILM COATED ORAL
Qty: 90 TABLET | Refills: 0 | OUTPATIENT
Start: 2024-12-23

## 2025-01-21 ENCOUNTER — TELEPHONE (OUTPATIENT)
Age: 80
End: 2025-01-21

## 2025-01-21 DIAGNOSIS — F03.C18 SEVERE DEMENTIA WITH OTHER BEHAVIORAL DISTURBANCE, UNSPECIFIED DEMENTIA TYPE (HCC): ICD-10-CM

## 2025-01-22 RX ORDER — DONEPEZIL HYDROCHLORIDE 10 MG/1
10 TABLET, FILM COATED ORAL NIGHTLY
Qty: 30 TABLET | Refills: 1 | Status: SHIPPED | OUTPATIENT
Start: 2025-01-22 | End: 2025-01-22

## 2025-01-22 RX ORDER — DONEPEZIL HYDROCHLORIDE 10 MG/1
10 TABLET, FILM COATED ORAL DAILY
Qty: 30 TABLET | Refills: 1 | Status: SHIPPED | OUTPATIENT
Start: 2025-01-22

## 2025-01-24 DIAGNOSIS — N18.31 HYPERTENSIVE KIDNEY DISEASE WITH STAGE 3A CHRONIC KIDNEY DISEASE (HCC): ICD-10-CM

## 2025-01-24 DIAGNOSIS — I12.9 HYPERTENSIVE KIDNEY DISEASE WITH STAGE 3A CHRONIC KIDNEY DISEASE (HCC): ICD-10-CM

## 2025-01-24 RX ORDER — AMLODIPINE BESYLATE 10 MG/1
TABLET ORAL
Qty: 90 TABLET | Refills: 0 | OUTPATIENT
Start: 2025-01-24

## 2025-01-24 NOTE — TELEPHONE ENCOUNTER
Labs:   Lab Results   Component Value Date     08/30/2024    K 3.8 08/30/2024     08/30/2024    CO2 28 08/30/2024    BUN 24 (H) 08/30/2024    CREATININE 0.96 08/30/2024    GLUCOSE 99 08/30/2024    CALCIUM 9.2 08/30/2024    BILITOT 0.5 08/30/2024    ALKPHOS 77 08/30/2024    AST 19 08/30/2024    ALT 26 08/30/2024    LABGLOM 61 08/30/2024    GFRAA 56 (L) 09/13/2022    AGRATIO 1.2 09/13/2022    GLOB 2.9 08/30/2024        Additional Notes:

## 2025-01-27 RX ORDER — AMLODIPINE BESYLATE 10 MG/1
TABLET ORAL
Qty: 30 TABLET | Refills: 0 | Status: SHIPPED | OUTPATIENT
Start: 2025-01-27

## 2025-02-21 ENCOUNTER — TELEPHONE (OUTPATIENT)
Facility: CLINIC | Age: 80
End: 2025-02-21

## 2025-02-21 NOTE — TELEPHONE ENCOUNTER
Patient son is requesting update on [facility] DELIVERED paperwork. Patients son states his mom needs her incontinence supplies and is requesting form be completed and faxed back to supplier. Delio is requesting a call to notify him when this has been done.   **There is a copy scanned in media**

## 2025-02-23 DIAGNOSIS — N18.31 HYPERTENSIVE KIDNEY DISEASE WITH STAGE 3A CHRONIC KIDNEY DISEASE (HCC): ICD-10-CM

## 2025-02-23 DIAGNOSIS — I12.9 HYPERTENSIVE KIDNEY DISEASE WITH STAGE 3A CHRONIC KIDNEY DISEASE (HCC): ICD-10-CM

## 2025-02-23 RX ORDER — AMLODIPINE BESYLATE 10 MG/1
TABLET ORAL
Qty: 30 TABLET | Refills: 0 | OUTPATIENT
Start: 2025-02-23

## 2025-03-09 PROBLEM — N18.31 HYPERTENSIVE KIDNEY DISEASE WITH STAGE 3A CHRONIC KIDNEY DISEASE (HCC): Status: ACTIVE | Noted: 2021-10-05

## 2025-03-09 NOTE — PROGRESS NOTES
Chief Complaint   Patient presents with    Medicare AWV    Dementia    Chronic Kidney Disease    Advance Care Planning    hypertensive kidney disease     prediabetes       Assessment & Plan:     1. Severe dementia with other behavioral disturbance, unspecified dementia type (HCC)  Assessment & Plan:  Worsening, follow up as scheduled with neuro on 05/21/2025  Orders:  -     CBC with Auto Differential; Future  -     Comprehensive Metabolic Panel; Future  -     DME - DURABLE MEDICAL EQUIPMENT  2. Hypertensive kidney disease with stage 2 chronic kidney disease  Assessment & Plan:  BP at goal, <130/80  Discontinue amlodipine 10 mg due to significant weight loss since last office visit, will continue Losartan at 25 mg daily  Orders:  -     CBC with Auto Differential; Future  -     Comprehensive Metabolic Panel; Future  -     Lipid Panel; Future  -     losartan (COZAAR) 25 MG tablet; Take 1 tablet by mouth every morning, Disp-90 tablet, R-0Normal  3. Hyperlipidemia LDL goal <100  Assessment & Plan:  Overdue for repeat labs, advised to complete  Continue atorvastatin 10 mg qhs for now  Orders:  -     Comprehensive Metabolic Panel; Future  -     Lipid Panel; Future  4. Stage 2 chronic kidney disease  Assessment & Plan:  Overdue for repeat labs, advised to complete  Orders:  -     Comprehensive Metabolic Panel; Future  5. Prediabetes  Assessment & Plan:  Overdue for repeat labs, advised to complete  Orders:  -     Comprehensive Metabolic Panel; Future  -     Hemoglobin A1C; Future  6. Positive FIT (fecal immunochemical test)  Assessment & Plan:  Per 2017 result, unclear if patient had been evaluated with colonoscopy, referred  Orders:  -     Amb External Referral To Gastroenterology  7. Urinary frequency  Comments:  Urine dip positive for leuks, sent for cx  Orders:  -     AMB POC URINALYSIS DIP STICK AUTO W/ MICRO  -     Culture, Urine; Future  8. Medical certificate issuance  Comments:  DME form for ncontinence supplies

## 2025-03-10 ENCOUNTER — OFFICE VISIT (OUTPATIENT)
Facility: CLINIC | Age: 80
End: 2025-03-10
Payer: MEDICARE

## 2025-03-10 ENCOUNTER — HOSPITAL ENCOUNTER (OUTPATIENT)
Facility: HOSPITAL | Age: 80
Setting detail: SPECIMEN
Discharge: HOME | End: 2025-03-13
Payer: MEDICARE

## 2025-03-10 VITALS
RESPIRATION RATE: 12 BRPM | BODY MASS INDEX: 18.49 KG/M2 | HEIGHT: 68 IN | SYSTOLIC BLOOD PRESSURE: 115 MMHG | OXYGEN SATURATION: 97 % | HEART RATE: 81 BPM | DIASTOLIC BLOOD PRESSURE: 69 MMHG | WEIGHT: 122 LBS

## 2025-03-10 DIAGNOSIS — E78.5 HYPERLIPIDEMIA LDL GOAL <100: ICD-10-CM

## 2025-03-10 DIAGNOSIS — Z00.00 INITIAL MEDICARE ANNUAL WELLNESS VISIT: ICD-10-CM

## 2025-03-10 DIAGNOSIS — R35.0 URINARY FREQUENCY: ICD-10-CM

## 2025-03-10 DIAGNOSIS — Z71.89 ACP (ADVANCE CARE PLANNING): ICD-10-CM

## 2025-03-10 DIAGNOSIS — N18.2 STAGE 2 CHRONIC KIDNEY DISEASE: ICD-10-CM

## 2025-03-10 DIAGNOSIS — Z00.00 MEDICARE ANNUAL WELLNESS VISIT, SUBSEQUENT: Primary | ICD-10-CM

## 2025-03-10 DIAGNOSIS — R19.5 POSITIVE FIT (FECAL IMMUNOCHEMICAL TEST): ICD-10-CM

## 2025-03-10 DIAGNOSIS — F03.C18 SEVERE DEMENTIA WITH OTHER BEHAVIORAL DISTURBANCE, UNSPECIFIED DEMENTIA TYPE (HCC): ICD-10-CM

## 2025-03-10 DIAGNOSIS — I12.9 HYPERTENSIVE KIDNEY DISEASE WITH STAGE 2 CHRONIC KIDNEY DISEASE: ICD-10-CM

## 2025-03-10 DIAGNOSIS — Z02.79 MEDICAL CERTIFICATE ISSUANCE: ICD-10-CM

## 2025-03-10 DIAGNOSIS — R73.03 PREDIABETES: ICD-10-CM

## 2025-03-10 DIAGNOSIS — Z23 NEED FOR PROPHYLACTIC VACCINATION AGAINST STREPTOCOCCUS PNEUMONIAE (PNEUMOCOCCUS): ICD-10-CM

## 2025-03-10 DIAGNOSIS — N18.2 HYPERTENSIVE KIDNEY DISEASE WITH STAGE 2 CHRONIC KIDNEY DISEASE: ICD-10-CM

## 2025-03-10 LAB
BILIRUBIN, URINE, POC: NEGATIVE
BLOOD URINE, POC: NEGATIVE
GLUCOSE URINE, POC: NEGATIVE
KETONES, URINE, POC: NORMAL
LEUKOCYTE ESTERASE, URINE, POC: NORMAL
NITRITE, URINE, POC: NEGATIVE
PH, URINE, POC: 7 (ref 4.6–8)
PROTEIN,URINE, POC: NORMAL
SPECIFIC GRAVITY, URINE, POC: 1.02 (ref 1–1.03)
URINALYSIS CLARITY, POC: NORMAL
URINALYSIS COLOR, POC: YELLOW
UROBILINOGEN, POC: NORMAL MG/DL

## 2025-03-10 PROCEDURE — 87088 URINE BACTERIA CULTURE: CPT

## 2025-03-10 PROCEDURE — 81001 URINALYSIS AUTO W/SCOPE: CPT | Performed by: NURSE PRACTITIONER

## 2025-03-10 PROCEDURE — G8399 PT W/DXA RESULTS DOCUMENT: HCPCS | Performed by: NURSE PRACTITIONER

## 2025-03-10 PROCEDURE — 1126F AMNT PAIN NOTED NONE PRSNT: CPT | Performed by: NURSE PRACTITIONER

## 2025-03-10 PROCEDURE — G8420 CALC BMI NORM PARAMETERS: HCPCS | Performed by: NURSE PRACTITIONER

## 2025-03-10 PROCEDURE — 1090F PRES/ABSN URINE INCON ASSESS: CPT | Performed by: NURSE PRACTITIONER

## 2025-03-10 PROCEDURE — G0438 PPPS, INITIAL VISIT: HCPCS | Performed by: NURSE PRACTITIONER

## 2025-03-10 PROCEDURE — 99497 ADVNCD CARE PLAN 30 MIN: CPT | Performed by: NURSE PRACTITIONER

## 2025-03-10 PROCEDURE — 1123F ACP DISCUSS/DSCN MKR DOCD: CPT | Performed by: NURSE PRACTITIONER

## 2025-03-10 PROCEDURE — 1036F TOBACCO NON-USER: CPT | Performed by: NURSE PRACTITIONER

## 2025-03-10 PROCEDURE — 1160F RVW MEDS BY RX/DR IN RCRD: CPT | Performed by: NURSE PRACTITIONER

## 2025-03-10 PROCEDURE — 1159F MED LIST DOCD IN RCRD: CPT | Performed by: NURSE PRACTITIONER

## 2025-03-10 PROCEDURE — 87086 URINE CULTURE/COLONY COUNT: CPT

## 2025-03-10 PROCEDURE — 87186 SC STD MICRODIL/AGAR DIL: CPT

## 2025-03-10 PROCEDURE — G8427 DOCREV CUR MEDS BY ELIG CLIN: HCPCS | Performed by: NURSE PRACTITIONER

## 2025-03-10 PROCEDURE — 99215 OFFICE O/P EST HI 40 MIN: CPT | Performed by: NURSE PRACTITIONER

## 2025-03-10 RX ORDER — LOSARTAN POTASSIUM 25 MG/1
25 TABLET ORAL EVERY MORNING
Qty: 90 TABLET | Refills: 0 | Status: SHIPPED | OUTPATIENT
Start: 2025-03-10

## 2025-03-10 SDOH — ECONOMIC STABILITY: TRANSPORTATION INSECURITY
IN THE PAST 12 MONTHS, HAS THE LACK OF TRANSPORTATION KEPT YOU FROM MEDICAL APPOINTMENTS OR FROM GETTING MEDICATIONS?: PATIENT DECLINED

## 2025-03-10 SDOH — ECONOMIC STABILITY: FOOD INSECURITY: WITHIN THE PAST 12 MONTHS, YOU WORRIED THAT YOUR FOOD WOULD RUN OUT BEFORE YOU GOT MONEY TO BUY MORE.: PATIENT DECLINED

## 2025-03-10 SDOH — ECONOMIC STABILITY: INCOME INSECURITY: IN THE LAST 12 MONTHS, WAS THERE A TIME WHEN YOU WERE NOT ABLE TO PAY THE MORTGAGE OR RENT ON TIME?: PATIENT DECLINED

## 2025-03-10 SDOH — ECONOMIC STABILITY: TRANSPORTATION INSECURITY
IN THE PAST 12 MONTHS, HAS LACK OF TRANSPORTATION KEPT YOU FROM MEETINGS, WORK, OR FROM GETTING THINGS NEEDED FOR DAILY LIVING?: PATIENT DECLINED

## 2025-03-10 SDOH — HEALTH STABILITY: PHYSICAL HEALTH: ON AVERAGE, HOW MANY DAYS PER WEEK DO YOU ENGAGE IN MODERATE TO STRENUOUS EXERCISE (LIKE A BRISK WALK)?: 3 DAYS

## 2025-03-10 SDOH — ECONOMIC STABILITY: FOOD INSECURITY: WITHIN THE PAST 12 MONTHS, THE FOOD YOU BOUGHT JUST DIDN'T LAST AND YOU DIDN'T HAVE MONEY TO GET MORE.: PATIENT DECLINED

## 2025-03-10 SDOH — HEALTH STABILITY: PHYSICAL HEALTH: ON AVERAGE, HOW MANY MINUTES DO YOU ENGAGE IN EXERCISE AT THIS LEVEL?: 10 MIN

## 2025-03-10 ASSESSMENT — PATIENT HEALTH QUESTIONNAIRE - PHQ9
1. LITTLE INTEREST OR PLEASURE IN DOING THINGS: NOT AT ALL
2. FEELING DOWN, DEPRESSED OR HOPELESS: NOT AT ALL
SUM OF ALL RESPONSES TO PHQ QUESTIONS 1-9: 0

## 2025-03-10 ASSESSMENT — LIFESTYLE VARIABLES
HOW OFTEN DO YOU HAVE SIX OR MORE DRINKS ON ONE OCCASION: 1
HOW MANY STANDARD DRINKS CONTAINING ALCOHOL DO YOU HAVE ON A TYPICAL DAY: PATIENT DOES NOT DRINK
HOW OFTEN DO YOU HAVE A DRINK CONTAINING ALCOHOL: 1
HOW OFTEN DO YOU HAVE A DRINK CONTAINING ALCOHOL: NEVER
HOW MANY STANDARD DRINKS CONTAINING ALCOHOL DO YOU HAVE ON A TYPICAL DAY: 0

## 2025-03-10 NOTE — PATIENT INSTRUCTIONS
help older adults by lowering fees. Contact your area's public health offices or  for information about dental care in your area.  Using a toothbrush  Older adults with arthritis sometimes have trouble brushing their teeth because they can't easily hold the toothbrush. Their hands and fingers may be stiff, painful, or weak. If this is the case, you can:  Offer an electric toothbrush.  Enlarge the handle of a non-electric toothbrush by wrapping a sponge, an elastic bandage, or adhesive tape around it.  Push the toothbrush handle through a ball made of rubber or soft foam.  Make the handle longer and thicker by taping Popsicle sticks or tongue depressors to it.  You may also be able to buy special toothbrushes, toothpaste dispensers, and floss holders.  Your doctor may recommend a soft-bristle toothbrush if the person you care for bleeds easily. Bleeding can happen because of a health problem or from certain medicines.  A toothpaste for sensitive teeth may help if the person you care for has sensitive teeth.  How do you brush and floss someone's teeth?  If the person you are caring for has a hard time cleaning their teeth on their own, you may need to brush and floss their teeth for them. It may be easiest to have the person sit and face away from you, and to sit or stand behind them. That way you can steady their head against your arm as you reach around to floss and brush their teeth. Choose a place that has good lighting and is comfortable for both of you.  Before you begin, gather your supplies. You will need gloves, floss, a toothbrush, and a container to hold water if you are not near a sink. Wash and dry your hands well and put on gloves. Start by flossing:  Gently work a piece of floss between each of the teeth toward the gums. A plastic flossing tool may make this easier, and they are available at most [facility].  Curve the floss around each tooth into a U-shape and gently slide it under the

## 2025-03-10 NOTE — PROGRESS NOTES
Advance Care Planning     Advance Care Planning (ACP) Physician/NP/PA Conversation    Date of Conversation: 3/10/2025  Conducted with: Healthcare Decision Maker  Other persons present: Son Delio Osborne    Healthcare Decision Maker:   Primary Decision Maker: Delio Osborne - Child - [phone]    Secondary Decision Maker: Danilo Osborne - Child - [phone]         Care Preferences:    Hospitalization:  \"If your health worsens and it becomes clear that your chance of recovery is unlikely, what would be your preference regarding hospitalization?\"  The patient would prefer comfort-focused treatment without hospitalization.    Ventilation:  \"If you were unable to breath on your own and your chance of recovery was unlikely, what would be your preference about the use of a ventilator (breathing machine) if it was available to you?\"  The patient would NOT desire the use of a ventilator.    Resuscitation:  \"In the event your heart stopped as a result of an underlying serious health condition, would you want attempts made to restart your heart, or would you prefer a natural death?\"  Yes, attempt to resuscitate.        Conversation Outcomes / Follow-Up Plan:  ACP in process - information provided, considering goals and options      Length of Voluntary ACP Conversation in minutes:  <16 minutes (Non-Billable)    MARIAMA Villa

## 2025-03-10 NOTE — PROGRESS NOTES
\"Have you been to the ER, urgent care clinic since your last visit?  Hospitalized since your last visit?\"    Yes [facility] last year    “Have you seen or consulted any other health care providers outside our system since your last visit?”    NO

## 2025-03-10 NOTE — PROGRESS NOTES
Medicare Annual Wellness Visit    Mona Olea is here for Medicare AWV    Assessment & Plan   Medicare annual wellness visit, subsequent  ACP (advance care planning)  -     BS -  Referral to ACP Clinical Specialist  Need for prophylactic vaccination against Streptococcus pneumoniae (pneumococcus)  Comments:  Declined Prevnar 20        Return in about 2 weeks (around 3/24/2025) for blood pressure, ckd, cholesterol, prediabetes, dementia, lab results - 30 min.       Subjective       Patient's complete Health Risk Assessment and screening values have been reviewed and are found in Flowsheets. The following problems were reviewed today and where indicated follow up appointments were made and/or referrals ordered.    Positive Risk Factor Screenings with Interventions:    Fall Risk:  Do you feel unsteady or are you worried about falling? : (!) (Patient-Rptd) yes  2 or more falls in past year?: (!) (Patient-Rptd) yes  Fall with injury in past year?: (!) (Patient-Rptd) yes     Interventions:    Reviewed medications, home hazards, visual acuity, and co-morbidities that can increase risk for falls    Cognitive:   Clock Drawing Test (CDT): (!) Abnormal  Words recalled: 0 Words Recalled  Total Score: (!) 0  Total Score Interpretation: Abnormal Mini-Cog  Interventions:  Has hx of dementia, followed by neuro               Dentist Screen:  Have you seen the dentist within the past year?: (!) (Patient-Rptd) No  Intervention:  See AVS for additional education material     Vision Screen:  Do you have difficulty driving, watching TV, or doing any of your daily activities because of your eyesight?: (Patient-Rptd) No  Have you had an eye exam within the past year?: (!) (Patient-Rptd) No  Interventions:   See AVS for additional education material     ADL's:   Patient reports needing help with:  Select all that apply: (!) (Patient-Rptd) Eating, Dressing, Grooming, Bathing, Toileting, Walking/Balance  Select all that apply: (!)

## 2025-03-10 NOTE — ASSESSMENT & PLAN NOTE
BP at goal, <130/80  Discontinue amlodipine 10 mg due to significant weight loss since last office visit, will continue Losartan at 25 mg daily

## 2025-03-11 ENCOUNTER — TELEPHONE (OUTPATIENT)
Facility: CLINIC | Age: 80
End: 2025-03-11

## 2025-03-12 ENCOUNTER — CLINICAL DOCUMENTATION (OUTPATIENT)
Dept: SPIRITUAL SERVICES | Age: 80
End: 2025-03-12

## 2025-03-12 NOTE — PROGRESS NOTES
Advance Care Planning   Ambulatory ACP Specialist Patient Outreach    Date:  3/12/2025    ACP Specialist:  Maggie Geronimo    Outreach call to patient in follow-up to ACP Specialist referral from:Lachelle Holguin NP-C    [x] PCP  [] Provider   [] Ambulatory Care Management [] Other     For:                  [x] Advance Directive Assistance              [] Complete Portable DNR order              [] Complete POST/POLST/MOST              [] Code Status Discussion             [] Discuss Goals of Care             [] Early ACP Decision-Making              [] Other (Specify)    Date Referral Received:3/11/25    Next Step:   [x] ACP scheduled conversation  [] Outreach again in one week               [x] Email / Mail ACP Info Sheets  [x] Email / Mail Advance Directive   [] Closing referral.  Routing closure to referring provider/staff and to ACP Specialist .    [] Closure letter mailed to patient with invitation to contact ACP Specialist if / when ready.   [] Other (Specify here):       [x] At this time, Healthcare Decision Maker Is:   Primary Decision Maker: Delio Osborne - Child - [phone]    Primary Decision Maker: Danilo Osborne - Child - [phone]         [] Primary agent named in scanned advance directive.    [x] Legal Next of Kin.     [] Unable to determine legal decision maker at this time.    Outreaches:         [x] 1st -  Date:  3/12/25               Intervention:  [] Spoke with Patient   [] Left Voice mail [] Email / Mail    [] MyChart  [x] Other (Specify) : Patient's son Blade Osborne    Outcomes:  Spoke with patient's son Blade Osborne on mobile phone number  scheduling a telephone conversation with ACP Specialist Homar Styles on Tuesday, 3/18/25 at 12:30 p.m.              [] 2nd -  Date:                 Intervention:  [] Spoke with Patient  [] Left Voice mail [] Email / Mail    [] MyChart  [] Other (Specify) :              Outcomes:                [] 3rd -  Date:

## 2025-03-13 ENCOUNTER — RESULTS FOLLOW-UP (OUTPATIENT)
Facility: HOSPITAL | Age: 80
End: 2025-03-13

## 2025-03-13 DIAGNOSIS — N30.01 ACUTE CYSTITIS WITH HEMATURIA: Primary | ICD-10-CM

## 2025-03-13 DIAGNOSIS — N30.00 ACUTE CYSTITIS WITHOUT HEMATURIA: ICD-10-CM

## 2025-03-13 LAB
BACTERIA SPEC CULT: ABNORMAL
CC UR VC: ABNORMAL
SERVICE CMNT-IMP: ABNORMAL

## 2025-03-13 RX ORDER — AMOXICILLIN 875 MG/1
875 TABLET, COATED ORAL 2 TIMES DAILY
Qty: 14 TABLET | Refills: 0 | Status: SHIPPED | OUTPATIENT
Start: 2025-03-13 | End: 2025-03-20

## 2025-03-13 NOTE — TELEPHONE ENCOUNTER
Patient son, Delio, contacted and advised of results. Delio stated they have picked up the prescription and she has started on her first dose.

## 2025-03-13 NOTE — TELEPHONE ENCOUNTER
----- Message from MARIAMA Villa sent at 3/13/2025  1:16 PM EDT -----  Urine culture positive for bacteria.  Start Amoxicillin 875 mg twice daily for 7 days.  Drink plenty of water.  Thank you!

## 2025-03-17 DIAGNOSIS — F41.9 ANXIETY DISORDER, UNSPECIFIED TYPE: ICD-10-CM

## 2025-03-17 DIAGNOSIS — F03.C18 SEVERE DEMENTIA WITH OTHER BEHAVIORAL DISTURBANCE, UNSPECIFIED DEMENTIA TYPE (HCC): ICD-10-CM

## 2025-03-17 DIAGNOSIS — E78.5 HYPERLIPIDEMIA LDL GOAL <100: ICD-10-CM

## 2025-03-18 ENCOUNTER — CLINICAL DOCUMENTATION (OUTPATIENT)
Dept: CASE MANAGEMENT | Age: 80
End: 2025-03-18

## 2025-03-18 RX ORDER — ATORVASTATIN CALCIUM 10 MG/1
10 TABLET, FILM COATED ORAL
Qty: 30 TABLET | Refills: 0 | OUTPATIENT
Start: 2025-03-18

## 2025-03-18 NOTE — ACP (ADVANCE CARE PLANNING)
rescheduling appointment.  ACP Specialist returned telephone call to son to offer a rescheduled ACP appointment. New appointment scheduled for Friday, March 21, 2025 at 8:00am.      Thank you for this referral.    Homar Styles, ALICJA, MARIA GUADALUPE-CP  Advance Care   [facility]  [phone]

## 2025-03-20 RX ORDER — SERTRALINE HYDROCHLORIDE 25 MG/1
75 TABLET, FILM COATED ORAL DAILY
Qty: 90 TABLET | Refills: 1 | Status: SHIPPED | OUTPATIENT
Start: 2025-03-20

## 2025-03-20 RX ORDER — DONEPEZIL HYDROCHLORIDE 10 MG/1
10 TABLET, FILM COATED ORAL NIGHTLY
Qty: 30 TABLET | Refills: 1 | Status: SHIPPED | OUTPATIENT
Start: 2025-03-20

## 2025-03-20 NOTE — TELEPHONE ENCOUNTER
Patient's son called in for refill for:  donepezil (ARICEPT) 10 MG tablet   sertraline (ZOLOFT) 25 MG tablet    Pharmacy:  [place] [id number] - [place] [id number] - P [phone] - F [phone]

## 2025-03-21 ENCOUNTER — TELEPHONE (OUTPATIENT)
Age: 80
End: 2025-03-21

## 2025-03-21 ENCOUNTER — CLINICAL DOCUMENTATION (OUTPATIENT)
Dept: CASE MANAGEMENT | Age: 80
End: 2025-03-21

## 2025-03-21 ENCOUNTER — CLINICAL DOCUMENTATION (OUTPATIENT)
Facility: CLINIC | Age: 80
End: 2025-03-21

## 2025-03-21 NOTE — ACP (ADVANCE CARE PLANNING)
capable of understanding questions around naming a health care agent and confidently indicated that she wanted her son, Blade, to be her primary health care agent in the event she could not make her own medical decision.  Pt was also able to indicate that she would want her other son, Danilo Osborne, to be a secondary health care agent in the event that Blade was not available or could not make her medical decisions.  Pt was able to state her wishes around her health care agents consistently when asked several times throughout the ACP conversation.     Pt was not capable of making a clear decision around health care instructions as she was having difficulty imagining a situation where her health conditions may have worsened and expressing how she would want her health care agent to make decisions in these situations.  This portion of the document was not completed and it was indicated that patient was only naming health care agents on the document and no health care instructions were decided by patient.  Both patient and son were made aware that in the event any decisions are needed regarding health care, patient's son, Blade Osborne, would be the decision maker and would be making these health care decisions.  Pt voiced understanding and was agreeable to her son making these decisions when they are needed.     [place] Advance Directive for Health Care document completed with patient during this Advance Care Planning appointment and document sent to patient via [employer] for signature.  Pt will need to review, sign and return the [place] Advance Directive for Health Care document to be uploaded into the medical record.      Outcomes:  ACP discussion completed and New Advance Directive completed      Follow-up Plan:   -Advised patient/agent/surrogate to review completed ACP document and complete a new document per changes in patient's goals, values, care preferences, or best interest of patient.  *This note routed

## 2025-03-26 ENCOUNTER — TELEPHONE (OUTPATIENT)
Facility: CLINIC | Age: 80
End: 2025-03-26

## 2025-03-26 ENCOUNTER — CLINICAL DOCUMENTATION (OUTPATIENT)
Dept: CASE MANAGEMENT | Age: 80
End: 2025-03-26

## 2025-03-26 DIAGNOSIS — S32.10XD CLOSED FRACTURE OF SACRUM WITH ROUTINE HEALING, UNSPECIFIED FRACTURE MORPHOLOGY, SUBSEQUENT ENCOUNTER: Primary | ICD-10-CM

## 2025-03-26 DIAGNOSIS — S32.592D CLOSED FRACTURE OF PUBIC RAMUS, LEFT, WITH ROUTINE HEALING, SUBSEQUENT ENCOUNTER: ICD-10-CM

## 2025-03-26 NOTE — TELEPHONE ENCOUNTER
Spoke with Kandi regarding status update on the wheelchair order for this patient. Kandi stated the office notes that were submitted are not eligible for coverage of the wheelchair by Medicare.     Kandi stated the note must reflect that the patient has a physical mobility that prevents her from performing ADLs. It may also state that the patient's physical mobility limits can not be resolved with a walker or cane.     Please update the office not, if possible to reflect, so that I can fax back to [facility].

## 2025-03-27 NOTE — TELEPHONE ENCOUNTER
Contacted patient's son, Blade, and advised of him of the denial. Blade advised that another option may be for him to reach out to [facility] to request them to submit an order for the wheelchair.       Blade voiced understanding.

## 2025-03-27 NOTE — TELEPHONE ENCOUNTER
Order confirmation received. [facility] states that the records submitted are not \"qualifying notes\" for Medicare coverage. Response from [facility] scanned into patient's chart under Media

## 2025-03-27 NOTE — TELEPHONE ENCOUNTER
Spoke with provider, JAMAICA Holguin, and advised of response from [facility]. JAMAICA Holguin stated it may be best for the patient's son to reach out to [facility] to have them create the order.

## 2025-03-28 ENCOUNTER — TELEPHONE (OUTPATIENT)
Facility: CLINIC | Age: 80
End: 2025-03-28

## 2025-03-28 NOTE — TELEPHONE ENCOUNTER
Contacted [facility] for a status update on the order for the patient's incontinence items.  Advised that there is a duplicate order for this patient, but it is the same. Representative stated one order has been approved and the other is pending. Advised representative that the pending order should be deleted since they are the same order. Representative placed me on hold and advised me that he will call to get the ok to delete the order.       Representative came back to the line stating that he was unable to get an ok to delete the pending order. States he will email someone and get back with me.

## 2025-04-01 ENCOUNTER — OFFICE VISIT (OUTPATIENT)
Age: 80
End: 2025-04-01
Payer: MEDICARE

## 2025-04-01 VITALS — BODY MASS INDEX: 18.4 KG/M2 | WEIGHT: 121 LBS

## 2025-04-01 DIAGNOSIS — S32.592A PUBIC RAMUS FRACTURE, LEFT, CLOSED, INITIAL ENCOUNTER (HCC): Primary | ICD-10-CM

## 2025-04-01 DIAGNOSIS — S32.10XA CLOSED FRACTURE OF SACRUM, UNSPECIFIED FRACTURE MORPHOLOGY, INITIAL ENCOUNTER (HCC): ICD-10-CM

## 2025-04-01 PROCEDURE — 1036F TOBACCO NON-USER: CPT

## 2025-04-01 PROCEDURE — 1090F PRES/ABSN URINE INCON ASSESS: CPT

## 2025-04-01 PROCEDURE — G8419 CALC BMI OUT NRM PARAM NOF/U: HCPCS

## 2025-04-01 PROCEDURE — 1123F ACP DISCUSS/DSCN MKR DOCD: CPT

## 2025-04-01 PROCEDURE — 73502 X-RAY EXAM HIP UNI 2-3 VIEWS: CPT

## 2025-04-01 PROCEDURE — G8399 PT W/DXA RESULTS DOCUMENT: HCPCS

## 2025-04-01 PROCEDURE — 1126F AMNT PAIN NOTED NONE PRSNT: CPT

## 2025-04-01 PROCEDURE — 99213 OFFICE O/P EST LOW 20 MIN: CPT

## 2025-04-01 PROCEDURE — G8428 CUR MEDS NOT DOCUMENT: HCPCS

## 2025-04-01 NOTE — PROGRESS NOTES
Insecurity: Patient Declined (3/10/2025)    Hunger Vital Sign     Worried About Running Out of Food in the Last Year: Patient declined     Ran Out of Food in the Last Year: Patient declined   Transportation Needs: Patient Declined (3/10/2025)    PRAPARE - Transportation     Lack of Transportation (Medical): Patient declined     Lack of Transportation (Non-Medical): Patient declined   Physical Activity: Insufficiently Active (3/10/2025)    Exercise Vital Sign     Days of Exercise per Week: 3 days     Minutes of Exercise per Session: 10 min   Stress: Not on file   Social Connections: Feeling Somewhat Isolated (7/26/2024)    OASIS : Social Isolation     Frequency of experiencing loneliness or isolation: Sometimes   Intimate Partner Violence: Not At Risk (6/6/2024)    Humiliation, Afraid, Rape, and Kick questionnaire     Fear of Current or Ex-Partner: No     Emotionally Abused: No     Physically Abused: No     Sexually Abused: No   Housing Stability: Patient Declined (3/10/2025)    Housing Stability Vital Sign     Unable to Pay for Housing in the Last Year: Patient declined     Number of Times Moved in the Last Year: Not on file     Homeless in the Last Year: Patient declined       REVIEW OF SYSTEMS:      Negative except for that stated above.     [unfilled]      Prescription medication management discussed with patient.     Electronically signed by: Farhana Lopez PA-C    Note: This note was completed using voice recognition software.  Any typographical/name errors or mistakes are unintentional.

## 2025-04-03 ENCOUNTER — HOSPITAL ENCOUNTER (EMERGENCY)
Facility: HOSPITAL | Age: 80
Discharge: HOME | End: 2025-04-03
Payer: MEDICARE

## 2025-04-03 VITALS
HEART RATE: 73 BPM | BODY MASS INDEX: 18.19 KG/M2 | HEIGHT: 68 IN | RESPIRATION RATE: 14 BRPM | TEMPERATURE: 98 F | WEIGHT: 120 LBS | SYSTOLIC BLOOD PRESSURE: 125 MMHG | DIASTOLIC BLOOD PRESSURE: 64 MMHG | OXYGEN SATURATION: 99 %

## 2025-04-03 DIAGNOSIS — I10 ASYMPTOMATIC HYPERTENSION: Primary | ICD-10-CM

## 2025-04-03 LAB
ANION GAP SERPL CALC-SCNC: 3 MMOL/L (ref 3–18)
APPEARANCE UR: CLEAR
BASOPHILS # BLD: 0.04 K/UL (ref 0–0.1)
BASOPHILS NFR BLD: 0.6 % (ref 0–2)
BILIRUB UR QL: NEGATIVE
BUN SERPL-MCNC: 16 MG/DL (ref 7–18)
BUN/CREAT SERPL: 19 (ref 12–20)
CALCIUM SERPL-MCNC: 9.4 MG/DL (ref 8.5–10.1)
CHLORIDE SERPL-SCNC: 99 MMOL/L (ref 100–111)
CO2 SERPL-SCNC: 31 MMOL/L (ref 21–32)
COLOR UR: YELLOW
CREAT SERPL-MCNC: 0.83 MG/DL (ref 0.6–1.3)
DIFFERENTIAL METHOD BLD: ABNORMAL
EKG ATRIAL RATE: 71 BPM
EKG DIAGNOSIS: NORMAL
EKG P-R INTERVAL: 124 MS
EKG Q-T INTERVAL: 354 MS
EKG QRS DURATION: 104 MS
EKG QTC CALCULATION (BAZETT): 384 MS
EKG R AXIS: -13 DEGREES
EKG T AXIS: 95 DEGREES
EKG VENTRICULAR RATE: 71 BPM
EOSINOPHIL # BLD: 0.14 K/UL (ref 0–0.4)
EOSINOPHIL NFR BLD: 2.3 % (ref 0–5)
ERYTHROCYTE [DISTWIDTH] IN BLOOD BY AUTOMATED COUNT: 13 % (ref 11.6–14.5)
GLUCOSE SERPL-MCNC: 93 MG/DL (ref 74–99)
GLUCOSE UR STRIP.AUTO-MCNC: NEGATIVE MG/DL
HCT VFR BLD AUTO: 37.5 % (ref 35–45)
HGB BLD-MCNC: 12.7 G/DL (ref 12–16)
HGB UR QL STRIP: NEGATIVE
IMM GRANULOCYTES # BLD AUTO: 0.01 K/UL (ref 0–0.04)
IMM GRANULOCYTES NFR BLD AUTO: 0.2 % (ref 0–0.5)
KETONES UR QL STRIP.AUTO: NEGATIVE MG/DL
LEUKOCYTE ESTERASE UR QL STRIP.AUTO: NEGATIVE
LYMPHOCYTES # BLD: 2.2 K/UL (ref 0.9–3.6)
LYMPHOCYTES NFR BLD: 35.5 % (ref 21–52)
MCH RBC QN AUTO: 32.7 PG (ref 24–34)
MCHC RBC AUTO-ENTMCNC: 33.9 G/DL (ref 31–37)
MCV RBC AUTO: 96.6 FL (ref 78–100)
MONOCYTES # BLD: 0.5 K/UL (ref 0.05–1.2)
MONOCYTES NFR BLD: 8.1 % (ref 3–10)
NEUTS SEG # BLD: 3.3 K/UL (ref 1.8–8)
NEUTS SEG NFR BLD: 53.3 % (ref 40–73)
NITRITE UR QL STRIP.AUTO: NEGATIVE
NRBC # BLD: 0 K/UL (ref 0–0.01)
NRBC BLD-RTO: 0 PER 100 WBC
PH UR STRIP: 7 (ref 5–8)
PLATELET # BLD AUTO: 249 K/UL (ref 135–420)
PMV BLD AUTO: 10.4 FL (ref 9.2–11.8)
POTASSIUM SERPL-SCNC: 4.2 MMOL/L (ref 3.5–5.5)
PROT UR STRIP-MCNC: NEGATIVE MG/DL
RBC # BLD AUTO: 3.88 M/UL (ref 4.2–5.3)
SODIUM SERPL-SCNC: 133 MMOL/L (ref 136–145)
SP GR UR REFRACTOMETRY: 1.01 (ref 1–1.03)
TROPONIN I SERPL HS-MCNC: 6 NG/L (ref 0–54)
UROBILINOGEN UR QL STRIP.AUTO: 0.2 EU/DL (ref 0.2–1)
WBC # BLD AUTO: 6.2 K/UL (ref 4.6–13.2)

## 2025-04-03 PROCEDURE — 85025 COMPLETE CBC W/AUTO DIFF WBC: CPT

## 2025-04-03 PROCEDURE — 93005 ELECTROCARDIOGRAM TRACING: CPT

## 2025-04-03 PROCEDURE — 99284 EMERGENCY DEPT VISIT MOD MDM: CPT

## 2025-04-03 PROCEDURE — 80048 BASIC METABOLIC PNL TOTAL CA: CPT

## 2025-04-03 PROCEDURE — 84484 ASSAY OF TROPONIN QUANT: CPT

## 2025-04-03 PROCEDURE — 93010 ELECTROCARDIOGRAM REPORT: CPT | Performed by: INTERNAL MEDICINE

## 2025-04-03 PROCEDURE — 81003 URINALYSIS AUTO W/O SCOPE: CPT

## 2025-04-03 ASSESSMENT — PAIN - FUNCTIONAL ASSESSMENT: PAIN_FUNCTIONAL_ASSESSMENT: NONE - DENIES PAIN

## 2025-04-03 ASSESSMENT — ENCOUNTER SYMPTOMS
COUGH: 0
ABDOMINAL PAIN: 0
NAUSEA: 0
CHEST TIGHTNESS: 0
VOMITING: 0
SHORTNESS OF BREATH: 0
CONSTIPATION: 1
BACK PAIN: 0

## 2025-04-03 NOTE — ED NOTES
After visit summary provided. Reviewed and educated patient on information; including, medication, follow-up appointments, and additional care instructions.  All questions and concerns addressed at this time.  Patient verbalized understanding of discharge teaching.  Provider aware of vitals at time of discharge.      Patient wheeled to lobby with son who is the ride home.

## 2025-04-03 NOTE — DISCHARGE INSTRUCTIONS
Call PCP for follow-up in office.  Adequate hydration. .   Return to ED for new or worsening/ symptoms.

## 2025-04-03 NOTE — ED TRIAGE NOTES
Patient arrived to ED via EMS from Home with complaints of hypertension.  Pts son took her blood pressure with home automatic cuff, and it read 200/100.  Son states that she has been taking her medications as prescribed.  Pt denies chest pain, shortness of breath, headache, and has no complaints or pain.     Per EMS /78, .    Hx of dementia, A&Ox1, HTN

## 2025-04-03 NOTE — ED PROVIDER NOTES
[facility] EMERGENCY DEPARTMENT  EMERGENCY DEPARTMENT ENCOUNTER      Pt Name: Mona Olea  MRN: [id number]  Birthdate 1945  Date of evaluation: 4/3/2025  Provider: BELINDA Maxwell  11:40 AM    CHIEF COMPLAINT       Chief Complaint   Patient presents with    Hypertension         HISTORY OF PRESENT ILLNESS    Mona Olea is a 80 y.o. female who presents to the emergency department with elevated blood pressure reading from home.     81 y/o F with pmhx of HTN, Severe dementia, hyperlipidemia presents to ED via EMS from Home for evaluation of hypertension.  Pts son took her blood pressure with home automatic cuff, and it read 200/100.  She has been taking her medications as prescribed. Her PCP recently decreased her BP medications. Pt denies chest pain, shortness of breath, headache, and has no complaints or pain.        Nursing Notes were reviewed.    REVIEW OF SYSTEMS       Review of Systems   Constitutional:  Negative for activity change, chills, fatigue and fever.   HENT:  Negative for congestion and ear pain.    Eyes:  Negative for visual disturbance.   Respiratory:  Negative for cough, chest tightness and shortness of breath.    Cardiovascular:  Negative for chest pain, palpitations and leg swelling.   Gastrointestinal:  Positive for constipation. Negative for abdominal pain, nausea and vomiting.   Genitourinary:  Negative for difficulty urinating, dysuria and flank pain.   Musculoskeletal:  Negative for back pain, myalgias, neck pain and neck stiffness.   Skin:  Negative for pallor and wound.   Neurological:  Negative for dizziness, seizures, syncope, weakness, light-headedness, numbness and headaches.   Hematological: Negative.    Psychiatric/Behavioral: Negative.         Except as noted above the remainder of the review of systems was reviewed and negative.       PAST MEDICAL HISTORY     Past Medical History:   Diagnosis Date    Anxiety     Arrhythmia 2011    Negative Stress test

## 2025-04-26 ENCOUNTER — HOSPITAL ENCOUNTER (EMERGENCY)
Facility: HOSPITAL | Age: 80
Discharge: HOME | End: 2025-04-29
Attending: STUDENT IN AN ORGANIZED HEALTH CARE EDUCATION/TRAINING PROGRAM
Payer: MEDICARE

## 2025-04-26 ENCOUNTER — APPOINTMENT (OUTPATIENT)
Facility: HOSPITAL | Age: 80
End: 2025-04-26
Payer: MEDICARE

## 2025-04-26 DIAGNOSIS — F03.90 DEMENTIA, UNSPECIFIED DEMENTIA SEVERITY, UNSPECIFIED DEMENTIA TYPE, UNSPECIFIED WHETHER BEHAVIORAL, PSYCHOTIC, OR MOOD DISTURBANCE OR ANXIETY (HCC): Primary | ICD-10-CM

## 2025-04-26 LAB
ALBUMIN SERPL-MCNC: 3.9 G/DL (ref 3.4–5)
ALBUMIN/GLOB SERPL: 1.3 (ref 0.8–1.7)
ALP SERPL-CCNC: 63 U/L (ref 45–117)
ALT SERPL-CCNC: 42 U/L (ref 13–56)
AMPHET UR QL SCN: NEGATIVE
ANION GAP SERPL CALC-SCNC: 3 MMOL/L (ref 3–18)
APAP SERPL-MCNC: <2 UG/ML (ref 10–30)
APPEARANCE UR: CLEAR
AST SERPL-CCNC: 29 U/L (ref 10–38)
BARBITURATES UR QL SCN: NEGATIVE
BENZODIAZ UR QL: NEGATIVE
BILIRUB SERPL-MCNC: 0.6 MG/DL (ref 0.2–1)
BILIRUB UR QL: NEGATIVE
BUN SERPL-MCNC: 14 MG/DL (ref 7–18)
BUN/CREAT SERPL: 17 (ref 12–20)
CALCIUM SERPL-MCNC: 10 MG/DL (ref 8.5–10.1)
CANNABINOIDS UR QL SCN: NEGATIVE
CHLORIDE SERPL-SCNC: 101 MMOL/L (ref 100–111)
CHP ED QC CHECK: NORMAL
CO2 SERPL-SCNC: 30 MMOL/L (ref 21–32)
COCAINE UR QL SCN: NEGATIVE
COLOR UR: YELLOW
CREAT SERPL-MCNC: 0.84 MG/DL (ref 0.6–1.3)
EKG ATRIAL RATE: 69 BPM
EKG DIAGNOSIS: NORMAL
EKG P AXIS: 95 DEGREES
EKG P-R INTERVAL: 106 MS
EKG Q-T INTERVAL: 410 MS
EKG QRS DURATION: 106 MS
EKG QTC CALCULATION (BAZETT): 439 MS
EKG R AXIS: -35 DEGREES
EKG T AXIS: 116 DEGREES
EKG VENTRICULAR RATE: 69 BPM
ERYTHROCYTE [DISTWIDTH] IN BLOOD BY AUTOMATED COUNT: 13 % (ref 11.6–14.5)
ETHANOL SERPL-MCNC: <3 MG/DL (ref 0–3)
GLOBULIN SER CALC-MCNC: 3.1 G/DL (ref 2–4)
GLUCOSE BLD STRIP.AUTO-MCNC: 97 MG/DL (ref 70–110)
GLUCOSE SERPL-MCNC: 91 MG/DL (ref 74–99)
GLUCOSE UR STRIP.AUTO-MCNC: NEGATIVE MG/DL
HCT VFR BLD AUTO: 36.8 % (ref 35–45)
HGB BLD-MCNC: 12.4 G/DL (ref 12–16)
HGB UR QL STRIP: NEGATIVE
KETONES UR QL STRIP.AUTO: NEGATIVE MG/DL
LEUKOCYTE ESTERASE UR QL STRIP.AUTO: NEGATIVE
Lab: NORMAL
MCH RBC QN AUTO: 32 PG (ref 24–34)
MCHC RBC AUTO-ENTMCNC: 33.7 G/DL (ref 31–37)
MCV RBC AUTO: 94.8 FL (ref 78–100)
METHADONE UR QL: NEGATIVE
NITRITE UR QL STRIP.AUTO: NEGATIVE
NRBC # BLD: 0 K/UL (ref 0–0.01)
NRBC BLD-RTO: 0 PER 100 WBC
OPIATES UR QL: NEGATIVE
PCP UR QL: NEGATIVE
PH UR STRIP: 6 (ref 5–8)
PLATELET # BLD AUTO: 249 K/UL (ref 135–420)
PMV BLD AUTO: 10 FL (ref 9.2–11.8)
POTASSIUM SERPL-SCNC: 4.3 MMOL/L (ref 3.5–5.5)
PROT SERPL-MCNC: 7 G/DL (ref 6.4–8.2)
PROT UR STRIP-MCNC: NEGATIVE MG/DL
RBC # BLD AUTO: 3.88 M/UL (ref 4.2–5.3)
SALICYLATES SERPL-MCNC: <1.7 MG/DL (ref 2.8–20)
SODIUM SERPL-SCNC: 134 MMOL/L (ref 136–145)
SP GR UR REFRACTOMETRY: 1.01 (ref 1–1.03)
UROBILINOGEN UR QL STRIP.AUTO: 0.2 EU/DL (ref 0.2–1)
WBC # BLD AUTO: 7.9 K/UL (ref 4.6–13.2)

## 2025-04-26 PROCEDURE — 94761 N-INVAS EAR/PLS OXIMETRY MLT: CPT

## 2025-04-26 PROCEDURE — 6370000000 HC RX 637 (ALT 250 FOR IP): Performed by: EMERGENCY MEDICINE

## 2025-04-26 PROCEDURE — 97165 OT EVAL LOW COMPLEX 30 MIN: CPT

## 2025-04-26 PROCEDURE — 80053 COMPREHEN METABOLIC PANEL: CPT

## 2025-04-26 PROCEDURE — 81003 URINALYSIS AUTO W/O SCOPE: CPT

## 2025-04-26 PROCEDURE — 99284 EMERGENCY DEPT VISIT MOD MDM: CPT

## 2025-04-26 PROCEDURE — 85027 COMPLETE CBC AUTOMATED: CPT

## 2025-04-26 PROCEDURE — 82077 ASSAY SPEC XCP UR&BREATH IA: CPT

## 2025-04-26 PROCEDURE — 93010 ELECTROCARDIOGRAM REPORT: CPT | Performed by: INTERNAL MEDICINE

## 2025-04-26 PROCEDURE — 93005 ELECTROCARDIOGRAM TRACING: CPT | Performed by: STUDENT IN AN ORGANIZED HEALTH CARE EDUCATION/TRAINING PROGRAM

## 2025-04-26 PROCEDURE — 80307 DRUG TEST PRSMV CHEM ANLYZR: CPT

## 2025-04-26 PROCEDURE — 80179 DRUG ASSAY SALICYLATE: CPT

## 2025-04-26 PROCEDURE — 74176 CT ABD & PELVIS W/O CONTRAST: CPT

## 2025-04-26 PROCEDURE — 82962 GLUCOSE BLOOD TEST: CPT

## 2025-04-26 PROCEDURE — 6370000000 HC RX 637 (ALT 250 FOR IP): Performed by: STUDENT IN AN ORGANIZED HEALTH CARE EDUCATION/TRAINING PROGRAM

## 2025-04-26 PROCEDURE — 80143 DRUG ASSAY ACETAMINOPHEN: CPT

## 2025-04-26 PROCEDURE — 97161 PT EVAL LOW COMPLEX 20 MIN: CPT

## 2025-04-26 RX ORDER — QUETIAPINE FUMARATE 25 MG/1
25 TABLET, FILM COATED ORAL ONCE
Status: COMPLETED | OUTPATIENT
Start: 2025-04-26 | End: 2025-04-26

## 2025-04-26 RX ORDER — QUETIAPINE FUMARATE 25 MG/1
25 TABLET, FILM COATED ORAL 2 TIMES DAILY
Status: DISCONTINUED | OUTPATIENT
Start: 2025-04-26 | End: 2025-04-27

## 2025-04-26 RX ADMIN — QUETIAPINE FUMARATE 25 MG: 25 TABLET ORAL at 23:43

## 2025-04-26 RX ADMIN — QUETIAPINE FUMARATE 25 MG: 25 TABLET ORAL at 17:11

## 2025-04-26 ASSESSMENT — PAIN SCALES - GENERAL: PAINLEVEL_OUTOF10: 0

## 2025-04-26 ASSESSMENT — LIFESTYLE VARIABLES
HOW MANY STANDARD DRINKS CONTAINING ALCOHOL DO YOU HAVE ON A TYPICAL DAY: PATIENT DOES NOT DRINK
HOW OFTEN DO YOU HAVE A DRINK CONTAINING ALCOHOL: NEVER

## 2025-04-26 ASSESSMENT — PAIN - FUNCTIONAL ASSESSMENT: PAIN_FUNCTIONAL_ASSESSMENT: 0-10

## 2025-04-26 NOTE — ED NOTES
Received pt to bed 7. Pt awake and alert sitting up in stretcher attached to cardiac monitor. VSS stable. NAD att. Pt waiting for CM evaluation

## 2025-04-26 NOTE — ED NOTES
Pt got up out of stretcher attempting to put clothes on. Pt assisted back into stretcher with rails up. Bedside table and call bell in reach. Stretcher at lowest position.

## 2025-04-26 NOTE — CARE COORDINATION
LETICIA called and spoke with the pt son Blade Osborne [phone], he stated the pt is not currently not safe at home. He stated \" her status conditions is making it difficult for me to manage.\"   Pt son states he is the paid caregiver and currently wants the pt placed in a long term care facility.    LETICIA informed son referral have been sent to [facility], will get back with him once there are acceptances. He stated \" I can not accept substandard facilities.\" CM informed him once there are acceptances he will have the option to choose facility.     Lisa Chiu BSN RN  Case Management  [phone]

## 2025-04-26 NOTE — ED TRIAGE NOTES
BIB EMS from home. Per EMS, pt defecated on herself and would not let son help. Hx dementia, son is primary caregiver. Son would like to have pt admitted to a nursing home.   Pt has no medical complaints.

## 2025-04-26 NOTE — PROGRESS NOTES
Past Surgical History:   Procedure Laterality Date    CHOLECYSTECTOMY  1990    gallstones    HYSTERECTOMY (CERVIX STATUS UNKNOWN)  1990    total hysterectomy       Home Situation:   Social/Functional History  Lives With: Son  Type of Home: House  Prior Level of Assist for ADLs: Needs assistance  Prior Level of Assist for Transfers: Needs assistance  []  Right hand dominant   []  Left hand dominant    Cognitive/Behavioral Status:  Orientation  Overall Orientation Status: Within Functional Limits  Orientation Level: Oriented X4  Cognition  Overall Cognitive Status: Exceptions  Following Commands: Follows one step commands with repetition;Follows one step commands with increased time  Attention Span: Difficulty dividing attention;Difficulty attending to directions  Memory: Decreased recall of recent events;Decreased short term memory  Safety Judgement: Decreased awareness of need for assistance  Problem Solving: Assistance required to generate solutions;Assistance required to implement solutions  Initiation: Requires cues for all  Sequencing: Requires cues for all    Skin: few bruises, overall skin intact  Edema: none noted    Vision/Perceptual:    Vision  Vision: Within Functional Limits       Coordination: BUE  Coordination: Generally decreased, functional       Balance:     Balance  Sitting: Intact  Standing: Impaired  Standing - Static: Fair  Standing - Dynamic: Fair    Strength: BUE  Strength: Generally decreased, functional    Tone & Sensation: BUE  Tone: Normal       Range of Motion: BUE  AROM: Within functional limits       Functional Mobility and Transfers for ADLs:  Bed Mobility:     Bed Mobility Training  Bed Mobility Training: Yes  Supine to Sit: Supervision  Sit to Supine: Supervision  Scooting: Supervision  Transfers:         Transfer Training  Transfer Training: Yes  Sit to Stand: Contact guard assistance  Stand to Sit: Contact guard assistance    ADL Assessment:   Feeding:

## 2025-04-26 NOTE — ED NOTES
I received the patient in turnover from Dr. Kiersten fernandez at the end of his shift.  The patient is a 80-year-old woman with past medical history significant for dementia, chronic kidney disease and hyperlipidemia.  She was brought to the ED today due to agitation.  She is taken care of by her son.  She defecated on herself and would not let her son help her clean her out.  Her son is no longer able to take care of her at this time.    Recent Results (from the past 24 hours)   Salicylate    Collection Time: 04/26/25  8:15 AM   Result Value Ref Range    Salicylate Lvl <1.7 (L) 2.8 - 20.0 MG/DL   Acetaminophen Level    Collection Time: 04/26/25  8:15 AM   Result Value Ref Range    Acetaminophen Level <2 (L) 10.0 - 30.0 ug/mL   Ethanol    Collection Time: 04/26/25  8:15 AM   Result Value Ref Range    Ethanol Lvl <3 0 - 3 MG/DL   POCT Glucose    Collection Time: 04/26/25  8:16 AM   Result Value Ref Range    POC Glucose 97 70 - 110 mg/dL   CBC    Collection Time: 04/26/25  8:19 AM   Result Value Ref Range    WBC 7.9 4.6 - 13.2 K/uL    RBC 3.88 (L) 4.20 - 5.30 M/uL    Hemoglobin 12.4 12.0 - 16.0 g/dL    Hematocrit 36.8 35.0 - 45.0 %    MCV 94.8 78.0 - 100.0 FL    MCH 32.0 24.0 - 34.0 PG    MCHC 33.7 31.0 - 37.0 g/dL    RDW 13.0 11.6 - 14.5 %    Platelets 249 135 - 420 K/uL    MPV 10.0 9.2 - 11.8 FL    Nucleated RBCs 0.0 0  WBC    nRBC 0.00 0.00 - 0.01 K/uL   Comprehensive Metabolic Panel    Collection Time: 04/26/25  8:19 AM   Result Value Ref Range    Sodium 134 (L) 136 - 145 mmol/L    Potassium 4.3 3.5 - 5.5 mmol/L    Chloride 101 100 - 111 mmol/L    CO2 30 21 - 32 mmol/L    Anion Gap 3 3.0 - 18 mmol/L    Glucose 91 74 - 99 mg/dL    BUN 14 7.0 - 18 MG/DL    Creatinine 0.84 0.6 - 1.3 MG/DL    BUN/Creatinine Ratio 17 12 - 20      Est, Glom Filt Rate 71 >60 ml/min/1.73m2    Calcium 10.0 8.5 - 10.1 MG/DL    Total Bilirubin 0.6 0.2 - 1.0 MG/DL    ALT 42 13 - 56 U/L    AST 29 10 - 38 U/L    Alk Phosphatase 63 45 - 117 U/L

## 2025-04-27 PROCEDURE — 6370000000 HC RX 637 (ALT 250 FOR IP): Performed by: EMERGENCY MEDICINE

## 2025-04-27 PROCEDURE — 90792 PSYCH DIAG EVAL W/MED SRVCS: CPT

## 2025-04-27 PROCEDURE — 94761 N-INVAS EAR/PLS OXIMETRY MLT: CPT

## 2025-04-27 RX ORDER — MEMANTINE HYDROCHLORIDE 10 MG/1
5 TABLET ORAL DAILY
Status: DISCONTINUED | OUTPATIENT
Start: 2025-04-27 | End: 2025-04-29 | Stop reason: HOSPADM

## 2025-04-27 RX ORDER — ATORVASTATIN CALCIUM 10 MG/1
10 TABLET, FILM COATED ORAL
Status: DISCONTINUED | OUTPATIENT
Start: 2025-04-27 | End: 2025-04-29 | Stop reason: HOSPADM

## 2025-04-27 RX ORDER — RISPERIDONE 0.25 MG/1
0.25 TABLET ORAL 2 TIMES DAILY
Status: DISCONTINUED | OUTPATIENT
Start: 2025-04-27 | End: 2025-04-29 | Stop reason: HOSPADM

## 2025-04-27 RX ORDER — LOSARTAN POTASSIUM 25 MG/1
25 TABLET ORAL EVERY MORNING
Status: DISCONTINUED | OUTPATIENT
Start: 2025-04-27 | End: 2025-04-29 | Stop reason: HOSPADM

## 2025-04-27 RX ORDER — DONEPEZIL HYDROCHLORIDE 5 MG/1
10 TABLET, FILM COATED ORAL NIGHTLY
Status: DISCONTINUED | OUTPATIENT
Start: 2025-04-27 | End: 2025-04-29 | Stop reason: HOSPADM

## 2025-04-27 RX ADMIN — RISPERIDONE 0.25 MG: 0.25 TABLET, FILM COATED ORAL at 20:32

## 2025-04-27 RX ADMIN — MEMANTINE 5 MG: 10 TABLET ORAL at 09:17

## 2025-04-27 RX ADMIN — SERTRALINE HYDROCHLORIDE 75 MG: 50 TABLET ORAL at 09:16

## 2025-04-27 RX ADMIN — RISPERIDONE 0.25 MG: 0.25 TABLET, FILM COATED ORAL at 09:17

## 2025-04-27 RX ADMIN — LOSARTAN POTASSIUM 25 MG: 25 TABLET, FILM COATED ORAL at 09:16

## 2025-04-27 RX ADMIN — DONEPEZIL HYDROCHLORIDE 10 MG: 5 TABLET ORAL at 20:32

## 2025-04-27 RX ADMIN — ATORVASTATIN CALCIUM 10 MG: 10 TABLET, FILM COATED ORAL at 20:32

## 2025-04-27 NOTE — CARE COORDINATION
spoke with patients son Blade Osborne at [phone] to discuss patients dispo. Per Mr Osborne patient discharge is not safe because patient do not have running water in her home and he is not able to properly care for patient without water.  asked Mr. Osborne about his other sibling and informed  that he brother lives out of town and restate to  that he is not agreeable to patients discharge. Patient have referrals placed in [facility] and case management team will follow-up on acceptances. Dr. Mendoza was notified.    Dori PYLEW   Case Management

## 2025-04-27 NOTE — ED NOTES
Hourly rounding completed on pt  Pt in no apparent distress at this time RR even and non-labored   Pt on stretcher with eyes closed

## 2025-04-27 NOTE — ED NOTES
Hourly rounding completed on pt  Pt in no apparent distress at this time, RR even and non-labored  Pt awake sitting up in bed, sitter next to pt. Pt has no needs at this time.

## 2025-04-27 NOTE — ED NOTES
Hourly rounding completed on pt  Pt in no apparent distress at this time   Pt on stretcher with eyes closed

## 2025-04-27 NOTE — VIRTUAL HEALTH
AM.    END OF NOTE  -------------------------                                  Mona Olea, was evaluated through a synchronous (real-time) audio-video encounter. The patient (and/or guardian if applicable) is aware that this is a billable service, which includes applicable co-pays. This virtual visit was conducted with patient's (and/or legal guardian's) consent. Patient identification was verified, and a caregiver was present when appropriate.  The patient was located at Facility (Appt Department): [facility] EMERGENCY DEPARTMENT  [address] [address]  Loc: [phone]  The provider was located at Home (City/State): [place]  Confirm you are appropriately licensed, registered, or certified to deliver care in the state where the patient is located as indicated above. If you are not or unsure, please re-schedule the visit: Yes, I confirm.   Brandt Consult to Tele-Psych  Consult performed by: Radha Moreno APRN - CNP  Consult ordered by: Juan C Mendoza MD  Reason for consult: psychiatric evaluation           Total time spent on this encounter: Not billed by time    --CAMERON Lopez CNP on 4/27/2025 at 6:13 AM    An electronic signature was used to authenticate this note.

## 2025-04-27 NOTE — ED NOTES
roAssumed care of pt.   Pt arrives complaining of needs placement, son unable to care for pt  Pt placed on monitors

## 2025-04-27 NOTE — ED NOTES
Incontinence and perineal care provided.  New brief, colton guidry, provided.      Pt sitting upright in bed, with word searches and other activities on bedside table.  Patient is in no acute distress, and denies any needs at this time.

## 2025-04-27 NOTE — ED NOTES
Pt brief saturated with urine, will not keep purwick in place. Pt brief and ana luisa changed, pt cleaned with wipes,pt easily followed commands. Pt given fresh sheet, blanket and pillow.

## 2025-04-27 NOTE — ED NOTES
I received the patient in turnover from Dr. Dejon obrien at the end of his shift.  The patient is a 80-year-old woman who was brought to the ED due to her family's inability to continue to care for her at home.  She has been evaluated by case management.  We are awaiting placement at this time.     Fariba Lou MD  04/27/25 1949

## 2025-04-27 NOTE — ED NOTES
Incontinence and perineal care provided.  New brief, chux, gown, and linens in place.  Patient able to stand with assistance while bed linens were changed.     Pt resting comfortably in bed, no acute distress noted or verbalized.  Patient sitting upright with coloring material set up on bedside table.

## 2025-04-27 NOTE — ED NOTES
Hourly rounding completed on pt  Pt playing with paper sitter at bedside  Pt on stretcher with eyes closed

## 2025-04-28 PROCEDURE — 6370000000 HC RX 637 (ALT 250 FOR IP): Performed by: EMERGENCY MEDICINE

## 2025-04-28 PROCEDURE — 94761 N-INVAS EAR/PLS OXIMETRY MLT: CPT

## 2025-04-28 RX ORDER — QUETIAPINE FUMARATE 25 MG/1
25 TABLET, FILM COATED ORAL ONCE
Status: COMPLETED | OUTPATIENT
Start: 2025-04-28 | End: 2025-04-28

## 2025-04-28 RX ADMIN — MEMANTINE 5 MG: 10 TABLET ORAL at 09:50

## 2025-04-28 RX ADMIN — SERTRALINE HYDROCHLORIDE 75 MG: 50 TABLET ORAL at 09:49

## 2025-04-28 RX ADMIN — LOSARTAN POTASSIUM 25 MG: 25 TABLET, FILM COATED ORAL at 09:49

## 2025-04-28 RX ADMIN — QUETIAPINE FUMARATE 25 MG: 25 TABLET ORAL at 04:13

## 2025-04-28 RX ADMIN — RISPERIDONE 0.25 MG: 0.25 TABLET, FILM COATED ORAL at 09:51

## 2025-04-28 NOTE — CARE COORDINATION
Called and spoke with Lauren cyr for [facility] inquired if they have a respite bed, she stated the family would have to private pay for respite. The pt Medicaid would not cover respite.    Lisa COTE RN  Case Management  [phone]

## 2025-04-28 NOTE — CARE COORDINATION
Received a call from the patient's son, Blade Osborne.  Patient information HIPAA verified.    Mr. Osborne advised that he has a repairman coming to fix the water main at his house at 4:30 and is hopeful that he can take his mother home at that time.  He expressed concern with being asked to provide choices within the next hour.  He was advised that without medical necessity to admit to inpatient, his mother could not be admitted.  He was also advised that the ER is not the best place for a patient to stay.  As a back-up plan, it was requested that he provide choice so that a place of respite could be found for her.    Mr. Osborne agreed to provide choice and was provided with this writer's phone number if needed.  He was advised that the patient would not be moved from the ER without his consent.

## 2025-04-28 NOTE — ED NOTES
Pt voiced no concerns at this time. Pt easy to arouse, alert and orientated. Allergies verified. Medicated as per MAR. Resperations even and unlabored. Sitter at bedside

## 2025-04-28 NOTE — ED NOTES
Bedside and Verbal shift change report given to Coral RN (oncoming nurse) by Ronald RN (offgoing nurse). Report included the following information Nurse Handoff Report, ED Encounter Summary, and ED SBAR.

## 2025-04-28 NOTE — CARE COORDINATION
Received voicemail to contact the patient's son, Delio Osborne.  Attempted to reach at 1800 but call not answered.  Voicemail left.    Addendum - Received a call from Mr. Osborne.  The water has not been fixed so he will plan to come and get Ms. Olea in the morning.  If it is fixed tonight, he may still come tonight.  He is aware that she will remain in the ER until that time.  The information provided in Lisa's note regarding respite care was provided to him as well.

## 2025-04-28 NOTE — ED NOTES
Bedside shift change report given to MARCOS Alfaro (oncoming nurse) by MARCOS Moncada (offgoing nurse). Report included the following information Nurse Handoff Report, ED Encounter Summary, ED SBAR, Adult Overview, and Neuro Assessment.      Will be entering 11th grade.   Pt. enjoys writing and wants to start Volleyball.

## 2025-04-28 NOTE — CARE COORDINATION
Pt has accepting facilities for LTC. Cm called pt son Blade [phone] to discuss facility selection, no answer, left voicemail to return call.    Lisa Chiu BSN RN  Case Management  [phone]

## 2025-04-28 NOTE — CARE COORDINATION
Called and spoke with pt son Blade regarding the pt discharge informed him the pt has accepting SNFs and requested he selects a SNF. He was verbally aggressive stating \"mother can not be discharged to an unsafe environment, as his proxy and advocate.\" LETICIA informed son a SNF is a safe discharge, and the accepting SNFs list was given to him. He then stated, \"I will need to do research on this facilities, then call you back.\" CM informed him, to please call within the hour so that we can make arrangement with the facility, he then stated \" I can't do the research right , am in the middle of digging a trench in the backyard to get water restored, and I will not be able to get back with you until about 430.\" LETICIA informed him to let me know as soon as possible, because the ED is not an appropriate  environment for the patient. Mr. Osborne then stated \" I know you just want her out of there to free up a room in the emergency room.\"  LETICIA informed him we have a safe discharge to a facility, the then stated he I want to escalate this and he requested  supervisors number which was provided.    CM remains available.    Lisa Chiu BSN RN  Case Management  [phone]

## 2025-04-28 NOTE — CARE COORDINATION
Voicemail received from \"Delio Osborne\" regarding placing his mother at [facility] if needed.  However, he does not want her moved unless he agrees to it.    Spoke with the care manager who will work on getting a respite bed from [facility].

## 2025-04-28 NOTE — ED NOTES
Assumed care of PT. Sitter in place. Pt resting at this time. Pt breathing even and unlabored. Pt not in distress at this time.    Pt coached back into the bed at this time.

## 2025-04-28 NOTE — ED NOTES
Pt had one episode of bladder incontinence. Pt cleaned, sheets changed, pt positioned for comfort, and blankets given.   Pt continually trying to get out of the bed. Pt coached back in the bed multiple times. Discussed with provider. Orders will follow.   Pt awake and alert. Allergies verified. Medicated as per MAR. Resperations even and unlabored.

## 2025-04-28 NOTE — ED NOTES
roAssumed care of pt.   Pt arrives complaining of additional means of care  Pt placed on monitors

## 2025-04-28 NOTE — ED NOTES
Pt resting in room. Breathing even and unlabored. Pt not in distress at this time.   Sitter at bedside

## 2025-04-29 VITALS
SYSTOLIC BLOOD PRESSURE: 160 MMHG | TEMPERATURE: 97.4 F | HEART RATE: 95 BPM | BODY MASS INDEX: 18.19 KG/M2 | HEIGHT: 68 IN | OXYGEN SATURATION: 98 % | RESPIRATION RATE: 18 BRPM | DIASTOLIC BLOOD PRESSURE: 76 MMHG | WEIGHT: 120 LBS

## 2025-04-29 PROCEDURE — 94761 N-INVAS EAR/PLS OXIMETRY MLT: CPT

## 2025-04-29 PROCEDURE — 6370000000 HC RX 637 (ALT 250 FOR IP): Performed by: EMERGENCY MEDICINE

## 2025-04-29 RX ADMIN — RISPERIDONE 0.25 MG: 0.25 TABLET, FILM COATED ORAL at 00:18

## 2025-04-29 RX ADMIN — DONEPEZIL HYDROCHLORIDE 10 MG: 5 TABLET ORAL at 00:19

## 2025-04-29 RX ADMIN — ATORVASTATIN CALCIUM 10 MG: 10 TABLET, FILM COATED ORAL at 00:20

## 2025-04-29 NOTE — DISCHARGE INSTRUCTIONS
Return to the ER for severe pain, difficulty breathing, debility tolerate food or fluids mouth, new fever, any other concerning signs or symptoms.

## 2025-04-29 NOTE — ED NOTES
Hourly rounding completed on pt  Pt in no apparent distress at this time  Pt sitting up on stretcher

## 2025-04-29 NOTE — ED NOTES
Pt changed out of gown and into clothes for discharge.     Son provided AVS, pt's medications and belongings.

## 2025-04-29 NOTE — ED NOTES
Son arrived to ED with aggressive mood, raising his voice stating \"This is the worst fucking care ever, you smell like piss.\" \"You haven't been bathed.\" This nurse verified with MARCOS Beyer that pt was recently offered and provided incot care, was provided clean linen from  closet and dressed by ED staff without difficulty. Son then begins yelling \"Excuse me excuse me, I need a fucking charge nurse.\" Son aggressive with all staff and mother at this time.

## 2025-04-29 NOTE — ED PROVIDER NOTES
5:59 AM :Pt care assumed from Dr. Lou , ED provider. Pt complaint(s), current treatment plan, progression and available diagnostic results have been discussed thoroughly. The patient was seen and evaluated on my shift.   Rounding occurred: Yes  Intended Disposition: TBD  Pending diagnostic reports and/or labs (please list): Dementia, change in behavior, family cares for her at home, would not let family provide hygiene, UA negative, PT/OT/CM, med rec, restart home meds, seroquel was given will get provider eval from [facility] for med recs         Juan C Mendoza MD  04/27/25 0603    Patient was seen by teleneurology nurse practitioner and she has experience managing dementia related aggression patient will need to have an outpatient psychiatrist if she does not currently have 1.  For the short-term she recommends risperidone 0.25 twice daily and will place the order until disposition is made.  This is a medication that will be taken until she sees a psychiatrist and is recommended only for the short-term. Juan C Mendoza, DO 6:39 AM       Juan C Mendoza MD  04/27/25 0685    Attempted to discuss discharge with the patient's family with care management.  Patient cannot leave at this time and there is a water main issue at the house.  The patient remains comfortable and was seen by her care manager again today and will continue placement for skilled nursing facilities.  Patient is comfortable at this time and has round-the-clock medications ordered. Juan C Mendoza, DO 9:43 AM       Juan C Mendoza MD  04/27/25 3681    
8:34 AM : Pt care assumed from Dr. Lares  ,ED provider. History of patient complaint(s), available diagnostic reports and current treatment plan has been discussed thoroughly.   Bedside rounding on patient occured : Yes  Medically cleared Yes  Pending case management disposition vs. Family taking her home, no acute medical problems.  Pending diagnostics reports and/or labs (please list): patient's family is here to take her home, will discharge with her son.    No results found for this or any previous visit (from the past 12 hours).        ED Course as of 04/29/25 0834   Sat Apr 26, 2025   0833 EKG: Sinus rhythm, heart rate 69, , QTc 439, left axis deviation, no signs of acute ischemic changes. [KS]   Mon Apr 28, 2025   1621 Case management update no destination [CB]   1621 Plumbing problems at house no water service [CB]   1622 Signed out to me by Dr. Hart case management patient no acute problems [CB]   1904 Likely discharge tomorrow per  [CB]   Tue Apr 29, 2025   0603 Case mgt, possibly to snf today? [LK]      ED Course User Index  [CB] Shane Cooper MD  [KS] William Meadows MD  [LK] Ritchie Bills MD Kitchen, Levi K, MD  04/29/25 0834    
respiratory distress.      Breath sounds: Normal breath sounds. No wheezing.   Abdominal:      General: Abdomen is flat. Bowel sounds are normal.      Palpations: Abdomen is soft.   Musculoskeletal:         General: Normal range of motion.      Cervical back: Normal range of motion and neck supple.   Skin:     General: Skin is warm and dry.   Neurological:      General: No focal deficit present.      Mental Status: She is alert. Mental status is at baseline.      Cranial Nerves: No cranial nerve deficit.      Sensory: No sensory deficit.      Motor: No weakness.      Coordination: Coordination normal.      Comments: Alert and orientated x 2   Psychiatric:         Mood and Affect: Mood normal.         Behavior: Behavior normal.           Diagnostic Study Results     Labs -  No results found for this or any previous visit (from the past 72 hours).    Radiologic Studies -   CT ABDOMEN PELVIS WO CONTRAST Additional Contrast? None   Final Result      1.  No acute abdominal pelvic findings.   2.  Cholecystectomy with mild reservoir effect.   3.  Sigmoid diverticulosis without diverticulitis.      Electronically signed by Arlette Dumont            Medical Decision Making   I am the first provider for this patient.    I reviewed the vital signs, available nursing notes, past medical history, past surgical history, family history and social history.    Vital Signs-Reviewed the patient's vital signs.      EKG: ed course       ED Course: Progress Notes, Reevaluation, and Consults:    Provider Notes (Medical Decision Making):     MDM  80-year-old female who presents with concerns for agitation.  Will consider acute on chronic dementia.  Will consider acute psychosis.  Low suspicion for acute cystitis.  Suspicion for pneumonia as patient is having any respiratory distress and afebrile.  Will obtain labs and imaging.  Patient urinalysis unremarkable.  Patient UDS unremarkable.  No other significant lab abnormalities.  Discussed

## 2025-05-17 DIAGNOSIS — F41.9 ANXIETY DISORDER, UNSPECIFIED TYPE: ICD-10-CM

## 2025-05-17 DIAGNOSIS — F03.C18 SEVERE DEMENTIA WITH OTHER BEHAVIORAL DISTURBANCE, UNSPECIFIED DEMENTIA TYPE (HCC): ICD-10-CM

## 2025-05-26 DIAGNOSIS — N30.00 ACUTE CYSTITIS WITHOUT HEMATURIA: ICD-10-CM

## 2025-05-26 RX ORDER — AMOXICILLIN 875 MG/1
875 TABLET, COATED ORAL 2 TIMES DAILY
Qty: 14 TABLET | Refills: 0 | OUTPATIENT
Start: 2025-05-26

## 2025-05-28 ENCOUNTER — TELEPHONE (OUTPATIENT)
Age: 80
End: 2025-05-28

## 2025-05-28 RX ORDER — SERTRALINE HYDROCHLORIDE 25 MG/1
75 TABLET, FILM COATED ORAL DAILY
Qty: 90 TABLET | Refills: 2 | Status: SHIPPED | OUTPATIENT
Start: 2025-05-28

## 2025-07-03 ENCOUNTER — TELEPHONE (OUTPATIENT)
Facility: CLINIC | Age: 80
End: 2025-07-03

## 2025-07-03 NOTE — TELEPHONE ENCOUNTER
Patient's son, Delio, called in requesting to have paperwork completed for his mom for a hospital bed.     Patient stated he was informed that his mother has been dismissed from the practice. Deilo was advised that he requested via [employer] message on 3/16/25 that after completion of her March DME orders that JAMAICA Larose would not have to worry about her standard of care and \"We will be finding a new PCP\". Delio denied that he made this request and stated he was looking at the message that was written, but only verbally repeated the part regarding his mother's standard of care. Patient began to over talk me when I tried to read the statement to him in it's entirety.     Delio then stated that we did not help him find an alternative PCP for his mother and that we were abandoning his mom as a patient. Delio was advised that we did not discharge his mom, but this was his request.     I advised Delio that JAMAICA Larose was out of the office this week and on vacation and his response was \"of course she is\". I advised Delio that I could send the request for completion to JAMAICA Larose to see if she would be willing to complete since the patient does not have a PCP at this time. Delio then responded \"I never told you that my mother did not have a PCP.\" I responded to Delio \"who is your mother's PCP\". Delio replied \"that's none of your business\". I then replied \"that is fine if you do not want to provide me with that information.\" I then asked Delio, \"why isn't your request for DME being put through to her current PCP then?\" Delio then stated to disregard the DME request for a hospital bed for his mom, he will go through his mom's Ortho physician \"like always\".     Delio then requested that a reference letter be completed by JAMAICA Larose regarding his presence and care of his mother is what has kept her out of the nursing home. I then asked Delio again why isn't he requesting this items from his mom's current PCP.     Delio became very frustrated during the call and

## 2025-07-03 NOTE — TELEPHONE ENCOUNTER
Patient's son, Delio, called in requesting to have a reference letter created stated that due to his presence in the home and care for his mother is what has kept her out of a nursing home.     Please see prior MyChart message

## 2025-07-21 DIAGNOSIS — F03.C18 SEVERE DEMENTIA WITH OTHER BEHAVIORAL DISTURBANCE, UNSPECIFIED DEMENTIA TYPE (HCC): ICD-10-CM

## 2025-07-26 RX ORDER — DONEPEZIL HYDROCHLORIDE 10 MG/1
10 TABLET, FILM COATED ORAL EVERY MORNING
Qty: 30 TABLET | Refills: 0 | Status: SHIPPED | OUTPATIENT
Start: 2025-07-26